# Patient Record
Sex: FEMALE | Race: WHITE | NOT HISPANIC OR LATINO | Employment: OTHER | URBAN - METROPOLITAN AREA
[De-identification: names, ages, dates, MRNs, and addresses within clinical notes are randomized per-mention and may not be internally consistent; named-entity substitution may affect disease eponyms.]

---

## 2023-11-06 ENCOUNTER — APPOINTMENT (OUTPATIENT)
Dept: RADIOLOGY | Facility: HOSPITAL | Age: 73
End: 2023-11-06
Payer: COMMERCIAL

## 2024-02-21 ENCOUNTER — HOSPITAL ENCOUNTER (EMERGENCY)
Facility: HOSPITAL | Age: 74
Discharge: LEFT BEFORE DISCHARGE | End: 2024-02-21
Payer: COMMERCIAL

## 2024-02-21 ENCOUNTER — APPOINTMENT (OUTPATIENT)
Dept: RADIOLOGY | Facility: HOSPITAL | Age: 74
End: 2024-02-21
Payer: COMMERCIAL

## 2024-02-21 VITALS
BODY MASS INDEX: 31.89 KG/M2 | DIASTOLIC BLOOD PRESSURE: 113 MMHG | HEART RATE: 73 BPM | TEMPERATURE: 98.1 F | RESPIRATION RATE: 19 BRPM | OXYGEN SATURATION: 97 % | HEIGHT: 63 IN | WEIGHT: 180 LBS | SYSTOLIC BLOOD PRESSURE: 145 MMHG

## 2024-02-21 LAB
ALBUMIN SERPL-MCNC: 3.5 G/DL (ref 3.5–5)
ALP BLD-CCNC: 123 U/L (ref 35–125)
ALT SERPL-CCNC: 11 U/L (ref 5–40)
ANION GAP SERPL CALC-SCNC: 10 MMOL/L
APPEARANCE UR: CLEAR
AST SERPL-CCNC: 17 U/L (ref 5–40)
BASOPHILS # BLD AUTO: 0.02 X10*3/UL (ref 0–0.1)
BASOPHILS NFR BLD AUTO: 0.2 %
BILIRUB SERPL-MCNC: 0.3 MG/DL (ref 0.1–1.2)
BILIRUB UR STRIP.AUTO-MCNC: NEGATIVE MG/DL
BUN SERPL-MCNC: 11 MG/DL (ref 8–25)
CALCIUM SERPL-MCNC: 9.3 MG/DL (ref 8.5–10.4)
CHLORIDE SERPL-SCNC: 97 MMOL/L (ref 97–107)
CO2 SERPL-SCNC: 30 MMOL/L (ref 24–31)
COLOR UR: ABNORMAL
CREAT SERPL-MCNC: 0.9 MG/DL (ref 0.4–1.6)
EGFRCR SERPLBLD CKD-EPI 2021: 68 ML/MIN/1.73M*2
EOSINOPHIL # BLD AUTO: 0.06 X10*3/UL (ref 0–0.4)
EOSINOPHIL NFR BLD AUTO: 0.6 %
ERYTHROCYTE [DISTWIDTH] IN BLOOD BY AUTOMATED COUNT: 15.9 % (ref 11.5–14.5)
GLUCOSE SERPL-MCNC: 102 MG/DL (ref 65–99)
GLUCOSE UR STRIP.AUTO-MCNC: NORMAL MG/DL
HCT VFR BLD AUTO: 43.5 % (ref 36–46)
HGB BLD-MCNC: 14.1 G/DL (ref 12–16)
IMM GRANULOCYTES # BLD AUTO: 0.03 X10*3/UL (ref 0–0.5)
IMM GRANULOCYTES NFR BLD AUTO: 0.3 % (ref 0–0.9)
KETONES UR STRIP.AUTO-MCNC: NEGATIVE MG/DL
LEUKOCYTE ESTERASE UR QL STRIP.AUTO: ABNORMAL
LIPASE SERPL-CCNC: 16 U/L (ref 16–63)
LYMPHOCYTES # BLD AUTO: 2.05 X10*3/UL (ref 0.8–3)
LYMPHOCYTES NFR BLD AUTO: 21.5 %
MCH RBC QN AUTO: 30.5 PG (ref 26–34)
MCHC RBC AUTO-ENTMCNC: 32.4 G/DL (ref 32–36)
MCV RBC AUTO: 94 FL (ref 80–100)
MONOCYTES # BLD AUTO: 0.49 X10*3/UL (ref 0.05–0.8)
MONOCYTES NFR BLD AUTO: 5.1 %
NEUTROPHILS # BLD AUTO: 6.89 X10*3/UL (ref 1.6–5.5)
NEUTROPHILS NFR BLD AUTO: 72.3 %
NITRITE UR QL STRIP.AUTO: NEGATIVE
NRBC BLD-RTO: 0 /100 WBCS (ref 0–0)
PH UR STRIP.AUTO: 7 [PH]
PLATELET # BLD AUTO: 316 X10*3/UL (ref 150–450)
POTASSIUM SERPL-SCNC: 3.2 MMOL/L (ref 3.4–5.1)
PROT SERPL-MCNC: 7.6 G/DL (ref 5.9–7.9)
PROT UR STRIP.AUTO-MCNC: NEGATIVE MG/DL
RBC # BLD AUTO: 4.63 X10*6/UL (ref 4–5.2)
RBC # UR STRIP.AUTO: NEGATIVE /UL
RBC #/AREA URNS AUTO: NORMAL /HPF
SODIUM SERPL-SCNC: 137 MMOL/L (ref 133–145)
SP GR UR STRIP.AUTO: 1.01
SQUAMOUS #/AREA URNS AUTO: NORMAL /HPF
TROPONIN T SERPL-MCNC: 21 NG/L
UROBILINOGEN UR STRIP.AUTO-MCNC: NORMAL MG/DL
WBC # BLD AUTO: 9.5 X10*3/UL (ref 4.4–11.3)
WBC #/AREA URNS AUTO: NORMAL /HPF

## 2024-02-21 PROCEDURE — 80053 COMPREHEN METABOLIC PANEL: CPT | Performed by: STUDENT IN AN ORGANIZED HEALTH CARE EDUCATION/TRAINING PROGRAM

## 2024-02-21 PROCEDURE — 85025 COMPLETE CBC W/AUTO DIFF WBC: CPT | Performed by: STUDENT IN AN ORGANIZED HEALTH CARE EDUCATION/TRAINING PROGRAM

## 2024-02-21 PROCEDURE — 71046 X-RAY EXAM CHEST 2 VIEWS: CPT | Mod: FOREIGN READ | Performed by: RADIOLOGY

## 2024-02-21 PROCEDURE — 99284 EMERGENCY DEPT VISIT MOD MDM: CPT | Mod: 25

## 2024-02-21 PROCEDURE — 83690 ASSAY OF LIPASE: CPT | Performed by: CLINICAL NURSE SPECIALIST

## 2024-02-21 PROCEDURE — 87086 URINE CULTURE/COLONY COUNT: CPT | Mod: TRILAB,WESLAB | Performed by: CLINICAL NURSE SPECIALIST

## 2024-02-21 PROCEDURE — 84484 ASSAY OF TROPONIN QUANT: CPT | Performed by: STUDENT IN AN ORGANIZED HEALTH CARE EDUCATION/TRAINING PROGRAM

## 2024-02-21 PROCEDURE — 81001 URINALYSIS AUTO W/SCOPE: CPT | Performed by: CLINICAL NURSE SPECIALIST

## 2024-02-21 PROCEDURE — 74176 CT ABD & PELVIS W/O CONTRAST: CPT

## 2024-02-21 PROCEDURE — 36415 COLL VENOUS BLD VENIPUNCTURE: CPT | Performed by: STUDENT IN AN ORGANIZED HEALTH CARE EDUCATION/TRAINING PROGRAM

## 2024-02-21 PROCEDURE — 74176 CT ABD & PELVIS W/O CONTRAST: CPT | Mod: FOREIGN READ | Performed by: RADIOLOGY

## 2024-02-21 PROCEDURE — 71046 X-RAY EXAM CHEST 2 VIEWS: CPT

## 2024-02-21 RX ORDER — ACETAMINOPHEN 325 MG/1
650 TABLET ORAL ONCE
Status: DISCONTINUED | OUTPATIENT
Start: 2024-02-21 | End: 2024-02-22 | Stop reason: HOSPADM

## 2024-02-21 RX ORDER — IPRATROPIUM BROMIDE AND ALBUTEROL SULFATE 2.5; .5 MG/3ML; MG/3ML
3 SOLUTION RESPIRATORY (INHALATION)
Status: DISCONTINUED | OUTPATIENT
Start: 2024-02-21 | End: 2024-02-22 | Stop reason: HOSPADM

## 2024-02-21 RX ORDER — ONDANSETRON HYDROCHLORIDE 2 MG/ML
4 INJECTION, SOLUTION INTRAVENOUS ONCE
Status: DISCONTINUED | OUTPATIENT
Start: 2024-02-21 | End: 2024-02-22 | Stop reason: HOSPADM

## 2024-02-21 ASSESSMENT — PAIN DESCRIPTION - FREQUENCY: FREQUENCY: CONSTANT/CONTINUOUS

## 2024-02-21 ASSESSMENT — PAIN DESCRIPTION - DESCRIPTORS: DESCRIPTORS: TIGHTNESS

## 2024-02-21 ASSESSMENT — PAIN DESCRIPTION - PAIN TYPE: TYPE: ACUTE PAIN

## 2024-02-21 ASSESSMENT — PAIN DESCRIPTION - ORIENTATION: ORIENTATION: ANTERIOR

## 2024-02-21 ASSESSMENT — PAIN - FUNCTIONAL ASSESSMENT: PAIN_FUNCTIONAL_ASSESSMENT: 0-10

## 2024-02-21 ASSESSMENT — PAIN DESCRIPTION - LOCATION: LOCATION: HEAD

## 2024-02-21 ASSESSMENT — PAIN SCALES - GENERAL: PAINLEVEL_OUTOF10: 8

## 2024-02-22 ENCOUNTER — HOSPITAL ENCOUNTER (EMERGENCY)
Facility: HOSPITAL | Age: 74
Discharge: HOME | End: 2024-02-22
Attending: STUDENT IN AN ORGANIZED HEALTH CARE EDUCATION/TRAINING PROGRAM
Payer: COMMERCIAL

## 2024-02-22 ENCOUNTER — PHARMACY VISIT (OUTPATIENT)
Dept: PHARMACY | Facility: CLINIC | Age: 74
End: 2024-02-22
Payer: MEDICARE

## 2024-02-22 VITALS
BODY MASS INDEX: 31.89 KG/M2 | HEIGHT: 63 IN | TEMPERATURE: 96.8 F | SYSTOLIC BLOOD PRESSURE: 162 MMHG | RESPIRATION RATE: 16 BRPM | WEIGHT: 180 LBS | OXYGEN SATURATION: 99 % | DIASTOLIC BLOOD PRESSURE: 82 MMHG | HEART RATE: 73 BPM

## 2024-02-22 DIAGNOSIS — I10 PRIMARY HYPERTENSION: ICD-10-CM

## 2024-02-22 DIAGNOSIS — H66.003 ACUTE SUPPURATIVE OTITIS MEDIA OF BOTH EARS WITHOUT SPONTANEOUS RUPTURE OF TYMPANIC MEMBRANES, RECURRENCE NOT SPECIFIED: Primary | ICD-10-CM

## 2024-02-22 DIAGNOSIS — J01.90 ACUTE SINUSITIS, RECURRENCE NOT SPECIFIED, UNSPECIFIED LOCATION: ICD-10-CM

## 2024-02-22 PROCEDURE — 99283 EMERGENCY DEPT VISIT LOW MDM: CPT

## 2024-02-22 PROCEDURE — 2500000004 HC RX 250 GENERAL PHARMACY W/ HCPCS (ALT 636 FOR OP/ED): Performed by: PHYSICIAN ASSISTANT

## 2024-02-22 PROCEDURE — 2500000002 HC RX 250 W HCPCS SELF ADMINISTERED DRUGS (ALT 637 FOR MEDICARE OP, ALT 636 FOR OP/ED): Performed by: PHYSICIAN ASSISTANT

## 2024-02-22 PROCEDURE — RXMED WILLOW AMBULATORY MEDICATION CHARGE

## 2024-02-22 RX ORDER — PREDNISONE 20 MG/1
40 TABLET ORAL ONCE
Status: COMPLETED | OUTPATIENT
Start: 2024-02-22 | End: 2024-02-22

## 2024-02-22 RX ORDER — AMOXICILLIN AND CLAVULANATE POTASSIUM 875; 125 MG/1; MG/1
1 TABLET, FILM COATED ORAL EVERY 12 HOURS
Qty: 14 TABLET | Refills: 0 | Status: SHIPPED | OUTPATIENT
Start: 2024-02-22 | End: 2024-02-29

## 2024-02-22 RX ORDER — PREDNISONE 20 MG/1
TABLET ORAL
Qty: 13 TABLET | Refills: 0 | Status: SHIPPED | OUTPATIENT
Start: 2024-02-22 | End: 2024-03-02

## 2024-02-22 RX ORDER — IPRATROPIUM BROMIDE AND ALBUTEROL SULFATE 2.5; .5 MG/3ML; MG/3ML
3 SOLUTION RESPIRATORY (INHALATION) ONCE
Status: COMPLETED | OUTPATIENT
Start: 2024-02-22 | End: 2024-02-22

## 2024-02-22 RX ADMIN — IPRATROPIUM BROMIDE AND ALBUTEROL SULFATE 3 ML: 2.5; .5 SOLUTION RESPIRATORY (INHALATION) at 10:41

## 2024-02-22 RX ADMIN — PREDNISONE 40 MG: 20 TABLET ORAL at 10:41

## 2024-02-22 ASSESSMENT — COLUMBIA-SUICIDE SEVERITY RATING SCALE - C-SSRS
6. HAVE YOU EVER DONE ANYTHING, STARTED TO DO ANYTHING, OR PREPARED TO DO ANYTHING TO END YOUR LIFE?: NO
2. HAVE YOU ACTUALLY HAD ANY THOUGHTS OF KILLING YOURSELF?: NO
1. IN THE PAST MONTH, HAVE YOU WISHED YOU WERE DEAD OR WISHED YOU COULD GO TO SLEEP AND NOT WAKE UP?: NO

## 2024-02-22 NOTE — ED TRIAGE NOTES
Patient presents to the emergency department for multiple complaints.  Patient reports has been sick for 3 weeks with a sinus congestion.  Was given an inhaler which seemed to help her symptoms but today went to use it and had to be throw up.  She is also being treated by her primary care physician for blood pressure she was started on new blood pressure medication 2 weeks ago and noted that her blood pressure was elevated.  She complains of a headache described as a vice on her forehead related to her sinus pressure.  No fall or injury no blood thinners.  She has a cough with congestion that is productive.  She is a smoker.  Intermittent wheezing and shortness of breath.  Complains of intermittent chest pain that started on her way into the emergency department via EMS no pain at this time.  And also abdominal pain with history of diverticulitis.  She denies pressure burning or frequency of urination.  No increased weight gain  EMS report noted to have pressure of 203 systolic.  Patient states she is very sensitive to medications.  Has  5 times secondary to medication use.  Clinical presentation:  General: Calm cooperative well-nourished well-hydrated nontoxic in female no acute distress.  Blood pressure is elevated 145/113  HEENT: Head normocephalic atraumatic speech is clear facial movement symmetrical no focal deficits noted.  Pupils are 3 brisk reactive to light.  No pain palpation  Frontal sinuses.  Bilateral tympanic membranes pearly gray and intact.  No sign of otitis media otitis externa or mastoiditis.  Posterior pharynx not erythematous no exudate noted uvula midline palate is symmetrical  Neck: No pain palpation of the cervical spine no step-offs crepitus or bruising.  No cervical adenopathy trachea midline no stridor trismus noted  Heart: Regular rate and rhythm S1-S2 no murmurs rubs or gallops  Lungs: Diminished loose barky nonproductive cough scattered wheezing bilateral upper posterior lobes no  use of accessory muscles or nasal flaring noted.  Cough is nonproductive no pursed of breathing or tripod positioning noted  Abdomen: Soft diffuse tenderness no rebound or guarding negative for [place] sign abraised point tenderness.  No peritoneal signs noted.  Back: No pain palpation of the cervical thoracic or lumbar spine no step-offs crepitus or bruising no CVA tenderness no saddle paresthesias      Differentials include but not limited to viral illness COVID versus flu versus RSV versus pneumonia versus COPD exacerbation versus electrolyte abnormality versus dehydration versus acute coronary syndrome versus obstruction versus diverticulitis versus acute cholecystitis versus UTI    Plan:  Tylenol  DuoNeb  CT abdomen pelvis  Chest x-ray  EKG  CBC  CMP  Lipase  RSV COVID flu  Troponin  Urine    Patient seen via provider in triage.  There was a limited examination due to the triage capacity.  The appropriate studies have been ordered in triage.  Patient should be seen for full evaluation/examination as well as follow-up for any completed imaging or lab work.  Parts of this chart  have been completed using voice recognition software.  Please excuse any errors of transcription.  Despite the medical decision-making time stamp above medical decision making has taken place during the patient's entire visit.

## 2024-02-22 NOTE — ED PROVIDER NOTES
Patient was seen by both myself and advanced practitioner.  I personally saw the patient and made/approved the management plan and take responsibility for the patient management     Patient is a 73-year-old female with a history of hypertension that presents here to department for evaluation of anxiety, mild shortness of breath on exertion and a mild tension-like headache.  Patient states that she was seen in the emergency department yesterday however left without receiving her results prior to being placed in inpatient exam room.  She states that she has been having increasing anxiety recently due to her current living situation with her daughter however today is supposed to be moving in with another daughter and her son-in-law in [place].  She states this is making her feel much more relaxed and safe.  She states she still has some mild shortness of breath on exertion however the headache has significantly improved.  She denies neck pain or stiffness, fever, chills, abdominal pain, nausea or vomiting.  Patient came back in today because she was concerned and wanted to follow-up on her results from yesterday.    On exam patient resting comfortably in no obvious distress.  She is awake, alert and oriented.  She does have very mild expiratory wheeze present bilaterally however there is no respiratory distress and oxygen saturation is normal at 99% on room air.  She does have bilateral erythema and middle ear effusion but the tympanic membranes.  Regular rate and rhythm on auscultation of the heart.  Abdomen soft, nontender, nondistended.  Reviewed patient's blood work from yesterday which was unremarkable including only slightly decreased potassium at 3.2, otherwise normal electrolytes.  She has normal white count, slight leukocyte esterase present in the urine however no bacteria and patient does not have any urinary symptoms.  History and exam does appear to be consistent with an upper respiratory tract infection  and given her history of COPD with slight wheezing patient will be started on p.o. prednisone.  She does have inhaler at home.  She also has otitis media on clinical exam and given that it is bilateral and has been going on for 2 weeks she will be started on Augmentin.  Patient has remained hemodynamically stable and resting comfortably on reevaluation and is felt to be safe for discharge home at this time.  Return precautions were discussed with patient.     Kalpesh Sevilla, DO  02/22/24 6151

## 2024-02-22 NOTE — ED PROVIDER NOTES
HPI   Chief Complaint   Patient presents with    Hypertension     Pt to ER for eval of high blood pressure readings @ home, states she has been more stressed recently due to living situation. Pt states she was placed on medication for HTN 2 weeks ago. Pt has chronic sinusitis as well. Denies any CP, SOB, headache and blurry vision. Pt ambulated to room no distress noted.        A 73-year-old female who presents emergency department due to follow-up on imaging and laboratory results from yesterday evening.  Patient left the emergency department yesterday evening without receiving results of tests.  She was seen by the provider in triage but then did not want a wait to see a provider.  She presented to the emergency department due to concerns of high blood pressure with a tension-like headache.  She states that she also has 2 weeks of sinus congestion but she has a history of chronic sinusitis since she can remember.  She states she is living with her son and daughter in law.  She states that her daughter-in-law causes her stress and it is not a safe environment for her to live in.  She states that she has arranged plans to go live with her daughter and son-in-law in [place].  She is going there today.  She previously lived with her son in [place] and she feels safe there.  She states that she believes her high blood pressure was related to her current living situation.  She states that when her daughter in law causes her stress she will have a tension type headache and experience shortness of breath.  She will have elevated blood pressure at this time.  Currently she has a mild headache in the frontal region.  She denies any current shortness of breath, chest pain, abdominal pain, fevers or chills.  She has no numbness or tingling in the upper or lower extremities bilaterally.  She denies any visual disturbances.  She states she has nasal congestion 2 weeks.  She tried to get in with her primary care doctor today  to follow-up on her test results from the emergency department yesterday evening however she did not have a ride to her primary care doctor's office so she called EMS to bring her back to the emergency department.  She takes losartan 50 mg every morning for blood pressure.        Please see HPI for pertinent positive and negative ROS.                   [place] Coma Scale Score: 15                     Patient History   No past medical history on file.  No past surgical history on file.  No family history on file.  Social History     Tobacco Use    Smoking status: Not on file    Smokeless tobacco: Not on file   Substance Use Topics    Alcohol use: Not on file    Drug use: Not on file       Physical Exam   ED Triage Vitals [02/22/24 0853]   Temperature Heart Rate Respirations BP   36 °C (96.8 °F) 93 18 166/86      Pulse Ox Temp src Heart Rate Source Patient Position   99 % -- -- --      BP Location FiO2 (%)     -- --       Physical Exam  GENERAL APPEARANCE: Awake and alert. No acute distress.   VITAL SIGNS: As per the nurses' triage record.  HEENT: Normocephalic, atraumatic. Extraocular muscles are intact. Conjunctiva are pink. Negative scleral icterus. Mucous membranes are moist. Tongue in the midline. Oropharynx clear, uvula midline.   NECK: Soft, nontender and supple, full gross ROM, no meningeal signs.  TMs dull with erythema bilaterally.  No mastoid bone erythema or edema bilaterally.  Swollen erythematous inferior turbinates bilaterally.  CHEST: Nontender to palpation.  Diffuse return expiratory wheezing bilaterally.  Symmetric rise and fall of chest wall.   HEART: Clear S1 and S2. Regular rate and rhythm. No murmurs appreciated on auscultation.  Strong and equal pulses in the extremities.  ABDOMEN: Soft, nontender, nondistended, positive bowel sounds, no palpable masses.  MUSCULOSKELETAL: The calves are nontender to palpation.  No pedal edema bilaterally.  Full gross active range of motion. Ambulating on own  with no acute difficulties  NEUROLOGICAL: Awake, alert and oriented x 3. Motor power intact in the upper and lower extremities. Sensation is intact to light touch in the upper and lower extremities. Patient answering questions appropriately.  Cranial nerves II through XII grossly intact bilaterally.  IMMUNOLOGICAL: No lymphatic streaking noted  DERMATOLOGIC: Warm and dry without petechiae, rashes, or ecchymosis noted on visible skin.   PYSCH: Cooperative with appropriate mood and affect.  ED Course & MDM   Diagnoses as of 02/22/24 1937   Acute suppurative otitis media of both ears without spontaneous rupture of tympanic membranes, recurrence not specified   Acute sinusitis, recurrence not specified, unspecified location   Primary hypertension       Medical Decision Making  Parts of this chart have been completed using voice recognition software. Please excuse any errors of transcription.  My thought process and reason for plan has been formulated from the time that I saw the patient until the time of disposition and is not specific to one specific moment during their visit and furthermore my MDM encompasses this entire chart and not only this text box.      HPI: Detailed above.    Exam: A medically appropriate exam performed, outlined above, given the known history and presentation.    History obtained from: Patient    Social Determinants of Health considered during this visit: The patient was currently living with her son and daughter-in-law.  She states that her daughter-in-law made her feel stressed.  She believes that is contributing to her high blood pressure.  The patient states that she has plans to move in with her son in [place] today.  She is very happy with this plan and feels safe with this plan.  She states that all of the plans are in place to move in with her son in [place] today.  She states she does not need to talk to a  in regards to her current living situation as she already has  plans in place to move into a safe environment today with her son in [place].    Medications given during visit:  Medications   ipratropium-albuteroL (Duo-Neb) 0.5-2.5 mg/3 mL nebulizer solution 3 mL (3 mL nebulization Given 2/22/24 1041)   predniSONE (Deltasone) tablet 40 mg (40 mg oral Given 2/22/24 1041)        Diagnostic/tests  Labs Reviewed - No data to display   No orders to display        Considerations/further MDM:  Patient was seen in conjucntion with my supervising physician,  Dr. Sevilla. Please refer to his note.    This is a 73-year-old female who presents to the emergency department with concerns of mild shortness of breath and hypertension with tension-like headache.  The patient was seen in the emergency department yesterday night, she was pitted but did not see provider as patient left before receiving results.  She is back to receive results.     Laboratory evaluation was fairly unremarkable other than a potassium of 3.2.  The patient's electrolytes were within normal limits.  CBC showed no leukocytosis or evidence of acute anemia.  Troponin T HS 21.  Urinalysis showed 25 leukocytes but no bacteria and patient has no urinary complaints.    Patient's blood pressure was 162/82 in the emergency department.  She clinically appears to have acute sinusitis with bilateral acute otitis media.  She also had some inspiratory expiratory wheezing bilaterally which patient was treated for with a DuoNeb.  Her symptoms improved.  Of note chest x-ray yesterday night showed no acute cardiopulmonary process.  No leukocytosis so no concern for pneumonia.     CT of abdomen pelvis with out IV contrast was done yesterday as well which showed evidence of vaginal versus rectal prolapse compared to previous study this has increased.  Patient knows of this.  There was mild to moderate left hydronephrosis and mild ureterectasis without ureteral calculus demonstrated.  No evidence of urinary tract infection on urinalysis  yesterday.  Only 25 leukocytes.     The patient was discharged with a prescription for prednisone, Augmentin.     The patient was evaluated in the emergency department and an etiology requiring emergent treatment or admission to hospital was not identified.  All labs, imaging, and diagnostic studies were reviewed by me and the patient was counseled on clinical impression, expectations, and plan.  Patient was educated to follow-up with PCP in the following 1 to 2 days.  The patient felt comfortable with discharge plan home.  She is moving in with her son today and is happy and safe with this plan.  All questions from patient were answered.  They elicited understanding and were agreeable to course of treatment.  Patient was discharged in stable condition and given strict return precautions including new or worsening symptoms.    Procedure  Procedures     Audrey Whelan PA-C  02/22/24 1937

## 2024-02-22 NOTE — DISCHARGE INSTRUCTIONS
Follow-up with your primary care doctor in the next 1 to 2 days.  Listed on your discharge paperwork is a ENT doctor for you to follow-up with.  Please call his office to schedule an appointment.  Please to continue losartan as prescribed.    Please start Augmentin for sinusitis and ear infection and prednisone.  Continue albuterol inhaler    Return to the emergency department asap with new or worsening symptoms or the onset of new symptoms.

## 2024-02-22 NOTE — ED TRIAGE NOTES
Pt to ER for eval of high blood pressure readings @ home, states she has been more stressed recently due to living situation. Pt states she was placed on medication for HTN 2 weeks ago. Pt has chronic sinusitis as well. Denies any CP, SOB, headache and blurry vision. Pt ambulated to room no distress noted

## 2024-02-23 LAB — BACTERIA UR CULT: NORMAL

## 2024-03-27 ENCOUNTER — HOSPITAL ENCOUNTER (INPATIENT)
Facility: HOSPITAL | Age: 74
LOS: 5 days | Discharge: HOME | DRG: 193 | End: 2024-04-01
Attending: STUDENT IN AN ORGANIZED HEALTH CARE EDUCATION/TRAINING PROGRAM | Admitting: INTERNAL MEDICINE
Payer: COMMERCIAL

## 2024-03-27 ENCOUNTER — APPOINTMENT (OUTPATIENT)
Dept: CARDIOLOGY | Facility: HOSPITAL | Age: 74
DRG: 193 | End: 2024-03-27
Payer: COMMERCIAL

## 2024-03-27 ENCOUNTER — APPOINTMENT (OUTPATIENT)
Dept: RADIOLOGY | Facility: HOSPITAL | Age: 74
DRG: 193 | End: 2024-03-27
Payer: COMMERCIAL

## 2024-03-27 DIAGNOSIS — I48.91 ATRIAL FIBRILLATION WITH RAPID VENTRICULAR RESPONSE (MULTI): ICD-10-CM

## 2024-03-27 DIAGNOSIS — J40 BRONCHITIS: ICD-10-CM

## 2024-03-27 DIAGNOSIS — I50.43 CHF (CONGESTIVE HEART FAILURE), NYHA CLASS I, ACUTE ON CHRONIC, COMBINED (MULTI): ICD-10-CM

## 2024-03-27 PROBLEM — R06.00 DYSPNEA: Status: RESOLVED | Noted: 2024-03-27 | Resolved: 2024-03-27

## 2024-03-27 PROBLEM — R53.83 FATIGUE: Status: RESOLVED | Noted: 2023-10-19 | Resolved: 2024-03-27

## 2024-03-27 PROBLEM — E87.6 HYPOKALEMIA: Status: RESOLVED | Noted: 2023-10-19 | Resolved: 2024-03-27

## 2024-03-27 LAB
ALBUMIN SERPL-MCNC: 3.5 G/DL (ref 3.5–5)
ALP BLD-CCNC: 120 U/L (ref 35–125)
ALT SERPL-CCNC: 73 U/L (ref 5–40)
ANION GAP SERPL CALC-SCNC: 14 MMOL/L
APPEARANCE UR: ABNORMAL
AST SERPL-CCNC: 42 U/L (ref 5–40)
ATRIAL RATE: 159 BPM
BACTERIA #/AREA URNS AUTO: ABNORMAL /HPF
BASOPHILS # BLD AUTO: 0.03 X10*3/UL (ref 0–0.1)
BASOPHILS NFR BLD AUTO: 0.3 %
BILIRUB SERPL-MCNC: 0.8 MG/DL (ref 0.1–1.2)
BILIRUB UR STRIP.AUTO-MCNC: NEGATIVE MG/DL
BUN SERPL-MCNC: 19 MG/DL (ref 8–25)
CALCIUM SERPL-MCNC: 9.2 MG/DL (ref 8.5–10.4)
CHLORIDE SERPL-SCNC: 101 MMOL/L (ref 97–107)
CO2 SERPL-SCNC: 27 MMOL/L (ref 24–31)
COLOR UR: YELLOW
CREAT SERPL-MCNC: 1.1 MG/DL (ref 0.4–1.6)
EGFRCR SERPLBLD CKD-EPI 2021: 53 ML/MIN/1.73M*2
EOSINOPHIL # BLD AUTO: 0.06 X10*3/UL (ref 0–0.4)
EOSINOPHIL NFR BLD AUTO: 0.6 %
ERYTHROCYTE [DISTWIDTH] IN BLOOD BY AUTOMATED COUNT: 16.4 % (ref 11.5–14.5)
FLUAV RNA RESP QL NAA+PROBE: NOT DETECTED
FLUBV RNA RESP QL NAA+PROBE: NOT DETECTED
GLUCOSE SERPL-MCNC: 97 MG/DL (ref 65–99)
GLUCOSE UR STRIP.AUTO-MCNC: NORMAL MG/DL
HCT VFR BLD AUTO: 44.4 % (ref 36–46)
HGB BLD-MCNC: 14.1 G/DL (ref 12–16)
IMM GRANULOCYTES # BLD AUTO: 0.03 X10*3/UL (ref 0–0.5)
IMM GRANULOCYTES NFR BLD AUTO: 0.3 % (ref 0–0.9)
KETONES UR STRIP.AUTO-MCNC: NEGATIVE MG/DL
LACTATE BLDV-SCNC: 1.4 MMOL/L (ref 0.4–2)
LEUKOCYTE ESTERASE UR QL STRIP.AUTO: ABNORMAL
LYMPHOCYTES # BLD AUTO: 2.33 X10*3/UL (ref 0.8–3)
LYMPHOCYTES NFR BLD AUTO: 22.3 %
MAGNESIUM SERPL-MCNC: 1.5 MG/DL (ref 1.6–3.1)
MCH RBC QN AUTO: 30.3 PG (ref 26–34)
MCHC RBC AUTO-ENTMCNC: 31.8 G/DL (ref 32–36)
MCV RBC AUTO: 96 FL (ref 80–100)
MONOCYTES # BLD AUTO: 0.72 X10*3/UL (ref 0.05–0.8)
MONOCYTES NFR BLD AUTO: 6.9 %
MUCOUS THREADS #/AREA URNS AUTO: ABNORMAL /LPF
NEUTROPHILS # BLD AUTO: 7.29 X10*3/UL (ref 1.6–5.5)
NEUTROPHILS NFR BLD AUTO: 69.6 %
NITRITE UR QL STRIP.AUTO: NEGATIVE
NRBC BLD-RTO: 0 /100 WBCS (ref 0–0)
NT-PROBNP SERPL-MCNC: 2664 PG/ML (ref 0–353)
PH UR STRIP.AUTO: 6.5 [PH]
PLATELET # BLD AUTO: 291 X10*3/UL (ref 150–450)
POTASSIUM SERPL-SCNC: 3.2 MMOL/L (ref 3.4–5.1)
PROT SERPL-MCNC: 6.5 G/DL (ref 5.9–7.9)
PROT UR STRIP.AUTO-MCNC: ABNORMAL MG/DL
Q ONSET: 221 MS
QRS COUNT: 25 BEATS
QRS DURATION: 84 MS
QT INTERVAL: 290 MS
QTC CALCULATION(BAZETT): 461 MS
QTC FREDERICIA: 395 MS
R AXIS: 69 DEGREES
RBC # BLD AUTO: 4.65 X10*6/UL (ref 4–5.2)
RBC # UR STRIP.AUTO: NEGATIVE /UL
RBC #/AREA URNS AUTO: ABNORMAL /HPF
RSV RNA RESP QL NAA+PROBE: NOT DETECTED
SARS-COV-2 RNA RESP QL NAA+PROBE: NOT DETECTED
SODIUM SERPL-SCNC: 142 MMOL/L (ref 133–145)
SP GR UR STRIP.AUTO: 1.01
SQUAMOUS #/AREA URNS AUTO: ABNORMAL /HPF
T AXIS: 256 DEGREES
T OFFSET: 366 MS
TROPONIN T SERPL-MCNC: 19 NG/L
TROPONIN T SERPL-MCNC: 20 NG/L
TROPONIN T SERPL-MCNC: 20 NG/L
UROBILINOGEN UR STRIP.AUTO-MCNC: NORMAL MG/DL
VENTRICULAR RATE: 152 BPM
WBC # BLD AUTO: 10.5 X10*3/UL (ref 4.4–11.3)
WBC #/AREA URNS AUTO: ABNORMAL /HPF

## 2024-03-27 PROCEDURE — 87040 BLOOD CULTURE FOR BACTERIA: CPT | Mod: TRILAB,91 | Performed by: PHYSICIAN ASSISTANT

## 2024-03-27 PROCEDURE — 71046 X-RAY EXAM CHEST 2 VIEWS: CPT | Performed by: RADIOLOGY

## 2024-03-27 PROCEDURE — 71046 X-RAY EXAM CHEST 2 VIEWS: CPT

## 2024-03-27 PROCEDURE — 85025 COMPLETE CBC W/AUTO DIFF WBC: CPT | Performed by: PHYSICIAN ASSISTANT

## 2024-03-27 PROCEDURE — 2500000002 HC RX 250 W HCPCS SELF ADMINISTERED DRUGS (ALT 637 FOR MEDICARE OP, ALT 636 FOR OP/ED): Performed by: PHYSICIAN ASSISTANT

## 2024-03-27 PROCEDURE — 2060000001 HC INTERMEDIATE ICU ROOM DAILY

## 2024-03-27 PROCEDURE — 36415 COLL VENOUS BLD VENIPUNCTURE: CPT | Performed by: PHYSICIAN ASSISTANT

## 2024-03-27 PROCEDURE — 99222 1ST HOSP IP/OBS MODERATE 55: CPT | Performed by: NURSE PRACTITIONER

## 2024-03-27 PROCEDURE — 81001 URINALYSIS AUTO W/SCOPE: CPT | Performed by: PHYSICIAN ASSISTANT

## 2024-03-27 PROCEDURE — 2500000005 HC RX 250 GENERAL PHARMACY W/O HCPCS: Performed by: PHYSICIAN ASSISTANT

## 2024-03-27 PROCEDURE — 99291 CRITICAL CARE FIRST HOUR: CPT

## 2024-03-27 PROCEDURE — 93010 ELECTROCARDIOGRAM REPORT: CPT | Performed by: INTERNAL MEDICINE

## 2024-03-27 PROCEDURE — 84484 ASSAY OF TROPONIN QUANT: CPT | Performed by: PHYSICIAN ASSISTANT

## 2024-03-27 PROCEDURE — 94640 AIRWAY INHALATION TREATMENT: CPT

## 2024-03-27 PROCEDURE — 96375 TX/PRO/DX INJ NEW DRUG ADDON: CPT

## 2024-03-27 PROCEDURE — 80053 COMPREHEN METABOLIC PANEL: CPT | Performed by: PHYSICIAN ASSISTANT

## 2024-03-27 PROCEDURE — 87086 URINE CULTURE/COLONY COUNT: CPT | Mod: TRILAB,WESLAB | Performed by: PHYSICIAN ASSISTANT

## 2024-03-27 PROCEDURE — 2500000001 HC RX 250 WO HCPCS SELF ADMINISTERED DRUGS (ALT 637 FOR MEDICARE OP): Performed by: NURSE PRACTITIONER

## 2024-03-27 PROCEDURE — 83735 ASSAY OF MAGNESIUM: CPT | Performed by: NURSE PRACTITIONER

## 2024-03-27 PROCEDURE — 83605 ASSAY OF LACTIC ACID: CPT | Performed by: PHYSICIAN ASSISTANT

## 2024-03-27 PROCEDURE — 93005 ELECTROCARDIOGRAM TRACING: CPT

## 2024-03-27 PROCEDURE — 2500000004 HC RX 250 GENERAL PHARMACY W/ HCPCS (ALT 636 FOR OP/ED): Performed by: PHYSICIAN ASSISTANT

## 2024-03-27 PROCEDURE — 96365 THER/PROPH/DIAG IV INF INIT: CPT

## 2024-03-27 PROCEDURE — 83880 ASSAY OF NATRIURETIC PEPTIDE: CPT | Performed by: PHYSICIAN ASSISTANT

## 2024-03-27 PROCEDURE — 96376 TX/PRO/DX INJ SAME DRUG ADON: CPT

## 2024-03-27 PROCEDURE — 87637 SARSCOV2&INF A&B&RSV AMP PRB: CPT | Performed by: PHYSICIAN ASSISTANT

## 2024-03-27 RX ORDER — DILTIAZEM HYDROCHLORIDE 5 MG/ML
10 INJECTION INTRAVENOUS ONCE
Status: COMPLETED | OUTPATIENT
Start: 2024-03-27 | End: 2024-03-27

## 2024-03-27 RX ORDER — PHENOL/SODIUM PHENOLATE
20 AEROSOL, SPRAY (ML) MUCOUS MEMBRANE
COMMUNITY
Start: 2023-10-19 | End: 2024-03-27 | Stop reason: ENTERED-IN-ERROR

## 2024-03-27 RX ORDER — ALBUTEROL SULFATE 90 UG/1
1 AEROSOL, METERED RESPIRATORY (INHALATION) EVERY 6 HOURS PRN
Status: ON HOLD | COMMUNITY
Start: 2024-02-15 | End: 2024-04-01 | Stop reason: SDUPTHER

## 2024-03-27 RX ORDER — IPRATROPIUM BROMIDE AND ALBUTEROL SULFATE 2.5; .5 MG/3ML; MG/3ML
3 SOLUTION RESPIRATORY (INHALATION) ONCE
Status: COMPLETED | OUTPATIENT
Start: 2024-03-27 | End: 2024-03-27

## 2024-03-27 RX ORDER — DILTIAZEM HCL/D5W 125 MG/125
5-15 PLASTIC BAG, INJECTION (ML) INTRAVENOUS CONTINUOUS
Status: DISCONTINUED | OUTPATIENT
Start: 2024-03-27 | End: 2024-03-27

## 2024-03-27 RX ORDER — DILTIAZEM HCL/D5W 125 MG/125
5-15 PLASTIC BAG, INJECTION (ML) INTRAVENOUS CONTINUOUS
Status: DISCONTINUED | OUTPATIENT
Start: 2024-03-27 | End: 2024-03-28

## 2024-03-27 RX ORDER — FUROSEMIDE 40 MG/1
40 TABLET ORAL DAILY
Status: DISCONTINUED | OUTPATIENT
Start: 2024-03-27 | End: 2024-03-28

## 2024-03-27 RX ORDER — ASPIRIN 81 MG/1
TABLET ORAL
COMMUNITY
End: 2024-03-27 | Stop reason: ENTERED-IN-ERROR

## 2024-03-27 RX ORDER — CEFTRIAXONE 1 G/50ML
1 INJECTION, SOLUTION INTRAVENOUS ONCE
Status: COMPLETED | OUTPATIENT
Start: 2024-03-27 | End: 2024-03-27

## 2024-03-27 RX ORDER — ACETAMINOPHEN 650 MG/1
650 SUPPOSITORY RECTAL EVERY 4 HOURS PRN
Status: DISCONTINUED | OUTPATIENT
Start: 2024-03-27 | End: 2024-04-01 | Stop reason: HOSPADM

## 2024-03-27 RX ORDER — ACETAMINOPHEN 160 MG/5ML
650 SOLUTION ORAL EVERY 4 HOURS PRN
Status: DISCONTINUED | OUTPATIENT
Start: 2024-03-27 | End: 2024-04-01 | Stop reason: HOSPADM

## 2024-03-27 RX ORDER — POTASSIUM CHLORIDE 20 MEQ/1
20 TABLET, EXTENDED RELEASE ORAL ONCE
Status: COMPLETED | OUTPATIENT
Start: 2024-03-27 | End: 2024-03-27

## 2024-03-27 RX ORDER — FUROSEMIDE 10 MG/ML
40 INJECTION INTRAMUSCULAR; INTRAVENOUS ONCE
Status: COMPLETED | OUTPATIENT
Start: 2024-03-27 | End: 2024-03-27

## 2024-03-27 RX ORDER — AMLODIPINE BESYLATE 5 MG/1
TABLET ORAL EVERY 24 HOURS
COMMUNITY
Start: 2018-04-09 | End: 2024-03-27 | Stop reason: ENTERED-IN-ERROR

## 2024-03-27 RX ORDER — AZITHROMYCIN 500 MG/1
500 TABLET, FILM COATED ORAL
Status: DISCONTINUED | OUTPATIENT
Start: 2024-03-27 | End: 2024-03-29

## 2024-03-27 RX ORDER — ACETAMINOPHEN 325 MG/1
650 TABLET ORAL EVERY 4 HOURS PRN
Status: DISCONTINUED | OUTPATIENT
Start: 2024-03-27 | End: 2024-04-01 | Stop reason: HOSPADM

## 2024-03-27 RX ORDER — IPRATROPIUM BROMIDE AND ALBUTEROL SULFATE 2.5; .5 MG/3ML; MG/3ML
3 SOLUTION RESPIRATORY (INHALATION) EVERY 6 HOURS PRN
Status: DISCONTINUED | OUTPATIENT
Start: 2024-03-27 | End: 2024-03-28

## 2024-03-27 RX ORDER — GUAIFENESIN 100 MG/5ML
200 SOLUTION ORAL EVERY 4 HOURS PRN
Status: DISCONTINUED | OUTPATIENT
Start: 2024-03-27 | End: 2024-04-01 | Stop reason: HOSPADM

## 2024-03-27 RX ORDER — METOPROLOL TARTRATE 25 MG/1
25 TABLET, FILM COATED ORAL 2 TIMES DAILY
Status: DISCONTINUED | OUTPATIENT
Start: 2024-03-27 | End: 2024-03-28

## 2024-03-27 RX ADMIN — Medication 5 MG/HR: at 16:05

## 2024-03-27 RX ADMIN — AZITHROMYCIN 500 MG: 500 TABLET, FILM COATED ORAL at 18:00

## 2024-03-27 RX ADMIN — CEFTRIAXONE SODIUM 1 G: 1 INJECTION, SOLUTION INTRAVENOUS at 16:03

## 2024-03-27 RX ADMIN — POTASSIUM CHLORIDE 20 MEQ: 1500 TABLET, EXTENDED RELEASE ORAL at 15:32

## 2024-03-27 RX ADMIN — SODIUM CHLORIDE 500 ML: 9 INJECTION, SOLUTION INTRAVENOUS at 13:40

## 2024-03-27 RX ADMIN — APIXABAN 5 MG: 5 TABLET, FILM COATED ORAL at 20:52

## 2024-03-27 RX ADMIN — IPRATROPIUM BROMIDE AND ALBUTEROL SULFATE 3 ML: 2.5; .5 SOLUTION RESPIRATORY (INHALATION) at 16:01

## 2024-03-27 RX ADMIN — IPRATROPIUM BROMIDE AND ALBUTEROL SULFATE 3 ML: .5; 3 SOLUTION RESPIRATORY (INHALATION) at 15:50

## 2024-03-27 RX ADMIN — DILTIAZEM HYDROCHLORIDE 10 MG: 5 INJECTION INTRAVENOUS at 15:32

## 2024-03-27 RX ADMIN — METOPROLOL TARTRATE 25 MG: 25 TABLET, FILM COATED ORAL at 20:49

## 2024-03-27 RX ADMIN — FUROSEMIDE 40 MG: 10 INJECTION, SOLUTION INTRAMUSCULAR; INTRAVENOUS at 15:57

## 2024-03-27 SDOH — SOCIAL STABILITY: SOCIAL INSECURITY: HAS ANYONE EVER THREATENED TO HURT YOUR FAMILY OR YOUR PETS?: NO

## 2024-03-27 SDOH — SOCIAL STABILITY: SOCIAL INSECURITY: ARE YOU OR HAVE YOU BEEN THREATENED OR ABUSED PHYSICALLY, EMOTIONALLY, OR SEXUALLY BY ANYONE?: NO

## 2024-03-27 SDOH — SOCIAL STABILITY: SOCIAL INSECURITY: DO YOU FEEL ANYONE HAS EXPLOITED OR TAKEN ADVANTAGE OF YOU FINANCIALLY OR OF YOUR PERSONAL PROPERTY?: NO

## 2024-03-27 SDOH — SOCIAL STABILITY: SOCIAL INSECURITY: DOES ANYONE TRY TO KEEP YOU FROM HAVING/CONTACTING OTHER FRIENDS OR DOING THINGS OUTSIDE YOUR HOME?: NO

## 2024-03-27 SDOH — SOCIAL STABILITY: SOCIAL INSECURITY: WERE YOU ABLE TO COMPLETE ALL THE BEHAVIORAL HEALTH SCREENINGS?: YES

## 2024-03-27 SDOH — SOCIAL STABILITY: SOCIAL INSECURITY: ARE THERE ANY APPARENT SIGNS OF INJURIES/BEHAVIORS THAT COULD BE RELATED TO ABUSE/NEGLECT?: NO

## 2024-03-27 SDOH — SOCIAL STABILITY: SOCIAL INSECURITY: ABUSE: ADULT

## 2024-03-27 SDOH — SOCIAL STABILITY: SOCIAL INSECURITY: HAVE YOU HAD THOUGHTS OF HARMING ANYONE ELSE?: NO

## 2024-03-27 SDOH — SOCIAL STABILITY: SOCIAL INSECURITY: DO YOU FEEL UNSAFE GOING BACK TO THE PLACE WHERE YOU ARE LIVING?: NO

## 2024-03-27 ASSESSMENT — COGNITIVE AND FUNCTIONAL STATUS - GENERAL
DAILY ACTIVITIY SCORE: 24
PATIENT BASELINE BEDBOUND: NO
MOBILITY SCORE: 24

## 2024-03-27 ASSESSMENT — ACTIVITIES OF DAILY LIVING (ADL)
BATHING: NEEDS ASSISTANCE
JUDGMENT_ADEQUATE_SAFELY_COMPLETE_DAILY_ACTIVITIES: YES
ADEQUATE_TO_COMPLETE_ADL: YES
DRESSING YOURSELF: INDEPENDENT
FEEDING YOURSELF: INDEPENDENT
GROOMING: INDEPENDENT
WALKS IN HOME: INDEPENDENT
HEARING - RIGHT EAR: FUNCTIONAL
HEARING - LEFT EAR: FUNCTIONAL
LACK_OF_TRANSPORTATION: YES
TOILETING: NEEDS ASSISTANCE
PATIENT'S MEMORY ADEQUATE TO SAFELY COMPLETE DAILY ACTIVITIES?: YES

## 2024-03-27 ASSESSMENT — PAIN - FUNCTIONAL ASSESSMENT: PAIN_FUNCTIONAL_ASSESSMENT: 0-10

## 2024-03-27 ASSESSMENT — LIFESTYLE VARIABLES
AUDIT-C TOTAL SCORE: 2
SUBSTANCE_ABUSE_PAST_12_MONTHS: NO
AUDIT-C TOTAL SCORE: 2
SKIP TO QUESTIONS 9-10: 0
PRESCIPTION_ABUSE_PAST_12_MONTHS: NO
HOW OFTEN DO YOU HAVE A DRINK CONTAINING ALCOHOL: MONTHLY OR LESS
HOW OFTEN DO YOU HAVE 6 OR MORE DRINKS ON ONE OCCASION: LESS THAN MONTHLY
HOW MANY STANDARD DRINKS CONTAINING ALCOHOL DO YOU HAVE ON A TYPICAL DAY: 1 OR 2

## 2024-03-27 ASSESSMENT — PAIN SCALES - GENERAL
PAINLEVEL_OUTOF10: 0 - NO PAIN
PAINLEVEL_OUTOF10: 6
PAINLEVEL_OUTOF10: 7

## 2024-03-27 ASSESSMENT — ENCOUNTER SYMPTOMS
SHORTNESS OF BREATH: 1
COUGH: 1

## 2024-03-27 ASSESSMENT — PATIENT HEALTH QUESTIONNAIRE - PHQ9
2. FEELING DOWN, DEPRESSED OR HOPELESS: NOT AT ALL
SUM OF ALL RESPONSES TO PHQ9 QUESTIONS 1 & 2: 0
1. LITTLE INTEREST OR PLEASURE IN DOING THINGS: NOT AT ALL

## 2024-03-27 ASSESSMENT — COLUMBIA-SUICIDE SEVERITY RATING SCALE - C-SSRS
2. HAVE YOU ACTUALLY HAD ANY THOUGHTS OF KILLING YOURSELF?: NO
1. IN THE PAST MONTH, HAVE YOU WISHED YOU WERE DEAD OR WISHED YOU COULD GO TO SLEEP AND NOT WAKE UP?: NO
6. HAVE YOU EVER DONE ANYTHING, STARTED TO DO ANYTHING, OR PREPARED TO DO ANYTHING TO END YOUR LIFE?: NO

## 2024-03-27 NOTE — PROGRESS NOTES
Pharmacy Medication History Review    Constance M Kocher is a 73 y.o. female admitted for  SOB. Pharmacy reviewed the patient's [id number] medications and allergies for accuracy.    The list below reflectives the updated PTA list. Please review each medication in order reconciliation for additional clarification and justification.  Prior to Admission Medications   Prescriptions Last Dose Informant   albuterol 90 mcg/actuation inhaler 3/26/2024 at am    Sig: Inhale 1 puff every 6 hours if needed.      Facility-Administered Medications: None          The list below reflectives the updated allergy list. Please review each documented allergy for additional clarification and justification.  Allergies  Reviewed by Marita Farnk CPhT on 3/27/2024        Severity Reactions Comments    Iodinated Contrast Media High Anaphylaxis             Below are additional concerns with the patient's PTA list.  - pt says she is against pharmaceuticals and believes she is allergic to pill form medications.     MARITA FRANK CPhT

## 2024-03-27 NOTE — H&P
"History Of Present Illness  Constance M Kocher is a 73 y.o. female, known to have HTN with med non-compliance, who presents to the ED for evaluation of 6-8 week cough and dyspnea. The patient is a poor historian. She does not have a PCP or see a physician on a regular basis. The patient states prefers to \"just go to the ED or Urgent Care when needed\". The patient reports was told she has high blood pressure at some point however admits to stopping med due to side effects. She is unable to tell me the name of the drug or specific side effects. States \"it made me deathly ill\". Upon ED evaluation the patient was found to be in Afib with RVR, 's. She was started on Cardizem gtt and HR is now improved to 70-80's. Pt reports exposure to Grandson with RSV. Viral studies done in ED are negative. ProBNP is elevated at 2,664. CXR shows interstitial infiltrates, small bilateral pleural effusions. Pt received IV Lasix and has urinated twice thus far. She is currently on room air. She denies leg edema, chest pain or palpitations. She recalls being told to see a Cardiologist many years ago and the menton of a blood thinner however never followed up on this.           Past Medical History  Past Medical History:   Diagnosis Date    Dyspnea 03/27/2024    Essential hypertension 10/20/2009    Last Assessment & Plan: Formatting of this note might be different from the original. -Uncontrolled -states she has not done well with meds in the past, but willing to try restarting something given how high her BP is.   -will start her on losartan 50 mg daily and see if she is able to tolerate this. -encouraged to watch salt in her diet and work on weight loss -risk of heart attack and stroke rev    Fatigue 10/19/2023    Last Assessment & Plan: Formatting of this note might be different from the original. -checking blood work including CBC and TSH    Hyperlipidemia 02/12/2014    Last Assessment & Plan: Formatting of this note might be " "different from the original. -updating lipid panel    Hypokalemia 10/19/2023    Insomnia 03/09/2012    Murmur 10/20/2009    Polymyalgia rheumatica (CMS/HCC)        Surgical History  History reviewed. No pertinent surgical history.     Social History  She reports that she quit smoking about 4 weeks ago. Her smoking use included cigarettes. She has a 55.00 pack-year smoking history. She has quit using smokeless tobacco. She reports that she does not currently use alcohol. She reports that she does not use drugs.    Family History  Family History   Problem Relation Name Age of Onset    No Known Problems Mother      No Known Problems Father          Allergies  Anesthesia s/i-40 (propofol) [propofol] and Iodinated contrast media    Review of Systems   Respiratory:  Positive for cough and shortness of breath.         Physical Exam  HENT:      Head: Normocephalic and atraumatic.      Nose: Nose normal.      Mouth/Throat:      Mouth: Mucous membranes are moist.      Pharynx: Oropharynx is clear.   Eyes:      Extraocular Movements: Extraocular movements intact.      Pupils: Pupils are equal, round, and reactive to light.   Cardiovascular:      Rate and Rhythm: Normal rate. Rhythm irregular.      Pulses: Normal pulses.   Pulmonary:      Effort: Pulmonary effort is normal.      Comments: Faint bibasilar rales, no wheezes or rhonci  Abdominal:      General: Abdomen is flat. Bowel sounds are normal.      Palpations: Abdomen is soft.   Musculoskeletal:         General: Normal range of motion.   Skin:     General: Skin is warm and dry.      Capillary Refill: Capillary refill takes less than 2 seconds.   Neurological:      General: No focal deficit present.      Mental Status: She is oriented to person, place, and time.   Psychiatric:         Mood and Affect: Mood normal.          Last Recorded Vitals  Blood pressure (!) 159/94, pulse (!) 104, temperature 36.9 °C (98.4 °F), resp. rate (!) 24, height 1.6 m (5' 3\"), weight 79.1 kg " (174 lb 6.1 oz), SpO2 95 %.    Relevant Results  ECG 12 lead    Result Date: 3/27/2024  Atrial fibrillation with rapid ventricular response Nonspecific ST-T changes Abnormal ECG No previous ECGs available Confirmed by Ze Jauregui ([id number]) on 3/27/2024 5:11:11 PM    XR chest 2 views    Result Date: 3/27/2024  Interpreted By:  Moi Walsh, STUDY: XR CHEST 2 VIEWS  3/27/2024 3:03 pm   INDICATION: Signs/Symptoms:cough x 6 weeks   COMPARISON: 02/21/2024   ACCESSION NUMBER(S): [id number]   ORDERING CLINICIAN: VIRAJ CHCAON   TECHNIQUE: PA and lateral views of the chest were obtained.   FINDINGS: Cardiac monitoring leads are seen over the chest. Mild-to-moderate diffuse interstitial infiltrates are seen bilaterally, and may represent edema and/or pneumonia. Small bilateral pleural effusions are seen, larger on the left than on the right. Bibasilar airspace consolidations are present, and may represent atelectasis and/or pneumonia. No pneumothorax is identified. The cardiac silhouette is within normal limits for size.  Mild discogenic degenerative changes are seen throughout the thoracic spine.       Diffuse interstitial infiltrates, small bilateral pleural effusions and bibasilar airspace consolidations, as above. Clinical correlation and continued follow-up until clearing is recommended.   MACRO: None.   Signed by: Moi Walsh 3/27/2024 3:09 PM Dictation workstation:   [id number]     Results for orders placed or performed during the hospital encounter of 03/27/24 (from the past 24 hour(s))   CBC and Auto Differential   Result Value Ref Range    WBC 10.5 4.4 - 11.3 x10*3/uL    nRBC 0.0 0.0 - 0.0 /100 WBCs    RBC 4.65 4.00 - 5.20 x10*6/uL    Hemoglobin 14.1 12.0 - 16.0 g/dL    Hematocrit 44.4 36.0 - 46.0 %    MCV 96 80 - 100 fL    MCH 30.3 26.0 - 34.0 pg    MCHC 31.8 (L) 32.0 - 36.0 g/dL    RDW 16.4 (H) 11.5 - 14.5 %    Platelets 291 150 - 450 x10*3/uL    Neutrophils % 69.6 40.0 - 80.0 %    Immature Granulocytes  %, Automated 0.3 0.0 - 0.9 %    Lymphocytes % 22.3 13.0 - 44.0 %    Monocytes % 6.9 2.0 - 10.0 %    Eosinophils % 0.6 0.0 - 6.0 %    Basophils % 0.3 0.0 - 2.0 %    Neutrophils Absolute 7.29 (H) 1.60 - 5.50 x10*3/uL    Immature Granulocytes Absolute, Automated 0.03 0.00 - 0.50 x10*3/uL    Lymphocytes Absolute 2.33 0.80 - 3.00 x10*3/uL    Monocytes Absolute 0.72 0.05 - 0.80 x10*3/uL    Eosinophils Absolute 0.06 0.00 - 0.40 x10*3/uL    Basophils Absolute 0.03 0.00 - 0.10 x10*3/uL   Comprehensive Metabolic Panel   Result Value Ref Range    Glucose 97 65 - 99 mg/dL    Sodium 142 133 - 145 mmol/L    Potassium 3.2 (L) 3.4 - 5.1 mmol/L    Chloride 101 97 - 107 mmol/L    Bicarbonate 27 24 - 31 mmol/L    Urea Nitrogen 19 8 - 25 mg/dL    Creatinine 1.10 0.40 - 1.60 mg/dL    eGFR 53 (L) >60 mL/min/1.73m*2    Calcium 9.2 8.5 - 10.4 mg/dL    Albumin 3.5 3.5 - 5.0 g/dL    Alkaline Phosphatase 120 35 - 125 U/L    Total Protein 6.5 5.9 - 7.9 g/dL    AST 42 (H) 5 - 40 U/L    Bilirubin, Total 0.8 0.1 - 1.2 mg/dL    ALT 73 (H) 5 - 40 U/L    Anion Gap 14 <=19 mmol/L   Blood Gas Lactic Acid, Venous   Result Value Ref Range    POCT Lactate, Venous 1.4 0.4 - 2.0 mmol/L   NT Pro-BNP   Result Value Ref Range    PROBNP 2,664 (H) 0 - 353 pg/mL   Urinalysis with Reflex Culture and Microscopic   Result Value Ref Range    Color, Urine Yellow Light-Yellow, Yellow, Dark-Yellow    Appearance, Urine Turbid (N) Clear    Specific Gravity, Urine 1.014 1.005 - 1.035    pH, Urine 6.5 5.0, 5.5, 6.0, 6.5, 7.0, 7.5, 8.0    Protein, Urine 10 (TRACE) NEGATIVE, 10 (TRACE), 20 (TRACE) mg/dL    Glucose, Urine Normal Normal mg/dL    Blood, Urine NEGATIVE NEGATIVE    Ketones, Urine NEGATIVE NEGATIVE mg/dL    Bilirubin, Urine NEGATIVE NEGATIVE    Urobilinogen, Urine Normal Normal mg/dL    Nitrite, Urine NEGATIVE NEGATIVE    Leukocyte Esterase, Urine 25 Artie/µL (A) NEGATIVE   Serial Troponin, Initial ([facility])   Result Value Ref Range    Troponin T, High Sensitivity 20  (HH) <=14 ng/L   Sars-CoV-2 and Influenza A/B PCR   Result Value Ref Range    Flu A Result Not Detected Not Detected    Flu B Result Not Detected Not Detected    Coronavirus 2019, PCR Not Detected Not Detected   RSV PCR   Result Value Ref Range    RSV PCR Not Detected Not Detected   Microscopic Only, Urine   Result Value Ref Range    WBC, Urine 1-5 1-5, NONE /HPF    RBC, Urine 1-2 NONE, 1-2, 3-5 /HPF    Squamous Epithelial Cells, Urine 10-25 (FEW) Reference range not established. /HPF    Bacteria, Urine 1+ (A) NONE SEEN /HPF    Mucus, Urine FEW Reference range not established. /LPF   Serial Troponin, 2 Hour ([facility])   Result Value Ref Range    Troponin T, High Sensitivity 19 (HH) <=14 ng/L     Assessment/Plan   Atrial Fibrillation with RVR  -'s upon ED arrival  -Continue Cardizem gtt for now  -Start Metoprolol  -Consult Cardiology  -ChadsVasc is 4. Will start Eliquis  -Monitor on tele  -Check Magnesium    Acute CHF   -Possibly secondary to AFib  -Pt euvolemic on exam  -ProBNP 2,664  -CXR shows small bilateral pleural effusions  -Check ECHO  -Received IV Lasix in ED. Will start oral Lasix in am  -Strict I&O's   -Cardiology to see    Dyspnea  -Likely secondary to Acute CHF/Afib. Pt also with recent URI. Could also be component of COPD  -On room air  -Will empirically cover with oral Azithromycin for possible URI/bronchitis  -Duonebs prn  -As above    Hypertension  -Start Metoprolol  -BP elevated, monitor     Tobacco Dependence  -Smoker x 57 yrs. States quit 4 weeks ago    DVT Prophylaxis  -Eliquis    Plan  Plan d/w patient and she is agreeable.   To home on discharge with no needs. Pt would benefit from [facility]       Hoa Christiansen, APRN-CNP

## 2024-03-27 NOTE — ED PROVIDER NOTES
Supervisory note:  Patient seen in conjunction with EZEKIEL Sadler.  Patient presents with shortness of breath and cough.  She reports that she has had cough for weeks.  Over the last week, she has noted more shortness of breath, particularly with exertion.  She reports that she has been taking care of her grandchildren and she feels very worn out.  She is not for primary care physician, stating that she has difficulty with transportation but does follow-up with urgent care.  She reports after breathing treatment, her breathing did feel somewhat better.  On examination, heart is rapid and irregular.  There are wheezes heard in all lung fields with expiration.  There is trace pretibial edema of bilateral lower extremities.    Patient found to be in atrial fibrillation with rapid ventricular response.  I also feel that patient likely has undiagnosed COPD in setting of smoking.  Patient was treated for both COPD as well as atrial fibrillation, having received breathing treatments as well as diltiazem bolus and drip.  Patient accepted to hospitalist service for further management.    I personally saw the patient and made/approved the management plan and take responsiblity for the patient management.  Parts of this chart were completed with dictation software, please excuse any errors in transcription.     Timothy Gan MD  03/28/24 0000

## 2024-03-27 NOTE — ED PROVIDER NOTES
HPI   Chief Complaint   Patient presents with    Shortness of Breath     Pt complains of a sinus infection, and difficulty breathing for about a month.  States after treatment it has not gotten better.  Has some chest tightness. States she went to urgent care today and she was told she has fluid on her lungs and she should come to the ED.  States she got a breathing treatment at urgent care with some relief.        This is a 73-year-old female who presents emergency department for concerns of shortness of breath, coughing x 6 to 8 weeks.  Patient states that she was seen at urgent care today and they advised her to come to the emergency department because they were concerned for fluid in her lungs.  Patient states that she has shortness of breath with show and over the last several weeks with cough. She has swelling in her lower extremities when her legs are not upright.  She denies subjective fever or chills.  She states that her cough is dry and sometimes productive.  She states that she was treated for sinus infection at the end of February but feels like she has not gotten any better.  She does not have a primary care doctor.  She does not have any chest pain or abdominal pain.  No nausea or vomiting.  No dysuria or hematuria.  No known history of A-fib      Please see HPI for pertinent positive and negative ROS.                   Melissa Coma Scale Score: 15                     Patient History   Past Medical History:   Diagnosis Date    Dyspnea 03/27/2024    Essential hypertension 10/20/2009    Last Assessment & Plan: Formatting of this note might be different from the original. -Uncontrolled -states she has not done well with meds in the past, but willing to try restarting something given how high her BP is.   -will start her on losartan 50 mg daily and see if she is able to tolerate this. -encouraged to watch salt in her diet and work on weight loss -risk of heart attack and stroke rev    Fatigue 10/19/2023     Last Assessment & Plan: Formatting of this note might be different from the original. -checking blood work including CBC and TSH    Hyperlipidemia 02/12/2014    Last Assessment & Plan: Formatting of this note might be different from the original. -updating lipid panel    Hypokalemia 10/19/2023    Insomnia 03/09/2012    Murmur 10/20/2009     History reviewed. No pertinent surgical history.  No family history on file.  Social History     Tobacco Use    Smoking status: Former     Types: Cigarettes    Smokeless tobacco: Former   Substance Use Topics    Alcohol use: Not on file    Drug use: Not on file       Physical Exam   ED Triage Vitals [03/27/24 1253]   Temperature Heart Rate Respirations BP   36.8 °C (98.2 °F) (!) 104 (!) 26 (!) 153/114      Pulse Ox Temp src Heart Rate Source Patient Position   94 % -- -- --      BP Location FiO2 (%)     -- --       Physical Exam  GENERAL APPEARANCE: Awake and alert. No acute distress.   VITAL SIGNS: As per the nurses' triage record.  HEENT: Normocephalic, atraumatic. Extraocular muscles are intact. Conjunctiva are pink. Negative scleral icterus. Mucous membranes are moist. Tongue in the midline. Oropharynx clear, uvula midline.   NECK: Soft, nontender and supple, full gross ROM, no meningeal signs.  CHEST: Nontender to palpation.  Diffuse inspiratory and expiratory wheeze bilaterally.  Bibasilar crackles noted bilaterally.  Symmetric rise and fall of chest wall.   HEART: Clear S1 and S2. Tachycardic rate rate and irregularly irregular rhythm. No murmurs appreciated on auscultation.  Strong and equal pulses in the extremities.  ABDOMEN: Soft, nontender, nondistended, positive bowel sounds, no palpable masses.  MUSCULOSKELETAL: The calves are nontender to palpation.  Pitting pedal edema bilaterally.  Full gross active range of motion. Ambulating on own with no acute difficulties  NEUROLOGICAL: Awake, alert and oriented x 3. Motor power intact in the upper and lower extremities.  Sensation is intact to light touch in the upper and lower extremities. Patient answering questions appropriately.   IMMUNOLOGICAL: No lymphatic streaking noted  DERMATOLOGIC: Warm and dry without petechiae, rashes, or ecchymosis noted on visible skin.   PYSCH: Cooperative with appropriate mood and affect.  ED Course & MDM   ED Course as of 03/27/24 1740   Wed Mar 27, 2024   1305 EKG Time:1303  EKG Interpretation time:1305  EKG Interpretation: EKG shows atrial fibrillation with RVR with a rate of 152 bpm, normal axis, QTc 461, nonspecific ST changes but no evidence of STEMI.    EKG was interpreted by myself independently [JL]      ED Course User Index  [JL] Kalpesh Sevilla, DO         Diagnoses as of 03/27/24 1740   Atrial fibrillation with rapid ventricular response (CMS/HCC)   Bronchitis       Medical Decision Making  Parts of this chart have been completed using voice recognition software. Please excuse any errors of transcription.  My thought process and reason for plan has been formulated from the time that I saw the patient until the time of disposition and is not specific to one specific moment during their visit and furthermore my MDM encompasses this entire chart and not only this text box.      HPI: Detailed above.    Exam: A medically appropriate exam performed, outlined above, given the known history and presentation.    History obtained from: Patient    EKG: See my supervising physician's EKG interpretation    Medications given during visit:  Medications   sodium chloride 0.9 % bolus 500 mL (500 mL intravenous Not Given 3/27/24 1405)   dilTIAZem (Cardizem) 125 mg in dextrose 5% 125 mL (1 mg/mL) infusion (premix) (5 mg/hr intravenous New Bag 3/27/24 1605)   dilTIAZem (Cardizem) injection 10 mg (10 mg intravenous Given 3/27/24 1532)   potassium chloride CR (Klor-Con M20) ER tablet 20 mEq (20 mEq oral Given 3/27/24 1532)   cefTRIAXone (Rocephin) IVPB 1 g (1 g intravenous New Bag 3/27/24 1603)    ipratropium-albuteroL (Duo-Neb) 0.5-2.5 mg/3 mL nebulizer solution 3 mL (3 mL nebulization Given 3/27/24 1550)   furosemide (Lasix) injection 40 mg (40 mg intravenous Given 3/27/24 1557)   ipratropium-albuteroL (Duo-Neb) 0.5-2.5 mg/3 mL nebulizer solution 3 mL (3 mL nebulization Given 3/27/24 1601)        Diagnostic/tests  Labs Reviewed   CBC WITH AUTO DIFFERENTIAL - Abnormal       Result Value    WBC 10.5      nRBC 0.0      RBC 4.65      Hemoglobin 14.1      Hematocrit 44.4      MCV 96      MCH 30.3      MCHC 31.8 (*)     RDW 16.4 (*)     Platelets 291      Neutrophils % 69.6      Immature Granulocytes %, Automated 0.3      Lymphocytes % 22.3      Monocytes % 6.9      Eosinophils % 0.6      Basophils % 0.3      Neutrophils Absolute 7.29 (*)     Immature Granulocytes Absolute, Automated 0.03      Lymphocytes Absolute 2.33      Monocytes Absolute 0.72      Eosinophils Absolute 0.06      Basophils Absolute 0.03     COMPREHENSIVE METABOLIC PANEL - Abnormal    Glucose 97      Sodium 142      Potassium 3.2 (*)     Chloride 101      Bicarbonate 27      Urea Nitrogen 19      Creatinine 1.10      eGFR 53 (*)     Calcium 9.2      Albumin 3.5      Alkaline Phosphatase 120      Total Protein 6.5      AST 42 (*)     Bilirubin, Total 0.8      ALT 73 (*)     Anion Gap 14     N-TERMINAL PROBNP - Abnormal    PROBNP 2,664 (*)     Narrative:     Reference ranges are based on clinical submission data. These ranges represent the 95th percentile of normal cut-off points. As NT Pro- BNP values approach 1000 pg/ml, clinical symptoms are more likely associated with CHF.   URINALYSIS WITH REFLEX CULTURE AND MICROSCOPIC - Abnormal    Color, Urine Yellow      Appearance, Urine Turbid (*)     Specific Gravity, Urine 1.014      pH, Urine 6.5      Protein, Urine 10 (TRACE)      Glucose, Urine Normal      Blood, Urine NEGATIVE      Ketones, Urine NEGATIVE      Bilirubin, Urine NEGATIVE      Urobilinogen, Urine Normal      Nitrite, Urine  NEGATIVE      Leukocyte Esterase, Urine 25 Artie/µL (*)    SERIAL TROPONIN, INITIAL ([facility]) - Abnormal    Troponin T, High Sensitivity 20 (*)    SERIAL TROPONIN,  2 HOUR ([facility]) - Abnormal    Troponin T, High Sensitivity 19 (*)    MICROSCOPIC ONLY, URINE - Abnormal    WBC, Urine 1-5      RBC, Urine 1-2      Squamous Epithelial Cells, Urine 10-25 (FEW)      Bacteria, Urine 1+ (*)     Mucus, Urine FEW     BLOOD GAS LACTIC ACID, VENOUS - Normal    POCT Lactate, Venous 1.4     SARS-COV-2 AND INFLUENZA A/B PCR - Normal    Flu A Result Not Detected      Flu B Result Not Detected      Coronavirus 2019, PCR Not Detected      Narrative:     This assay has received FDA Emergency Use Authorization (EUA) and  is only authorized for the duration of time that circumstances exist to justify the authorization of the emergency use of in vitro diagnostic tests for the detection of SARS-CoV-2 virus and/or diagnosis of COVID-19 infection under section 564(b)(1) of the Act, 21 U.S.C. 360bbb-3(b)(1). Testing for SARS-CoV-2 is only recommended for patients who meet current clinical and/or epidemiological criteria as defined by federal, state, or local public health directives. This assay is an in vitro diagnostic nucleic acid amplification test for the qualitative detection of SARS-CoV-2, Influenza A, and Influenza B from nasopharyngeal specimens and has been validated for use at [facility]. Negative results do not preclude COVID-19 infections or Influenza A/B infections, and should not be used as the sole basis for diagnosis, treatment, or other management decisions. If Influenza A/B and RSV PCR results are negative, testing for Parainfluenza virus, Adenovirus and Metapneumovirus is routinely performed for [facility] pediatric oncology and intensive care inpatients, and is available on other patients by placing an add-on request.    RSV PCR - Normal    RSV PCR Not Detected      Narrative:     This assay is an FDA-cleared, in  vitro diagnostic nucleic acid amplification test for the detection of RSV from nasopharyngeal specimens, and has been validated for use at [facility]. Negative results do not preclude RSV infections, and should not be used as the sole basis for diagnosis, treatment, or other management decisions. If Influenza A/B and RSV PCR results are negative, testing for Parainfluenza virus, Adenovirus and Metapneumovirus is routinely performed for pediatric oncology and intensive care inpatients at [facility], and is available on other patients by placing an add-on request.       BLOOD CULTURE   BLOOD CULTURE   URINE CULTURE   TROPONIN T SERIES, HIGH SENSITIVITY (0, 2 HR, 6 HR)    Narrative:     The following orders were created for panel order Troponin T Series, High Sensitivity (0, 2HR, 6HR).  Procedure                               Abnormality         Status                     ---------                               -----------         ------                     Serial Troponin, Initial...[[id number]]  Abnormal            Final result               Serial Troponin, 2 Hour ...[[id number]]  Abnormal            Final result               Serial Troponin, 6 Hour ...[[id number]]                                                   Please view results for these tests on the individual orders.   URINALYSIS WITH REFLEX CULTURE AND MICROSCOPIC    Narrative:     The following orders were created for panel order Urinalysis with Reflex Culture and Microscopic.  Procedure                               Abnormality         Status                     ---------                               -----------         ------                     Urinalysis with Reflex C...[[id number]]  Abnormal            Final result               Extra Urine Gray Tube[[id number]]                                                         Please view results for these tests on the individual orders.   EXTRA URINE GRAY TUBE   SERIAL TROPONIN, 6 HOUR ([facility])       XR chest 2 views   Final Result   Diffuse interstitial infiltrates, small bilateral pleural effusions   and bibasilar airspace consolidations, as above. Clinical correlation   and continued follow-up until clearing is recommended.        MACRO:   None.        Signed by: Moi Walsh 3/27/2024 3:09 PM   Dictation workstation:   [id number]           Considerations/further MDM:  Patient was seen in conjucntion with my supervising physician,  Dr. Gan. Please refer to his note.    Presented tachycardia, per tensive, afebrile, mild tachypnea, 94% on room air    Differential diagnosis includes but not limited to A-fib versus infection such as pneumonia versus viral syndrome versus electrolyte disturbance versus sepsis    Due to presentation with tachycardia and elevated respirations, sepsis protocol was initiated including blood cultures and lactic acid.  Of note, concern for fluid overload due to patient having bibasilar crackles and pedal edema with A-fib, therefore full 30 mL/kg fluid bolus was not given.    Workup revealed no leukocytosis.  Initial lactic acid was 1.4.  Therefore concern for abscess was low.  Chest x-ray did show interstitial infiltrate with bibasilar airspace consolidations.  Therefore IV ceftriaxone was ordered.  DuoNebs were also ordered.    Patient's initial troponin T HS 20, repeat Troponin T HS 19. Pro-BNP elevated at 2,664.  No previous echoes in the system.   Due to concerns of A-fib leading to acute CHF, IV Lasix 40 mg IV injection was ordered.    Regards to A-fib, diltiazem 10 mg IV injection time once initially without significant lowering of heart rate.  She remained in 140s to 150 bpm.  Therefore diltiazem infusion was ordered.  Heart rate did respond with a heart rate of 104 at time of admission.    The patient was evaluated in the emergency department and an etiology requiring emergent treatment or admission to hospital was identified.  The patient/family was counseled on clinical  expression, expectations, and plan along with recommendations for admission.  All questions were answered and involved parties were understanding and agreeable to course of treatment.  Case was discussed with admitting physician, Ester.  Bed type ED treatment and further ED work-up decided by joint decision making with admitting team and any consultants.  Patient stable for admission per my assessment and further management of patient will be deferred to the inpatient setting.    Critical care time was billed at 35 minutes by me and is nonconcurrent with other providers involved.  Time excludes other separately billable procedures.  Critical care billed due to A-fib RVR with rate control needed in the emergency department.    Procedure  Procedures     Audrey Whelan PA-C  03/27/24 1206

## 2024-03-28 ENCOUNTER — APPOINTMENT (OUTPATIENT)
Dept: CARDIOLOGY | Facility: HOSPITAL | Age: 74
DRG: 193 | End: 2024-03-28
Payer: COMMERCIAL

## 2024-03-28 LAB
ANION GAP SERPL CALC-SCNC: 15 MMOL/L
AORTIC VALVE MEAN GRADIENT: 2 MMHG
AORTIC VALVE PEAK VELOCITY: 1.07 M/S
AV PEAK GRADIENT: 4.6 MMHG
AVA (PEAK VEL): 2.65 CM2
AVA (VTI): 3.19 CM2
BACTERIA UR CULT: NORMAL
BUN SERPL-MCNC: 20 MG/DL (ref 8–25)
CALCIUM SERPL-MCNC: 8.9 MG/DL (ref 8.5–10.4)
CHLORIDE SERPL-SCNC: 98 MMOL/L (ref 97–107)
CO2 SERPL-SCNC: 26 MMOL/L (ref 24–31)
CREAT SERPL-MCNC: 1.2 MG/DL (ref 0.4–1.6)
EGFRCR SERPLBLD CKD-EPI 2021: 48 ML/MIN/1.73M*2
EJECTION FRACTION APICAL 4 CHAMBER: 56.6
EJECTION FRACTION: 54 %
ERYTHROCYTE [DISTWIDTH] IN BLOOD BY AUTOMATED COUNT: 16.1 % (ref 11.5–14.5)
GLUCOSE SERPL-MCNC: 105 MG/DL (ref 65–99)
HCT VFR BLD AUTO: 42.3 % (ref 36–46)
HGB BLD-MCNC: 13.4 G/DL (ref 12–16)
LEFT ATRIUM VOLUME AREA LENGTH INDEX BSA: 45.4 ML/M2
LEFT VENTRICLE INTERNAL DIMENSION DIASTOLE: 3.51 CM (ref 3.5–6)
LEFT VENTRICULAR OUTFLOW TRACT DIAMETER: 2 CM
MCH RBC QN AUTO: 30.3 PG (ref 26–34)
MCHC RBC AUTO-ENTMCNC: 31.7 G/DL (ref 32–36)
MCV RBC AUTO: 96 FL (ref 80–100)
MITRAL VALVE E/E' RATIO: 14
NRBC BLD-RTO: 0 /100 WBCS (ref 0–0)
PLATELET # BLD AUTO: 267 X10*3/UL (ref 150–450)
POTASSIUM SERPL-SCNC: 3.3 MMOL/L (ref 3.4–5.1)
RBC # BLD AUTO: 4.42 X10*6/UL (ref 4–5.2)
RIGHT VENTRICLE FREE WALL PEAK S': 7.51 CM/S
RIGHT VENTRICLE PEAK SYSTOLIC PRESSURE: 28.4 MMHG
SODIUM SERPL-SCNC: 139 MMOL/L (ref 133–145)
TROPONIN T SERPL-MCNC: 20 NG/L
TROPONIN T SERPL-MCNC: 21 NG/L
TROPONIN T SERPL-MCNC: 22 NG/L
WBC # BLD AUTO: 9.6 X10*3/UL (ref 4.4–11.3)

## 2024-03-28 PROCEDURE — 94640 AIRWAY INHALATION TREATMENT: CPT | Mod: MUE

## 2024-03-28 PROCEDURE — 2060000001 HC INTERMEDIATE ICU ROOM DAILY

## 2024-03-28 PROCEDURE — 2500000002 HC RX 250 W HCPCS SELF ADMINISTERED DRUGS (ALT 637 FOR MEDICARE OP, ALT 636 FOR OP/ED): Performed by: NURSE PRACTITIONER

## 2024-03-28 PROCEDURE — 93306 TTE W/DOPPLER COMPLETE: CPT

## 2024-03-28 PROCEDURE — 2500000001 HC RX 250 WO HCPCS SELF ADMINISTERED DRUGS (ALT 637 FOR MEDICARE OP): Performed by: NURSE PRACTITIONER

## 2024-03-28 PROCEDURE — 94664 DEMO&/EVAL PT USE INHALER: CPT

## 2024-03-28 PROCEDURE — 2500000001 HC RX 250 WO HCPCS SELF ADMINISTERED DRUGS (ALT 637 FOR MEDICARE OP): Performed by: INTERNAL MEDICINE

## 2024-03-28 PROCEDURE — 2500000004 HC RX 250 GENERAL PHARMACY W/ HCPCS (ALT 636 FOR OP/ED): Performed by: NURSE PRACTITIONER

## 2024-03-28 PROCEDURE — 99232 SBSQ HOSP IP/OBS MODERATE 35: CPT | Performed by: NURSE PRACTITIONER

## 2024-03-28 PROCEDURE — 94668 MNPJ CHEST WALL SBSQ: CPT

## 2024-03-28 PROCEDURE — 9420000001 HC RT PATIENT EDUCATION 5 MIN

## 2024-03-28 PROCEDURE — 36415 COLL VENOUS BLD VENIPUNCTURE: CPT | Performed by: INTERNAL MEDICINE

## 2024-03-28 PROCEDURE — 36415 COLL VENOUS BLD VENIPUNCTURE: CPT | Performed by: NURSE PRACTITIONER

## 2024-03-28 PROCEDURE — 94667 MNPJ CHEST WALL 1ST: CPT

## 2024-03-28 PROCEDURE — 93306 TTE W/DOPPLER COMPLETE: CPT | Performed by: INTERNAL MEDICINE

## 2024-03-28 PROCEDURE — 94762 N-INVAS EAR/PLS OXIMTRY CONT: CPT

## 2024-03-28 PROCEDURE — 84484 ASSAY OF TROPONIN QUANT: CPT | Performed by: INTERNAL MEDICINE

## 2024-03-28 PROCEDURE — 85027 COMPLETE CBC AUTOMATED: CPT | Performed by: NURSE PRACTITIONER

## 2024-03-28 PROCEDURE — 99222 1ST HOSP IP/OBS MODERATE 55: CPT | Performed by: INTERNAL MEDICINE

## 2024-03-28 PROCEDURE — 94760 N-INVAS EAR/PLS OXIMETRY 1: CPT

## 2024-03-28 PROCEDURE — 80048 BASIC METABOLIC PNL TOTAL CA: CPT | Performed by: NURSE PRACTITIONER

## 2024-03-28 PROCEDURE — 2500000002 HC RX 250 W HCPCS SELF ADMINISTERED DRUGS (ALT 637 FOR MEDICARE OP, ALT 636 FOR OP/ED): Performed by: INTERNAL MEDICINE

## 2024-03-28 RX ORDER — POTASSIUM CHLORIDE 20 MEQ/1
20 TABLET, EXTENDED RELEASE ORAL ONCE
Status: COMPLETED | OUTPATIENT
Start: 2024-03-28 | End: 2024-03-28

## 2024-03-28 RX ORDER — BISOPROLOL FUMARATE 5 MG/1
5 TABLET, FILM COATED ORAL DAILY
Status: DISCONTINUED | OUTPATIENT
Start: 2024-03-28 | End: 2024-03-29

## 2024-03-28 RX ORDER — MAGNESIUM SULFATE HEPTAHYDRATE 40 MG/ML
2 INJECTION, SOLUTION INTRAVENOUS ONCE
Status: COMPLETED | OUTPATIENT
Start: 2024-03-28 | End: 2024-03-28

## 2024-03-28 RX ORDER — IPRATROPIUM BROMIDE AND ALBUTEROL SULFATE 2.5; .5 MG/3ML; MG/3ML
3 SOLUTION RESPIRATORY (INHALATION)
Status: DISCONTINUED | OUTPATIENT
Start: 2024-03-28 | End: 2024-04-01 | Stop reason: HOSPADM

## 2024-03-28 RX ORDER — DILTIAZEM HYDROCHLORIDE 120 MG/1
120 CAPSULE, COATED, EXTENDED RELEASE ORAL 2 TIMES DAILY
Status: DISCONTINUED | OUTPATIENT
Start: 2024-03-28 | End: 2024-03-29

## 2024-03-28 RX ORDER — IPRATROPIUM BROMIDE AND ALBUTEROL SULFATE 2.5; .5 MG/3ML; MG/3ML
3 SOLUTION RESPIRATORY (INHALATION) EVERY 2 HOUR PRN
Status: DISCONTINUED | OUTPATIENT
Start: 2024-03-28 | End: 2024-04-01 | Stop reason: HOSPADM

## 2024-03-28 RX ADMIN — GUAIFENESIN 200 MG: 200 SOLUTION ORAL at 01:59

## 2024-03-28 RX ADMIN — METOPROLOL TARTRATE 25 MG: 25 TABLET, FILM COATED ORAL at 09:06

## 2024-03-28 RX ADMIN — IPRATROPIUM BROMIDE AND ALBUTEROL SULFATE 3 ML: 2.5; .5 SOLUTION RESPIRATORY (INHALATION) at 19:53

## 2024-03-28 RX ADMIN — DILTIAZEM HYDROCHLORIDE 120 MG: 120 CAPSULE, COATED, EXTENDED RELEASE ORAL at 13:56

## 2024-03-28 RX ADMIN — BISOPROLOL FUMARATE 5 MG: 5 TABLET, FILM COATED ORAL at 13:56

## 2024-03-28 RX ADMIN — POTASSIUM CHLORIDE 20 MEQ: 1500 TABLET, EXTENDED RELEASE ORAL at 13:56

## 2024-03-28 RX ADMIN — AZITHROMYCIN 500 MG: 500 TABLET, FILM COATED ORAL at 09:06

## 2024-03-28 RX ADMIN — APIXABAN 5 MG: 5 TABLET, FILM COATED ORAL at 05:49

## 2024-03-28 RX ADMIN — IPRATROPIUM BROMIDE AND ALBUTEROL SULFATE 3 ML: 2.5; .5 SOLUTION RESPIRATORY (INHALATION) at 13:00

## 2024-03-28 RX ADMIN — ACETAMINOPHEN 650 MG: 325 TABLET ORAL at 01:59

## 2024-03-28 RX ADMIN — FUROSEMIDE 40 MG: 40 TABLET ORAL at 09:06

## 2024-03-28 RX ADMIN — MAGNESIUM SULFATE HEPTAHYDRATE 2 G: 40 INJECTION, SOLUTION INTRAVENOUS at 13:57

## 2024-03-28 RX ADMIN — DILTIAZEM HYDROCHLORIDE 120 MG: 120 CAPSULE, COATED, EXTENDED RELEASE ORAL at 20:43

## 2024-03-28 RX ADMIN — APIXABAN 5 MG: 5 TABLET, FILM COATED ORAL at 18:37

## 2024-03-28 RX ADMIN — IPRATROPIUM BROMIDE AND ALBUTEROL SULFATE 3 ML: 2.5; .5 SOLUTION RESPIRATORY (INHALATION) at 08:44

## 2024-03-28 ASSESSMENT — COGNITIVE AND FUNCTIONAL STATUS - GENERAL
TOILETING: A LITTLE
DAILY ACTIVITIY SCORE: 23
MOBILITY SCORE: 23
CLIMB 3 TO 5 STEPS WITH RAILING: A LITTLE

## 2024-03-28 ASSESSMENT — PAIN SCALES - GENERAL
PAINLEVEL_OUTOF10: 0 - NO PAIN

## 2024-03-28 ASSESSMENT — PAIN - FUNCTIONAL ASSESSMENT
PAIN_FUNCTIONAL_ASSESSMENT: 0-10

## 2024-03-28 NOTE — PROGRESS NOTES
03/28/24 1553   Discharge Planning   Living Arrangements Family members   Support Systems Family members   Assistance Needed Patient lives with her son and his family.   Type of Residence Private residence   Number of Stairs to Enter Residence 5   Number of Stairs Within Residence 15   Home or Post Acute Services None   Patient expects to be discharged to: Home with no needs.  Patient needs PCP and [facility] info.     Met with patient at bedside to discuss discharge needs.  Patient is from home with her son and his family.  The home has 5+ entry steps with 15-20 between floors.  Patient does not wear O2.  Patient does drive, but has difficulty with transportation for physician appointments.  Discussed [facility] as an alternative to which patient was very receptive.  Patient inquiring of PCP in the [facility] so she can see Dr. Rubin and her PCP on the same day.  Patient denies needs at discharge.  RN TCC following.

## 2024-03-28 NOTE — CARE PLAN
The patient's goals for the shift include To breath better    The clinical goals for the shift include MAINTAIN STABLE HEMODYNAMICS    Over the shift, the patient did  make progress toward the following goals.  CONTROLLED AF

## 2024-03-28 NOTE — SIGNIFICANT EVENT
I came to evaluate the pt ater RN reported CP and HR>150/min   when I entered the room the patient denied any chest pain stated it was gas pain which is now completely resolved reviewed EKG which shows A-fib heart rate about 90 the patient is fully alert oriented x 3 no distress she states that she is much improved since admission she has bilateral breath sounds with occasional crackles no wheezes.  I feel this patient is stable no further the need to change any of the plan

## 2024-03-28 NOTE — PROGRESS NOTES
"Constance M Kocher is a 73 y.o. female on day 1 of admission presenting with Atrial fibrillation with rapid ventricular response (CMS/HCC).      Subjective   No overnight issues. Up in chair. States urinated \"a lot\" from Lasix however UOP not recorded. Breathing is improved. Remains in Afib however HR better controlled.        Objective     Last Recorded Vitals  /90 (BP Location: Left arm, Patient Position: Lying)   Pulse 72   Temp 36.3 °C (97.3 °F) (Temporal)   Resp 18   Wt 78.1 kg (172 lb 2.9 oz)   SpO2 90%   Intake/Output last 3 Shifts:  No intake or output data in the 24 hours ending 03/28/24 1314    Admission Weight  Weight: 79.1 kg (174 lb 6.1 oz) (03/27/24 1253)    Daily Weight  03/28/24 : 78.1 kg (172 lb 2.9 oz)    Image Results  Transthoracic Echo (TTE) [place] [address]              Phone [phone]    TRANSTHORACIC ECHOCARDIOGRAM REPORT       Patient Name:     CONSTANCE M KOCHER   Reading Physician:   [id number] Los Kuo MD  Study Date:       3/28/2024            Ordering Provider:   [id number] ALVARO LEE  MRN/PID:          [id number]             Fellow:  Accession#:       [id number]         Nurse:  Date of           1950 / 73 years Sonographer:         Brittni Real RDCS  Birth/Age:  Gender:           F                    Additional Staff:  Height:           165.10 cm            Admit Date:  Weight:           78.02 kg             Admission Status:    Inpatient - Routine  BSA / BMI:        1.86 m2 / 28.62      Department Location: [facility]                    kg/m2  Blood Pressure: 129 /74 mmHg    Study Type:    TRANSTHORACIC ECHO (TTE) COMPLETE  Diagnosis/ICD: Unspecified atrial fibrillation-I48.91  Indication:    Afib with RVR  CPT Codes:     Echo Complete w Full [id number]    Patient " History:  Pertinent History: A-Fib and HTN.    Study Detail: The following Echo studies were performed: 2D, M-Mode, Doppler and                color flow.       PHYSICIAN INTERPRETATION:  Left Ventricle: Left ventricular systolic function is low normal, with an estimated ejection fraction of 50-55%. There are no regional wall motion abnormalities. The left ventricular cavity size is normal. Spectral Doppler shows a normal pattern of left ventricular diastolic filling.  Left Atrium: The left atrium is mildly dilated.  Right Ventricle: The right ventricle is normal in size. There is normal right ventricular global systolic function.  Right Atrium: The right atrium is normal in size.  Aortic Valve: The aortic valve is trileaflet. There is no evidence of aortic valve regurgitation. The peak instantaneous gradient of the aortic valve is 4.6 mmHg. The mean gradient of the aortic valve is 2.0 mmHg.  Mitral Valve: The mitral valve is normal in structure. There is mild mitral valve regurgitation.  Tricuspid Valve: The tricuspid valve is structurally normal. There is trace tricuspid regurgitation. The Doppler estimated RVSP is within normal limits at 28.4 mmHg.  Pulmonic Valve: The pulmonic valve is structurally normal. There is physiologic pulmonic valve regurgitation.  Pericardium: There is no pericardial effusion noted.  Aorta: The aortic root is normal.  Systemic Veins: The inferior vena cava appears mildly dilated. There is IVC inspiratory collapse greater than 50%.       CONCLUSIONS:   1. Left ventricular systolic function is low normal with a 50-55% estimated ejection fraction.   2. Mild mitral valve regurgitation.   3. RVSP within normal limits.   4. Trace tricuspid regurgitation is visualized.   5. Patient is in atrial fibrillation.    QUANTITATIVE DATA SUMMARY:  2D MEASUREMENTS:                           Normal Ranges:  LAs:           4.10 cm   (2.7-4.0cm)  IVSd:          0.99 cm   (0.6-1.1cm)  LVPWd:         1.05  cm   (0.6-1.1cm)  LVIDd:         3.51 cm   (3.9-5.9cm)  LVIDs:         2.44 cm  LV Mass Index: 57.6 g/m2  LV % FS        30.5 %    LA VOLUME:                                Normal Ranges:  LA Vol A4C:        83.7 ml    (22+/-6mL/m2)  LA Vol A2C:        84.0 ml  LA Vol BP:         84.3 ml  LA Vol Index A4C:  45.1ml/m2  LA Vol Index A2C:  45.3 ml/m2  LA Vol Index BP:   45.4 ml/m2  LA Area A4C:       26.1 cm2  LA Area A2C:       26.3 cm2  LA Major Axis A4C: 6.9 cm  LA Major Axis A2C: 7.0 cm  LA Volume Index:   43.5 ml/m2  LA Vol A4C:        78.7 ml  LA Vol A2C:        82.6 ml    RA VOLUME BY A/L METHOD:                                Normal Ranges:  RA Vol A4C:        24.6 ml    (8.3-19.5ml)  RA Vol Index A4C:  13.3 ml/m2  RA Area A4C:       12.1 cm2  RA Major Axis A4C: 5.1 cm    M-MODE MEASUREMENTS:                   Normal Ranges:  Ao Root: 2.50 cm (2.0-3.7cm)  LAs:     3.80 cm (2.7-4.0cm)    LV SYSTOLIC FUNCTION BY 2D PLANIMETRY (MOD):                      Normal Ranges:  EF-A4C View: 56.6 % (>=55%)  EF-A2C View: 53.8 %  EF-Biplane:  53.6 %    LV DIASTOLIC FUNCTION:                         Normal Ranges:  MV Peak E:    1.19 m/s (0.7-1.2 m/s)  MV e'         0.08 m/s (>8.0)  MV lateral e' 0.09 m/s  MV medial e'  0.08 m/s  E/e' Ratio:   14.00    (<8.0)    MITRAL VALVE:                  Normal Ranges:  MV DT: 161 msec (150-240msec)    MITRAL INSUFFICIENCY:                            Normal Ranges:  PISA Radius:  0.3 cm  MR VTI:       138.00 cm  MR Vmax:      474.00 cm/s  MR Alias Dillon: 30.8 cm/s  MR Volume:    5.07 ml  MR Flow Rt:   17.42 ml/s  MR EROA:      0.04 cm2    AORTIC VALVE:                                    Normal Ranges:  AoV Vmax:                1.07 m/s (<=1.7m/s)  AoV Peak P.6 mmHg (<20mmHg)  AoV Mean P.0 mmHg (1.7-11.5mmHg)  LVOT Max Dillon:            0.90 m/s (<=1.1m/s)  AoV VTI:                 14.20 cm (18-25cm)  LVOT VTI:                14.40 cm  LVOT Diameter:            2.00 cm  (1.8-2.4cm)  AoV Area, VTI:           3.19 cm2 (2.5-5.5cm2)  AoV Area,Vmax:           2.65 cm2 (2.5-4.5cm2)  AoV Dimensionless Index: 1.01       RIGHT VENTRICLE:  RV Basal 2.69 cm  RV Mid   1.72 cm  RV Major 6.7 cm  RV s'    0.08 m/s    TRICUSPID VALVE/RVSP:                              Normal Ranges:  Peak TR Velocity: 2.26 m/s  RV Syst Pressure: 28.4 mmHg (< 30mmHg)  IVC Diam:         2.25 cm       [id number] Los Kuo MD  Electronically signed on 3/28/2024 at 8:53:21 AM       ** Final **  ECG 12 Lead  Atrial fibrillation with premature ventricular or aberrantly conducted complexes  ST & T wave abnormality, consider inferior ischemia  Abnormal ECG  When compared with ECG of 27-MAR-2024 19:11, (unconfirmed)  T wave inversion less evident in Lateral leads  ECG 12 lead  Atrial fibrillation with rapid ventricular response with premature ventricular or aberrantly conducted complexes  ST & T wave abnormality, consider lateral ischemia  Abnormal ECG  When compared with ECG of 27-MAR-2024 13:03,  T wave inversion more evident in Anterolateral leads      Physical Exam  HENT:      Head: Normocephalic and atraumatic.      Nose: Nose normal.      Mouth/Throat:      Mouth: Mucous membranes are moist.      Pharynx: Oropharynx is clear.   Eyes:      Extraocular Movements: Extraocular movements intact.      Pupils: Pupils are equal, round, and reactive to light.   Cardiovascular:      Rate and Rhythm: Normal rate. Rhythm irregular.      Pulses: Normal pulses.   Pulmonary:      Effort: Pulmonary effort is normal.      Breath sounds: Normal breath sounds.   Abdominal:      General: Abdomen is flat. Bowel sounds are normal.      Palpations: Abdomen is soft.   Musculoskeletal:         General: Normal range of motion.   Skin:     General: Skin is warm and dry.      Capillary Refill: Capillary refill takes less than 2 seconds.   Neurological:      General: No focal deficit present.      Mental Status: She is oriented to person,  place, and time.   Psychiatric:         Mood and Affect: Mood normal.         Relevant Results  Results for orders placed or performed during the hospital encounter of 03/27/24 (from the past 24 hour(s))   ECG 12 lead   Result Value Ref Range    Ventricular Rate 140 BPM    Atrial Rate 326 BPM    QRS Duration 86 ms    QT Interval 254 ms    QTC Calculation(Bazett) 387 ms    R Axis 56 degrees    T Axis 236 degrees    QRS Count 23 beats    Q Onset 219 ms    T Offset 346 ms    QTC Fredericia 336 ms   CBC and Auto Differential   Result Value Ref Range    WBC 10.5 4.4 - 11.3 x10*3/uL    nRBC 0.0 0.0 - 0.0 /100 WBCs    RBC 4.65 4.00 - 5.20 x10*6/uL    Hemoglobin 14.1 12.0 - 16.0 g/dL    Hematocrit 44.4 36.0 - 46.0 %    MCV 96 80 - 100 fL    MCH 30.3 26.0 - 34.0 pg    MCHC 31.8 (L) 32.0 - 36.0 g/dL    RDW 16.4 (H) 11.5 - 14.5 %    Platelets 291 150 - 450 x10*3/uL    Neutrophils % 69.6 40.0 - 80.0 %    Immature Granulocytes %, Automated 0.3 0.0 - 0.9 %    Lymphocytes % 22.3 13.0 - 44.0 %    Monocytes % 6.9 2.0 - 10.0 %    Eosinophils % 0.6 0.0 - 6.0 %    Basophils % 0.3 0.0 - 2.0 %    Neutrophils Absolute 7.29 (H) 1.60 - 5.50 x10*3/uL    Immature Granulocytes Absolute, Automated 0.03 0.00 - 0.50 x10*3/uL    Lymphocytes Absolute 2.33 0.80 - 3.00 x10*3/uL    Monocytes Absolute 0.72 0.05 - 0.80 x10*3/uL    Eosinophils Absolute 0.06 0.00 - 0.40 x10*3/uL    Basophils Absolute 0.03 0.00 - 0.10 x10*3/uL   Comprehensive Metabolic Panel   Result Value Ref Range    Glucose 97 65 - 99 mg/dL    Sodium 142 133 - 145 mmol/L    Potassium 3.2 (L) 3.4 - 5.1 mmol/L    Chloride 101 97 - 107 mmol/L    Bicarbonate 27 24 - 31 mmol/L    Urea Nitrogen 19 8 - 25 mg/dL    Creatinine 1.10 0.40 - 1.60 mg/dL    eGFR 53 (L) >60 mL/min/1.73m*2    Calcium 9.2 8.5 - 10.4 mg/dL    Albumin 3.5 3.5 - 5.0 g/dL    Alkaline Phosphatase 120 35 - 125 U/L    Total Protein 6.5 5.9 - 7.9 g/dL    AST 42 (H) 5 - 40 U/L    Bilirubin, Total 0.8 0.1 - 1.2 mg/dL    ALT 73  (H) 5 - 40 U/L    Anion Gap 14 <=19 mmol/L   Blood Gas Lactic Acid, Venous   Result Value Ref Range    POCT Lactate, Venous 1.4 0.4 - 2.0 mmol/L   Blood Culture    Specimen: Peripheral Venipuncture; Blood culture   Result Value Ref Range    Blood Culture Loaded on Instrument - Culture in progress    Blood Culture    Specimen: Peripheral Venipuncture; Blood culture   Result Value Ref Range    Blood Culture Loaded on Instrument - Culture in progress    NT Pro-BNP   Result Value Ref Range    PROBNP 2,664 (H) 0 - 353 pg/mL   Urinalysis with Reflex Culture and Microscopic   Result Value Ref Range    Color, Urine Yellow Light-Yellow, Yellow, Dark-Yellow    Appearance, Urine Turbid (N) Clear    Specific Gravity, Urine 1.014 1.005 - 1.035    pH, Urine 6.5 5.0, 5.5, 6.0, 6.5, 7.0, 7.5, 8.0    Protein, Urine 10 (TRACE) NEGATIVE, 10 (TRACE), 20 (TRACE) mg/dL    Glucose, Urine Normal Normal mg/dL    Blood, Urine NEGATIVE NEGATIVE    Ketones, Urine NEGATIVE NEGATIVE mg/dL    Bilirubin, Urine NEGATIVE NEGATIVE    Urobilinogen, Urine Normal Normal mg/dL    Nitrite, Urine NEGATIVE NEGATIVE    Leukocyte Esterase, Urine 25 Artie/µL (A) NEGATIVE   Serial Troponin, Initial ([facility])   Result Value Ref Range    Troponin T, High Sensitivity 20 (HH) <=14 ng/L   Sars-CoV-2 and Influenza A/B PCR   Result Value Ref Range    Flu A Result Not Detected Not Detected    Flu B Result Not Detected Not Detected    Coronavirus 2019, PCR Not Detected Not Detected   RSV PCR   Result Value Ref Range    RSV PCR Not Detected Not Detected   Microscopic Only, Urine   Result Value Ref Range    WBC, Urine 1-5 1-5, NONE /HPF    RBC, Urine 1-2 NONE, 1-2, 3-5 /HPF    Squamous Epithelial Cells, Urine 10-25 (FEW) Reference range not established. /HPF    Bacteria, Urine 1+ (A) NONE SEEN /HPF    Mucus, Urine FEW Reference range not established. /LPF   Serial Troponin, 2 Hour ([facility])   Result Value Ref Range    Troponin T, High Sensitivity 19 (HH) <=14 ng/L   Magnesium    Result Value Ref Range    Magnesium 1.50 (L) 1.60 - 3.10 mg/dL   Serial Troponin, 6 Hour ([facility])   Result Value Ref Range    Troponin T, High Sensitivity 20 (HH) <=14 ng/L   ECG 12 Lead   Result Value Ref Range    Ventricular Rate 98 BPM    Atrial Rate 74 BPM    QRS Duration 90 ms    QT Interval 392 ms    QTC Calculation(Bazett) 500 ms    R Axis 76 degrees    T Axis -8 degrees    QRS Count 17 beats    Q Onset 218 ms    T Offset 414 ms    QTC Fredericia 461 ms   Basic metabolic panel   Result Value Ref Range    Glucose 105 (H) 65 - 99 mg/dL    Sodium 139 133 - 145 mmol/L    Potassium 3.3 (L) 3.4 - 5.1 mmol/L    Chloride 98 97 - 107 mmol/L    Bicarbonate 26 24 - 31 mmol/L    Urea Nitrogen 20 8 - 25 mg/dL    Creatinine 1.20 0.40 - 1.60 mg/dL    eGFR 48 (L) >60 mL/min/1.73m*2    Calcium 8.9 8.5 - 10.4 mg/dL    Anion Gap 15 <=19 mmol/L   CBC   Result Value Ref Range    WBC 9.6 4.4 - 11.3 x10*3/uL    nRBC 0.0 0.0 - 0.0 /100 WBCs    RBC 4.42 4.00 - 5.20 x10*6/uL    Hemoglobin 13.4 12.0 - 16.0 g/dL    Hematocrit 42.3 36.0 - 46.0 %    MCV 96 80 - 100 fL    MCH 30.3 26.0 - 34.0 pg    MCHC 31.7 (L) 32.0 - 36.0 g/dL    RDW 16.1 (H) 11.5 - 14.5 %    Platelets 267 150 - 450 x10*3/uL   Serial Troponin, Initial ([facility])   Result Value Ref Range    Troponin T, High Sensitivity 22 (HH) <=14 ng/L   Serial Troponin, 2 Hour ([facility])   Result Value Ref Range    Troponin T, High Sensitivity 21 () <=14 ng/L   Transthoracic Echo (TTE) Complete   Result Value Ref Range    AV pk nasim 1.07 m/s    LVOT diam 2.00 cm    AV mn grad 2.0 mmHg    LV biplane EF 54 %    LA vol index A/L 45.4 ml/m2    MV avg E/e' ratio 14.00     RV free wall pk S' 7.51 cm/s    RVSP 28.4 mmHg    LVIDd 3.51 cm    AV pk grad 4.6 mmHg    Aortic Valve Area by Continuity of VTI 3.19 cm2    Aortic Valve Area by Continuity of Peak Velocity 2.65 cm2    LV A4C EF 56.6    Serial Troponin, 6 Hour ([facility])   Result Value Ref Range    Troponin T, High Sensitivity 20 (HH) <=14 ng/L      Assessment/Plan   Atrial Fibrillation with RVR  -'s upon ED arrival, currently controlled  -Continue Cardizem gtt for now  -Continue Metoprolol  -Cardiology to evaluate  -ChadsVasc is 4. Continue Eliquis  -Monitor on tele     Acute CHF   -Likely secondary to AFib  -Pt euvolemic   -ProBNP 2,664  -CXR showed small bilateral pleural effusions  -ECHO shows mildly reduced LV systolic function, EF 50-55%  -Continue oral Lasix  -Strict I&O's   -Cardiology to see     Dyspnea  -Likely secondary to Acute CHF/Afib. Pt also with recent URI. Could also be component of COPD. Improved after Lasix  -On room air  -Continue empiric oral Azithromycin for possible URI/bronchitis  -Duonebs prn  -As above    Hypomagnesemia  -Replace    Hypokalemia  -Replace     Hypertension  -Continue Metoprolol  -BP elevated, monitor      Tobacco Dependence  -Smoker x 57 yrs. States quit 4 weeks ago     DVT Prophylaxis  -Eliquis     Plan  To home on discharge with no needs    Hoa Christiansen, APRN-CNP

## 2024-03-28 NOTE — CONSULTS
Consults  History Of Present Illness:    Constance M Kocher is a 73 y.o. female patient with multiple comorbid condition.  Noncompliance to diet and medication are hospital our office follow-up.  Patient found having a worsening short of breath dyspnea exertion found during palpitation tachycardia.  Patient has a transportation issue unable to travel to doctors office finally came to [facility] emergency room found having A-fib RVR.  New onset atrial fibrillation with a mild elevated proBNP now more likely acute on chronic diastolic CHF.  Currently on IV Cardizem.  No active angina  signs symptoms except short of breath with activity.  Currently feeling better.  Patient quit smoking about 4 weeks ago.  Last Recorded Vitals:  Vitals:    03/28/24 0446 03/28/24 0705 03/28/24 0844 03/28/24 1052   BP: 110/86 129/74  122/90   BP Location:  Right arm  Left arm   Patient Position:  Lying  Lying   Pulse: 76 96  72   Resp: 18 18  18   Temp: 36.1 °C (97 °F) 36.3 °C (97.3 °F)  36.3 °C (97.3 °F)   TempSrc: Temporal Temporal  Temporal   SpO2: 91% 92% 96% 90%   Weight: 78.1 kg (172 lb 2.9 oz)      Height:           Past Medical History:  She has a past medical history of Dyspnea (03/27/2024), Essential hypertension (10/20/2009), Fatigue (10/19/2023), Hyperlipidemia (02/12/2014), Hypokalemia (10/19/2023), Insomnia (03/09/2012), Murmur (10/20/2009), and Polymyalgia rheumatica (CMS/[facility]).    Past Surgical History:  She has no past surgical history on file.      Social History:  She reports that she quit smoking about 4 weeks ago. Her smoking use included cigarettes. She has a 55.00 pack-year smoking history. She has quit using smokeless tobacco. She reports that she does not currently use alcohol. She reports that she does not use drugs.    Family History:  Family History   Problem Relation Name Age of Onset    No Known Problems Mother      No Known Problems Father          Allergies:  Anesthesia s/i-40 (propofol) [propofol] and  "Iodinated contrast media    Inpatient Medications:  Scheduled medications   Medication Dose Route Frequency    apixaban  5 mg oral q12h    azithromycin  500 mg oral q24h LUNA    bisoprolol  5 mg oral Daily    dilTIAZem CD  120 mg oral BID    ipratropium-albuteroL  3 mL nebulization TID    magnesium sulfate  2 g intravenous Once    potassium chloride CR  20 mEq oral Once     Outpatient Medications:  Current Outpatient Medications   Medication Instructions    albuterol 90 mcg/actuation inhaler 1 puff, inhalation, Every 6 hours PRN       Physical Exam:  HEENT: Normocephalic/atraumatic pupils equal react light  Neck exam mild JVD, no bruit  Lung exam diffuse expiratory wheezing few crackles at the bases  Cardiac exam is irregular rhythm S1-S2, soft systolic murmur heard.   Abdomen soft nontender, nondistended  Extremities no clubbing, cyanosis but trace edema  Neuro exam grossly intact.  Last Labs:  CBC - 3/28/2024:  5:33 AM  9.6 13.4 267    42.3      CMP - 3/28/2024:  5:33 AM  8.9 6.5 42 --- 0.8   _ 3.5 73 120      PTT - No results in last year.  _   _ _     No results found for: \"TROPHS\", \"BNP\", \"LDLCALC\", \"VLDL\"       Transthoracic Echo (TTE) Complete              [address], [place] [id number]              Phone [phone]    TRANSTHORACIC ECHOCARDIOGRAM REPORT       Patient Name:     CONSTANCE M KOCHER Reading Physician:   [id number] Los Kuo MD  Study Date:       3/28/2024            Ordering Provider:   [id number] ALVARO LEE  MRN/PID:          [id number]             Fellow:  Accession#:       [id number]         Nurse:  Date of           1950 / 73 years Sonographer:         Brittni Real RDCS  Birth/Age:  Gender:           F                    Additional Staff:  Height:           165.10 cm            Admit Date:  Weight:           78.02 kg             " Admission Status:    Inpatient - Routine  BSA / BMI:        1.86 m2 / 28.62      [facility]                    kg/m2  Blood Pressure: 129 /74 mmHg    Study Type:    TRANSTHORACIC ECHO (TTE) COMPLETE  Diagnosis/ICD: Unspecified atrial fibrillation-I48.91  Indication:    Afib with RVR  CPT Codes:     Echo Complete w Full [id number]    Patient History:  Pertinent History: A-Fib and HTN.    Study Detail: The following Echo studies were performed: 2D, M-Mode, Doppler and                color flow.       PHYSICIAN INTERPRETATION:  Left Ventricle: Left ventricular systolic function is low normal, with an estimated ejection fraction of 50-55%. There are no regional wall motion abnormalities. The left ventricular cavity size is normal. Spectral Doppler shows a normal pattern of left ventricular diastolic filling.  Left Atrium: The left atrium is mildly dilated.  Right Ventricle: The right ventricle is normal in size. There is normal right ventricular global systolic function.  Right Atrium: The right atrium is normal in size.  Aortic Valve: The aortic valve is trileaflet. There is no evidence of aortic valve regurgitation. The peak instantaneous gradient of the aortic valve is 4.6 mmHg. The mean gradient of the aortic valve is 2.0 mmHg.  Mitral Valve: The mitral valve is normal in structure. There is mild mitral valve regurgitation.  Tricuspid Valve: The tricuspid valve is structurally normal. There is trace tricuspid regurgitation. The Doppler estimated RVSP is within normal limits at 28.4 mmHg.  Pulmonic Valve: The pulmonic valve is structurally normal. There is physiologic pulmonic valve regurgitation.  Pericardium: There is no pericardial effusion noted.  Aorta: The aortic root is normal.  Systemic Veins: The inferior vena cava appears mildly dilated. There is IVC inspiratory collapse greater than 50%.       CONCLUSIONS:   1. Left ventricular systolic function is low normal with a 50-55%  estimated ejection fraction.   2. Mild mitral valve regurgitation.   3. RVSP within normal limits.   4. Trace tricuspid regurgitation is visualized.   5. Patient is in atrial fibrillation.    QUANTITATIVE DATA SUMMARY:  2D MEASUREMENTS:                           Normal Ranges:  LAs:           4.10 cm   (2.7-4.0cm)  IVSd:          0.99 cm   (0.6-1.1cm)  LVPWd:         1.05 cm   (0.6-1.1cm)  LVIDd:         3.51 cm   (3.9-5.9cm)  LVIDs:         2.44 cm  LV Mass Index: 57.6 g/m2  LV % FS        30.5 %    LA VOLUME:                                Normal Ranges:  LA Vol A4C:        83.7 ml    (22+/-6mL/m2)  LA Vol A2C:        84.0 ml  LA Vol BP:         84.3 ml  LA Vol Index A4C:  45.1ml/m2  LA Vol Index A2C:  45.3 ml/m2  LA Vol Index BP:   45.4 ml/m2  LA Area A4C:       26.1 cm2  LA Area A2C:       26.3 cm2  LA Major Axis A4C: 6.9 cm  LA Major Axis A2C: 7.0 cm  LA Volume Index:   43.5 ml/m2  LA Vol A4C:        78.7 ml  LA Vol A2C:        82.6 ml    RA VOLUME BY A/L METHOD:                                Normal Ranges:  RA Vol A4C:        24.6 ml    (8.3-19.5ml)  RA Vol Index A4C:  13.3 ml/m2  RA Area A4C:       12.1 cm2  RA Major Axis A4C: 5.1 cm    M-MODE MEASUREMENTS:                   Normal Ranges:  Ao Root: 2.50 cm (2.0-3.7cm)  LAs:     3.80 cm (2.7-4.0cm)    LV SYSTOLIC FUNCTION BY 2D PLANIMETRY (MOD):                      Normal Ranges:  EF-A4C View: 56.6 % (>=55%)  EF-A2C View: 53.8 %  EF-Biplane:  53.6 %    LV DIASTOLIC FUNCTION:                         Normal Ranges:  MV Peak E:    1.19 m/s (0.7-1.2 m/s)  MV e'         0.08 m/s (>8.0)  MV lateral e' 0.09 m/s  MV medial e'  0.08 m/s  E/e' Ratio:   14.00    (<8.0)    MITRAL VALVE:                  Normal Ranges:  MV DT: 161 msec (150-240msec)    MITRAL INSUFFICIENCY:                            Normal Ranges:  PISA Radius:  0.3 cm  MR VTI:       138.00 cm  MR Vmax:      474.00 cm/s  MR Alias Dillon: 30.8 cm/s  MR Volume:    5.07 ml  MR Flow Rt:   17.42 ml/s  MR EROA:       0.04 cm2    AORTIC VALVE:                                    Normal Ranges:  AoV Vmax:                1.07 m/s (<=1.7m/s)  AoV Peak P.6 mmHg (<20mmHg)  AoV Mean P.0 mmHg (1.7-11.5mmHg)  LVOT Max Dillon:            0.90 m/s (<=1.1m/s)  AoV VTI:                 14.20 cm (18-25cm)  LVOT VTI:                14.40 cm  LVOT Diameter:           2.00 cm  (1.8-2.4cm)  AoV Area, VTI:           3.19 cm2 (2.5-5.5cm2)  AoV Area,Vmax:           2.65 cm2 (2.5-4.5cm2)  AoV Dimensionless Index: 1.01       RIGHT VENTRICLE:  RV Basal 2.69 cm  RV Mid   1.72 cm  RV Major 6.7 cm  RV s'    0.08 m/s    TRICUSPID VALVE/RVSP:                              Normal Ranges:  Peak TR Velocity: 2.26 m/s  RV Syst Pressure: 28.4 mmHg (< 30mmHg)  IVC Diam:         2.25 cm       [id number] Los Kuo MD  Electronically signed on 3/28/2024 at 8:53:21 AM       ** Final **  ECG 12 Lead  Atrial fibrillation with premature ventricular or aberrantly conducted complexes  ST & T wave abnormality, consider inferior ischemia  Abnormal ECG  When compared with ECG of 27-MAR-2024 19:11, (unconfirmed)  T wave inversion less evident in Lateral leads  ECG 12 lead  Atrial fibrillation with rapid ventricular response with premature ventricular or aberrantly conducted complexes  ST & T wave abnormality, consider lateral ischemia  Abnormal ECG  When compared with ECG of 27-MAR-2024 13:03,  T wave inversion more evident in Anterolateral leads      Principal Problem:    Atrial fibrillation with rapid ventricular response (CMS/HCC)  Active Problems:    Primary hypertension    Assessment/Plan   73-year-old female patient with noncompliance with diet, medication, office follow-up or physician follow-up.  Now with shortness of breath and dyspnea exertion with A-fib RVR.  1.  A-fib RVR: Continue current rate control vacation changed to p.o. rate and to medication today including bisoprolol and Cardizem with Eliquis.  2.  Acute on chronic diastolic  CHF: Continue CHF guideline directed medical therapy.  CHF education was done.  Advised patient for CHF diet to avoid SALTY 6/equals 1,500MG per DAY, Advised patient to watch out for diarrhea, dehydration and dizziness with CHF care plan.     Advised patient to avoid lunch meats, canned soups, pizzas, bread rolls, and sandwiches. Advised patient to limit salt intake 1,500 mg daily. Advised patient to exercise 30 mins/3 times a week including treadmill or aerobic type, Goal to achieve 65% target HR.    Smoking cessation counseling was performed.  The patient was counseled on the harms of smoking, including increased risk for lung disease, cardiovascular disease and cancer. In  the context of the disease, smoking is associated with a greater pain, worse joint damage, and worse response to biological therapy.  About 11 to 15 minute on smoking cessation and counseling to patient and family.    Critical care time is spent at bedside includes review of diagnostic tests, labs, and radiographs, serial assessments and management of hemodynamics, EKGs, old echoes, cardiac work-up and coordination of care.  Assessment, impression and plans are reflected in the note above as well as the orders.    Code Status:  Full Code  I spent 60 minutes in the professional and overall care of this patient.  Hugo Rubin MD

## 2024-03-29 ENCOUNTER — APPOINTMENT (OUTPATIENT)
Dept: RADIOLOGY | Facility: HOSPITAL | Age: 74
DRG: 193 | End: 2024-03-29
Payer: COMMERCIAL

## 2024-03-29 ENCOUNTER — PATIENT OUTREACH (OUTPATIENT)
Dept: CASE MANAGEMENT | Facility: HOSPITAL | Age: 74
End: 2024-03-29
Payer: COMMERCIAL

## 2024-03-29 LAB
ANION GAP SERPL CALC-SCNC: 12 MMOL/L
BUN SERPL-MCNC: 24 MG/DL (ref 8–25)
CALCIUM SERPL-MCNC: 8.7 MG/DL (ref 8.5–10.4)
CHLORIDE SERPL-SCNC: 98 MMOL/L (ref 97–107)
CO2 SERPL-SCNC: 27 MMOL/L (ref 24–31)
CREAT SERPL-MCNC: 1.2 MG/DL (ref 0.4–1.6)
EGFRCR SERPLBLD CKD-EPI 2021: 48 ML/MIN/1.73M*2
ERYTHROCYTE [DISTWIDTH] IN BLOOD BY AUTOMATED COUNT: 16.2 % (ref 11.5–14.5)
GLUCOSE SERPL-MCNC: 97 MG/DL (ref 65–99)
HCT VFR BLD AUTO: 43.4 % (ref 36–46)
HGB BLD-MCNC: 13.8 G/DL (ref 12–16)
MAGNESIUM SERPL-MCNC: 2 MG/DL (ref 1.6–3.1)
MCH RBC QN AUTO: 30.7 PG (ref 26–34)
MCHC RBC AUTO-ENTMCNC: 31.8 G/DL (ref 32–36)
MCV RBC AUTO: 97 FL (ref 80–100)
NRBC BLD-RTO: 0 /100 WBCS (ref 0–0)
PLATELET # BLD AUTO: 272 X10*3/UL (ref 150–450)
POTASSIUM SERPL-SCNC: 3.5 MMOL/L (ref 3.4–5.1)
RBC # BLD AUTO: 4.49 X10*6/UL (ref 4–5.2)
SODIUM SERPL-SCNC: 137 MMOL/L (ref 133–145)
WBC # BLD AUTO: 11.6 X10*3/UL (ref 4.4–11.3)

## 2024-03-29 PROCEDURE — 94668 MNPJ CHEST WALL SBSQ: CPT

## 2024-03-29 PROCEDURE — 2500000001 HC RX 250 WO HCPCS SELF ADMINISTERED DRUGS (ALT 637 FOR MEDICARE OP): Performed by: NURSE PRACTITIONER

## 2024-03-29 PROCEDURE — 2500000004 HC RX 250 GENERAL PHARMACY W/ HCPCS (ALT 636 FOR OP/ED): Performed by: NURSE PRACTITIONER

## 2024-03-29 PROCEDURE — 99232 SBSQ HOSP IP/OBS MODERATE 35: CPT | Performed by: NURSE PRACTITIONER

## 2024-03-29 PROCEDURE — 2500000001 HC RX 250 WO HCPCS SELF ADMINISTERED DRUGS (ALT 637 FOR MEDICARE OP): Performed by: INTERNAL MEDICINE

## 2024-03-29 PROCEDURE — 2500000002 HC RX 250 W HCPCS SELF ADMINISTERED DRUGS (ALT 637 FOR MEDICARE OP, ALT 636 FOR OP/ED): Performed by: INTERNAL MEDICINE

## 2024-03-29 PROCEDURE — 94640 AIRWAY INHALATION TREATMENT: CPT | Mod: MUE

## 2024-03-29 PROCEDURE — 80048 BASIC METABOLIC PNL TOTAL CA: CPT | Performed by: NURSE PRACTITIONER

## 2024-03-29 PROCEDURE — 83735 ASSAY OF MAGNESIUM: CPT | Performed by: NURSE PRACTITIONER

## 2024-03-29 PROCEDURE — 2500000002 HC RX 250 W HCPCS SELF ADMINISTERED DRUGS (ALT 637 FOR MEDICARE OP, ALT 636 FOR OP/ED): Mod: MUE | Performed by: INTERNAL MEDICINE

## 2024-03-29 PROCEDURE — 85027 COMPLETE CBC AUTOMATED: CPT | Performed by: NURSE PRACTITIONER

## 2024-03-29 PROCEDURE — 87081 CULTURE SCREEN ONLY: CPT | Mod: TRILAB,WESLAB | Performed by: NURSE PRACTITIONER

## 2024-03-29 PROCEDURE — 71045 X-RAY EXAM CHEST 1 VIEW: CPT | Performed by: RADIOLOGY

## 2024-03-29 PROCEDURE — 9420000001 HC RT PATIENT EDUCATION 5 MIN

## 2024-03-29 PROCEDURE — 36415 COLL VENOUS BLD VENIPUNCTURE: CPT | Performed by: NURSE PRACTITIONER

## 2024-03-29 PROCEDURE — 99232 SBSQ HOSP IP/OBS MODERATE 35: CPT | Performed by: INTERNAL MEDICINE

## 2024-03-29 PROCEDURE — 71045 X-RAY EXAM CHEST 1 VIEW: CPT

## 2024-03-29 PROCEDURE — 2060000001 HC INTERMEDIATE ICU ROOM DAILY

## 2024-03-29 RX ORDER — BISOPROLOL FUMARATE 5 MG/1
10 TABLET, FILM COATED ORAL DAILY
Status: DISCONTINUED | OUTPATIENT
Start: 2024-03-30 | End: 2024-04-01 | Stop reason: HOSPADM

## 2024-03-29 RX ORDER — FUROSEMIDE 40 MG/1
40 TABLET ORAL DAILY
Status: DISCONTINUED | OUTPATIENT
Start: 2024-03-30 | End: 2024-03-29

## 2024-03-29 RX ORDER — ONDANSETRON HYDROCHLORIDE 2 MG/ML
4 INJECTION, SOLUTION INTRAVENOUS EVERY 6 HOURS PRN
Status: DISCONTINUED | OUTPATIENT
Start: 2024-03-29 | End: 2024-04-01 | Stop reason: HOSPADM

## 2024-03-29 RX ORDER — BENZONATATE 100 MG/1
100 CAPSULE ORAL 3 TIMES DAILY PRN
Status: DISCONTINUED | OUTPATIENT
Start: 2024-03-29 | End: 2024-04-01 | Stop reason: HOSPADM

## 2024-03-29 RX ORDER — TORSEMIDE 20 MG/1
20 TABLET ORAL DAILY
Status: DISCONTINUED | OUTPATIENT
Start: 2024-03-29 | End: 2024-04-01 | Stop reason: HOSPADM

## 2024-03-29 RX ORDER — DILTIAZEM HYDROCHLORIDE 180 MG/1
180 CAPSULE, COATED, EXTENDED RELEASE ORAL 2 TIMES DAILY
Status: DISCONTINUED | OUTPATIENT
Start: 2024-03-29 | End: 2024-04-01 | Stop reason: HOSPADM

## 2024-03-29 RX ORDER — SPIRONOLACTONE 25 MG/1
12.5 TABLET ORAL 2 TIMES DAILY
Status: DISCONTINUED | OUTPATIENT
Start: 2024-03-29 | End: 2024-04-01 | Stop reason: HOSPADM

## 2024-03-29 RX ORDER — PREDNISONE 20 MG/1
40 TABLET ORAL DAILY
Status: DISCONTINUED | OUTPATIENT
Start: 2024-03-29 | End: 2024-04-01 | Stop reason: HOSPADM

## 2024-03-29 RX ORDER — CEFTRIAXONE 1 G/50ML
1 INJECTION, SOLUTION INTRAVENOUS EVERY 24 HOURS
Status: DISCONTINUED | OUTPATIENT
Start: 2024-03-29 | End: 2024-04-01 | Stop reason: HOSPADM

## 2024-03-29 RX ORDER — FUROSEMIDE 10 MG/ML
40 INJECTION INTRAMUSCULAR; INTRAVENOUS ONCE
Status: DISCONTINUED | OUTPATIENT
Start: 2024-03-29 | End: 2024-03-29

## 2024-03-29 RX ADMIN — APIXABAN 5 MG: 5 TABLET, FILM COATED ORAL at 05:25

## 2024-03-29 RX ADMIN — APIXABAN 5 MG: 5 TABLET, FILM COATED ORAL at 17:55

## 2024-03-29 RX ADMIN — IPRATROPIUM BROMIDE AND ALBUTEROL SULFATE 3 ML: 2.5; .5 SOLUTION RESPIRATORY (INHALATION) at 07:46

## 2024-03-29 RX ADMIN — IPRATROPIUM BROMIDE AND ALBUTEROL SULFATE 3 ML: 2.5; .5 SOLUTION RESPIRATORY (INHALATION) at 12:06

## 2024-03-29 RX ADMIN — CEFTRIAXONE SODIUM 1 G: 1 INJECTION, SOLUTION INTRAVENOUS at 15:18

## 2024-03-29 RX ADMIN — DILTIAZEM HYDROCHLORIDE 120 MG: 120 CAPSULE, COATED, EXTENDED RELEASE ORAL at 08:46

## 2024-03-29 RX ADMIN — ACETAMINOPHEN 650 MG: 325 TABLET ORAL at 23:43

## 2024-03-29 RX ADMIN — SPIRONOLACTONE 12.5 MG: 25 TABLET ORAL at 20:45

## 2024-03-29 RX ADMIN — AZITHROMYCIN 500 MG: 500 TABLET, FILM COATED ORAL at 09:00

## 2024-03-29 RX ADMIN — TORSEMIDE 20 MG: 20 TABLET ORAL at 12:37

## 2024-03-29 RX ADMIN — PREDNISONE 40 MG: 20 TABLET ORAL at 12:37

## 2024-03-29 RX ADMIN — BISOPROLOL FUMARATE 5 MG: 5 TABLET, FILM COATED ORAL at 08:42

## 2024-03-29 RX ADMIN — SPIRONOLACTONE 12.5 MG: 25 TABLET ORAL at 12:37

## 2024-03-29 RX ADMIN — AZITHROMYCIN MONOHYDRATE 500 MG: 500 INJECTION, POWDER, LYOPHILIZED, FOR SOLUTION INTRAVENOUS at 13:37

## 2024-03-29 RX ADMIN — BENZONATATE 100 MG: 100 CAPSULE ORAL at 05:25

## 2024-03-29 RX ADMIN — IPRATROPIUM BROMIDE AND ALBUTEROL SULFATE 3 ML: 2.5; .5 SOLUTION RESPIRATORY (INHALATION) at 19:58

## 2024-03-29 RX ADMIN — DILTIAZEM HYDROCHLORIDE 180 MG: 180 CAPSULE, COATED, EXTENDED RELEASE ORAL at 20:45

## 2024-03-29 RX ADMIN — ACETAMINOPHEN 650 MG: 325 TABLET ORAL at 05:33

## 2024-03-29 RX ADMIN — BENZONATATE 100 MG: 100 CAPSULE ORAL at 12:37

## 2024-03-29 ASSESSMENT — COGNITIVE AND FUNCTIONAL STATUS - GENERAL
MOBILITY SCORE: 23
DAILY ACTIVITIY SCORE: 24
CLIMB 3 TO 5 STEPS WITH RAILING: A LITTLE
MOBILITY SCORE: 23
CLIMB 3 TO 5 STEPS WITH RAILING: A LITTLE
DAILY ACTIVITIY SCORE: 24

## 2024-03-29 ASSESSMENT — PAIN SCALES - GENERAL
PAINLEVEL_OUTOF10: 0 - NO PAIN
PAINLEVEL_OUTOF10: 3

## 2024-03-29 ASSESSMENT — PAIN - FUNCTIONAL ASSESSMENT
PAIN_FUNCTIONAL_ASSESSMENT: 0-10

## 2024-03-29 ASSESSMENT — PAIN DESCRIPTION - ORIENTATION: ORIENTATION: LOWER

## 2024-03-29 ASSESSMENT — PAIN DESCRIPTION - LOCATION: LOCATION: BACK

## 2024-03-29 NOTE — CONSULTS
"Nutrition Assessement Note    Nutrition Assessment    Reason for Assessment: Dietitian discretion (CHF)    Reason for Hospital Admission:  Constance M Kocher is a 73 y.o. female who is admitted for CHF, a fib with RVR.     Nutrition History:  Food and Nutrient History: states her appetite is very good at this time d/t her breathing treatments. reports decreased appetite for 6-8 weeks prior to admission d/t not feeling well.  Energy Intake: Good > 75 %  Oral Problems:  edentulous but denies any issues chewing    Anthropometrics:  Ht: 165.1 cm (5' 5\"), Wt: 78.1 kg (172 lb 2.9 oz), BMI: 28.65    Weight Change:  Daily Weight  03/28/24 : 78.1 kg (172 lb 2.9 oz)  02/22/24 : 81.6 kg (180 lb)     Weight History / % Weight Change: pt reports a 15# wt loss over the past 6-8 weeks  Significant Weight Loss: Yes  Interpretation of Weight Loss:  (15# (8%) wt loss over 6-8 weeks per pt report)    Nutrition Focused Physical Exam Findings:   Subcutaneous Fat Loss  Orbital Fat Pads: Well nourished (slightly bulging fat pads)  Buccal Fat Pads: Well nourished (full, rounded cheeks)  Triceps: Well nourished (ample fat tissue)    Muscle Wasting  Temporalis: Well nourished (well-defined muscle)  Pectoralis (Clavicular Region): Well nourished (clavicle not visible)  Deltoid/Trapezius: Well nourished (rounded appearance at arm, shoulder, neck)    Nutrition Significant Labs:  Lab Results   Component Value Date    WBC 11.6 (H) 03/29/2024    HGB 13.8 03/29/2024    HCT 43.4 03/29/2024     03/29/2024    ALT 73 (H) 03/27/2024    AST 42 (H) 03/27/2024     03/29/2024    K 3.5 03/29/2024    CL 98 03/29/2024    CREATININE 1.20 03/29/2024    BUN 24 03/29/2024    CO2 27 03/29/2024    TSH 0.71 04/09/2018    INR 1.0 07/15/2019       Current Facility-Administered Medications:     acetaminophen (Tylenol) tablet 650 mg, 650 mg, oral, q4h PRN, 650 mg at 03/29/24 0533 **OR** acetaminophen (Tylenol) oral liquid 650 mg, 650 mg, nasogastric tube, " q4h PRN **OR** acetaminophen (Tylenol) suppository 650 mg, 650 mg, rectal, q4h PRN, TANI Lozano-CNP    apixaban (Eliquis) tablet 5 mg, 5 mg, oral, q12h, TANI Lozano-CNP, 5 mg at 03/29/24 0525    azithromycin (Zithromax) tablet 500 mg, 500 mg, oral, q24h LUNA, TANI Lozano-CNP, 500 mg at 03/29/24 0900    benzonatate (Tessalon) capsule 100 mg, 100 mg, oral, TID PRN, Juancarlos Cullen MD, 100 mg at 03/29/24 0525    bisoprolol (Zebeta) tablet 5 mg, 5 mg, oral, Daily, Hugo Rubin MD, 5 mg at 03/29/24 0842    dilTIAZem CD (Cardizem CD) 24 hr capsule 120 mg, 120 mg, oral, BID, Hugo Rubin MD, 120 mg at 03/29/24 0846    guaiFENesin (Robitussin) 100 mg/5 mL syrup 200 mg, 200 mg, oral, q4h PRN, TANI Lozano-CNP, 200 mg at 03/28/24 0159    ipratropium-albuteroL (Duo-Neb) 0.5-2.5 mg/3 mL nebulizer solution 3 mL, 3 mL, nebulization, TID, Alfa Walker DO, 3 mL at 03/29/24 0746    ipratropium-albuteroL (Duo-Neb) 0.5-2.5 mg/3 mL nebulizer solution 3 mL, 3 mL, nebulization, q2h PRN, Alfa Walker DO    Dietary Orders (From admission, onward)       Start     Ordered    03/27/24 1801  Adult diet Cardiac; 70 gm fat; 2 - 3 grams Sodium  Diet effective now        Question Answer Comment   Diet type Cardiac    Fat restriction: 70 gm fat    Sodium restriction: 2 - 3 grams Sodium        03/27/24 1801                  Estimated Needs:   Estimated Energy Needs  Total Energy Estimated Needs (kCal): 1955 kCal  Total Estimated Energy Need per Day (kCal/kg): 25 kCal/kg  Method for Estimating Needs: actual wt    Estimated Protein Needs  Total Protein Estimated Needs (g): 94 g  Total Protein Estimated Needs (g/kg): 1.2 g/kg  Method for Estimating Needs: actual wt    Estimated Fluid Needs  Method for Estimating Needs: <2000 ml/day        Nutrition Diagnosis   Nutrition Diagnosis:  Malnutrition Diagnosis  Patient has Malnutrition Diagnosis: No    Nutrition Diagnosis  Patient has  Nutrition Diagnosis: Yes  Diagnosis Status (1): New  Nutrition Diagnosis 1: Food and nutrition related knowledge deficit  Related to (1): lack of prior exposure to accurate nutrition related information  As Evidenced by (1): request for education       Nutrition Interventions/Recommendations   Nutrition Interventions and Recommendations:    Nutrition Prescription:  Individualized Nutrition Prescription Provided for : 1955 kcals and 94g protein to be provided via diet    Nutrition Interventions:   Food and/or Nutrient Delivery Interventions  Interventions: Meals and snacks  Meals and Snacks: Fat-modified diet, Mineral-modified diet  Goal: provide as ordered  Additional Interventions: briefly discussed the importance of following a low Na+ diet. declined written info. verbally reviewed foods high in Na+ and label reading to consume <2000 mg Na+ daily.    Education Documentation  Diet - Low Sodium, taught by Janae Jung, RD, LD at 3/29/2024 10:05 AM.  Learner: Patient  Readiness: Acceptance  Method: Explanation  Response: Verbalizes Understanding             Nutrition Monitoring and Evaluation   Monitoring/Evaluation:   Food/Nutrient Related History Monitoring  Monitoring and Evaluation Plan: Energy intake  Energy Intake: Estimated energy intake  Criteria: pt to consume >/= 75% estimated needs  Additional Plans: pt will plan meals within prescribed guidelines       Time Spent/Follow-up:   Follow Up  Time Spent (min): 30 minutes  Last Date of Nutrition Visit: 03/29/24  Nutrition Follow-Up Needed?: 7-10 days  Follow up Comment: 4/5/24

## 2024-03-29 NOTE — PROGRESS NOTES
Subjective Data:  Patient stable cardiac wise.  Underlying A-fib RVR with possible pneumonia.  Stable cardiac wise.  Continue current treatment plan.  Optimize pulmonary status.  Overnight Events:    None  Objective   Last Recorded Vitals  /80 (BP Location: Left arm, Patient Position: Sitting)   Pulse 65   Temp 36.2 °C (97.2 °F) (Temporal)   Resp 23   Wt 78.1 kg (172 lb 2.9 oz)   SpO2 95%     Intake/Output Summary (Last 24 hours) at 3/29/2024 1533  Last data filed at 3/29/2024 1337  Gross per 24 hour   Intake 720 ml   Output 150 ml   Net 570 ml     Physical Exam:  HEENT: Normocephalic/atraumatic pupils equal react light  Neck exam mild JVD, no bruit  Lung exam clear to auscultation, few crackles at the bases  Cardiac exam is irregular rhythm S1-S2, soft systolic murmur heard.   Abdomen soft nontender, nondistended  Extremities no clubbing, cyanosis, trace edema  Neuro exam grossly intact.  Image Results  XR chest 1 view  Narrative: Interpreted By:  De Meek,   STUDY:  XR CHEST 1 VIEW; 3/29/2024 11:07 am      INDICATION:  Signs/Symptoms:shortness of breath.      COMPARISON:  03/27/2024      ACCESSION NUMBER(S):  [id number]      ORDERING CLINICIAN:  ELMO ALONZO      TECHNIQUE:  1 view of the chest was performed.      FINDINGS:  The lungs are adequately inflated. No pneumothorax. Mild prominence  of the interstitium without significant consolidation possibly mild  pulmonary edema or atypical infectious or inflammatory etiology.  Probable minimal pleural effusion which is improved from the prior  study. The cardiomediastinal silhouette is within normal limits.      Impression: Patchy bilateral ground-glass airspace opacity and mild prominence of  the interstitium is again noted which can reflect mild pulmonary  edema as well as inflammatory or potentially atypical pneumonia.  Minimal bilateral pleural effusions. No significant consolidation.      Signed by: De Meek 3/29/2024 11:15  AM  Dictation workstation:   [id number]    Last Labs:  CBC - 3/29/2024:  5:33 AM  11.6 13.8 272    43.4      CMP - 3/29/2024:  5:33 AM  8.7 6.5 42 --- 0.8   _ 3.5 73 120      PTT - No results in last year.  _   _ _     Inpatient Medications:  Scheduled medications   Medication Dose Route Frequency    apixaban  5 mg oral q12h    azithromycin  500 mg intravenous q24h    [START ON 3/30/2024] bisoprolol  10 mg oral Daily    cefTRIAXone  1 g intravenous q24h    dilTIAZem CD  180 mg oral BID    ipratropium-albuteroL  3 mL nebulization TID    predniSONE  40 mg oral Daily    spironolactone  12.5 mg oral BID    torsemide  20 mg oral Daily     Principal Problem:    Atrial fibrillation with rapid ventricular response (CMS/HCC)  Active Problems:    Primary hypertension    Assessment/Plan   73-year-old female patient with a known history of COPD exacerbation, A-fib RVR has controlled with multiple rate control medication.  Continue antibiotic for possible pneumonia.  1.  Atrial fibrillation rapid ventricular rate: Continue current Eliquis 5 mg tablet p.o. twice a day as well as bisoprolol 10 mg once a day.  Also continue Cardizem on 80 mg CD tablet p.o. twice daily.  2.  Acute on chronic diastolic CHF: Continue current spironolactone 12.5 mg subcu twice a day as well as a low-dose of torsemide.  CHF education was done.    Advised patient for CHF diet to avoid SALTY 6/equals 1,000MG per DAY, Advised patient to watch out for diarrhea, dehydration and dizziness with CHF care plan. Advised patient for CHF diet to avoid SALTY 6/equals 1,000MG per DAY, Guideline directed medical therapy for CHF.    Code Status:  Full Code  I spent 35 minutes in the professional and overall care of this patient.  Hugo Rubin MD

## 2024-03-29 NOTE — PROGRESS NOTES
Met with patient @ bedside for information exchange & CHF education. Discussed HF Nurse Navigator role/HF program. Patient is agreeable to receiving CHF education & being followed post hospital discharge to help manage CHF. Patient reports that currently she does not have PCP. She plans on following up with Dr Rubin. CHF is new dx.  Reports the reason why she stopped taking medications was because they made her ill. She is very sensitive to many medications. She also reports that she has transportation issues. Case Management has informed her about [facility] & has given her list of PCPs.  Informed patient of  Meds to Beds Program. She was agreeable to Meds to Beds & would also [facility] Informed nurse & she did opt pt in for Meds to Beds & working on changing pharmacy. She follows low Na, natural diet currently, has scale @ home. She plans on having her insurance-[employer]-send her new scale. Provided CHF booklet/handouts & reviewed. Educated on CHF,EF,HF flare up symptoms & managing CHF.  HEART FAILURE EDUCATION:  1. Weigh yourself daily and record on your weight log.  2. If you gain more than 2 or 3 pounds overnight or > 5 Lbs in week, call your cardiologist.  3. Follow a low sodium diet. No more than 2000 mg in one day, or more than 650 mg per meal.  4. Limit total fluids to no more than 6- 8 cups (48-64 oz) per day - this includes all fluids (water, coffee, juice, milk, tea, etc.)  5. Call to schedule your follow-up appointments when you get home if they were not already scheduled for you.  6. Keep your follow-up appointments! Bring your weight log with you so the doctors can see your weight trend.  7. Be sure to  any new prescriptions and take them as directed. If unsure of the medications, be sure to call your cardiologist.  8. Stay as active as you can tolerate.   9. If you notice subtle change of symptoms (slight increase in swelling,  slight shortness of breath, a new intolerance to laying flat, a new cough), be sure to call your cardiologist.  Patient verbalized understanding of CHF education. She prefers to make her own follow up appt. I will continue to follow while IP & post hospital discharge to help manage CHF.

## 2024-03-29 NOTE — PROGRESS NOTES
03/29/24 1100   Discharge Planning   Patient expects to be discharged to: Home with no needs.  PCP and [facility] info provided.   Does the patient need discharge transport arranged? Yes     Patient is from home with her son and his family.  Patient will need to be utilizing [employer] for transportation to appointments.  Provided PCP and [facility] information to patient.  Bedside nurse advised patient will need transportation home.  RN [facility] following.

## 2024-03-29 NOTE — CONSULTS
"Inpatient consult to Pulmonology  Consult performed by: Darrell Calhoun MD  Consult ordered by: TANI Lozano-CNP          Pulmonary Consult    Reason For Consult  Shortness of breath and hypoxia.  History Of Present Illness  Constance M Kocher is a 73 y.o. female presenting with Atrial fibrillation with rapid ventricular response (CMS/HCC).  She reports tolerating states that she on regular basis.  Has a history of known high blood pressure but admits to stop in her meds due to side effects.  Given A-fib with RVR started on Cardizem drip proBNP is also elevated chest x-ray showing signs of edema and possible pneumonia.     Past Medical History  She has a past medical history of Dyspnea (03/27/2024), Essential hypertension (10/20/2009), Fatigue (10/19/2023), Hyperlipidemia (02/12/2014), Hypokalemia (10/19/2023), Insomnia (03/09/2012), Murmur (10/20/2009), and Polymyalgia rheumatica (CMS/[facility]).    Surgical History  She has no past surgical history on file.     Social History  She reports that she quit smoking about 4 weeks ago. Her smoking use included cigarettes. She has a 55.00 pack-year smoking history. She has quit using smokeless tobacco. She reports that she does not currently use alcohol. She reports that she does not use drugs.      Family History  Family History   Problem Relation Name Age of Onset    No Known Problems Mother      No Known Problems Father          Allergies  Anesthesia s/i-40 (propofol) [propofol] and Iodinated contrast media    Review of Systems   All other systems reviewed and are negative.      Last Recorded Vitals  Blood pressure 102/80, pulse 65, temperature 36.2 °C (97.2 °F), temperature source Temporal, resp. rate 23, height 1.651 m (5' 5\"), weight 78.1 kg (172 lb 2.9 oz), SpO2 95 %.    Intake/Output    Intake/Output Summary (Last 24 hours) at 3/29/2024 1622  Last data filed at 3/29/2024 1337  Gross per 24 hour   Intake 720 ml   Output 150 ml   Net 570 ml       Physical " Exam  Vitals reviewed.   Constitutional:       Appearance: Normal appearance.   HENT:      Head: Normocephalic and atraumatic.      Mouth/Throat:      Mouth: Mucous membranes are moist.   Eyes:      Extraocular Movements: Extraocular movements intact.      Pupils: Pupils are equal, round, and reactive to light.   Cardiovascular:      Rate and Rhythm: Normal rate and regular rhythm.   Pulmonary:      Effort: Pulmonary effort is normal.      Breath sounds: Normal breath sounds and air entry.   Abdominal:      General: Abdomen is flat. Bowel sounds are normal.      Palpations: Abdomen is soft.   Musculoskeletal:         General: Normal range of motion.      Cervical back: Normal range of motion and neck supple.   Skin:     General: Skin is warm and dry.   Neurological:      General: No focal deficit present.      Mental Status: She is alert and oriented to person, place, and time. Mental status is at baseline.      ...    Oxygen Therapy  SpO2: 95 %  Medical Gas Therapy: None (Room air)  O2 Delivery Method: Nasal cannula  FiO2 (%):  [28 %] 28 %    Lines and Tubes:  Peripheral IV 03/27/24 18 G Right Antecubital (Active)   Placement Date/Time: 03/27/24 1320   Size (Gauge): 18 G  Orientation: Right  Location: Antecubital   Number of days: 2       Peripheral IV 20 G Left Antecubital (Active)   Earliest Known Present: 03/29/24   Size (Gauge): 20 G  Orientation: Left  Location: Antecubital   Number of days: 0         Scheduled medications  apixaban, 5 mg, oral, q12h  azithromycin, 500 mg, intravenous, q24h  [START ON 3/30/2024] bisoprolol, 10 mg, oral, Daily  cefTRIAXone, 1 g, intravenous, q24h  dilTIAZem CD, 180 mg, oral, BID  ipratropium-albuteroL, 3 mL, nebulization, TID  predniSONE, 40 mg, oral, Daily  spironolactone, 12.5 mg, oral, BID  torsemide, 20 mg, oral, Daily      Continuous medications     PRN medications  PRN medications: acetaminophen **OR** acetaminophen **OR** acetaminophen, benzonatate, guaiFENesin,  ipratropium-albuteroL, ondansetron    Relevant Results  Results from last 7 days   Lab Units 03/29/24  0533 03/28/24  0533 03/27/24  1347   WBC AUTO x10*3/uL 11.6* 9.6 10.5   HEMOGLOBIN g/dL 13.8 13.4 14.1   HEMATOCRIT % 43.4 42.3 44.4   PLATELETS AUTO x10*3/uL 272 267 291      Results from last 7 days   Lab Units 03/29/24  0533 03/28/24  0533 03/27/24  1347   SODIUM mmol/L 137 139 142   POTASSIUM mmol/L 3.5 3.3* 3.2*   CHLORIDE mmol/L 98 98 101   CO2 mmol/L 27 26 27   BUN mg/dL 24 20 19   CREATININE mg/dL 1.20 1.20 1.10   GLUCOSE mg/dL 97 105* 97   CALCIUM mg/dL 8.7 8.9 9.2          XR chest 1 view 03/29/2024    Narrative  Interpreted By:  De Meek,  STUDY:  XR CHEST 1 VIEW; 3/29/2024 11:07 am    INDICATION:  Signs/Symptoms:shortness of breath.    COMPARISON:  03/27/2024    ACCESSION NUMBER(S):  [id number]    ORDERING CLINICIAN:  ELMO ALONZO    TECHNIQUE:  1 view of the chest was performed.    FINDINGS:  The lungs are adequately inflated. No pneumothorax. Mild prominence  of the interstitium without significant consolidation possibly mild  pulmonary edema or atypical infectious or inflammatory etiology.  Probable minimal pleural effusion which is improved from the prior  study. The cardiomediastinal silhouette is within normal limits.    Impression  Patchy bilateral ground-glass airspace opacity and mild prominence of  the interstitium is again noted which can reflect mild pulmonary  edema as well as inflammatory or potentially atypical pneumonia.  Minimal bilateral pleural effusions. No significant consolidation.    Signed by: De Meek 3/29/2024 11:15 AM  Dictation workstation:   [id number]       Assessment/Plan   Principal Problem:    Atrial fibrillation with rapid ventricular response (CMS/HCC)  Active Problems:    Primary hypertension    Atrial Fibrillation with RVR  Acute CHF   Dyspnea  Hypertension  Tobacco Dependence  DVT Prophylaxis    Await 2d echo  Diuresis  Wean  oxygen  Eliquis    Darrell Calhoun MD  [facility]

## 2024-03-29 NOTE — PROGRESS NOTES
Constance M Kocher is a 73 y.o. female on day 2 of admission presenting with Atrial fibrillation with rapid ventricular response (CMS/HCC).      Subjective   No overnight issues. Remains in Afib, HR controlled. Endorses cough. Requesting O2 as this makes her feel better however good O2 sats on room air. Evaluated by Pulmonology and CXR ordered.        Objective     Last Recorded Vitals  /76 (BP Location: Right arm, Patient Position: Lying)   Pulse 71   Temp 36.2 °C (97.2 °F) (Temporal)   Resp 18   Wt 78.1 kg (172 lb 2.9 oz)   SpO2 95%   Intake/Output last 3 Shifts:  No intake or output data in the 24 hours ending 03/29/24 0926    Admission Weight  Weight: 79.1 kg (174 lb 6.1 oz) (03/27/24 1253)    Daily Weight  03/28/24 : 78.1 kg (172 lb 2.9 oz)    Image Results    XR chest 2 views 03/27/2024    Narrative  Interpreted By:  Moi Walsh,  STUDY:  XR CHEST 2 VIEWS  3/27/2024 3:03 pm    INDICATION:  Signs/Symptoms:cough x 6 weeks    COMPARISON:  02/21/2024    ACCESSION NUMBER(S):  [id number]    ORDERING CLINICIAN:  VIRAJ CHACON    TECHNIQUE:  PA and lateral views of the chest were obtained.    FINDINGS:  Cardiac monitoring leads are seen over the chest. Mild-to-moderate  diffuse interstitial infiltrates are seen bilaterally, and may  represent edema and/or pneumonia. Small bilateral pleural effusions  are seen, larger on the left than on the right. Bibasilar airspace  consolidations are present, and may represent atelectasis and/or  pneumonia. No pneumothorax is identified. The cardiac silhouette is  within normal limits for size.  Mild discogenic degenerative changes  are seen throughout the thoracic spine.    Impression  Diffuse interstitial infiltrates, small bilateral pleural effusions  and bibasilar airspace consolidations, as above. Clinical correlation  and continued follow-up until clearing is recommended.    MACRO:  None.    Signed by: Moi Walsh 3/27/2024 3:09 PM  Dictation workstation:    [id number]     Results for orders placed or performed during the hospital encounter of 03/27/24 (from the past 24 hour(s))   Basic metabolic panel   Result Value Ref Range    Glucose 97 65 - 99 mg/dL    Sodium 137 133 - 145 mmol/L    Potassium 3.5 3.4 - 5.1 mmol/L    Chloride 98 97 - 107 mmol/L    Bicarbonate 27 24 - 31 mmol/L    Urea Nitrogen 24 8 - 25 mg/dL    Creatinine 1.20 0.40 - 1.60 mg/dL    eGFR 48 (L) >60 mL/min/1.73m*2    Calcium 8.7 8.5 - 10.4 mg/dL    Anion Gap 12 <=19 mmol/L   CBC   Result Value Ref Range    WBC 11.6 (H) 4.4 - 11.3 x10*3/uL    nRBC 0.0 0.0 - 0.0 /100 WBCs    RBC 4.49 4.00 - 5.20 x10*6/uL    Hemoglobin 13.8 12.0 - 16.0 g/dL    Hematocrit 43.4 36.0 - 46.0 %    MCV 97 80 - 100 fL    MCH 30.7 26.0 - 34.0 pg    MCHC 31.8 (L) 32.0 - 36.0 g/dL    RDW 16.2 (H) 11.5 - 14.5 %    Platelets 272 150 - 450 x10*3/uL   Magnesium   Result Value Ref Range    Magnesium 2.00 1.60 - 3.10 mg/dL        Physical Exam  Vitals and nursing note reviewed.   Constitutional:       Appearance: Normal appearance.   HENT:      Head: Normocephalic and atraumatic.      Nose: Nose normal.      Mouth/Throat:      Mouth: Mucous membranes are moist.      Pharynx: Oropharynx is clear.   Eyes:      Extraocular Movements: Extraocular movements intact.      Pupils: Pupils are equal, round, and reactive to light.   Cardiovascular:      Rate and Rhythm: Normal rate. Rhythm irregular.      Pulses: Normal pulses.   Pulmonary:      Effort: Pulmonary effort is normal.      Breath sounds: Normal breath sounds.   Abdominal:      General: Abdomen is flat. Bowel sounds are normal.      Palpations: Abdomen is soft.   Musculoskeletal:         General: Normal range of motion.      Cervical back: Normal range of motion and neck supple.   Skin:     General: Skin is warm and dry.      Capillary Refill: Capillary refill takes less than 2 seconds.   Neurological:      General: No focal deficit present.      Mental Status: She is alert and  oriented to person, place, and time.   Psychiatric:         Mood and Affect: Mood normal.         Behavior: Behavior normal.         Thought Content: Thought content normal.         Judgment: Judgment normal.           Assessment/Plan    Atrial Fibrillation with RVR  -'s upon ED arrival, currently controlled  -Initially on Cardizem gtt, now on oral  -Continue BB  -Cardiology following  -ChadsVasc is 4. Continue Eliquis  -Monitor on tele     Acute CHF   -Likely secondary to AFib  -Pt euvolemic on exam  -ProBNP 2,664  -CXR today shows pulmonary edema  -ECHO shows mildly reduced LV systolic function, EF 50-55%  -Will give IV Lasix today. Start oral in am  -Strict I&O's   -Cardiology follows     Pneumonia  -CXR today shows patchy bilateral ground-glass opacities, possible atypical pneumonia  -Start broad spectrum IV Abx  -Will add oral steroids as pt reports chronic bronchitis  -Pulmonology follows  -Follow blood cultures  -MRSA PCR  -Duonebs    Hypomagnesemia  -Resolved     Hypokalemia  -Resolved     Hypertension  -Continue Metoprolol  -BP elevated, monitor      Tobacco Dependence  -Smoker x 57 yrs. States quit 4 weeks ago     DVT Prophylaxis  -Eliquis     Plan  To home on discharge with no needs. Will place referral for [facility] and PCP    Hoa Christiansen, CNP

## 2024-03-30 LAB
ANION GAP SERPL CALC-SCNC: 14 MMOL/L
BUN SERPL-MCNC: 24 MG/DL (ref 8–25)
CALCIUM SERPL-MCNC: 8.9 MG/DL (ref 8.5–10.4)
CHLORIDE SERPL-SCNC: 97 MMOL/L (ref 97–107)
CO2 SERPL-SCNC: 22 MMOL/L (ref 24–31)
CREAT SERPL-MCNC: 1.1 MG/DL (ref 0.4–1.6)
EGFRCR SERPLBLD CKD-EPI 2021: 53 ML/MIN/1.73M*2
ERYTHROCYTE [DISTWIDTH] IN BLOOD BY AUTOMATED COUNT: 15.9 % (ref 11.5–14.5)
GLUCOSE SERPL-MCNC: 169 MG/DL (ref 65–99)
HCT VFR BLD AUTO: 39.6 % (ref 36–46)
HGB BLD-MCNC: 12.5 G/DL (ref 12–16)
MCH RBC QN AUTO: 30.4 PG (ref 26–34)
MCHC RBC AUTO-ENTMCNC: 31.6 G/DL (ref 32–36)
MCV RBC AUTO: 96 FL (ref 80–100)
NRBC BLD-RTO: 0 /100 WBCS (ref 0–0)
PLATELET # BLD AUTO: 227 X10*3/UL (ref 150–450)
POTASSIUM SERPL-SCNC: 3.6 MMOL/L (ref 3.4–5.1)
RBC # BLD AUTO: 4.11 X10*6/UL (ref 4–5.2)
SODIUM SERPL-SCNC: 133 MMOL/L (ref 133–145)
WBC # BLD AUTO: 12.1 X10*3/UL (ref 4.4–11.3)

## 2024-03-30 PROCEDURE — 2060000001 HC INTERMEDIATE ICU ROOM DAILY

## 2024-03-30 PROCEDURE — 2500000002 HC RX 250 W HCPCS SELF ADMINISTERED DRUGS (ALT 637 FOR MEDICARE OP, ALT 636 FOR OP/ED): Performed by: INTERNAL MEDICINE

## 2024-03-30 PROCEDURE — 9420000001 HC RT PATIENT EDUCATION 5 MIN

## 2024-03-30 PROCEDURE — 36415 COLL VENOUS BLD VENIPUNCTURE: CPT | Performed by: NURSE PRACTITIONER

## 2024-03-30 PROCEDURE — 94760 N-INVAS EAR/PLS OXIMETRY 1: CPT

## 2024-03-30 PROCEDURE — 94640 AIRWAY INHALATION TREATMENT: CPT | Mod: MUE

## 2024-03-30 PROCEDURE — 2500000001 HC RX 250 WO HCPCS SELF ADMINISTERED DRUGS (ALT 637 FOR MEDICARE OP): Performed by: NURSE PRACTITIONER

## 2024-03-30 PROCEDURE — 2500000004 HC RX 250 GENERAL PHARMACY W/ HCPCS (ALT 636 FOR OP/ED): Performed by: NURSE PRACTITIONER

## 2024-03-30 PROCEDURE — 99232 SBSQ HOSP IP/OBS MODERATE 35: CPT | Performed by: INTERNAL MEDICINE

## 2024-03-30 PROCEDURE — 99232 SBSQ HOSP IP/OBS MODERATE 35: CPT | Performed by: NURSE PRACTITIONER

## 2024-03-30 PROCEDURE — 2500000001 HC RX 250 WO HCPCS SELF ADMINISTERED DRUGS (ALT 637 FOR MEDICARE OP): Performed by: INTERNAL MEDICINE

## 2024-03-30 PROCEDURE — 80048 BASIC METABOLIC PNL TOTAL CA: CPT | Performed by: NURSE PRACTITIONER

## 2024-03-30 PROCEDURE — 94668 MNPJ CHEST WALL SBSQ: CPT

## 2024-03-30 PROCEDURE — 85027 COMPLETE CBC AUTOMATED: CPT | Performed by: NURSE PRACTITIONER

## 2024-03-30 RX ADMIN — IPRATROPIUM BROMIDE AND ALBUTEROL SULFATE 3 ML: 2.5; .5 SOLUTION RESPIRATORY (INHALATION) at 19:17

## 2024-03-30 RX ADMIN — DILTIAZEM HYDROCHLORIDE 180 MG: 180 CAPSULE, COATED, EXTENDED RELEASE ORAL at 09:24

## 2024-03-30 RX ADMIN — IPRATROPIUM BROMIDE AND ALBUTEROL SULFATE 3 ML: 2.5; .5 SOLUTION RESPIRATORY (INHALATION) at 08:59

## 2024-03-30 RX ADMIN — TORSEMIDE 20 MG: 20 TABLET ORAL at 09:25

## 2024-03-30 RX ADMIN — APIXABAN 5 MG: 5 TABLET, FILM COATED ORAL at 06:31

## 2024-03-30 RX ADMIN — CEFTRIAXONE SODIUM 1 G: 1 INJECTION, SOLUTION INTRAVENOUS at 14:19

## 2024-03-30 RX ADMIN — AZITHROMYCIN MONOHYDRATE 500 MG: 500 INJECTION, POWDER, LYOPHILIZED, FOR SOLUTION INTRAVENOUS at 14:19

## 2024-03-30 RX ADMIN — SPIRONOLACTONE 12.5 MG: 25 TABLET ORAL at 20:27

## 2024-03-30 RX ADMIN — PREDNISONE 40 MG: 20 TABLET ORAL at 09:24

## 2024-03-30 RX ADMIN — BISOPROLOL FUMARATE 10 MG: 5 TABLET, FILM COATED ORAL at 09:25

## 2024-03-30 RX ADMIN — APIXABAN 5 MG: 5 TABLET, FILM COATED ORAL at 17:41

## 2024-03-30 RX ADMIN — IPRATROPIUM BROMIDE AND ALBUTEROL SULFATE 3 ML: 2.5; .5 SOLUTION RESPIRATORY (INHALATION) at 14:00

## 2024-03-30 RX ADMIN — DILTIAZEM HYDROCHLORIDE 180 MG: 180 CAPSULE, COATED, EXTENDED RELEASE ORAL at 20:27

## 2024-03-30 RX ADMIN — SPIRONOLACTONE 12.5 MG: 25 TABLET ORAL at 09:24

## 2024-03-30 ASSESSMENT — PAIN SCALES - GENERAL: PAINLEVEL_OUTOF10: 0 - NO PAIN

## 2024-03-30 NOTE — CARE PLAN
Attempted to contact mom regarding refill request. No answer. Left message on voicemail with callback number.        The patient's goals for the shift include To breath better    The clinical goals for the shift include Improved breathing

## 2024-03-30 NOTE — PROGRESS NOTES
Pulmonary Progress Note    Constance M Kocher is a 73 y.o. female on day 3 of admission presenting with Atrial fibrillation with rapid ventricular response (CMS/HCC).    Subjective   Improved at rest  Short of breath with exertion persists  On 3lpm  Objective   Vital Signs      3/29/2024     4:34 PM 3/29/2024     8:21 PM 3/30/2024    12:27 AM 3/30/2024     4:03 AM 3/30/2024     8:34 AM 3/30/2024    12:43 PM 3/30/2024     3:58 PM   Vitals   Systolic 94 123 102 115 104 98 90   Diastolic 75 67 55 81 54 55 49   Heart Rate 80 83 99 57 57 57 57   Temp 35.8 °C (96.4 °F) 36.4 °C (97.5 °F) 36.2 °C (97.2 °F) 35.4 °C (95.7 °F) 36.7 °C (98.1 °F) 39.7 °C (103.5 °F) 36.7 °C (98.1 °F)   Resp 20 23 19 20 20 20 20       Oxygen Therapy  SpO2: 91 %  Medical Gas Therapy: Supplemental oxygen  O2 Delivery Method: Nasal cannula    FiO2 (%):  [21 %-28 %] 28 %    Intake/Output previous 24 hours:    Intake/Output Summary (Last 24 hours) at 3/30/2024 1710  Last data filed at 3/29/2024 1800  Gross per 24 hour   Intake 400 ml   Output --   Net 400 ml       Physical Exam  Vitals reviewed.   Constitutional:       General: She is awake.      Appearance: Normal appearance.   HENT:      Head: Normocephalic and atraumatic.   Eyes:      Pupils: Pupils are equal, round, and reactive to light.   Cardiovascular:      Rate and Rhythm: Normal rate and regular rhythm.   Pulmonary:      Effort: Pulmonary effort is normal.      Breath sounds: Normal breath sounds.   Abdominal:      General: Bowel sounds are normal.   Musculoskeletal:         General: Normal range of motion.   Neurological:      Mental Status: She is alert and oriented to person, place, and time. Mental status is at baseline.   Psychiatric:         Attention and Perception: Attention and perception normal.         Mood and Affect: Mood normal.         Behavior: Behavior normal.       ...  Lines and Tubes:  Peripheral IV 03/27/24 18 G Right Antecubital (Active)   Placement Date/Time:  03/27/24 1320   Size (Gauge): 18 G  Orientation: Right  Location: Antecubital   Number of days: 3         Scheduled medications  apixaban, 5 mg, oral, q12h  azithromycin, 500 mg, intravenous, q24h  bisoprolol, 10 mg, oral, Daily  cefTRIAXone, 1 g, intravenous, q24h  dilTIAZem CD, 180 mg, oral, BID  ipratropium-albuteroL, 3 mL, nebulization, TID  predniSONE, 40 mg, oral, Daily  spironolactone, 12.5 mg, oral, BID  torsemide, 20 mg, oral, Daily      Continuous medications     PRN medications  PRN medications: acetaminophen **OR** acetaminophen **OR** acetaminophen, benzonatate, guaiFENesin, ipratropium-albuteroL, ondansetron    Relevant Results  Results from last 7 days   Lab Units 03/30/24  0525 03/29/24  0533 03/28/24  0533   WBC AUTO x10*3/uL 12.1* 11.6* 9.6   HEMOGLOBIN g/dL 12.5 13.8 13.4   HEMATOCRIT % 39.6 43.4 42.3   PLATELETS AUTO x10*3/uL 227 272 267      Results from last 7 days   Lab Units 03/30/24  0525 03/29/24  0533 03/28/24  0533   SODIUM mmol/L 133 137 139   POTASSIUM mmol/L 3.6 3.5 3.3*   CHLORIDE mmol/L 97 98 98   CO2 mmol/L 22* 27 26   BUN mg/dL 24 24 20   CREATININE mg/dL 1.10 1.20 1.20   GLUCOSE mg/dL 169* 97 105*   CALCIUM mg/dL 8.9 8.7 8.9          XR chest 1 view 03/29/2024    Narrative  Interpreted By:  De Meek,  STUDY:  XR CHEST 1 VIEW; 3/29/2024 11:07 am    INDICATION:  Signs/Symptoms:shortness of breath.    COMPARISON:  03/27/2024    ACCESSION NUMBER(S):  [id number]    ORDERING CLINICIAN:  ELMO ALONZO    TECHNIQUE:  1 view of the chest was performed.    FINDINGS:  The lungs are adequately inflated. No pneumothorax. Mild prominence  of the interstitium without significant consolidation possibly mild  pulmonary edema or atypical infectious or inflammatory etiology.  Probable minimal pleural effusion which is improved from the prior  study. The cardiomediastinal silhouette is within normal limits.    Impression  Patchy bilateral ground-glass airspace opacity and mild prominence  of  the interstitium is again noted which can reflect mild pulmonary  edema as well as inflammatory or potentially atypical pneumonia.  Minimal bilateral pleural effusions. No significant consolidation.    Signed by: De Meek 3/29/2024 11:15 AM  Dictation workstation:   [id number]      Patient Active Problem List   Diagnosis    Primary hypertension    Atrial fibrillation with rapid ventricular response (CMS/HCC)     Assessment/Plan     Principal Problem:    Atrial fibrillation with rapid ventricular response (CMS/HCC)  Active Problems:    Primary hypertension     Atrial Fibrillation with RVR  Acute CHF   Dyspnea  Hypertension  Tobacco Dependence    Rate control  Initially on Cardizem drip now on oral meds  Monitor volume status  Continue diuresis  On torsemide objective  Continue DuoNebs  Continue antibiotics for now.  Repeat chest x-ray  Wean oxygen as tolerated  DVT prophylaxis            Darrell Calhoun MD  [facility]

## 2024-03-30 NOTE — CARE PLAN
Problem: Respiratory  Goal: Wean oxygen to maintain O2 saturation per order/standard this shift  Outcome: Progressing  Goal: Minimize anxiety/maximize coping throughout shift  Outcome: Progressing  Goal: Minimal/no exertional discomfort or dyspnea this shift  Outcome: Progressing  Goal: No signs of respiratory distress (eg. Use of accessory muscles. Peds grunting)  Outcome: Progressing  Goal: Verbalize decreased shortness of breath this shift  Outcome: Progressing

## 2024-03-30 NOTE — PROGRESS NOTES
Subjective Data:  Patient no atrial fibrillation.  Also COPD exacerbation.  Patient heart rate is controlled on the multiple medication.  No active angina CHF signs symptoms.  None  Overnight Events:    None  Objective   Last Recorded Vitals  BP (!) 90/49 (BP Location: Left arm, Patient Position: Lying)   Pulse 57   Temp 36.7 °C (98.1 °F) (Temporal)   Resp 20   Wt 78.1 kg (172 lb 2.9 oz)   SpO2 91%     Intake/Output Summary (Last 24 hours) at 3/30/2024 1613  Last data filed at 3/29/2024 1800  Gross per 24 hour   Intake 400 ml   Output --   Net 400 ml     Physical Exam:  HEENT: Normocephalic/atraumatic pupils equal react light  Neck exam mild JVD, no bruit  Lung exam clear to auscultation, few crackles at the bases  Cardiac exam is irregular rhythm S1-S2, soft systolic murmur heard.   Abdomen soft nontender, nondistended  Extremities no clubbing, cyanosis, trace edema  Neuro exam grossly intact.  Image Results  XR chest 1 view  Narrative: Interpreted By:  De Meek,   STUDY:  XR CHEST 1 VIEW; 3/29/2024 11:07 am      INDICATION:  Signs/Symptoms:shortness of breath.      COMPARISON:  03/27/2024      ACCESSION NUMBER(S):  [id number]      ORDERING CLINICIAN:  ELMO ALONZO      TECHNIQUE:  1 view of the chest was performed.      FINDINGS:  The lungs are adequately inflated. No pneumothorax. Mild prominence  of the interstitium without significant consolidation possibly mild  pulmonary edema or atypical infectious or inflammatory etiology.  Probable minimal pleural effusion which is improved from the prior  study. The cardiomediastinal silhouette is within normal limits.      Impression: Patchy bilateral ground-glass airspace opacity and mild prominence of  the interstitium is again noted which can reflect mild pulmonary  edema as well as inflammatory or potentially atypical pneumonia.  Minimal bilateral pleural effusions. No significant consolidation.      Signed by: De Meek 3/29/2024 11:15  AM  Dictation workstation:   [id number]    Last Labs:  CBC - 3/30/2024:  5:25 AM  12.1 12.5 227    39.6      CMP - 3/30/2024:  5:25 AM  8.9 6.5 42 --- 0.8   _ 3.5 73 120      PTT - No results in last year.  _   _ _     Inpatient Medications:  Scheduled medications   Medication Dose Route Frequency    apixaban  5 mg oral q12h    azithromycin  500 mg intravenous q24h    bisoprolol  10 mg oral Daily    cefTRIAXone  1 g intravenous q24h    dilTIAZem CD  180 mg oral BID    ipratropium-albuteroL  3 mL nebulization TID    predniSONE  40 mg oral Daily    spironolactone  12.5 mg oral BID    torsemide  20 mg oral Daily     Principal Problem:    Atrial fibrillation with rapid ventricular response (CMS/HCC)  Active Problems:    Primary hypertension    Assessment/Plan   73-year-old female patient.  No chest pain or tightness.  Underlying atrial fibrillation with rapid ventricular rate.  Continue current rate control medication  1.  A-fib: Continue rate control medication including bisoprolol and diltiazem.  2.  Diastolic CHF: Continue current spironolactone and torsemide.  3.  COPD exacerbation: Continue current bronchodilators with antibiotic and steroids.  Pt. care time is spent includes review of diagnostic tests, labs, radiographs, EKGs, old echoes, cardiac work-up and coordination of care. Assessment, impression and plans are reflected in the note above as well as the orders.    Code Status:  Full Code  I spent 35 minutes in the professional and overall care of this patient.  Hugo Rubin MD

## 2024-03-30 NOTE — PROGRESS NOTES
Constance M Kocher is a 73 y.o. female on day 3 of admission presenting with Atrial fibrillation with rapid ventricular response (CMS/HCC).      Subjective   Remains in Afib, HR elevated at times. Breathing is improved.    Placed on oxygen early this am for low room air pulse ox. Currently on 2 liters. Awaiting PFT's.     Objective     Last Recorded Vitals  BP 98/55 (BP Location: Left arm, Patient Position: Lying)   Pulse 57   Temp 39.7 °C (103.5 °F) (Temporal)   Resp 20   Wt 78.1 kg (172 lb 2.9 oz)   SpO2 92%   Intake/Output last 3 Shifts:    Intake/Output Summary (Last 24 hours) at 3/30/2024 1340  Last data filed at 3/29/2024 1800  Gross per 24 hour   Intake 700 ml   Output --   Net 700 ml       Admission Weight  Weight: 79.1 kg (174 lb 6.1 oz) (03/27/24 1253)    Daily Weight  03/28/24 : 78.1 kg (172 lb 2.9 oz)    Results for orders placed or performed during the hospital encounter of 03/27/24 (from the past 24 hour(s))   Basic metabolic panel   Result Value Ref Range    Glucose 169 (H) 65 - 99 mg/dL    Sodium 133 133 - 145 mmol/L    Potassium 3.6 3.4 - 5.1 mmol/L    Chloride 97 97 - 107 mmol/L    Bicarbonate 22 (L) 24 - 31 mmol/L    Urea Nitrogen 24 8 - 25 mg/dL    Creatinine 1.10 0.40 - 1.60 mg/dL    eGFR 53 (L) >60 mL/min/1.73m*2    Calcium 8.9 8.5 - 10.4 mg/dL    Anion Gap 14 <=19 mmol/L   CBC   Result Value Ref Range    WBC 12.1 (H) 4.4 - 11.3 x10*3/uL    nRBC 0.0 0.0 - 0.0 /100 WBCs    RBC 4.11 4.00 - 5.20 x10*6/uL    Hemoglobin 12.5 12.0 - 16.0 g/dL    Hematocrit 39.6 36.0 - 46.0 %    MCV 96 80 - 100 fL    MCH 30.4 26.0 - 34.0 pg    MCHC 31.6 (L) 32.0 - 36.0 g/dL    RDW 15.9 (H) 11.5 - 14.5 %    Platelets 227 150 - 450 x10*3/uL        Physical Exam  Vitals and nursing note reviewed.   Constitutional:       Appearance: Normal appearance.   HENT:      Head: Normocephalic and atraumatic.      Nose: Nose normal.      Mouth/Throat:      Mouth: Mucous membranes are moist.      Pharynx: Oropharynx is clear.    Eyes:      Extraocular Movements: Extraocular movements intact.      Pupils: Pupils are equal, round, and reactive to light.   Cardiovascular:      Rate and Rhythm: Normal rate. Rhythm irregular.      Pulses: Normal pulses.   Pulmonary:      Effort: Pulmonary effort is normal.      Breath sounds: Normal breath sounds.   Abdominal:      General: Abdomen is flat. Bowel sounds are normal.      Palpations: Abdomen is soft.   Musculoskeletal:         General: Normal range of motion.      Cervical back: Normal range of motion and neck supple.   Skin:     General: Skin is warm and dry.      Capillary Refill: Capillary refill takes less than 2 seconds.   Neurological:      General: No focal deficit present.      Mental Status: She is alert and oriented to person, place, and time.   Psychiatric:         Mood and Affect: Mood normal.         Behavior: Behavior normal.         Thought Content: Thought content normal.         Judgment: Judgment normal.           Assessment/Plan    Atrial Fibrillation with RVR  -'s upon ED arrival, currently improved  -Initially on Cardizem gtt, now on oral  -Continue BB  -Cardiology following  -ChadsVasc is 4. Continue Eliquis  -Monitor on tele     Acute CHF   -Likely secondary to AFib  -Pt euvolemic on exam  -ProBNP 2,664  -CXR 3/29 shows pulmonary edema  -ECHO shows mildly reduced LV systolic function, EF 50-55%  -Continue Torsemide and Aldactone  -Strict I&O's   -Cardiology follows     Pneumonia  -CXR 3/29 shows patchy bilateral ground-glass opacities, possible atypical pneumonia  -Continue broad spectrum IV Abx  -Continue oral steroids  -Pulmonology follows  -Follow blood cultures  -MRSA PCR pending  -Pretty    Hypomagnesemia  -Resolved     Hypokalemia  -Resolved     Hypertension  -Continue Metoprolol  -BP soft, monitor      Tobacco Dependence  -Smoker x 57 yrs. States quit 4 weeks ago     DVT Prophylaxis  -Eliquis     Plan  To home on discharge with no needs. Referral placed for  [facility]    Hoa Christiansen, CNP

## 2024-03-31 LAB
ANION GAP SERPL CALC-SCNC: 9 MMOL/L
ATRIAL RATE: 326 BPM
ATRIAL RATE: 74 BPM
BACTERIA BLD CULT: NORMAL
BACTERIA BLD CULT: NORMAL
BUN SERPL-MCNC: 24 MG/DL (ref 8–25)
CALCIUM SERPL-MCNC: 9.1 MG/DL (ref 8.5–10.4)
CHLORIDE SERPL-SCNC: 100 MMOL/L (ref 97–107)
CO2 SERPL-SCNC: 27 MMOL/L (ref 24–31)
CREAT SERPL-MCNC: 1.3 MG/DL (ref 0.4–1.6)
EGFRCR SERPLBLD CKD-EPI 2021: 44 ML/MIN/1.73M*2
ERYTHROCYTE [DISTWIDTH] IN BLOOD BY AUTOMATED COUNT: 16.4 % (ref 11.5–14.5)
GLUCOSE SERPL-MCNC: 131 MG/DL (ref 65–99)
HCT VFR BLD AUTO: 41.3 % (ref 36–46)
HGB BLD-MCNC: 13.2 G/DL (ref 12–16)
MCH RBC QN AUTO: 30.5 PG (ref 26–34)
MCHC RBC AUTO-ENTMCNC: 32 G/DL (ref 32–36)
MCV RBC AUTO: 95 FL (ref 80–100)
NRBC BLD-RTO: 0 /100 WBCS (ref 0–0)
PLATELET # BLD AUTO: 260 X10*3/UL (ref 150–450)
POTASSIUM SERPL-SCNC: 4.1 MMOL/L (ref 3.4–5.1)
Q ONSET: 218 MS
Q ONSET: 219 MS
QRS COUNT: 17 BEATS
QRS COUNT: 23 BEATS
QRS DURATION: 86 MS
QRS DURATION: 90 MS
QT INTERVAL: 254 MS
QT INTERVAL: 392 MS
QTC CALCULATION(BAZETT): 387 MS
QTC CALCULATION(BAZETT): 500 MS
QTC FREDERICIA: 336 MS
QTC FREDERICIA: 461 MS
R AXIS: 56 DEGREES
R AXIS: 76 DEGREES
RBC # BLD AUTO: 4.33 X10*6/UL (ref 4–5.2)
SODIUM SERPL-SCNC: 136 MMOL/L (ref 133–145)
STAPHYLOCOCCUS SPEC CULT: NORMAL
T AXIS: -8 DEGREES
T AXIS: 236 DEGREES
T OFFSET: 346 MS
T OFFSET: 414 MS
VENTRICULAR RATE: 140 BPM
VENTRICULAR RATE: 98 BPM
WBC # BLD AUTO: 18.3 X10*3/UL (ref 4.4–11.3)

## 2024-03-31 PROCEDURE — 2060000001 HC INTERMEDIATE ICU ROOM DAILY

## 2024-03-31 PROCEDURE — 2500000002 HC RX 250 W HCPCS SELF ADMINISTERED DRUGS (ALT 637 FOR MEDICARE OP, ALT 636 FOR OP/ED): Performed by: INTERNAL MEDICINE

## 2024-03-31 PROCEDURE — 2500000001 HC RX 250 WO HCPCS SELF ADMINISTERED DRUGS (ALT 637 FOR MEDICARE OP): Performed by: INTERNAL MEDICINE

## 2024-03-31 PROCEDURE — 85027 COMPLETE CBC AUTOMATED: CPT | Performed by: NURSE PRACTITIONER

## 2024-03-31 PROCEDURE — 2500000004 HC RX 250 GENERAL PHARMACY W/ HCPCS (ALT 636 FOR OP/ED): Performed by: NURSE PRACTITIONER

## 2024-03-31 PROCEDURE — 9420000001 HC RT PATIENT EDUCATION 5 MIN

## 2024-03-31 PROCEDURE — 2500000001 HC RX 250 WO HCPCS SELF ADMINISTERED DRUGS (ALT 637 FOR MEDICARE OP): Performed by: NURSE PRACTITIONER

## 2024-03-31 PROCEDURE — 94640 AIRWAY INHALATION TREATMENT: CPT | Mod: MUE

## 2024-03-31 PROCEDURE — 36415 COLL VENOUS BLD VENIPUNCTURE: CPT | Performed by: NURSE PRACTITIONER

## 2024-03-31 PROCEDURE — 80048 BASIC METABOLIC PNL TOTAL CA: CPT | Performed by: NURSE PRACTITIONER

## 2024-03-31 PROCEDURE — 99232 SBSQ HOSP IP/OBS MODERATE 35: CPT | Performed by: INTERNAL MEDICINE

## 2024-03-31 PROCEDURE — 94668 MNPJ CHEST WALL SBSQ: CPT

## 2024-03-31 RX ADMIN — SPIRONOLACTONE 12.5 MG: 25 TABLET ORAL at 09:16

## 2024-03-31 RX ADMIN — PREDNISONE 40 MG: 20 TABLET ORAL at 09:16

## 2024-03-31 RX ADMIN — IPRATROPIUM BROMIDE AND ALBUTEROL SULFATE 3 ML: 2.5; .5 SOLUTION RESPIRATORY (INHALATION) at 08:00

## 2024-03-31 RX ADMIN — AZITHROMYCIN MONOHYDRATE 500 MG: 500 INJECTION, POWDER, LYOPHILIZED, FOR SOLUTION INTRAVENOUS at 13:54

## 2024-03-31 RX ADMIN — BENZONATATE 100 MG: 100 CAPSULE ORAL at 09:16

## 2024-03-31 RX ADMIN — BISOPROLOL FUMARATE 10 MG: 5 TABLET, FILM COATED ORAL at 09:16

## 2024-03-31 RX ADMIN — SPIRONOLACTONE 12.5 MG: 25 TABLET ORAL at 20:02

## 2024-03-31 RX ADMIN — IPRATROPIUM BROMIDE AND ALBUTEROL SULFATE 3 ML: 2.5; .5 SOLUTION RESPIRATORY (INHALATION) at 19:06

## 2024-03-31 RX ADMIN — DILTIAZEM HYDROCHLORIDE 180 MG: 180 CAPSULE, COATED, EXTENDED RELEASE ORAL at 20:02

## 2024-03-31 RX ADMIN — CEFTRIAXONE SODIUM 1 G: 1 INJECTION, SOLUTION INTRAVENOUS at 13:54

## 2024-03-31 RX ADMIN — IPRATROPIUM BROMIDE AND ALBUTEROL SULFATE 3 ML: 2.5; .5 SOLUTION RESPIRATORY (INHALATION) at 12:57

## 2024-03-31 RX ADMIN — DILTIAZEM HYDROCHLORIDE 180 MG: 180 CAPSULE, COATED, EXTENDED RELEASE ORAL at 09:16

## 2024-03-31 RX ADMIN — APIXABAN 5 MG: 5 TABLET, FILM COATED ORAL at 06:17

## 2024-03-31 RX ADMIN — ACETAMINOPHEN 650 MG: 325 TABLET ORAL at 20:02

## 2024-03-31 RX ADMIN — APIXABAN 5 MG: 5 TABLET, FILM COATED ORAL at 17:50

## 2024-03-31 RX ADMIN — TORSEMIDE 20 MG: 20 TABLET ORAL at 09:16

## 2024-03-31 ASSESSMENT — COGNITIVE AND FUNCTIONAL STATUS - GENERAL
MOBILITY SCORE: 23
MOBILITY SCORE: 23
DAILY ACTIVITIY SCORE: 24
CLIMB 3 TO 5 STEPS WITH RAILING: A LITTLE
DAILY ACTIVITIY SCORE: 24
CLIMB 3 TO 5 STEPS WITH RAILING: A LITTLE

## 2024-03-31 ASSESSMENT — PAIN SCALES - GENERAL
PAINLEVEL_OUTOF10: 4
PAINLEVEL_OUTOF10: 0 - NO PAIN

## 2024-03-31 ASSESSMENT — PAIN - FUNCTIONAL ASSESSMENT
PAIN_FUNCTIONAL_ASSESSMENT: 0-10
PAIN_FUNCTIONAL_ASSESSMENT: 0-10

## 2024-03-31 ASSESSMENT — PAIN SCALES - WONG BAKER: WONGBAKER_NUMERICALRESPONSE: NO HURT

## 2024-03-31 NOTE — PROGRESS NOTES
Subjective Data:  Patient with a paroxysmal fibrillation COPD exacerbation.  Stable cardiac wise.  Overnight Events:    None  Objective   Last Recorded Vitals  /80 (BP Location: Left arm, Patient Position: Lying)   Pulse 63   Temp 36.1 °C (97 °F) (Temporal)   Resp 18   Wt 78.1 kg (172 lb 2.9 oz)   SpO2 93%   No intake or output data in the 24 hours ending 03/31/24 1438  Physical Exam:  HEENT: Normocephalic/atraumatic pupils equal react light  Neck exam mild JVD, no bruit  Lung exam clear to auscultation, few crackles at the bases  Cardiac exam is irregular rhythm S1-S2, soft systolic murmur heard.   Abdomen soft nontender, nondistended  Extremities no clubbing, cyanosis, trace edema  Neuro exam grossly intact.  Image Results  XR chest 1 view  Narrative: Interpreted By:  De Meek,   STUDY:  XR CHEST 1 VIEW; 3/29/2024 11:07 am      INDICATION:  Signs/Symptoms:shortness of breath.      COMPARISON:  03/27/2024      ACCESSION NUMBER(S):  [id number]      ORDERING CLINICIAN:  ELMO ALONZO      TECHNIQUE:  1 view of the chest was performed.      FINDINGS:  The lungs are adequately inflated. No pneumothorax. Mild prominence  of the interstitium without significant consolidation possibly mild  pulmonary edema or atypical infectious or inflammatory etiology.  Probable minimal pleural effusion which is improved from the prior  study. The cardiomediastinal silhouette is within normal limits.      Impression: Patchy bilateral ground-glass airspace opacity and mild prominence of  the interstitium is again noted which can reflect mild pulmonary  edema as well as inflammatory or potentially atypical pneumonia.  Minimal bilateral pleural effusions. No significant consolidation.      Signed by: De Meek 3/29/2024 11:15 AM  Dictation workstation:   [id number]    Last Labs:  CBC - 3/31/2024:  6:22 AM  18.3 13.2 260    41.3      CMP - 3/31/2024:  6:22 AM  9.1 6.5 42 --- 0.8   _ 3.5 73 120      PTT - No  results in last year.  _   _ _     Inpatient Medications:  Scheduled medications   Medication Dose Route Frequency    apixaban  5 mg oral q12h    azithromycin  500 mg intravenous q24h    bisoprolol  10 mg oral Daily    cefTRIAXone  1 g intravenous q24h    dilTIAZem CD  180 mg oral BID    ipratropium-albuteroL  3 mL nebulization TID    predniSONE  40 mg oral Daily    spironolactone  12.5 mg oral BID    torsemide  20 mg oral Daily     Principal Problem:    Atrial fibrillation with rapid ventricular response (CMS/HCC)  Active Problems:    Primary hypertension    Assessment/Plan   73 male patient with history of atrial fibrillation for ventricular rate, hypertension, hyperlipidemia.  Patient also COPD exacerbation continue current rate control rotation.  More likely acute on chronic diastolic CHF.  Continue beta-blocker rate control.    Pt. care time is spent includes review of diagnostic tests, labs, radiographs, EKGs, old echoes, cardiac work-up and coordination of care. Assessment, impression and plans are reflected in the note above as well as the orders.    Code Status:  Full Code  I spent 35 minutes in the professional and overall care of this patient.  Hugo Rubin MD

## 2024-03-31 NOTE — CARE PLAN
The patient's goals for the shift include To breath better    The clinical goals for the shift include lessen work of breathing

## 2024-03-31 NOTE — PROGRESS NOTES
"Constance M Kocher is a 73 y.o. female on day 4 of admission presenting with Atrial fibrillation with rapid ventricular response (CMS/HCC).    Subjective   HPI   Patient seen and examined, feels slightly better today still coughing all night.  Patient denies any chest pain, shortness of breath in room air.  Patient tolerates well her diet with no nausea vomiting or abdominal pain.  No fever or chills.  No headache or dizziness.      Objective     Last Recorded Vitals  Blood pressure 108/71, pulse 79, temperature 35.8 °C (96.4 °F), temperature source Temporal, resp. rate 18, height 1.651 m (5' 5\"), weight 78.1 kg (172 lb 2.9 oz), SpO2 90 %.    No intake or output data in the 24 hours ending 03/31/24 0804      Physical Exam   General: alert, no diaphoresis   HENT: mucous membranes moist, external ears normal, no rhinorrhea   Eyes: no icterus or injection, no discharge   Lungs: CTA BL   Heart: RRR, no murmurs, no LE edema BL   GI: abdomen soft, nontender, nondistended, BS present   MSK: no joint effusion or deformity   Skin: no rashes, erythema, or ecchymosis   Neuro: grossly normal cognition, motor strength, sensation   Relevant Results    Lab Results   Component Value Date    WBC 18.3 (H) 03/31/2024    HGB 13.2 03/31/2024    HCT 41.3 03/31/2024    MCV 95 03/31/2024     03/31/2024       Lab Results   Component Value Date    GLUCOSE 131 (H) 03/31/2024    CALCIUM 9.1 03/31/2024     03/31/2024    K 4.1 03/31/2024    CO2 27 03/31/2024     03/31/2024    BUN 24 03/31/2024    CREATININE 1.30 03/31/2024       No results found for: \"HGBA1C\"    XR chest 1 view    Result Date: 3/29/2024  Interpreted By:  De Meek, STUDY: XR CHEST 1 VIEW; 3/29/2024 11:07 am   INDICATION: Signs/Symptoms:shortness of breath.   COMPARISON: 03/27/2024   ACCESSION NUMBER(S): [id number]   ORDERING CLINICIAN: ELMO ALONZO   TECHNIQUE: 1 view of the chest was performed.   FINDINGS: The lungs are adequately inflated. No " pneumothorax. Mild prominence of the interstitium without significant consolidation possibly mild pulmonary edema or atypical infectious or inflammatory etiology. Probable minimal pleural effusion which is improved from the prior study. The cardiomediastinal silhouette is within normal limits.       Patchy bilateral ground-glass airspace opacity and mild prominence of the interstitium is again noted which can reflect mild pulmonary edema as well as inflammatory or potentially atypical pneumonia. Minimal bilateral pleural effusions. No significant consolidation.   Signed by: De Meek 3/29/2024 11:15 AM Dictation workstation:   [id number]    Transthoracic Echo (TTE) Complete    Result Date: 3/28/2024            [facility] [address], [place] [id number]             Phone [phone] TRANSTHORACIC ECHOCARDIOGRAM REPORT  Patient Name:     CONSTANCE M KOCHER   Reading Physician:   [id number] Los Kuo MD Study Date:       3/28/2024            Ordering Provider:   [id number] ALVARO LEE MRN/PID:          [id number]             Fellow: Accession#:       [id number]         Nurse: Date of           1950 / 73 years Sonographer:         Brittni Real RDCS Birth/Age: Gender:           F                    Additional Staff: Height:           165.10 cm            Admit Date: Weight:           78.02 kg             Admission Status:    Inpatient - Routine BSA / BMI:        1.86 m2 / 28.62      Department Location: [facility]                   kg/m2 Blood Pressure: 129 /74 mmHg Study Type:    TRANSTHORACIC ECHO (TTE) COMPLETE Diagnosis/ICD: Unspecified atrial fibrillation-I48.91 Indication:    Afib with RVR CPT Codes:     Echo Complete w Full Doppler-[id number] Patient History: Pertinent History: A-Fib and HTN. Study Detail: The following Echo studies were performed: 2D, M-Mode,  Doppler and               color flow.  PHYSICIAN INTERPRETATION: Left Ventricle: Left ventricular systolic function is low normal, with an estimated ejection fraction of 50-55%. There are no regional wall motion abnormalities. The left ventricular cavity size is normal. Spectral Doppler shows a normal pattern of left ventricular diastolic filling. Left Atrium: The left atrium is mildly dilated. Right Ventricle: The right ventricle is normal in size. There is normal right ventricular global systolic function. Right Atrium: The right atrium is normal in size. Aortic Valve: The aortic valve is trileaflet. There is no evidence of aortic valve regurgitation. The peak instantaneous gradient of the aortic valve is 4.6 mmHg. The mean gradient of the aortic valve is 2.0 mmHg. Mitral Valve: The mitral valve is normal in structure. There is mild mitral valve regurgitation. Tricuspid Valve: The tricuspid valve is structurally normal. There is trace tricuspid regurgitation. The Doppler estimated RVSP is within normal limits at 28.4 mmHg. Pulmonic Valve: The pulmonic valve is structurally normal. There is physiologic pulmonic valve regurgitation. Pericardium: There is no pericardial effusion noted. Aorta: The aortic root is normal. Systemic Veins: The inferior vena cava appears mildly dilated. There is IVC inspiratory collapse greater than 50%.  CONCLUSIONS:  1. Left ventricular systolic function is low normal with a 50-55% estimated ejection fraction.  2. Mild mitral valve regurgitation.  3. RVSP within normal limits.  4. Trace tricuspid regurgitation is visualized.  5. Patient is in atrial fibrillation. QUANTITATIVE DATA SUMMARY: 2D MEASUREMENTS:                          Normal Ranges: LAs:           4.10 cm   (2.7-4.0cm) IVSd:          0.99 cm   (0.6-1.1cm) LVPWd:         1.05 cm   (0.6-1.1cm) LVIDd:         3.51 cm   (3.9-5.9cm) LVIDs:         2.44 cm LV Mass Index: 57.6 g/m2 LV % FS        30.5 % LA VOLUME:                                Normal Ranges: LA Vol A4C:        83.7 ml    (22+/-6mL/m2) LA Vol A2C:        84.0 ml LA Vol BP:         84.3 ml LA Vol Index A4C:  45.1ml/m2 LA Vol Index A2C:  45.3 ml/m2 LA Vol Index BP:   45.4 ml/m2 LA Area A4C:       26.1 cm2 LA Area A2C:       26.3 cm2 LA Major Axis A4C: 6.9 cm LA Major Axis A2C: 7.0 cm LA Volume Index:   43.5 ml/m2 LA Vol A4C:        78.7 ml LA Vol A2C:        82.6 ml RA VOLUME BY A/L METHOD:                               Normal Ranges: RA Vol A4C:        24.6 ml    (8.3-19.5ml) RA Vol Index A4C:  13.3 ml/m2 RA Area A4C:       12.1 cm2 RA Major Axis A4C: 5.1 cm M-MODE MEASUREMENTS:                  Normal Ranges: Ao Root: 2.50 cm (2.0-3.7cm) LAs:     3.80 cm (2.7-4.0cm) LV SYSTOLIC FUNCTION BY 2D PLANIMETRY (MOD):                     Normal Ranges: EF-A4C View: 56.6 % (>=55%) EF-A2C View: 53.8 % EF-Biplane:  53.6 % LV DIASTOLIC FUNCTION:                        Normal Ranges: MV Peak E:    1.19 m/s (0.7-1.2 m/s) MV e'         0.08 m/s (>8.0) MV lateral e' 0.09 m/s MV medial e'  0.08 m/s E/e' Ratio:   14.00    (<8.0) MITRAL VALVE:                 Normal Ranges: MV DT: 161 msec (150-240msec) MITRAL INSUFFICIENCY:                           Normal Ranges: PISA Radius:  0.3 cm MR VTI:       138.00 cm MR Vmax:      474.00 cm/s MR Alias Dillon: 30.8 cm/s MR Volume:    5.07 ml MR Flow Rt:   17.42 ml/s MR EROA:      0.04 cm2 AORTIC VALVE:                                   Normal Ranges: AoV Vmax:                1.07 m/s (<=1.7m/s) AoV Peak P.6 mmHg (<20mmHg) AoV Mean P.0 mmHg (1.7-11.5mmHg) LVOT Max Dillon:            0.90 m/s (<=1.1m/s) AoV VTI:                 14.20 cm (18-25cm) LVOT VTI:                14.40 cm LVOT Diameter:           2.00 cm  (1.8-2.4cm) AoV Area, VTI:           3.19 cm2 (2.5-5.5cm2) AoV Area,Vmax:           2.65 cm2 (2.5-4.5cm2) AoV Dimensionless Index: 1.01  RIGHT VENTRICLE: RV Basal 2.69 cm RV Mid   1.72 cm RV Major 6.7 cm RV s'    0.08 m/s  TRICUSPID VALVE/RVSP:                             Normal Ranges: Peak TR Velocity: 2.26 m/s RV Syst Pressure: 28.4 mmHg (< 30mmHg) IVC Diam:         2.25 cm  [id number] Los Kuo MD Electronically signed on 3/28/2024 at 8:53:21 AM  ** Final **     ECG 12 Lead    Result Date: 3/28/2024  Atrial fibrillation with premature ventricular or aberrantly conducted complexes ST & T wave abnormality, consider inferior ischemia Abnormal ECG When compared with ECG of 27-MAR-2024 19:11, (unconfirmed) T wave inversion less evident in Lateral leads    ECG 12 lead    Result Date: 3/28/2024  Atrial fibrillation with rapid ventricular response with premature ventricular or aberrantly conducted complexes ST & T wave abnormality, consider lateral ischemia Abnormal ECG When compared with ECG of 27-MAR-2024 13:03, T wave inversion more evident in Anterolateral leads    ECG 12 lead    Result Date: 3/27/2024  Atrial fibrillation with rapid ventricular response Nonspecific ST-T changes Abnormal ECG No previous ECGs available Confirmed by Ze Jauregui ([id number]) on 3/27/2024 5:11:11 PM    XR chest 2 views    Result Date: 3/27/2024  Interpreted By:  Moi Walsh, STUDY: XR CHEST 2 VIEWS  3/27/2024 3:03 pm   INDICATION: Signs/Symptoms:cough x 6 weeks   COMPARISON: 02/21/2024   ACCESSION NUMBER(S): [id number]   ORDERING CLINICIAN: VIRAJ CHACON   TECHNIQUE: PA and lateral views of the chest were obtained.   FINDINGS: Cardiac monitoring leads are seen over the chest. Mild-to-moderate diffuse interstitial infiltrates are seen bilaterally, and may represent edema and/or pneumonia. Small bilateral pleural effusions are seen, larger on the left than on the right. Bibasilar airspace consolidations are present, and may represent atelectasis and/or pneumonia. No pneumothorax is identified. The cardiac silhouette is within normal limits for size.  Mild discogenic degenerative changes are seen throughout the thoracic spine.       Diffuse interstitial  infiltrates, small bilateral pleural effusions and bibasilar airspace consolidations, as above. Clinical correlation and continued follow-up until clearing is recommended.   MACRO: None.   Signed by: Moi Walsh 3/27/2024 3:09 PM Dictation workstation:   [id number]    Scheduled medications  apixaban, 5 mg, oral, q12h  azithromycin, 500 mg, intravenous, q24h  bisoprolol, 10 mg, oral, Daily  cefTRIAXone, 1 g, intravenous, q24h  dilTIAZem CD, 180 mg, oral, BID  ipratropium-albuteroL, 3 mL, nebulization, TID  predniSONE, 40 mg, oral, Daily  spironolactone, 12.5 mg, oral, BID  torsemide, 20 mg, oral, Daily      Continuous medications     PRN medications  PRN medications: acetaminophen **OR** acetaminophen **OR** acetaminophen, benzonatate, guaiFENesin, ipratropium-albuteroL, ondansetron    Assessment/Plan   Principal Problem:    Atrial fibrillation with rapid ventricular response (CMS/HCC)  Active Problems:    Primary hypertension     Atrial Fibrillation with RVR  -HR 57-92   -Initially on Cardizem gtt, now on oral  -Continue BB  -Cardiology following  -ChadsVasc is 4. Continue Eliquis  -Monitor on tele     Acute CHF   -Likely secondary to AFib  -Pt euvolemic on exam  -ProBNP 2,664  -CXR 3/29 shows pulmonary edema  -ECHO shows mildly reduced LV systolic function, EF 50-55%  -Continue Torsemide and Aldactone  -Strict I&O's   -Cardiology follows     Pneumonia  -CXR 3/29 shows patchy bilateral ground-glass opacities, possible atypical pneumonia  -Continue broad spectrum IV Abx  -Continue oral steroids  -Pulmonology follows  -Follow blood cultures  -MRSA PCR pending  -Pretty     Hypertension  - BP is in low side  -Continue Metoprolol    Tobacco Dependence  -Smoker x 57 yrs. States quit 4 weeks ago     DVT Prophylaxis  -Eliquis     Discharge plan:  Waiting for PFT  Anticipate discharging patient home when medically clear    I spent 35 minutes in the professional and overall care of this patient.    Alberta Gil,  APRN-CNP

## 2024-04-01 ENCOUNTER — PHARMACY VISIT (OUTPATIENT)
Dept: PHARMACY | Facility: CLINIC | Age: 74
End: 2024-04-01
Payer: COMMERCIAL

## 2024-04-01 VITALS
RESPIRATION RATE: 16 BRPM | BODY MASS INDEX: 31.11 KG/M2 | TEMPERATURE: 97.9 F | HEIGHT: 65 IN | HEART RATE: 73 BPM | OXYGEN SATURATION: 94 % | SYSTOLIC BLOOD PRESSURE: 124 MMHG | WEIGHT: 186.73 LBS | DIASTOLIC BLOOD PRESSURE: 77 MMHG

## 2024-04-01 PROCEDURE — 94640 AIRWAY INHALATION TREATMENT: CPT | Mod: MUE

## 2024-04-01 PROCEDURE — 2500000004 HC RX 250 GENERAL PHARMACY W/ HCPCS (ALT 636 FOR OP/ED): Performed by: NURSE PRACTITIONER

## 2024-04-01 PROCEDURE — RXMED WILLOW AMBULATORY MEDICATION CHARGE

## 2024-04-01 PROCEDURE — 94668 MNPJ CHEST WALL SBSQ: CPT

## 2024-04-01 PROCEDURE — 2500000002 HC RX 250 W HCPCS SELF ADMINISTERED DRUGS (ALT 637 FOR MEDICARE OP, ALT 636 FOR OP/ED): Performed by: INTERNAL MEDICINE

## 2024-04-01 PROCEDURE — 2500000001 HC RX 250 WO HCPCS SELF ADMINISTERED DRUGS (ALT 637 FOR MEDICARE OP): Performed by: NURSE PRACTITIONER

## 2024-04-01 PROCEDURE — 99232 SBSQ HOSP IP/OBS MODERATE 35: CPT | Performed by: NURSE PRACTITIONER

## 2024-04-01 PROCEDURE — 94760 N-INVAS EAR/PLS OXIMETRY 1: CPT

## 2024-04-01 PROCEDURE — 2500000001 HC RX 250 WO HCPCS SELF ADMINISTERED DRUGS (ALT 637 FOR MEDICARE OP): Performed by: INTERNAL MEDICINE

## 2024-04-01 PROCEDURE — 99232 SBSQ HOSP IP/OBS MODERATE 35: CPT | Performed by: INTERNAL MEDICINE

## 2024-04-01 PROCEDURE — 2500000002 HC RX 250 W HCPCS SELF ADMINISTERED DRUGS (ALT 637 FOR MEDICARE OP, ALT 636 FOR OP/ED): Mod: MUE | Performed by: INTERNAL MEDICINE

## 2024-04-01 RX ORDER — GUAIFENESIN 100 MG/5ML
200 SOLUTION ORAL EVERY 4 HOURS PRN
Qty: 473 ML | Refills: 0 | Status: SHIPPED | OUTPATIENT
Start: 2024-04-01 | End: 2024-06-04 | Stop reason: WASHOUT

## 2024-04-01 RX ORDER — BISOPROLOL FUMARATE 10 MG/1
10 TABLET, FILM COATED ORAL DAILY
Qty: 30 TABLET | Refills: 0 | Status: SHIPPED | OUTPATIENT
Start: 2024-04-02 | End: 2024-04-16 | Stop reason: SDUPTHER

## 2024-04-01 RX ORDER — PREDNISONE 10 MG/1
TABLET ORAL
Qty: 28 TABLET | Refills: 0 | Status: SHIPPED | OUTPATIENT
Start: 2024-04-02 | End: 2024-04-16 | Stop reason: ALTCHOICE

## 2024-04-01 RX ORDER — ACETAMINOPHEN 325 MG/1
650 TABLET ORAL EVERY 4 HOURS PRN
Qty: 30 TABLET | Refills: 0
Start: 2024-04-01

## 2024-04-01 RX ORDER — CEFUROXIME AXETIL 500 MG/1
500 TABLET ORAL 2 TIMES DAILY
Qty: 10 TABLET | Refills: 0 | Status: SHIPPED | OUTPATIENT
Start: 2024-04-01 | End: 2024-04-06

## 2024-04-01 RX ORDER — DILTIAZEM HYDROCHLORIDE 180 MG/1
180 CAPSULE, COATED, EXTENDED RELEASE ORAL 2 TIMES DAILY
Qty: 60 CAPSULE | Refills: 0 | Status: SHIPPED | OUTPATIENT
Start: 2024-04-01 | End: 2024-04-09 | Stop reason: WASHOUT

## 2024-04-01 RX ORDER — SPIRONOLACTONE 25 MG/1
12.5 TABLET ORAL 2 TIMES DAILY
Qty: 30 TABLET | Refills: 0 | Status: SHIPPED | OUTPATIENT
Start: 2024-04-01 | End: 2024-04-16 | Stop reason: SDUPTHER

## 2024-04-01 RX ORDER — AZITHROMYCIN 500 MG/1
500 TABLET, FILM COATED ORAL DAILY
Qty: 5 TABLET | Refills: 0 | Status: SHIPPED | OUTPATIENT
Start: 2024-04-01 | End: 2024-04-06

## 2024-04-01 RX ORDER — TORSEMIDE 20 MG/1
20 TABLET ORAL DAILY
Qty: 30 TABLET | Refills: 0 | Status: SHIPPED | OUTPATIENT
Start: 2024-04-02 | End: 2024-04-16 | Stop reason: SDUPTHER

## 2024-04-01 RX ORDER — ALBUTEROL SULFATE 90 UG/1
1 AEROSOL, METERED RESPIRATORY (INHALATION) EVERY 6 HOURS PRN
Qty: 18 G | Refills: 0 | Status: SHIPPED | OUTPATIENT
Start: 2024-04-01 | End: 2024-04-16 | Stop reason: SDUPTHER

## 2024-04-01 RX ADMIN — AZITHROMYCIN MONOHYDRATE 500 MG: 500 INJECTION, POWDER, LYOPHILIZED, FOR SOLUTION INTRAVENOUS at 12:24

## 2024-04-01 RX ADMIN — ACETAMINOPHEN 650 MG: 325 TABLET ORAL at 07:21

## 2024-04-01 RX ADMIN — SPIRONOLACTONE 12.5 MG: 25 TABLET ORAL at 08:10

## 2024-04-01 RX ADMIN — IPRATROPIUM BROMIDE AND ALBUTEROL SULFATE 3 ML: 2.5; .5 SOLUTION RESPIRATORY (INHALATION) at 12:28

## 2024-04-01 RX ADMIN — TORSEMIDE 20 MG: 20 TABLET ORAL at 08:10

## 2024-04-01 RX ADMIN — PREDNISONE 40 MG: 20 TABLET ORAL at 08:10

## 2024-04-01 RX ADMIN — DILTIAZEM HYDROCHLORIDE 180 MG: 180 CAPSULE, COATED, EXTENDED RELEASE ORAL at 08:10

## 2024-04-01 RX ADMIN — APIXABAN 5 MG: 5 TABLET, FILM COATED ORAL at 05:42

## 2024-04-01 RX ADMIN — BISOPROLOL FUMARATE 10 MG: 5 TABLET, FILM COATED ORAL at 08:10

## 2024-04-01 RX ADMIN — IPRATROPIUM BROMIDE AND ALBUTEROL SULFATE 3 ML: 2.5; .5 SOLUTION RESPIRATORY (INHALATION) at 07:51

## 2024-04-01 ASSESSMENT — PAIN DESCRIPTION - LOCATION: LOCATION: BACK

## 2024-04-01 ASSESSMENT — COGNITIVE AND FUNCTIONAL STATUS - GENERAL
DAILY ACTIVITIY SCORE: 24
MOBILITY SCORE: 24

## 2024-04-01 ASSESSMENT — PAIN SCALES - GENERAL
PAINLEVEL_OUTOF10: 8
PAINLEVEL_OUTOF10: 0 - NO PAIN

## 2024-04-01 ASSESSMENT — PAIN - FUNCTIONAL ASSESSMENT
PAIN_FUNCTIONAL_ASSESSMENT: 0-10
PAIN_FUNCTIONAL_ASSESSMENT: 0-10

## 2024-04-01 NOTE — CARE PLAN
The patient's goals for the shift include To breath better    The clinical goals for the shift include COPD testing and discharge      Problem: Pain  Goal: My pain/discomfort is manageable  Outcome: Progressing     Problem: Safety  Goal: Patient will be injury free during hospitalization  Outcome: Progressing  Goal: I will remain free of falls  Outcome: Progressing     Problem: Daily Care  Goal: Daily care needs are met  Outcome: Progressing     Problem: Psychosocial Needs  Goal: Demonstrates ability to cope with hospitalization/illness  Outcome: Progressing  Goal: Collaborate with me, my family, and caregiver to identify my specific goals  Outcome: Progressing     Problem: Discharge Barriers  Goal: My discharge needs are met  Outcome: Progressing     Problem: Fall/Injury  Goal: Not fall by end of shift  Outcome: Progressing  Goal: Be free from injury by end of the shift  Outcome: Progressing  Goal: Verbalize understanding of personal risk factors for fall in the hospital  Outcome: Progressing  Goal: Verbalize understanding of risk factor reduction measures to prevent injury from fall in the home  Outcome: Progressing  Goal: Use assistive devices by end of the shift  Outcome: Progressing  Goal: Pace activities to prevent fatigue by end of the shift  Outcome: Progressing     Problem: Pain - Adult  Goal: Verbalizes/displays adequate comfort level or baseline comfort level  Outcome: Progressing     Problem: Respiratory  Goal: Clear secretions with interventions this shift  Outcome: Progressing  Goal: Wean oxygen to maintain O2 saturation per order/standard this shift  Outcome: Progressing  Goal: Minimize anxiety/maximize coping throughout shift  Outcome: Progressing  Goal: Minimal/no exertional discomfort or dyspnea this shift  Outcome: Progressing  Goal: No signs of respiratory distress (eg. Use of accessory muscles. Peds grunting)  Outcome: Progressing  Goal: Verbalize decreased shortness of breath this  shift  Outcome: Progressing

## 2024-04-01 NOTE — DISCHARGE SUMMARY
Discharge Diagnosis  Atrial fibrillation with rapid ventricular response (CMS/HCC)    Issues Requiring Follow-Up  Bronchitis  Afib RVR  CHF    Discharge Meds     Your medication list        START taking these medications        Instructions Last Dose Given Next Dose Due   acetaminophen 325 mg tablet  Commonly known as: Tylenol      Take 2 tablets (650 mg) by mouth every 4 hours if needed for mild pain (1 - 3) or fever (temp greater than 38.0 C).       apixaban 5 mg tablet  Commonly known as: Eliquis      Take 1 tablet (5 mg) by mouth every 12 hours.       azithromycin 500 mg tablet  Commonly known as: Zithromax      Take 1 tablet (500 mg) by mouth once daily for 5 days.       bisoprolol 10 mg tablet  Commonly known as: Zebeta  Start taking on: April 2, 2024      Take 1 tablet (10 mg) by mouth once daily. Do not start before April 2, 2024.       cefuroxime 500 mg tablet  Commonly known as: Ceftin      Take 1 tablet (500 mg) by mouth 2 times a day for 5 days.       dilTIAZem  mg 24 hr capsule  Commonly known as: Cardizem CD      Take 1 capsule (180 mg) by mouth 2 times a day.       guaiFENesin 100 mg/5 mL syrup  Commonly known as: Robitussin      Take 10 mL (200 mg) by mouth every 4 hours if needed for congestion.       predniSONE 10 mg tablet  Commonly known as: Deltasone  Start taking on: April 2, 2024      Take 4 tablets (40 mg) by mouth once daily for 2 days, THEN 3 tablets (30 mg) once daily for 3 days, THEN 2 tablets (20 mg) once daily for 3 days, THEN 1 tablet (10 mg) once daily for 3 days, THEN 1 tablet (10 mg) every other day for 3 days. Do not start before April 2, 2024.       spironolactone 25 mg tablet  Commonly known as: Aldactone      Take 0.5 tablets (12.5 mg) by mouth 2 times a day.       torsemide 20 mg tablet  Commonly known as: Demadex  Start taking on: April 2, 2024      Take 1 tablet (20 mg) by mouth once daily. Do not start before April 2, 2024.              CONTINUE taking these  "medications        Instructions Last Dose Given Next Dose Due   albuterol 90 mcg/actuation inhaler                     Where to Get Your Medications        These medications were sent to [facility]  [address], [place], [place] [facility] [address]      Hours: 9 AM to 6 PM Mon-Fri, 9 AM to 1 PM Sat Phone: [phone]   apixaban 5 mg tablet  azithromycin 500 mg tablet  bisoprolol 10 mg tablet  cefuroxime 500 mg tablet  dilTIAZem  mg 24 hr capsule  guaiFENesin 100 mg/5 mL syrup  predniSONE 10 mg tablet  spironolactone 25 mg tablet  torsemide 20 mg tablet       Information about where to get these medications is not yet available    Ask your nurse or doctor about these medications  acetaminophen 325 mg tablet         Test Results Pending At Discharge  Pending Labs       Order Current Status    Extra Urine Gray Tube Collected (03/27/24 1347)    Urinalysis with Reflex Culture and Microscopic In process            Hospital Course  HPI from admission  Constance M Kocher is a 73 y.o. female, known to have HTN with med non-compliance, who presents to the ED for evaluation of 6-8 week cough and dyspnea. The patient is a poor historian. She does not have a PCP or see a physician on a regular basis. The patient states prefers to \"just go to the ED or Urgent Care when needed\". The patient reports was told she has high blood pressure at some point however admits to stopping med due to side effects. She is unable to tell me the name of the drug or specific side effects. States \"it made me deathly ill\". Upon ED evaluation the patient was found to be in Afib with RVR, 's. She was started on Cardizem gtt and HR is now improved to 70-80's. Pt reports exposure to Grandson with RSV. Viral studies done in ED are negative. ProBNP is elevated at 2,664. CXR shows interstitial infiltrates, small bilateral pleural effusions. Pt received IV Lasix and has urinated twice thus far. She is currently on room air. She " denies leg edema, chest pain or palpitations. She recalls being told to see a Cardiologist many years ago and the menton of a blood thinner however never followed up on this.           During hospital course patient was seen by pulmonology and cardiology.  Pulmonology saw patient for dyspnea.  Patient was originally requiring oxygen.  She has been on room air for over 24 hours.  She has been placed on a steroid taper and antibiotics which she will continue upon discharge.  She will follow-up with pulmonology on an outpatient basis for PFTs.  She was also seen by cardiology for atrial fibrillation with rapid ventricular rate and acute CHF.  She was originally on Cardizem drip and now is on oral medications.  Her rate is now controlled she was placed on beta-blocker, torsemide, Eliquis per cardiology recommendations.  She will be discharged and follow-up with radiology on an outpatient basis.    Of Case and plan discussed with patient and she is in agreement    Pertinent Physical Exam At Time of Discharge  Physical Exam  Constitutional:       General: She is not in acute distress.     Appearance: She is obese. She is not ill-appearing or toxic-appearing.   Cardiovascular:      Rate and Rhythm: Normal rate. Rhythm irregular.      Pulses: Normal pulses.      Heart sounds: Normal heart sounds.   Pulmonary:      Effort: No respiratory distress.      Breath sounds: Wheezing present.   Abdominal:      General: Bowel sounds are normal.      Palpations: Abdomen is soft.   Musculoskeletal:         General: Normal range of motion.   Skin:     General: Skin is warm and dry.   Neurological:      General: No focal deficit present.      Mental Status: She is alert and oriented to person, place, and time.   Psychiatric:         Mood and Affect: Mood normal.         Behavior: Behavior normal.     Outpatient Follow-Up  Future Appointments   Date Time Provider Department Center   4/9/2024  9:20 AM Delon Mcneill MD [id number]         Cecilia Rodriguez, APRN-CNP

## 2024-04-01 NOTE — PROGRESS NOTES
"Constance M Kocher is a 73 y.o. female on day 5 of admission presenting with Atrial fibrillation with rapid ventricular response (CMS/HCC).    Subjective   Patient seen and examined.  No documented concerns/events overnight from nursing.  Afebrile overnight.  Denies chills.  Patient continues to complain of dyspnea.        Objective     Physical Exam  Constitutional:       General: She is not in acute distress.     Appearance: She is obese. She is not ill-appearing or toxic-appearing.   Cardiovascular:      Rate and Rhythm: Normal rate. Rhythm irregular.      Pulses: Normal pulses.      Heart sounds: Normal heart sounds.   Pulmonary:      Effort: No respiratory distress.      Breath sounds: Wheezing present.   Abdominal:      General: Bowel sounds are normal.      Palpations: Abdomen is soft.   Musculoskeletal:         General: Normal range of motion.   Skin:     General: Skin is warm and dry.   Neurological:      General: No focal deficit present.      Mental Status: She is alert and oriented to person, place, and time.   Psychiatric:         Mood and Affect: Mood normal.         Behavior: Behavior normal.         Last Recorded Vitals  Blood pressure 137/85, pulse 53, temperature 36.3 °C (97.3 °F), temperature source Temporal, resp. rate 17, height 1.651 m (5' 5\"), weight 84.7 kg (186 lb 11.7 oz), SpO2 95 %.  Intake/Output last 3 Shifts:  I/O last 3 completed shifts:  In: 480 (5.7 mL/kg) [P.O.:480]  Out: - (0 mL/kg)   Weight: 84.7 kg     Relevant Results  Scheduled medications  apixaban, 5 mg, oral, q12h  azithromycin, 500 mg, intravenous, q24h  bisoprolol, 10 mg, oral, Daily  cefTRIAXone, 1 g, intravenous, q24h  dilTIAZem CD, 180 mg, oral, BID  ipratropium-albuteroL, 3 mL, nebulization, TID  predniSONE, 40 mg, oral, Daily  spironolactone, 12.5 mg, oral, BID  torsemide, 20 mg, oral, Daily      Continuous medications     PRN medications  PRN medications: acetaminophen **OR** acetaminophen **OR** acetaminophen, " benzonatate, guaiFENesin, ipratropium-albuteroL, ondansetron     following data reviewed    WBC-18.3  Hgb-13.2  Hct-41.3  Platelets-260      Na-136  K+-4.1  Cl-100  Bicarb-27  BUN-24  creatinine-1.3                             Assessment/Plan   Principal Problem:    Atrial fibrillation with rapid ventricular response (CMS/HCC)  Active Problems:    Primary hypertension    Atrial Fibrillation with RVR  -rate control  -continue BB  -cardiology following  -telemetry    Acute CHF   -Pt euvolemic on exam  -ProBNP 2,664  -CXR 3/29 shows pulmonary edema  -ECHO shows mildly reduced LV systolic function, EF 50-55%  -Continue Torsemide and Aldactone  -Strict I&O's   -Cardiology following     Pneumonia  -CXR 3/29 shows patchy bilateral ground-glass opacities, possible atypical pneumonia  -Continue broad spectrum IV Abx  -Continue oral steroids  -Pulmonology follows  -Follow blood cultures  -MRSA PCR pending  -Pretty     Hypertension  - BP is in low side  -Continue Metoprolol     Tobacco Dependence  -Smoker x 57 yrs. States quit 4 weeks ago     Discharge plan:  Above plan discussed with pt and she is in agreement    Cecilia Rodriguez, APRN-CNP

## 2024-04-01 NOTE — PROGRESS NOTES
Pulmonary Progress Note    Constance M Kocher is a 73 y.o. female on day 5 of admission presenting with Atrial fibrillation with rapid ventricular response (CMS/HCC).    Subjective   Feeling much better.  Case signed out to me by Dr. Calhoun.  No overnight events.  Off oxygen.  Objective   Vital Signs      3/31/2024     9:35 AM 3/31/2024     1:22 PM 3/31/2024     7:32 PM 4/1/2024    12:19 AM 4/1/2024     3:50 AM 4/1/2024     8:11 AM 4/1/2024    11:54 AM   Vitals   Systolic  115 109 99 104 137 124   Diastolic  80 73 83 67 85 77   Heart Rate  63 94 61 63 53 73   Temp 36.4 °C (97.5 °F) 36.1 °C (97 °F) 35.9 °C (96.6 °F) 35.7 °C (96.3 °F) 35.9 °C (96.6 °F) 36.3 °C (97.3 °F) 36.6 °C (97.9 °F)   Resp   17 16 18 17 16   Weight (lb)     186.73     BMI     31.07 kg/m2     BSA (m2)     1.97 m2         Oxygen Therapy  SpO2: 92 %  Medical Gas Therapy: None (Room air)  O2 Delivery Method: Nasal cannula    FiO2 (%):  [21 %] 21 %    Intake/Output previous 24 hours:    Intake/Output Summary (Last 24 hours) at 4/1/2024 1206  Last data filed at 4/1/2024 1100  Gross per 24 hour   Intake 480 ml   Output --   Net 480 ml         Physical Exam  Vitals reviewed.   Constitutional:       General: She is awake.      Appearance: Normal appearance.   HENT:      Head: Normocephalic and atraumatic.   Eyes:      Pupils: Pupils are equal, round, and reactive to light.   Cardiovascular:      Rate and Rhythm: Normal rate and regular rhythm.   Pulmonary:      Effort: Pulmonary effort is normal.      Breath sounds: Normal breath sounds.   Abdominal:      General: Bowel sounds are normal.   Musculoskeletal:         General: Normal range of motion.   Neurological:      Mental Status: She is alert and oriented to person, place, and time. Mental status is at baseline.   Psychiatric:         Attention and Perception: Attention and perception normal.         Mood and Affect: Mood normal.         Behavior: Behavior normal.       ...  Lines and  Tubes:  Peripheral IV 03/27/24 18 G Right Antecubital (Active)   Placement Date/Time: 03/27/24 1320   Size (Gauge): 18 G  Orientation: Right  Location: Antecubital   Number of days: 3         Scheduled medications  apixaban, 5 mg, oral, q12h  azithromycin, 500 mg, intravenous, q24h  bisoprolol, 10 mg, oral, Daily  cefTRIAXone, 1 g, intravenous, q24h  dilTIAZem CD, 180 mg, oral, BID  ipratropium-albuteroL, 3 mL, nebulization, TID  predniSONE, 40 mg, oral, Daily  spironolactone, 12.5 mg, oral, BID  torsemide, 20 mg, oral, Daily      Continuous medications     PRN medications  PRN medications: acetaminophen **OR** acetaminophen **OR** acetaminophen, benzonatate, guaiFENesin, ipratropium-albuteroL, ondansetron    Relevant Results  Results from last 7 days   Lab Units 03/31/24  0622 03/30/24  0525 03/29/24  0533   WBC AUTO x10*3/uL 18.3* 12.1* 11.6*   HEMOGLOBIN g/dL 13.2 12.5 13.8   HEMATOCRIT % 41.3 39.6 43.4   PLATELETS AUTO x10*3/uL 260 227 272        Results from last 7 days   Lab Units 03/31/24  0622 03/30/24  0525 03/29/24  0533   SODIUM mmol/L 136 133 137   POTASSIUM mmol/L 4.1 3.6 3.5   CHLORIDE mmol/L 100 97 98   CO2 mmol/L 27 22* 27   BUN mg/dL 24 24 24   CREATININE mg/dL 1.30 1.10 1.20   GLUCOSE mg/dL 131* 169* 97   CALCIUM mg/dL 9.1 8.9 8.7            XR chest 1 view 03/29/2024    Narrative  Interpreted By:  De Meek,  STUDY:  XR CHEST 1 VIEW; 3/29/2024 11:07 am    INDICATION:  Signs/Symptoms:shortness of breath.    COMPARISON:  03/27/2024    ACCESSION NUMBER(S):  [id number]    ORDERING CLINICIAN:  ELMO ALONZO    TECHNIQUE:  1 view of the chest was performed.    FINDINGS:  The lungs are adequately inflated. No pneumothorax. Mild prominence  of the interstitium without significant consolidation possibly mild  pulmonary edema or atypical infectious or inflammatory etiology.  Probable minimal pleural effusion which is improved from the prior  study. The cardiomediastinal silhouette is within normal  limits.    Impression  Patchy bilateral ground-glass airspace opacity and mild prominence of  the interstitium is again noted which can reflect mild pulmonary  edema as well as inflammatory or potentially atypical pneumonia.  Minimal bilateral pleural effusions. No significant consolidation.    Signed by: De Meek 3/29/2024 11:15 AM  Dictation workstation:   [id number]      Patient Active Problem List   Diagnosis    Primary hypertension    Atrial fibrillation with rapid ventricular response (CMS/HCC)     Assessment/Plan     Principal Problem:    Atrial fibrillation with rapid ventricular response (CMS/HCC)  Active Problems:    Primary hypertension     Atrial Fibrillation with RVR  Acute CHF   Dyspnea  Hypertension  Tobacco Dependence    Stable for discharge from our perspective.  PFTs can be pursued as an outpatient.  Can follow-up with Dr. Calhoun in the office in 1 to 2 weeks  We will sign off.  Please call with any questions      Tj Guadalupe MD  [facility]

## 2024-04-01 NOTE — PROGRESS NOTES
"Subjective Data:  Patient is doing better.  Still complains shortness of breath.  Denies having chest pain.    Overnight Events:    Events overnight reviewed no events.     Objective Data:  Last Recorded Vitals:  Vitals:    04/01/24 0350 04/01/24 0751 04/01/24 0811 04/01/24 1154   BP: 104/67  137/85 124/77   BP Location: Right arm      Patient Position: Lying      Pulse: 63  53 73   Resp: 18  17 16   Temp: 35.9 °C (96.6 °F)  36.3 °C (97.3 °F) 36.6 °C (97.9 °F)   TempSrc: Temporal  Temporal Temporal   SpO2: 91% 95% 95% 92%   Weight: 84.7 kg (186 lb 11.7 oz)      Height:           Last Labs:  CBC - 3/31/2024:  6:22 AM  18.3 13.2 260    41.3      CMP - 3/31/2024:  6:22 AM  9.1 6.5 42 --- 0.8   _ 3.5 73 120      PTT - No results in last year.  _   _ _     No results found for: \"TROPHS\", \"BNP\", \"HGBA1C\", \"LDLCALC\", \"VLDL\"   Last I/O:  I/O last 3 completed shifts:  In: 480 (5.7 mL/kg) [P.O.:480]  Out: - (0 mL/kg)   Weight: 84.7 kg     Past Cardiology Tests (Last 3 Years):  EKG:  ECG 12 Lead 03/27/2024      ECG 12 lead 03/27/2024      ECG 12 lead 03/27/2024    Echo:  Transthoracic Echo (TTE) Complete 03/28/2024    Ejection Fractions:  EF   Date/Time Value Ref Range Status   03/28/2024 08:26 AM 54 %      Cath:  No results found for this or any previous visit from the past 1095 days.    Stress Test:  No results found for this or any previous visit from the past 1095 days.    Cardiac Imaging:  No results found for this or any previous visit from the past 1095 days.      Inpatient Medications:  Scheduled medications   Medication Dose Route Frequency    apixaban  5 mg oral q12h    azithromycin  500 mg intravenous q24h    bisoprolol  10 mg oral Daily    cefTRIAXone  1 g intravenous q24h    dilTIAZem CD  180 mg oral BID    ipratropium-albuteroL  3 mL nebulization TID    predniSONE  40 mg oral Daily    spironolactone  12.5 mg oral BID    torsemide  20 mg oral Daily     PRN medications   Medication    acetaminophen    Or    " acetaminophen    Or    acetaminophen    benzonatate    guaiFENesin    ipratropium-albuteroL    ondansetron     Continuous Medications   Medication Dose Last Rate       Physical Exam:  General: Patient is in no acute distress.  HEENT: atraumatic normocephalic.  Neck: is supple jugular venous pressure within normal limits no thyromegaly.  Cardiovascular regular rate and rhythm normal heart sounds no murmurs rubs or gallops.  Lungs: Decreased breathing sounds throughout.  Abdomen: is soft nontender.  Extremities warm to touch no edema.       Assessment/Plan   #1 atrial fibrillation.  Patient with history of atrial fibrillation.  Recommend to continue rate control strategy and oral anticoagulation.  Follow-up as an outpatient.    2.  Shortness of breath.  Patient admitted to the hospital with acute on chronic respiratory failure secondary to COPD exacerbation.  I do not see any evidence of heart failure.  Recommend outpatient follow-up.    3.  Hypertension.  Recommend monitoring.  Continue antihypertensive medications.  Peripheral IV 03/27/24 18 G Right Antecubital (Active)   Site Assessment Clean;Dry;Intact 04/01/24 0731   Dressing Type Transparent 04/01/24 0731   Line Status Flushed 04/01/24 0731   Dressing Status Clean;Dry 04/01/24 0731   Number of days: 5       Code Status:  Full Code        Los Kuo MD

## 2024-04-01 NOTE — CARE PLAN
Pt has a discharge for today; She will return home. Numbers for [facility] (visiting physicians) given to pt; number for [employer] to assist with an apartment (pt is currently working with [employer]); number for [facility]--pt is able to transfer her meds to [facility] and have them delivered to her home.  Pt said that she has been on the phone with [employer] and they are going to send someone to her home to evaluate for at home services such as house keeping. Pt declines the Healthy at Home program. Pt said that she is interested in having someone come into her home to clean.  Pt will need the shuttle for transportation home    DISCHARGE PLAN: HOME TODAY

## 2024-04-01 NOTE — CARE PLAN
The patient's goals for the shift include To breath better    The clinical goals for the shift include COPD testing and discharge      Problem: Pain  Goal: My pain/discomfort is manageable  4/1/2024 1154 by Jama Browne RN  Outcome: Met  4/1/2024 0733 by Jama Browne RN  Outcome: Progressing     Problem: Safety  Goal: Patient will be injury free during hospitalization  4/1/2024 1154 by Jama Browne RN  Outcome: Met  4/1/2024 0733 by Jama Browne RN  Outcome: Progressing  Goal: I will remain free of falls  4/1/2024 1154 by Jama Browne RN  Outcome: Met  4/1/2024 0733 by Jama Browne RN  Outcome: Progressing     Problem: Daily Care  Goal: Daily care needs are met  4/1/2024 1154 by Jama Browne RN  Outcome: Met  4/1/2024 0733 by Jama Browne RN  Outcome: Progressing     Problem: Psychosocial Needs  Goal: Demonstrates ability to cope with hospitalization/illness  4/1/2024 1154 by Jama Brwone RN  Outcome: Met  4/1/2024 0733 by Jama Browne RN  Outcome: Progressing  Goal: Collaborate with me, my family, and caregiver to identify my specific goals  4/1/2024 1154 by Jama Browne RN  Outcome: Met  4/1/2024 0733 by Jama Browne RN  Outcome: Progressing     Problem: Discharge Barriers  Goal: My discharge needs are met  4/1/2024 1154 by Jama Browne RN  Outcome: Met  4/1/2024 0733 by Jama Browne RN  Outcome: Progressing     Problem: Fall/Injury  Goal: Not fall by end of shift  4/1/2024 1154 by Jama Browne RN  Outcome: Met  4/1/2024 0733 by Jama Browne RN  Outcome: Progressing  Goal: Be free from injury by end of the shift  4/1/2024 1154 by Jama Browne RN  Outcome: Met  4/1/2024 0733 by Jama Browne RN  Outcome: Progressing  Goal: Verbalize understanding of personal risk factors for fall in the hospital  4/1/2024 1154 by Jama Browne RN  Outcome: Met  4/1/2024 0733 by Jama Browne, RN  Outcome: Progressing  Goal: Verbalize  understanding of risk factor reduction measures to prevent injury from fall in the home  4/1/2024 1154 by Jama Browne RN  Outcome: Met  4/1/2024 0733 by Jama Browne RN  Outcome: Progressing  Goal: Use assistive devices by end of the shift  4/1/2024 1154 by Jama Browne RN  Outcome: Met  4/1/2024 0733 by Jama Browne RN  Outcome: Progressing  Goal: Pace activities to prevent fatigue by end of the shift  4/1/2024 1154 by Jama Browne RN  Outcome: Met  4/1/2024 0733 by Jama Browne RN  Outcome: Progressing     Problem: Pain - Adult  Goal: Verbalizes/displays adequate comfort level or baseline comfort level  4/1/2024 1154 by Jama Browne RN  Outcome: Met  4/1/2024 0733 by Jama Browne RN  Outcome: Progressing     Problem: Respiratory  Goal: Clear secretions with interventions this shift  4/1/2024 1154 by Jama Browne RN  Outcome: Met  4/1/2024 0733 by Jama Browne RN  Outcome: Progressing  Goal: Wean oxygen to maintain O2 saturation per order/standard this shift  4/1/2024 1154 by Jama Browne RN  Outcome: Met  4/1/2024 0733 by Jama Browne RN  Outcome: Progressing  Goal: Minimize anxiety/maximize coping throughout shift  4/1/2024 1154 by Jama Browne RN  Outcome: Met  4/1/2024 0733 by Jama Browne RN  Outcome: Progressing  Goal: Minimal/no exertional discomfort or dyspnea this shift  4/1/2024 1154 by Jama Browne RN  Outcome: Met  4/1/2024 0733 by Jama Browne RN  Outcome: Progressing  Goal: No signs of respiratory distress (eg. Use of accessory muscles. Peds grunting)  4/1/2024 1154 by Jama Browne RN  Outcome: Met  4/1/2024 0733 by Jama Browne RN  Outcome: Progressing  Goal: Verbalize decreased shortness of breath this shift  4/1/2024 1154 by Jama Browne RN  Outcome: Met  4/1/2024 0733 by Haille Browne, RN  Outcome: Progressing

## 2024-04-02 ENCOUNTER — DOCUMENTATION (OUTPATIENT)
Dept: CARE COORDINATION | Age: 74
End: 2024-04-02
Payer: COMMERCIAL

## 2024-04-02 ENCOUNTER — PATIENT OUTREACH (OUTPATIENT)
Dept: HOME HEALTH SERVICES | Age: 74
End: 2024-04-02
Payer: COMMERCIAL

## 2024-04-02 ENCOUNTER — TELEPHONE (OUTPATIENT)
Dept: PULMONOLOGY | Facility: CLINIC | Age: 74
End: 2024-04-02
Payer: COMMERCIAL

## 2024-04-02 VITALS — SYSTOLIC BLOOD PRESSURE: 135 MMHG | DIASTOLIC BLOOD PRESSURE: 75 MMHG

## 2024-04-02 SDOH — HEALTH STABILITY: MENTAL HEALTH
STRESS IS WHEN SOMEONE FEELS TENSE, NERVOUS, ANXIOUS, OR CAN'T SLEEP AT NIGHT BECAUSE THEIR MIND IS TROUBLED. HOW STRESSED ARE YOU?: RATHER MUCH

## 2024-04-02 SDOH — ECONOMIC STABILITY: FOOD INSECURITY: WITHIN THE PAST 12 MONTHS, YOU WORRIED THAT YOUR FOOD WOULD RUN OUT BEFORE YOU GOT MONEY TO BUY MORE.: SOMETIMES TRUE

## 2024-04-02 SDOH — SOCIAL STABILITY: SOCIAL INSECURITY
WITHIN THE LAST YEAR, HAVE TO BEEN RAPED OR FORCED TO HAVE ANY KIND OF SEXUAL ACTIVITY BY YOUR PARTNER OR EX-PARTNER?: NO

## 2024-04-02 SDOH — SOCIAL STABILITY: SOCIAL NETWORK: IN A TYPICAL WEEK, HOW MANY TIMES DO YOU TALK ON THE PHONE WITH FAMILY, FRIENDS, OR NEIGHBORS?: ONCE A WEEK

## 2024-04-02 SDOH — ECONOMIC STABILITY: FOOD INSECURITY: WITHIN THE PAST 12 MONTHS, THE FOOD YOU BOUGHT JUST DIDN'T LAST AND YOU DIDN'T HAVE MONEY TO GET MORE.: SOMETIMES TRUE

## 2024-04-02 SDOH — SOCIAL STABILITY: SOCIAL INSECURITY
WITHIN THE LAST YEAR, HAVE YOU BEEN KICKED, HIT, SLAPPED, OR OTHERWISE PHYSICALLY HURT BY YOUR PARTNER OR EX-PARTNER?: NO

## 2024-04-02 SDOH — HEALTH STABILITY: PHYSICAL HEALTH: ON AVERAGE, HOW MANY DAYS PER WEEK DO YOU ENGAGE IN MODERATE TO STRENUOUS EXERCISE (LIKE A BRISK WALK)?: 0 DAYS

## 2024-04-02 SDOH — SOCIAL STABILITY: SOCIAL INSECURITY: WITHIN THE LAST YEAR, HAVE YOU BEEN HUMILIATED OR EMOTIONALLY ABUSED IN OTHER WAYS BY YOUR PARTNER OR EX-PARTNER?: NO

## 2024-04-02 SDOH — SOCIAL STABILITY: SOCIAL NETWORK: ARE YOU MARRIED, WIDOWED, DIVORCED, SEPARATED, NEVER MARRIED, OR LIVING WITH A PARTNER?: PATIENT DECLINED

## 2024-04-02 SDOH — SOCIAL STABILITY: SOCIAL NETWORK
DO YOU BELONG TO ANY CLUBS OR ORGANIZATIONS SUCH AS CHURCH GROUPS UNIONS, FRATERNAL OR ATHLETIC GROUPS, OR SCHOOL GROUPS?: PATIENT DECLINED

## 2024-04-02 SDOH — ECONOMIC STABILITY: INCOME INSECURITY: IN THE PAST 12 MONTHS, HAS THE ELECTRIC, GAS, OIL, OR WATER COMPANY THREATENED TO SHUT OFF SERVICE IN YOUR HOME?: NO

## 2024-04-02 SDOH — SOCIAL STABILITY: SOCIAL INSECURITY: WITHIN THE LAST YEAR, HAVE YOU BEEN AFRAID OF YOUR PARTNER OR EX-PARTNER?: NO

## 2024-04-02 SDOH — SOCIAL STABILITY: SOCIAL NETWORK: HOW OFTEN DO YOU ATTENT MEETINGS OF THE CLUB OR ORGANIZATION YOU BELONG TO?: PATIENT DECLINED

## 2024-04-02 SDOH — SOCIAL STABILITY: SOCIAL NETWORK: HOW OFTEN DO YOU GET TOGETHER WITH FRIENDS OR RELATIVES?: ONCE A WEEK

## 2024-04-02 SDOH — HEALTH STABILITY: PHYSICAL HEALTH: ON AVERAGE, HOW MANY MINUTES DO YOU ENGAGE IN EXERCISE AT THIS LEVEL?: 0 MIN

## 2024-04-02 SDOH — SOCIAL STABILITY: SOCIAL NETWORK: HOW OFTEN DO YOU ATTEND CHURCH OR RELIGIOUS SERVICES?: PATIENT DECLINED

## 2024-04-02 NOTE — PROGRESS NOTES
Community Resource Name: [facility]    Phone Number: [phone]  Staff Member:  ROBI Mcdonald    Discussed the following topics on behalf of the patient:  []  Behavioral Health Assistance     []  Case Management  []   Assistance  []  Digital Equity Assistance  []  Dental Health Assistance  []  Education Assistance  []  Employment Assistance  [x]  Financial Strain Relief Assistance  []  Food Insecurity Assistance  []  Healthcare Coverage Assistance  []  Housing Stability Assistance  []  IP Violence Relief Assistance  []  Legal Assistance  []  Physical Activity Assistance  []  Social Connection Assistance  []  Stress Relief Assistance   []  Substance Abuse Assistance  [x]  Transportation Assistance  []  Utility Assistance  []  Other:     PT returned my call. I shared with her the info in regards to [employer] transportation. She stated she did not have a financial strain, but did request transportation assist.         Tai Mcdonald, CAMILLE

## 2024-04-02 NOTE — TELEPHONE ENCOUNTER
Per dr. Mcneill request, to verify pt does want to see him due to saw  in hospital, pt lives in [place], still welcome to see dr. Mcneill but would  need to be in office not [facility],   Tried to call pt , pt did answer but pt said was not able to talk at that moment and asked to be called tomorrow   [phone]

## 2024-04-02 NOTE — PROGRESS NOTES
Community Resource Name: [facility]  Phone Number: [phone]  Staff Member:  Tai Mcdonald    Discussed the following topics on behalf of the patient:  []  Behavioral Health Assistance     []  Case Management  []   Assistance  []  Digital Equity Assistance  []  Dental Health Assistance  []  Education Assistance  []  Employment Assistance  [x]  Financial Strain Relief Assistance  []  Food Insecurity Assistance  []  Healthcare Coverage Assistance  [x]  Housing Stability Assistance  []  IP Violence Relief Assistance  []  Legal Assistance  []  Physical Activity Assistance  []  Social Connection Assistance  []  Stress Relief Assistance   []  Substance Abuse Assistance  [x]  Transportation Assistance  []  Utility Assistance  []  Other: [insert comment here]    Attempted to contact PT ingards to [facility] referral for transportation/financial assistance. PT was not available, left a voice message w/my name and number for a return call back.  I also contacted the [facility] in [facility], for them to mail the PT out an application to fill out and mail back in for transporting services.        Tai Mcdonald, [facility] [place]

## 2024-04-03 ENCOUNTER — PATIENT OUTREACH (OUTPATIENT)
Dept: HOME HEALTH SERVICES | Age: 74
End: 2024-04-03
Payer: COMMERCIAL

## 2024-04-04 ENCOUNTER — TELEPHONE (OUTPATIENT)
Dept: INTENSIVE CARE | Facility: HOSPITAL | Age: 74
End: 2024-04-04

## 2024-04-04 ENCOUNTER — PATIENT OUTREACH (OUTPATIENT)
Dept: CASE MANAGEMENT | Facility: HOSPITAL | Age: 74
End: 2024-04-04

## 2024-04-04 ENCOUNTER — PATIENT OUTREACH (OUTPATIENT)
Dept: HOME HEALTH SERVICES | Age: 74
End: 2024-04-04

## 2024-04-04 VITALS — WEIGHT: 171.5 LBS | BODY MASS INDEX: 28.54 KG/M2

## 2024-04-04 DIAGNOSIS — I50.9 CHRONIC CONGESTIVE HEART FAILURE, UNSPECIFIED HEART FAILURE TYPE (MULTI): Primary | ICD-10-CM

## 2024-04-04 RX ORDER — ALBUTEROL SULFATE 0.83 MG/ML
2.5 SOLUTION RESPIRATORY (INHALATION) EVERY 6 HOURS PRN
COMMUNITY
Start: 2024-03-27 | End: 2024-05-22 | Stop reason: ENTERED-IN-ERROR

## 2024-04-04 NOTE — PROGRESS NOTES
Initial [facility] call competed with Dr Ahuja and Broderick from pharmacy [facility] states she is doing ok she still has a lot of edema she states she is watching her sodium intake she has been sticking with the 64 oz Fluid restriction she is not able to rest or keep her legs up for long but she does feel like the edema is getting a little better she did get all her meds patients family set her medication up in pill boxes and put the rest up so she is unable to do a med review at this time patient does not have a scale we will follow up with getting her a scale she does have a BP cuff and has been checking her /90 HR 79 patient is doing a good job tracking her BP patient states she does get SOB with exertion patient advised to keep sodium to less than 2 grams per day patient needs a New PCP Cardiology Dr Scottie Calhoun set up she was sent the application for [employer] by social work to help wit transport patient ok to take a multivitamin and vitamin D we will get labs done to check vitamin D level no other medication needs questions and concerns addressed next [facility] 4/11 @ 1230     Patient's Address:   [address] [place], [place] [id number]  **  If this is not the address patient will receive services - alert team and address in EMR**       Patient Contacts:  Extended Emergency Contact Information  Primary Emergency Contact: Kocher,Lori  Home Phone: [phone]  Relation: Child  Secondary Emergency Contact: Kocher,Chris  Home Phone: [phone]  Relation: None                                Patient's Preferred Phone: [phone]  Patient's E-mail: [email or web address]

## 2024-04-04 NOTE — PROGRESS NOTES
Follow up phone call made by CHF Clinical Nurse Navigator. Spoke with Jazzy reports she is doing well. Unable to start performing daily weights. New scale is being delivered tomorrow. She is doing her best to follow low Na diet,watching fluid intake, takes medications as directed. Reviewed HF flare up symptoms to report to MD. She is active with [facility]. She is working on making follow up appt with PCP & Cardiology. I will continue to follow to help manage CHF.

## 2024-04-05 ENCOUNTER — PATIENT OUTREACH (OUTPATIENT)
Dept: HOME HEALTH SERVICES | Age: 74
End: 2024-04-05
Payer: COMMERCIAL

## 2024-04-05 VITALS — SYSTOLIC BLOOD PRESSURE: 133 MMHG | HEART RATE: 90 BPM | DIASTOLIC BLOOD PRESSURE: 79 MMHG

## 2024-04-05 NOTE — PROGRESS NOTES
Daily Call Note:   Daily call spoke to patient, she states she is feeling good. Reports vitals, denies any new symptoms. Deneis any questions. Reviewed, still working on getting scale.   Working to schedule Card, Pulm, And PCP for patient today. Will update her on process.   Next [facility] reviewed.    Pt Education: POC  Barriers: na  Topics for Daily Review: POC  Pt demonstrates clear understanding: Yes    Daily Weight:  There were no vitals filed for this visit.   Last 3 Weights:  Wt Readings from Last 7 Encounters:   04/04/24 77.8 kg (171 lb 8 oz)   04/01/24 84.7 kg (186 lb 11.7 oz)   02/22/24 81.6 kg (180 lb)       Masimo Device: No   Masimo Clinical Impression: na    Virtual Visits--Scheduled (Most Recent Date at Top)  Follow up Appointments  Recent Visits  No visits were found meeting these conditions.  Showing recent visits within past 30 days and meeting all other requirements  Future Appointments  No visits were found meeting these conditions.  Showing future appointments within next 90 days and meeting all other requirements       Frequency of RN Calls & Virtual Visits per Team Agreement: [facility] Frequency: Daily    Medication issues Addressed (what was done): na    Follow up appointments scheduled by [facility] Staff: na  Referrals made by [facility] staff: na      [facility] Care Transitions Assessment    Patient's Address:   [address] [place] [address]  **  If this is not the address patient will receive services - alert team and address in EMR**       Patient Contacts:  Extended Emergency Contact Information  Primary Emergency Contact: Kocher,Lori  Home Phone: [phone]  Relation: Child  Secondary Emergency Contact: Kocher,Kristopher  Home Phone: [phone]  Relation: None                                Patient's Preferred Phone: [phone]  Patient's E-mail: [email or web address]

## 2024-04-06 ENCOUNTER — DOCUMENTATION (OUTPATIENT)
Dept: HOME HEALTH SERVICES | Age: 74
End: 2024-04-06
Payer: COMMERCIAL

## 2024-04-06 NOTE — PROGRESS NOTES
[facility] Care Transitions Assessment  4/6/24 Daily call completed. Patient returned call to SOC. She reported that she is feeling well with the exception of a minor sore throat. Continues ABT and Steroids. Reported some edema bilateral lower extremities since hospital discharge. Patient does not have a scale to monitor weight but will go out an purchase one today. Advised to weigh self upon getting scale and call SOC to report it. Continues to monitor vitals. Educated on keeping a daily log for weight and vitals. Verbalized understanding.  /97  HR 93  Patient's Address:   [address] [place], [place] [address]  **  If this is not the address patient will receive services - alert team and address in EMR**       Patient Contacts:  Extended Emergency Contact Information  Primary Emergency Contact: Kocher,Lori  Home Phone: [phone]  Relation: Child  Secondary Emergency Contact: Kocher,Chris  Home Phone: [phone]  Relation: None                                Patient's Preferred Phone: [phone]  Patient's E-mail: [email or web address]

## 2024-04-08 ENCOUNTER — PATIENT OUTREACH (OUTPATIENT)
Dept: HOME HEALTH SERVICES | Age: 74
End: 2024-04-08
Payer: COMMERCIAL

## 2024-04-09 ENCOUNTER — PHARMACY VISIT (OUTPATIENT)
Dept: PHARMACY | Facility: CLINIC | Age: 74
End: 2024-04-09
Payer: MEDICARE

## 2024-04-09 ENCOUNTER — APPOINTMENT (OUTPATIENT)
Dept: PULMONOLOGY | Facility: CLINIC | Age: 74
End: 2024-04-09
Payer: COMMERCIAL

## 2024-04-09 ENCOUNTER — OFFICE VISIT (OUTPATIENT)
Dept: PRIMARY CARE | Facility: CLINIC | Age: 74
End: 2024-04-09
Payer: COMMERCIAL

## 2024-04-09 VITALS
RESPIRATION RATE: 18 BRPM | SYSTOLIC BLOOD PRESSURE: 114 MMHG | WEIGHT: 175 LBS | TEMPERATURE: 98.4 F | HEIGHT: 64 IN | BODY MASS INDEX: 29.88 KG/M2 | DIASTOLIC BLOOD PRESSURE: 68 MMHG | HEART RATE: 57 BPM | OXYGEN SATURATION: 95 %

## 2024-04-09 DIAGNOSIS — B37.0 THRUSH: ICD-10-CM

## 2024-04-09 DIAGNOSIS — J42 CHRONIC BRONCHITIS, UNSPECIFIED CHRONIC BRONCHITIS TYPE (MULTI): ICD-10-CM

## 2024-04-09 DIAGNOSIS — I48.91 ATRIAL FIBRILLATION WITH RAPID VENTRICULAR RESPONSE (MULTI): Primary | ICD-10-CM

## 2024-04-09 DIAGNOSIS — I10 PRIMARY HYPERTENSION: ICD-10-CM

## 2024-04-09 DIAGNOSIS — N18.31 STAGE 3A CHRONIC KIDNEY DISEASE (MULTI): ICD-10-CM

## 2024-04-09 PROCEDURE — 3078F DIAST BP <80 MM HG: CPT | Performed by: FAMILY MEDICINE

## 2024-04-09 PROCEDURE — RXMED WILLOW AMBULATORY MEDICATION CHARGE

## 2024-04-09 PROCEDURE — 1159F MED LIST DOCD IN RCRD: CPT | Performed by: FAMILY MEDICINE

## 2024-04-09 PROCEDURE — 1111F DSCHRG MED/CURRENT MED MERGE: CPT | Performed by: FAMILY MEDICINE

## 2024-04-09 PROCEDURE — 99214 OFFICE O/P EST MOD 30 MIN: CPT | Performed by: FAMILY MEDICINE

## 2024-04-09 PROCEDURE — 99204 OFFICE O/P NEW MOD 45 MIN: CPT | Performed by: FAMILY MEDICINE

## 2024-04-09 PROCEDURE — 3074F SYST BP LT 130 MM HG: CPT | Performed by: FAMILY MEDICINE

## 2024-04-09 RX ORDER — FLUCONAZOLE 150 MG/1
150 TABLET ORAL DAILY
Qty: 3 TABLET | Refills: 0 | Status: SHIPPED | OUTPATIENT
Start: 2024-04-09 | End: 2024-04-16 | Stop reason: ALTCHOICE

## 2024-04-09 ASSESSMENT — ENCOUNTER SYMPTOMS
DEPRESSION: 0
LOSS OF SENSATION IN FEET: 0
OCCASIONAL FEELINGS OF UNSTEADINESS: 0

## 2024-04-09 NOTE — PROGRESS NOTES
"Subjective   Patient ID: Constance M Kocher is a 73 y.o. female who presents for New Patient Visit (NEW PATIENT HERE FOR HOSPITAL FOLLOW UP.SHE WAS ADMITTED TO [facility] FOR A FIB AND PNEUMONIA.  SHE DOES SEE DR. FIGUEROA AND DR. ALONZO. HERE TO GET ESTABLISHED).    HPI she was admitted on 3/27 after having palpitations and coughing.  Dx with a fib with rvr/bronchitis.  She is just finishing pred taper and is on Eliquis.   She has had some edema since being out of the hospital and she spoke with her care team and cardiology had told her to increase and extra dose of torsemide. Dr figueroa follow up on 23rd and Dr Alonzo on the 25th.      Review of Systems see HPI    Objective   /68 (BP Location: Left arm, Patient Position: Sitting, BP Cuff Size: Adult)   Pulse 57   Temp 36.9 °C (98.4 °F) (Skin)   Resp 18   Ht 1.626 m (5' 4\")   Wt 79.4 kg (175 lb)   SpO2 95%   BMI 30.04 kg/m²     Physical Exam  Constitutional:       General: She is not in acute distress.     Appearance: Normal appearance.   Cardiovascular:      Rate and Rhythm: Normal rate and regular rhythm.      Heart sounds: No murmur heard.  Pulmonary:      Breath sounds: Normal breath sounds. No wheezing.   Musculoskeletal:      Comments: 2/4 edema bilaterally.  No erythema or calf tenderness.    Skin:     Comments: Mild thrush of posterior pharynx.    Neurological:      Mental Status: She is alert.         Assessment/Plan   Problem List Items Addressed This Visit             ICD-10-CM    Primary hypertension I10    Atrial fibrillation with rapid ventricular response (CMS/HCC) - Primary I48.91    Chronic bronchitis, unspecified chronic bronchitis type (CMS/[facility]) J42    Stage 3a chronic kidney disease (CMS/[facility]) N18.31     Other Visit Diagnoses         Codes    Thrush     B37.0    Relevant Medications    fluconazole (Diflucan) 150 mg tablet          She will take 2 torsemide today and tomorrow also and continue wt checks.  She appear to still in a fib but with " good rate control currently.  Reviewed hospital records/imaging/labs.   Recheck 3-4 wks sooner if worse.

## 2024-04-10 ENCOUNTER — PATIENT OUTREACH (OUTPATIENT)
Dept: HOME HEALTH SERVICES | Age: 74
End: 2024-04-10
Payer: COMMERCIAL

## 2024-04-10 VITALS
HEART RATE: 104 BPM | SYSTOLIC BLOOD PRESSURE: 123 MMHG | BODY MASS INDEX: 30.04 KG/M2 | WEIGHT: 175 LBS | DIASTOLIC BLOOD PRESSURE: 80 MMHG

## 2024-04-10 PROBLEM — N18.31 STAGE 3A CHRONIC KIDNEY DISEASE (MULTI): Status: ACTIVE | Noted: 2024-04-10

## 2024-04-10 PROBLEM — J42 CHRONIC BRONCHITIS, UNSPECIFIED CHRONIC BRONCHITIS TYPE (MULTI): Status: ACTIVE | Noted: 2024-04-10

## 2024-04-10 NOTE — PROGRESS NOTES
"Daily Call Note:   Spoke to patient, who reports not feeling great. She saw PCP yesterday and she is still in afib. Discussed importance of meds, she states she is taking all meds as ordered. Denies any chest pain, sob or palpitations. States some leg swelling but it is improving since taking water pill. She said PCP ordered another water pill for \"a couple more days\".  Also diagnosis of thrush yesterday, diflucan oral started.  Weight reported. BP and HR reported. Discussed if any sob, palpitations or new symptoms please call or or if needed 911.  Will continue to monitor daily weights and symptoms.   Encouraged LE elevation and limiting exertion today.  Next [facility] tomorrow reviewed.  Pt Education: POC  Barriers: na  Topics for Daily Review: POC  Pt demonstrates clear understanding: Yes    Daily Weight:  Vitals:    04/10/24 1032   Weight: 79.4 kg (175 lb)      Last 3 Weights:  Wt Readings from Last 7 Encounters:   04/10/24 79.4 kg (175 lb)   04/09/24 79.4 kg (175 lb)   04/04/24 77.8 kg (171 lb 8 oz)   04/01/24 84.7 kg (186 lb 11.7 oz)   02/22/24 81.6 kg (180 lb)       Masimo Device: No   Masimo Clinical Impression: na    Virtual Visits--Scheduled (Most Recent Date at Top)  Follow up Appointments  Recent Visits  Date Type Provider Dept   04/09/24 Office Visit Nahun Douglas DO [facility]   Showing recent visits within past 30 days and meeting all other requirements  Future Appointments  No visits were found meeting these conditions.  Showing future appointments within next 90 days and meeting all other requirements       Frequency of RN Calls & Virtual Visits per Team Agreement: [facility] Frequency: Daily    Medication issues Addressed (what was done): reviewed    Follow up appointments scheduled by [facility] Staff: na  Referrals made by [facility] staff: na      [facility] Care Transitions Assessment    Patient's Address:   [address] [place] [address]  **  If this is not the address " patient will receive services - alert team and address in EMR**       Patient Contacts:  Extended Emergency Contact Information  Primary Emergency Contact: Kocher,Lori  Home Phone: [phone]  Relation: Child  Secondary Emergency Contact: Kocher,Chris  Home Phone: [phone]  Relation: None                                Patient's Preferred Phone: [phone]  Patient's E-mail: [email or web address]

## 2024-04-11 ENCOUNTER — PATIENT OUTREACH (OUTPATIENT)
Dept: HOME HEALTH SERVICES | Age: 74
End: 2024-04-11

## 2024-04-11 ENCOUNTER — TELEPHONE (OUTPATIENT)
Dept: PRIMARY CARE | Facility: CLINIC | Age: 74
End: 2024-04-11

## 2024-04-11 ENCOUNTER — TELEPHONE (OUTPATIENT)
Dept: CARDIOLOGY | Facility: CLINIC | Age: 74
End: 2024-04-11

## 2024-04-11 DIAGNOSIS — I48.91 ATRIAL FIBRILLATION WITH RAPID VENTRICULAR RESPONSE (MULTI): ICD-10-CM

## 2024-04-11 PROBLEM — J01.90 ACUTE SINUSITIS: Status: ACTIVE | Noted: 2024-04-11

## 2024-04-11 PROBLEM — H66.009 ACUTE SUPPURATIVE OTITIS MEDIA WITHOUT SPONTANEOUS RUPTURE OF EAR DRUM: Status: ACTIVE | Noted: 2024-04-11

## 2024-04-11 NOTE — PROGRESS NOTES
Daily Call Note:  [facility] daily all complete.  Pt missed weekly [facility] call, rescheduled for 4/13/24 at 1300.  Pt reports fatigue, states she has been fatigued for a while, this was an issue during recent hospital stay.  Pt saw PCP on 4/9.  Pt states PCP ordered potassium, I called PCP to clarify, awaiting return call.  Pt request home health aide, waiting for PCP to order.  B/P 120/79, HR 81.  No other concerns voiced.      Pt Education: POC   Barriers:   Topics for Daily Review:  VS/ weight   Pt demonstrates clear understanding: Yes    Daily Weight:  172.2 lbs   There were no vitals filed for this visit.   Last 3 Weights:  Wt Readings from Last 7 Encounters:   04/10/24 79.4 kg (175 lb)   04/09/24 79.4 kg (175 lb)   04/04/24 77.8 kg (171 lb 8 oz)   04/01/24 84.7 kg (186 lb 11.7 oz)   02/22/24 81.6 kg (180 lb)       Masimo Device: No   Masimo Clinical Impression:     Virtual Visits--Scheduled (Most Recent Date at Top)  Follow up Appointments  Recent Visits  Date Type Provider Dept   04/09/24 Office Visit Nahun Douglas,  [facility]   Showing recent visits within past 30 days and meeting all other requirements  Future Appointments  No visits were found meeting these conditions.  Showing future appointments within next 90 days and meeting all other requirements       Frequency of RN Calls & Virtual Visits per Team Agreement: [facility] Frequency: Daily    Medication issues Addressed (what was done):     Follow up appointments scheduled by [facility] Staff:   Referrals made by [facility] staff:     [facility] Care Transitions Assessment    Patient's Address:   [address] [place] [address]  **  If this is not the address patient will receive services - alert team and address in EMR**       Patient Contacts:  Extended Emergency Contact Information  Primary Emergency Contact: Kocher,Lori  Home Phone: [phone]  Relation: Child  Secondary Emergency Contact: Kocher,Chris  Home Phone:  [phone]  Relation: None                                Patient's Preferred Phone: [phone]  Patient's E-mail: [email or web address]

## 2024-04-11 NOTE — TELEPHONE ENCOUNTER
Luh from [facility] called and advise new medication for patient prescribed by Yoli Augustine CNP for low potassium pre hospital discharge on 04/01/2024        Potassiumchloroide 20 mEq  1 times daily

## 2024-04-12 ENCOUNTER — PATIENT OUTREACH (OUTPATIENT)
Dept: CASE MANAGEMENT | Facility: HOSPITAL | Age: 74
End: 2024-04-12
Payer: COMMERCIAL

## 2024-04-12 ENCOUNTER — PHARMACY VISIT (OUTPATIENT)
Dept: PHARMACY | Facility: CLINIC | Age: 74
End: 2024-04-12
Payer: MEDICARE

## 2024-04-12 ENCOUNTER — HOME HEALTH ADMISSION (OUTPATIENT)
Dept: HOME HEALTH SERVICES | Facility: HOME HEALTH | Age: 74
End: 2024-04-12
Payer: COMMERCIAL

## 2024-04-12 ENCOUNTER — PATIENT OUTREACH (OUTPATIENT)
Dept: HOME HEALTH SERVICES | Age: 74
End: 2024-04-12
Payer: COMMERCIAL

## 2024-04-12 PROCEDURE — RXMED WILLOW AMBULATORY MEDICATION CHARGE

## 2024-04-12 RX ORDER — POTASSIUM CHLORIDE 1500 MG/1
20 TABLET, EXTENDED RELEASE ORAL DAILY
Qty: 30 TABLET | Refills: 0 | OUTPATIENT
Start: 2024-04-11 | End: 2024-04-16 | Stop reason: SDUPTHER

## 2024-04-12 NOTE — PROGRESS NOTES
Follow up phone call made by CHF Clinical Nurse Navigator. Spoke with Jazzy yesterday afternoon. Reports she is doing her best to manage her CHF. However she does not feel any better c/o weakness & difficulty taking care of self. She did receive scale & has been performing daily weights. She completed follow up appt with her PCP this week. She has call into her PCP's office to have HHC-PT & aide ordered. Per Jazzy this has been approved via her insurance, [employer]. She also reports that she is unable to get downstairs ( she lives with her son & daughter in law). Suggested that she have her son move her downstairs. She has been doing her best to follow low Na diet & has been taking her medications as directed. Reported that she needs Potassium. She is active with [facility]. Informed her that she is scheduled for call from both programs to address her issue with Potassium. She reports that she changed her Pharmacy to [facility], so she is hoping to get her medications delivered.  She has scheduled appts with Cardiology & Pulmonary. Reviewed HF flare up symptoms to call MD with. I will continue to follow to help manage CHF.

## 2024-04-12 NOTE — PROGRESS NOTES
Daily Call Note:  [facility] daily call complete.  Pt reports she is feeling well.  B/P 124/55, HR 67.  PCP ordered Potassium, [facility] will mail.  No  concerns voiced.  Verified upcoming [facility].      Pt Education:  POC   Barriers:   Topics for Daily Review:  VS/ weight   Pt demonstrates clear understanding: Yes    Daily Weight:  There were no vitals filed for this visit.   Last 3 Weights:  Wt Readings from Last 7 Encounters:   04/10/24 79.4 kg (175 lb)   04/09/24 79.4 kg (175 lb)   04/04/24 77.8 kg (171 lb 8 oz)   04/01/24 84.7 kg (186 lb 11.7 oz)   02/22/24 81.6 kg (180 lb)       Masimo Device: No   Masimo Clinical Impression:     Virtual Visits--Scheduled (Most Recent Date at Top)  Follow up Appointments  Recent Visits  Date Type Provider Dept   04/09/24 Office Visit Nahun Douglas DO [facility]   Showing recent visits within past 30 days and meeting all other requirements  Future Appointments  No visits were found meeting these conditions.  Showing future appointments within next 90 days and meeting all other requirements       Frequency of RN Calls & Virtual Visits per Team Agreement: [facility] Frequency: Daily    Medication issues Addressed (what was done):     Follow up appointments scheduled by [facility] Staff:   Referrals made by [facility] staff:       [facility] Care Transitions Assessment    Patient's Address:   [address] [place] [address]  **  If this is not the address patient will receive services - alert team and address in EMR**       Patient Contacts:  Extended Emergency Contact Information  Primary Emergency Contact: Kocher,Lori  Home Phone: [phone]  Relation: Child  Secondary Emergency Contact: Kocher,Kristopher  Home Phone: [phone]  Relation: None                                Patient's Preferred Phone: [phone]  Patient's E-mail: [email or web address]

## 2024-04-14 ENCOUNTER — TELEPHONE (OUTPATIENT)
Dept: HOME HEALTH SERVICES | Facility: HOME HEALTH | Age: 74
End: 2024-04-14
Payer: COMMERCIAL

## 2024-04-14 DIAGNOSIS — I48.91 ATRIAL FIBRILLATION WITH RAPID VENTRICULAR RESPONSE (MULTI): ICD-10-CM

## 2024-04-14 NOTE — TELEPHONE ENCOUNTER
Hello!    Due to referral having inaccurate information it has been determined that this referral has been made a non-admit. If you wish for patient to have [facility] please resubmit [facility] referral with correct homebound status and if labs are needed. If labs are needed please state which labs and for the specific date..     Thank you,  Intake department

## 2024-04-15 ENCOUNTER — PATIENT OUTREACH (OUTPATIENT)
Dept: HOME HEALTH SERVICES | Age: 74
End: 2024-04-15

## 2024-04-15 VITALS
BODY MASS INDEX: 29.39 KG/M2 | DIASTOLIC BLOOD PRESSURE: 91 MMHG | HEART RATE: 74 BPM | SYSTOLIC BLOOD PRESSURE: 136 MMHG | WEIGHT: 171.2 LBS

## 2024-04-15 NOTE — PROGRESS NOTES
Daily Call Note:  Spoke to patient, she states she slept for 2 days and feels much better. Reports weight and BP/HR.   Denies sob or palpitations unless she does activities.  Was taking water pill for few days and that stopped a few days ago and she reports feeling much better. No more leg swelling also. States she got potassium ans has started taking it.  HC reviewed SOC pending, gave her number to call as well.  She is working with [employer] for cardio apt next week.  Next [facility] scheduled.  Pt Education: POC  Barriers: na  Topics for Daily Review: POC  Pt demonstrates clear understanding: Yes    Daily Weight:  Vitals:    04/15/24 0925   Weight: 77.7 kg (171 lb 3.2 oz)      Last 3 Weights:  Wt Readings from Last 7 Encounters:   04/15/24 77.7 kg (171 lb 3.2 oz)   04/10/24 79.4 kg (175 lb)   04/09/24 79.4 kg (175 lb)   04/04/24 77.8 kg (171 lb 8 oz)   04/01/24 84.7 kg (186 lb 11.7 oz)   02/22/24 81.6 kg (180 lb)       Masimo Device: No   Masimo Clinical Impression: na    Virtual Visits--Scheduled (Most Recent Date at Top)  Follow up Appointments  Recent Visits  Date Type Provider Dept   04/09/24 Office Visit Nahun Douglas DO [facility]   Showing recent visits within past 30 days and meeting all other requirements  Future Appointments  No visits were found meeting these conditions.  Showing future appointments within next 90 days and meeting all other requirements       Frequency of RN Calls & Virtual Visits per Team Agreement: [facility] Frequency: Daily    Medication issues Addressed (what was done): na    Follow up appointments scheduled by [facility] Staff: na  Referrals made by [facility] staff: na      [facility] Care Transitions Assessment    Patient's Address:   [address] [place] [address]  **  If this is not the address patient will receive services - alert team and address in EMR**       Patient Contacts:  Extended Emergency Contact Information  Primary Emergency Contact:  Kocher,Lori  Home Phone: [phone]  Relation: Child  Secondary Emergency Contact: Kocher,Chris  Home Phone: [phone]  Relation: None                                Patient's Preferred Phone: [phone]  Patient's E-mail: [email or web address]

## 2024-04-16 ENCOUNTER — PATIENT OUTREACH (OUTPATIENT)
Dept: HOME HEALTH SERVICES | Age: 74
End: 2024-04-16

## 2024-04-16 ENCOUNTER — TELEMEDICINE (OUTPATIENT)
Dept: PHARMACY | Facility: HOSPITAL | Age: 74
End: 2024-04-16
Payer: COMMERCIAL

## 2024-04-16 DIAGNOSIS — J40 BRONCHITIS: ICD-10-CM

## 2024-04-16 DIAGNOSIS — I48.91 ATRIAL FIBRILLATION WITH RAPID VENTRICULAR RESPONSE (MULTI): ICD-10-CM

## 2024-04-16 DIAGNOSIS — I50.9 CONGESTIVE HEART FAILURE, UNSPECIFIED HF CHRONICITY, UNSPECIFIED HEART FAILURE TYPE (MULTI): ICD-10-CM

## 2024-04-16 PROCEDURE — RXMED WILLOW AMBULATORY MEDICATION CHARGE

## 2024-04-16 RX ORDER — TORSEMIDE 20 MG/1
20 TABLET ORAL DAILY
Qty: 90 TABLET | Refills: 1 | Status: SHIPPED | OUTPATIENT
Start: 2024-04-16 | End: 2024-04-24 | Stop reason: SDUPTHER

## 2024-04-16 RX ORDER — ALBUTEROL SULFATE 90 UG/1
1 AEROSOL, METERED RESPIRATORY (INHALATION) EVERY 6 HOURS PRN
Qty: 18 G | Refills: 2 | Status: SHIPPED | OUTPATIENT
Start: 2024-04-16 | End: 2024-07-11

## 2024-04-16 RX ORDER — BISOPROLOL FUMARATE 10 MG/1
10 TABLET, FILM COATED ORAL DAILY
Qty: 90 TABLET | Refills: 1 | Status: SHIPPED | OUTPATIENT
Start: 2024-04-16 | End: 2024-04-24 | Stop reason: SDUPTHER

## 2024-04-16 RX ORDER — SPIRONOLACTONE 25 MG/1
12.5 TABLET ORAL 2 TIMES DAILY
Qty: 90 TABLET | Refills: 1 | Status: SHIPPED | OUTPATIENT
Start: 2024-04-16 | End: 2024-04-24 | Stop reason: SDUPTHER

## 2024-04-16 RX ORDER — POTASSIUM CHLORIDE 20 MEQ/1
20 TABLET, EXTENDED RELEASE ORAL DAILY
Qty: 90 TABLET | Refills: 1 | Status: SHIPPED | OUTPATIENT
Start: 2024-04-16 | End: 2024-05-27 | Stop reason: HOSPADM

## 2024-04-16 NOTE — PROGRESS NOTES
Daily Call Note: [facility] weekly call complete w this RN, Broderick Pharm D, and Dr Munguia.  Pt reports intermittent SOB, mostly w exertion.  B/P 128/81, HR 77.  Pt had Cardiology appt on 4/23, and has Pulmonary appt soon.  Pt requesting Passport services will send  message to Social Work. Medications reviewed w Broderick, refills ordered.   All questions/ concerns addressed.  Next [facility] 4/3/24 at 1330.      Pt Education: POC   Barriers:   Topics for Daily Review:  VS/ weight   Pt demonstrates clear understanding: Yes    Daily Weight:  171.2 lbs   There were no vitals filed for this visit.   Last 3 Weights:  Wt Readings from Last 7 Encounters:   04/15/24 77.7 kg (171 lb 3.2 oz)   04/10/24 79.4 kg (175 lb)   04/09/24 79.4 kg (175 lb)   04/04/24 77.8 kg (171 lb 8 oz)   04/01/24 84.7 kg (186 lb 11.7 oz)   02/22/24 81.6 kg (180 lb)       Masimo Device: No   Masimo Clinical Impression:     Virtual Visits--Scheduled (Most Recent Date at Top)  Follow up Appointments  Recent Visits  Date Type Provider Dept   04/09/24 Office Visit Nahun Douglas DO [facility]   Showing recent visits within past 30 days and meeting all other requirements  Future Appointments  No visits were found meeting these conditions.  Showing future appointments within next 90 days and meeting all other requirements       Frequency of RN Calls & Virtual Visits per Team Agreement: [facility] Frequency: Daily    Medication issues Addressed (what was done):     Follow up appointments scheduled by [facility] Staff:   Referrals made by [facility] staff:       [facility]ute [facility] Care Transitions Assessment    Patient's Address:   [address] [place] [address]  **  If this is not the address patient will receive services - alert team and address in EMR**       Patient Contacts:  Extended Emergency Contact Information  Primary Emergency Contact: Kocher,Lori  Home Phone: [phone]  Relation: Child  Secondary Emergency Contact: Kocher,Chris  Home  Phone: [phone]  Relation: None                                Patient's Preferred Phone: [phone]  Patient's E-mail: [email or web address]

## 2024-04-17 ENCOUNTER — PHARMACY VISIT (OUTPATIENT)
Dept: PHARMACY | Facility: CLINIC | Age: 74
End: 2024-04-17
Payer: MEDICARE

## 2024-04-17 ENCOUNTER — DOCUMENTATION (OUTPATIENT)
Dept: CARE COORDINATION | Age: 74
End: 2024-04-17

## 2024-04-17 ENCOUNTER — HOME HEALTH ADMISSION (OUTPATIENT)
Dept: HOME HEALTH SERVICES | Facility: HOME HEALTH | Age: 74
End: 2024-04-17
Payer: COMMERCIAL

## 2024-04-17 ENCOUNTER — PATIENT OUTREACH (OUTPATIENT)
Dept: HOME HEALTH SERVICES | Age: 74
End: 2024-04-17

## 2024-04-17 ENCOUNTER — DOCUMENTATION (OUTPATIENT)
Dept: HOME HEALTH SERVICES | Facility: HOME HEALTH | Age: 74
End: 2024-04-17

## 2024-04-17 VITALS
DIASTOLIC BLOOD PRESSURE: 74 MMHG | BODY MASS INDEX: 29.39 KG/M2 | SYSTOLIC BLOOD PRESSURE: 118 MMHG | HEART RATE: 73 BPM | WEIGHT: 171.2 LBS

## 2024-04-17 NOTE — PROGRESS NOTES
"Daily Call Note:   Ms.. Kocher states she feels okay as long as she stays in bed.  Patient states she develops palpitations and get sob due to Afib.    /74  HR 73     122/61 HR 74     125/81  , with activity  Weight 171.2  Home care contacted Dr. Douglas concerning inaccurate information on referral, waiting on corrected informatation.     Update from [facility] concerning passport services.   \"Spoke with [facility], who will be contacting the PT to see if she eligible for 4 hours of Home Care though their agency.\"   I updated the patient.       Pt Education:   Barriers:   Topics for Daily Review:   Pt demonstrates clear understanding:     Daily Weight:  There were no vitals filed for this visit.   Last 3 Weights:  Wt Readings from Last 7 Encounters:   04/15/24 77.7 kg (171 lb 3.2 oz)   04/10/24 79.4 kg (175 lb)   04/09/24 79.4 kg (175 lb)   04/04/24 77.8 kg (171 lb 8 oz)   04/01/24 84.7 kg (186 lb 11.7 oz)   02/22/24 81.6 kg (180 lb)       Masimo Device:    Masimo Clinical Impression:     Virtual Visits--Scheduled (Most Recent Date at Top)  Follow up Appointments  Recent Visits  Date Type Provider Dept   04/09/24 Office Visit Nahun Douglas DO [facility]   Showing recent visits within past 30 days and meeting all other requirements  Future Appointments  No visits were found meeting these conditions.  Showing future appointments within next 90 days and meeting all other requirements       Frequency of RN Calls & Virtual Visits per Team Agreement:     Medication issues Addressed (what was done):     Follow up appointments scheduled by [facility] Staff:   Referrals made by [facility] staff:       [facility] Care Transitions Assessment    Patient's Address:   [address] [place] [id number]  **  If this is not the address patient will receive services - alert team and address in EMR**       Patient Contacts:  Extended Emergency Contact Information  Primary Emergency " Contact: Kocher,Lori  Home Phone: [phone]  Relation: Child  Secondary Emergency Contact: Kocher,Chris  Home Phone: [phone]  Relation: None                                Patient's Preferred Phone: [phone]  Patient's E-mail: [email or web address]

## 2024-04-17 NOTE — PROGRESS NOTES
Contacted PT in regards to [facility] referral for Passport resource assist: PT stated that she was interested in the PP program, but not for complete & total services so that it wld not consume her income.   Spoke with [facility], who will be contacting the PT to see if she eligible for 4 hours of Home Care though their agency.  She also required further assist w/dial a ride for transportation. She stated she did her application for dial a ride, but while completing it she experienced issues. Which I assisted her with.    Discussed the following topics on behalf of the patient:  []  Behavioral Health Assistance     []  Case Management  []   Assistance  []  Digital Equity Assistance  []  Dental Health Assistance  []  Education Assistance  []  Employment Assistance  []  Financial Strain Relief Assistance  []  Food Insecurity Assistance  [x]  Healthcare Coverage Assistance  []  Housing Stability Assistance  []  IP Violence Relief Assistance  []  Legal Assistance  []  Physical Activity Assistance  []  Social Connection Assistance  []  Stress Relief Assistance   []  Substance Abuse Assistance  [x]  Transportation Assistance  []  Utility Assistance  []  Other: [insert comment here]            Tai Mcdonald LCSW

## 2024-04-17 NOTE — HH CARE COORDINATION
Home Care received a Referral for Nursing and Home Health Aide. We have processed the referral for a Start of Care on 24-48 HOURS .     If you have any questions or concerns, please feel free to contact us at [phone]. Follow the prompts, enter your five digit zip code, and you will be directed to your care team on EAST 1.

## 2024-04-18 ENCOUNTER — HOME CARE VISIT (OUTPATIENT)
Dept: HOME HEALTH SERVICES | Facility: HOME HEALTH | Age: 74
End: 2024-04-18

## 2024-04-18 ENCOUNTER — PATIENT OUTREACH (OUTPATIENT)
Dept: HOME HEALTH SERVICES | Age: 74
End: 2024-04-18

## 2024-04-18 NOTE — PROGRESS NOTES
Daily Call Note: [facility] daily call complete.  Pt reports she is fatigued did not sleep well.  [facility] begins today.  B/P 124/79, HR 70.  No other concerns voiced.  Verified upcoming [facility] appt.      Pt Education: POC   Barriers:   Topics for Daily Review:  VS/ weight   Pt demonstrates clear understanding: Yes    Daily Weight:  167.2 lbs   There were no vitals filed for this visit.   Last 3 Weights:  Wt Readings from Last 7 Encounters:   04/17/24 77.7 kg (171 lb 3.2 oz)   04/15/24 77.7 kg (171 lb 3.2 oz)   04/10/24 79.4 kg (175 lb)   04/09/24 79.4 kg (175 lb)   04/04/24 77.8 kg (171 lb 8 oz)   04/01/24 84.7 kg (186 lb 11.7 oz)   02/22/24 81.6 kg (180 lb)       Masimo Device: No   Masimo Clinical Impression:     Virtual Visits--Scheduled (Most Recent Date at Top)  Follow up Appointments  Recent Visits  Date Type Provider Dept   04/09/24 Office Visit Nahun Douglas DO [facility]   Showing recent visits within past 30 days and meeting all other requirements  Future Appointments  No visits were found meeting these conditions.  Showing future appointments within next 90 days and meeting all other requirements       Frequency of RN Calls & Virtual Visits per Team Agreement: Healthy at Home Frequency: Daily    Medication issues Addressed (what was done):     Follow up appointments scheduled by [facility] Staff:   Referrals made by [facility] staff:       [facility] Care Transitions Assessment    Patient's Address:   [address] [place] [id number]  **  If this is not the address patient will receive services - alert team and address in EMR**       Patient Contacts:  Extended Emergency Contact Information  Primary Emergency Contact: Kocher,Lori  Home Phone: [phone]  Relation: Child  Secondary Emergency Contact: Kocher,Chris  Home Phone: [phone]  Relation: None                                Patient's Preferred Phone: [phone]  Patient's E-mail: [email or web address]

## 2024-04-20 ENCOUNTER — PATIENT OUTREACH (OUTPATIENT)
Dept: HOME HEALTH SERVICES | Age: 74
End: 2024-04-20

## 2024-04-20 NOTE — PROGRESS NOTES
Daily Call Note: Daily [facility] call complete.  Pt reports she is feeling well.  Has not checked B/P or weight his am.  Pt requesting Passport services.   aware , pt states she did speak w [facility], but has not heard from them again.  No concerns voiced .  Verified upcoming weekly [facility] call      Pt Education: POC   Barriers:  compliance   Topics for Daily Review: VS/ weight   Pt demonstrates clear understanding: Yes    Daily Weight:  There were no vitals filed for this visit.   Last 3 Weights:  Wt Readings from Last 7 Encounters:   04/17/24 77.7 kg (171 lb 3.2 oz)   04/15/24 77.7 kg (171 lb 3.2 oz)   04/10/24 79.4 kg (175 lb)   04/09/24 79.4 kg (175 lb)   04/04/24 77.8 kg (171 lb 8 oz)   04/01/24 84.7 kg (186 lb 11.7 oz)   02/22/24 81.6 kg (180 lb)       Masimo Device: No   Masimo Clinical Impression:     Virtual Visits--Scheduled (Most Recent Date at Top)  Follow up Appointments  Recent Visits  Date Type Provider Dept   04/09/24 Office Visit Nahun Douglas DO [facility]   Showing recent visits within past 30 days and meeting all other requirements  Future Appointments  Date Type Provider Dept   04/23/24 Appointment Nahun Douglas DO [facility]   Showing future appointments within next 90 days and meeting all other requirements       Frequency of RN Calls & Virtual Visits per Team Agreement: Healthy at Home Frequency: Daily    Medication issues Addressed (what was done):     Follow up appointments scheduled by [facility] Staff:   Referrals made by [facility] staff:  [facility] Care Transitions Assessment    Patient's Address:   [address] [place] [address]  **  If this is not the address patient will receive services - alert team and address in EMR**       Patient Contacts:  Extended Emergency Contact Information  Primary Emergency Contact: Kocher,Lori  Home Phone: [phone]  Relation: Child  Secondary Emergency Contact: Kocher,Chris  Home Phone:  [phone]  Relation: None                                Patient's Preferred Phone: [phone]  Patient's E-mail: [email or web address]

## 2024-04-22 ENCOUNTER — PATIENT OUTREACH (OUTPATIENT)
Dept: CARE COORDINATION | Age: 74
End: 2024-04-22

## 2024-04-22 NOTE — PROGRESS NOTES
Daily Call Note:   Pt on the phone this morning changing all of her appointments. PCP and cardiology changed to 4/24. Pt has a car these days to drive to her appointments. Pt stated she had a long morning and wanted to take a nap, and did not want to answer any more questions right now.        Topics for Daily Review:   Pt demonstrates clear understanding: Yes       Last 3 Weights:  Wt Readings from Last 7 Encounters:   04/17/24 77.7 kg (171 lb 3.2 oz)   04/15/24 77.7 kg (171 lb 3.2 oz)   04/10/24 79.4 kg (175 lb)   04/09/24 79.4 kg (175 lb)   04/04/24 77.8 kg (171 lb 8 oz)   04/01/24 84.7 kg (186 lb 11.7 oz)   02/22/24 81.6 kg (180 lb)       Masimo Device: No   Masimo Clinical Impression:     Virtual Visits--Scheduled (Most Recent Date at Top)  Follow up Appointments  Recent Visits  Date Type Provider Dept   04/09/24 Office Visit Nahun Douglas DO [facility]   Showing recent visits within past 30 days and meeting all other requirements  Future Appointments  Date Type Provider Dept   04/24/24 Appointment [facility]   04/25/24 Appointment Nahun Douglas DO [facility]   Showing future appointments within next 90 days and meeting all other requirements       Frequency of RN Calls & Virtual Visits per Team Agreement: Healthy at Home Frequency: Daily    Medication issues Addressed (what was done):     Follow up appointments scheduled by [facility] Staff: 4/24 @0930  Referrals made by [facility] staff:

## 2024-04-23 ENCOUNTER — DOCUMENTATION (OUTPATIENT)
Dept: CARE COORDINATION | Age: 74
End: 2024-04-23

## 2024-04-23 ENCOUNTER — APPOINTMENT (OUTPATIENT)
Dept: PHARMACY | Facility: HOSPITAL | Age: 74
End: 2024-04-23
Payer: COMMERCIAL

## 2024-04-23 ENCOUNTER — APPOINTMENT (OUTPATIENT)
Dept: PRIMARY CARE | Facility: CLINIC | Age: 74
End: 2024-04-23
Payer: COMMERCIAL

## 2024-04-23 ENCOUNTER — APPOINTMENT (OUTPATIENT)
Dept: CARDIOLOGY | Facility: CLINIC | Age: 74
End: 2024-04-23
Payer: COMMERCIAL

## 2024-04-23 ENCOUNTER — PATIENT OUTREACH (OUTPATIENT)
Dept: HOME HEALTH SERVICES | Age: 74
End: 2024-04-23

## 2024-04-23 ENCOUNTER — APPOINTMENT (OUTPATIENT)
Dept: CARE COORDINATION | Age: 74
End: 2024-04-23
Payer: COMMERCIAL

## 2024-04-23 VITALS
DIASTOLIC BLOOD PRESSURE: 86 MMHG | WEIGHT: 170 LBS | SYSTOLIC BLOOD PRESSURE: 127 MMHG | HEART RATE: 80 BPM | BODY MASS INDEX: 29.18 KG/M2

## 2024-04-23 NOTE — PROGRESS NOTES
Contacted PT to follow up in regards to her transportation. She stated that [employer] was too costly. Contacted her ins and since she is a heart patient, they will provide transportation for her medical appointments.     Discussed the following topics on behalf of the patient:  []  Behavioral Health Assistance     []  Case Management  []   Assistance  []  Digital Equity Assistance  []  Dental Health Assistance  []  Education Assistance  []  Employment Assistance  []  Financial Strain Relief Assistance  []  Food Insecurity Assistance  []  Healthcare Coverage Assistance  []  Housing Stability Assistance  []  IP Violence Relief Assistance  []  Legal Assistance  []  Physical Activity Assistance  []  Social Connection Assistance  []  Stress Relief Assistance   []  Substance Abuse Assistance  [x]  Transportation Assistance  []  Utility Assistance  []  Other: [insert comment here]        Tai Mcdonald LCSW

## 2024-04-23 NOTE — PROGRESS NOTES
"Daily call completed. Pt states she is \"hanging in there\". Denies CP/SOB. Denies any new swelling. States she is just tired. Pt states she is trying to get back into her \"normal life\" which involves transporting family and watching her grandchildren which is why she is so tired. Pt advised to rest when possible. Verbalizes understanding. States she took all of her daily meds today. Aware of upcoming appts (cards and PCP tomorrow). Denies any further questions/concerns/needs at this time. [facility] tomorrow 4/24 at 0930. States this time will work despite her two other appts.     Weight 170lb  /86  HR 80    Pt Education: resting, obtaining vitals  Barriers: na  Topics for Daily Review: dw, vs, daily needs, upcoming appts  Pt demonstrates clear understanding: Yes    Daily Weight:  There were no vitals filed for this visit.   Last 3 Weights:  Wt Readings from Last 7 Encounters:   04/17/24 77.7 kg (171 lb 3.2 oz)   04/15/24 77.7 kg (171 lb 3.2 oz)   04/10/24 79.4 kg (175 lb)   04/09/24 79.4 kg (175 lb)   04/04/24 77.8 kg (171 lb 8 oz)   04/01/24 84.7 kg (186 lb 11.7 oz)   02/22/24 81.6 kg (180 lb)       Masimo Device: No   Masimo Clinical Impression: na    Virtual Visits--Scheduled (Most Recent Date at Top)  Follow up Appointments  Recent Visits  Date Type Provider Dept   04/09/24 Office Visit Nahun Douglas DO [facility]   Showing recent visits within past 30 days and meeting all other requirements  Future Appointments  Date Type Provider Dept   04/24/24 Appointment Nahun Douglas DO [facility]   Showing future appointments within next 90 days and meeting all other requirements       Frequency of RN Calls & Virtual Visits per Team Agreement: Healthy at Home Frequency: Daily    Medication issues Addressed (what was done): na    Follow up appointments scheduled by [facility] Staff: na  Referrals made by [facility] staff: na      [facility] Care Transitions Assessment    Patient's Address:   [address] [facility] [address]  **  If this is not the address patient will receive services - alert team and address in EMR**       Patient Contacts:  Extended Emergency Contact Information  Primary Emergency Contact: Kocher,Lori  Home Phone: [phone]  Relation: Child  Secondary Emergency Contact: Kocher,Chris  Home Phone: [phone]  Relation: None                                Patient's Preferred Phone: [phone]  Patient's E-mail: [email or web address]

## 2024-04-24 ENCOUNTER — PATIENT OUTREACH (OUTPATIENT)
Dept: HOME HEALTH SERVICES | Age: 74
End: 2024-04-24

## 2024-04-24 ENCOUNTER — TELEMEDICINE (OUTPATIENT)
Dept: PHARMACY | Facility: HOSPITAL | Age: 74
End: 2024-04-24
Payer: COMMERCIAL

## 2024-04-24 ENCOUNTER — OFFICE VISIT (OUTPATIENT)
Dept: CARDIOLOGY | Facility: CLINIC | Age: 74
End: 2024-04-24
Payer: COMMERCIAL

## 2024-04-24 ENCOUNTER — OFFICE VISIT (OUTPATIENT)
Dept: PRIMARY CARE | Facility: CLINIC | Age: 74
End: 2024-04-24
Payer: COMMERCIAL

## 2024-04-24 VITALS
WEIGHT: 172 LBS | BODY MASS INDEX: 29.37 KG/M2 | HEART RATE: 48 BPM | SYSTOLIC BLOOD PRESSURE: 130 MMHG | TEMPERATURE: 98.9 F | RESPIRATION RATE: 18 BRPM | DIASTOLIC BLOOD PRESSURE: 63 MMHG | OXYGEN SATURATION: 97 % | HEIGHT: 64 IN

## 2024-04-24 VITALS
HEIGHT: 64 IN | WEIGHT: 170 LBS | OXYGEN SATURATION: 97 % | BODY MASS INDEX: 29.02 KG/M2 | RESPIRATION RATE: 16 BRPM | HEART RATE: 78 BPM | SYSTOLIC BLOOD PRESSURE: 118 MMHG | DIASTOLIC BLOOD PRESSURE: 76 MMHG | TEMPERATURE: 96.5 F

## 2024-04-24 VITALS — DIASTOLIC BLOOD PRESSURE: 80 MMHG | SYSTOLIC BLOOD PRESSURE: 135 MMHG | HEART RATE: 80 BPM

## 2024-04-24 DIAGNOSIS — E55.9 VITAMIN D DEFICIENCY: Primary | ICD-10-CM

## 2024-04-24 DIAGNOSIS — I48.0 PAF (PAROXYSMAL ATRIAL FIBRILLATION) (MULTI): ICD-10-CM

## 2024-04-24 DIAGNOSIS — I48.91 ATRIAL FIBRILLATION WITH RAPID VENTRICULAR RESPONSE (MULTI): Primary | ICD-10-CM

## 2024-04-24 DIAGNOSIS — I48.91 ATRIAL FIBRILLATION WITH RAPID VENTRICULAR RESPONSE (MULTI): ICD-10-CM

## 2024-04-24 DIAGNOSIS — I50.32 CHRONIC DIASTOLIC CHF (CONGESTIVE HEART FAILURE) (MULTI): ICD-10-CM

## 2024-04-24 DIAGNOSIS — N18.31 STAGE 3A CHRONIC KIDNEY DISEASE (MULTI): ICD-10-CM

## 2024-04-24 DIAGNOSIS — I50.9 CONGESTIVE HEART FAILURE, UNSPECIFIED HF CHRONICITY, UNSPECIFIED HEART FAILURE TYPE (MULTI): ICD-10-CM

## 2024-04-24 DIAGNOSIS — I10 PRIMARY HYPERTENSION: Primary | ICD-10-CM

## 2024-04-24 DIAGNOSIS — I10 PRIMARY HYPERTENSION: ICD-10-CM

## 2024-04-24 LAB — 25(OH)D3 SERPL-MCNC: 22 NG/ML (ref 31–100)

## 2024-04-24 PROCEDURE — 3078F DIAST BP <80 MM HG: CPT | Performed by: INTERNAL MEDICINE

## 2024-04-24 PROCEDURE — 3075F SYST BP GE 130 - 139MM HG: CPT | Performed by: INTERNAL MEDICINE

## 2024-04-24 PROCEDURE — 1111F DSCHRG MED/CURRENT MED MERGE: CPT | Performed by: FAMILY MEDICINE

## 2024-04-24 PROCEDURE — 1111F DSCHRG MED/CURRENT MED MERGE: CPT | Performed by: INTERNAL MEDICINE

## 2024-04-24 PROCEDURE — 3074F SYST BP LT 130 MM HG: CPT | Performed by: FAMILY MEDICINE

## 2024-04-24 PROCEDURE — 99214 OFFICE O/P EST MOD 30 MIN: CPT | Performed by: INTERNAL MEDICINE

## 2024-04-24 PROCEDURE — 1159F MED LIST DOCD IN RCRD: CPT | Performed by: INTERNAL MEDICINE

## 2024-04-24 PROCEDURE — 1126F AMNT PAIN NOTED NONE PRSNT: CPT | Performed by: INTERNAL MEDICINE

## 2024-04-24 PROCEDURE — 99214 OFFICE O/P EST MOD 30 MIN: CPT | Performed by: FAMILY MEDICINE

## 2024-04-24 PROCEDURE — 1126F AMNT PAIN NOTED NONE PRSNT: CPT | Performed by: FAMILY MEDICINE

## 2024-04-24 PROCEDURE — 3078F DIAST BP <80 MM HG: CPT | Performed by: FAMILY MEDICINE

## 2024-04-24 PROCEDURE — 1159F MED LIST DOCD IN RCRD: CPT | Performed by: FAMILY MEDICINE

## 2024-04-24 PROCEDURE — 36415 COLL VENOUS BLD VENIPUNCTURE: CPT | Performed by: FAMILY MEDICINE

## 2024-04-24 PROCEDURE — 82306 VITAMIN D 25 HYDROXY: CPT | Performed by: FAMILY MEDICINE

## 2024-04-24 RX ORDER — BISOPROLOL FUMARATE 10 MG/1
10 TABLET, FILM COATED ORAL DAILY
Qty: 90 TABLET | Refills: 3 | Status: SHIPPED | OUTPATIENT
Start: 2024-04-24 | End: 2024-05-27 | Stop reason: HOSPADM

## 2024-04-24 RX ORDER — ACETAMINOPHEN 500 MG
4000 TABLET ORAL DAILY
Qty: 60 CAPSULE | Refills: 11 | Status: SHIPPED | OUTPATIENT
Start: 2024-04-24 | End: 2024-06-10 | Stop reason: SDUPTHER

## 2024-04-24 RX ORDER — TORSEMIDE 20 MG/1
20 TABLET ORAL DAILY
Qty: 90 TABLET | Refills: 3 | Status: SHIPPED | OUTPATIENT
Start: 2024-04-24 | End: 2024-05-27 | Stop reason: HOSPADM

## 2024-04-24 RX ORDER — SPIRONOLACTONE 25 MG/1
12.5 TABLET ORAL 2 TIMES DAILY
Qty: 90 TABLET | Refills: 3 | Status: SHIPPED | OUTPATIENT
Start: 2024-04-24 | End: 2024-05-27 | Stop reason: HOSPADM

## 2024-04-24 ASSESSMENT — ENCOUNTER SYMPTOMS
OCCASIONAL FEELINGS OF UNSTEADINESS: 0
GASTROINTESTINAL NEGATIVE: 1
DEPRESSION: 0
RESPIRATORY NEGATIVE: 1
OCCASIONAL FEELINGS OF UNSTEADINESS: 0
CARDIOVASCULAR NEGATIVE: 1
MUSCULOSKELETAL NEGATIVE: 1
CONSTITUTIONAL NEGATIVE: 1
LOSS OF SENSATION IN FEET: 0
NEUROLOGICAL NEGATIVE: 1
DEPRESSION: 0
LOSS OF SENSATION IN FEET: 0

## 2024-04-24 ASSESSMENT — PAIN SCALES - GENERAL
PAINLEVEL: 0-NO PAIN
PAINLEVEL: 0-NO PAIN

## 2024-04-24 ASSESSMENT — LIFESTYLE VARIABLES
AUDIT-C TOTAL SCORE: 0
SKIP TO QUESTIONS 9-10: 1
HOW OFTEN DO YOU HAVE A DRINK CONTAINING ALCOHOL: NEVER
HAVE YOU OR SOMEONE ELSE BEEN INJURED AS A RESULT OF YOUR DRINKING: NO
HOW MANY STANDARD DRINKS CONTAINING ALCOHOL DO YOU HAVE ON A TYPICAL DAY: PATIENT DOES NOT DRINK
HOW OFTEN DO YOU HAVE SIX OR MORE DRINKS ON ONE OCCASION: NEVER
AUDIT TOTAL SCORE: 0
HAS A RELATIVE, FRIEND, DOCTOR, OR ANOTHER HEALTH PROFESSIONAL EXPRESSED CONCERN ABOUT YOUR DRINKING OR SUGGESTED YOU CUT DOWN: NO

## 2024-04-24 ASSESSMENT — PATIENT HEALTH QUESTIONNAIRE - PHQ9
2. FEELING DOWN, DEPRESSED OR HOPELESS: NOT AT ALL
1. LITTLE INTEREST OR PLEASURE IN DOING THINGS: NOT AT ALL
SUM OF ALL RESPONSES TO PHQ9 QUESTIONS 1 AND 2: 0

## 2024-04-24 ASSESSMENT — COLUMBIA-SUICIDE SEVERITY RATING SCALE - C-SSRS
6. HAVE YOU EVER DONE ANYTHING, STARTED TO DO ANYTHING, OR PREPARED TO DO ANYTHING TO END YOUR LIFE?: NO
1. IN THE PAST MONTH, HAVE YOU WISHED YOU WERE DEAD OR WISHED YOU COULD GO TO SLEEP AND NOT WAKE UP?: NO
2. HAVE YOU ACTUALLY HAD ANY THOUGHTS OF KILLING YOURSELF?: NO

## 2024-04-24 NOTE — PROGRESS NOTES
Subjective   Chief Complaint   Patient presents with    Hospital Follow-up     Mrs\Ms. Kocher is present for Hosp Follow up with Dr. Rubin, pt will like to discuss her condition      72-year-old female patient with a multiple comorbid condition.  Patient found worsening shortness of breath exertion palpitation fluttering symptoms patient found having A-fib RVR.  Also found to be acute on chronic diastolic CHF.  Patient had echocardiogram done in the hospital that time essentially had ejection fraction of 50% with mild mitral regurgitation with a mild tricuspid regurgitation and diastolic dysfunction.  Patient has also seen pulmonologist for short of breath.  She can improve with the diuretics.  Currently on rate control medication as well as Eliquis and diuretics.  Not here for further evaluation.  Feeling somewhat better with multiple medication.  Patient had a recently week ago blood test done hemoglobin is 15.1 hematocrit 49.8 platelet count was 355 with a mild elevated white count. Patient had a BMP done week ago essentially showed creatinine 1.4 potassium 4.7    Past Medical History:   Diagnosis Date    Dyspnea 03/27/2024    Essential hypertension 10/20/2009    Last Assessment & Plan: Formatting of this note might be different from the original. -Uncontrolled -states she has not done well with meds in the past, but willing to try restarting something given how high her BP is.   -will start her on losartan 50 mg daily and see if she is able to tolerate this. -encouraged to watch salt in her diet and work on weight loss -risk of heart attack and stroke rev    Fatigue 10/19/2023    Last Assessment & Plan: Formatting of this note might be different from the original. -checking blood work including CBC and TSH    Hyperlipidemia 02/12/2014    Last Assessment & Plan: Formatting of this note might be different from the original. -updating lipid panel    Hypokalemia 10/19/2023    Insomnia 03/09/2012    Murmur 10/20/2009  "   Polymyalgia rheumatica (Multi)      History reviewed. No pertinent surgical history.  No relevant family history has been documented for this patient.  Current Outpatient Medications   Medication Sig Dispense Refill    acetaminophen (Tylenol) 325 mg tablet Take 2 tablets (650 mg) by mouth every 4 hours if needed for mild pain (1 - 3) or fever (temp greater than 38.0 C). 30 tablet 0    albuterol 2.5 mg /3 mL (0.083 %) nebulizer solution Take 3 mL (2.5 mg) by nebulization every 6 hours if needed for shortness of breath.      albuterol 90 mcg/actuation inhaler Inhale 1 puff every 6 hours if needed for shortness of breath. 18 g 2    apixaban (Eliquis) 5 mg tablet Take 1 tablet (5 mg) by mouth every 12 hours. 180 tablet 1    bisoprolol (Zebeta) 10 mg tablet Take 1 tablet (10 mg) by mouth once daily. 90 tablet 1    guaiFENesin (Robitussin) 100 mg/5 mL syrup Take 10 mL (200 mg) by mouth every 4 hours if needed for congestion. 473 mL 0    potassium chloride CR 20 mEq ER tablet Take 1 tablet (20 mEq) by mouth once daily. 90 tablet 1    spironolactone (Aldactone) 25 mg tablet Take 0.5 tablets (12.5 mg) by mouth 2 times a day. 90 tablet 1    torsemide (Demadex) 20 mg tablet Take 1 tablet (20 mg) by mouth once daily. 90 tablet 1     No current facility-administered medications for this visit.      reports that she quit smoking about 8 weeks ago. Her smoking use included cigarettes. She started smoking about 55 years ago. She has a 55 pack-year smoking history. She has never been exposed to tobacco smoke. She has never used smokeless tobacco. She reports that she does not drink alcohol and does not use drugs.  Iodinated contrast media and Propofol  Primary hypertension    Vitals:    04/24/24 1131   BP: 130/63   Pulse: (!) 48   Resp: 18   Temp: 37.2 °C (98.9 °F)   TempSrc: Core   SpO2: 97%   Weight: 78 kg (172 lb)   Height: 1.626 m (5' 4\")   PainSc: 0-No pain      BMI:Body mass index is 29.52 kg/m².   General " Cardiology:  General Appearance: Alert, oriented and in no acute distress.  HEENT: extra ocular movements intact (EOMI), pupils equal,  round, reactive to light and accommodation (PERRLA).  Carotid Upstroke: no bruit, normal.  Jugular Venous Distention (JVD): flat.  Chest: normal.  Lungs: Clear to auscultation,   Heart Sounds: no S3 or S4, normal S1, S2, irregular rate.  Murmur, Click, Gallop: no systolic murmur.  Abdomen: no hepatomegaly, no masses felt, soft.  Extremities: no leg edema.  Peripheral pulses: 2 plus bilateral.  NEUROLOGY Cranial nerves II-XII grossly intact.     Patient Active Problem List   Diagnosis    Primary hypertension    Atrial fibrillation with rapid ventricular response (Multi)    Chronic bronchitis, unspecified chronic bronchitis type (Multi)    Stage 3a chronic kidney disease (Multi)    Acute suppurative otitis media without spontaneous rupture of ear drum    Acute sinusitis    Chronic diastolic CHF (congestive heart failure) (Multi)       Problem List Items Addressed This Visit       Primary hypertension - Primary    Atrial fibrillation with rapid ventricular response (Multi)    Stage 3a chronic kidney disease (Multi)    Chronic diastolic CHF (congestive heart failure) (Multi)      73-year-old female patient with a history of chronic diastolic CHF, A-fib RVR, COPD, bronchodilators.  Patient quit smoking couple months ago.  Somewhat improved with diuretics and rate control medication still in atrial fibrillation  1.  Atrial fibrillation: Continue current bisoprolol and Eliquis.  Probably needs ANYA cardioversion.  2.  Chronic diastolic CHF: Continue current bisoprolol, diuretics.  Creatinine is 1.4.  May add ACE inhibitors blood pressure is running fair.  Diet and exercise reviewed with patient..advice to walk about 10,000 steps or about 2 hours during day time. Cut back on salt, sugar and flour.  Pt. care time is spent includes review of diagnostic tests, labs, radiographs, EKGs, old  echoes, cardiac work-up and coordination of care. Assessment, impression and plans are reflected in the note above as well as the orders.    Hugo Rubin MD

## 2024-04-24 NOTE — PROGRESS NOTES
Pharmacy Post-Discharge Visit - Follow Up    Constance M Kocher is a 73 y.o. female was referred to Clinical Pharmacy Team to complete a post-discharge medication optimization and monitoring visit.  The patient was referred for their Afib and CHF management while also being enrolled in [facility].       Referring Provider: Miguel Sneed*  PCP: Nahun Douglas, DO - last visit: 4/9/24, next visit: 4/24 (later today)      Subjective   Allergies   Allergen Reactions    Anesthesia S/I-40 (Propofol) [Propofol] Anaphylaxis    Iodinated Contrast Media Anaphylaxis     No Pharmacies Listed    Medication System Management:  Affordability/Accessibility: $0 co-pays for prescriptions  Adherence/Organization: no concerns       Social History     Social History Narrative    Not on file          HPI  CHF ASSESSMENT  Staging:  Most recent ejection fraction: 50-55%  NYHA Stage: III  ACC/AHA Stage:  n/a     Symptom Assessment:  Weight changes/edema?: Yes, down 10 lbs in 2 weeks   Dyspnea?: With exertion  Dizziness/syncope/palpitations?: No     Current Regimen:  ARNI/ACEi/ARB: No  Beta Blocker: Yes - bisoprolol  MRA: Yes - spironolactone   SGLT2i: No     Other therapy:  Eliquis   K+  Torsemide      Secondary Prevention:  The ASCVD Risk score (Leah DK, et al., 2019) failed to calculate for the following reasons:    Cannot find a previous HDL lab    Cannot find a previous total cholesterol lab     Aspirin 81mg? no  Statin?: No  HTN?: No     Review of Systems        Objective     There were no vitals taken for this visit.   BP Readings from Last 4 Encounters:   04/23/24 127/86   04/17/24 118/74   04/15/24 (!) 136/91   04/10/24 123/80      There were no vitals filed for this visit.     LAB  Lab Results   Component Value Date    BILITOT 0.8 03/27/2024    CALCIUM 9.1 03/31/2024    CO2 27 03/31/2024     03/31/2024    CREATININE 1.30 03/31/2024    GLUCOSE 131 (H) 03/31/2024    ALKPHOS 120 03/27/2024    K 4.1 03/31/2024    PROT 6.5  "03/27/2024     03/31/2024    AST 42 (H) 03/27/2024    ALT 73 (H) 03/27/2024    BUN 24 03/31/2024    ANIONGAP 9 03/31/2024    MG 2.00 03/29/2024    ALBUMIN 3.5 03/27/2024    LIPASE 16 02/21/2024     No results found for: \"TRIG\", \"CHOL\", \"LDLCALC\", \"HDL\"  No results found for: \"HGBA1C\"      Current Outpatient Medications   Medication Instructions    acetaminophen (TYLENOL) 650 mg, oral, Every 4 hours PRN    albuterol 90 mcg/actuation inhaler 1 puff, inhalation, Every 6 hours PRN    albuterol 2.5 mg, nebulization, Every 6 hours PRN    bisoprolol (ZEBETA) 10 mg, oral, Daily    Eliquis 5 mg, oral, Every 12 hours    guaiFENesin (ROBITUSSIN) 200 mg, oral, Every 4 hours PRN    potassium chloride CR 20 mEq ER tablet 20 mEq, oral, Daily    spironolactone (ALDACTONE) 12.5 mg, oral, 2 times daily    torsemide (DEMADEX) 20 mg, oral, Daily            Assessment/Plan   Problem List Items Addressed This Visit    None  Afib  No chest pain or feelings of palpitations  Continue eliquis and bisoprolol   HR's have been stable - today was 80  CHF  Continue GDMT --> bisoprolol and spironolactone   Would benefit from SGLT2i but seeing cardio later today so will wait for their recs first  Continue torsemide daily   Continue daily weights and monitoring BP and continue to keep log for next visit   BP today 135/85  Also taking K+ while taking torsemide       Follow Up: 1 week     Continue all meds under the continuation of care with the referring provider and clinical pharmacy team.    Broderick Zamora, Venkat     Verbal consent to manage patient's drug therapy was obtained from the patient. They were informed they may decline to participate or withdraw from participation in pharmacy services at any time.   "

## 2024-04-24 NOTE — PROGRESS NOTES
"Daily Call Note:   Ms. Kocher states she is doing well today.  She was cleaning the house.  Patient denies heart racing. Patient denies lower extremity swelling.   Weight 165 lb  Blood pressure \"around 135/80 and HR 80\"  Broderick sent medications to [facility] a few days ago.      Call frequency changed to Tuesday and Thursday     [facility] 4/30/24 @ 13:30      Pt Education:   Barriers:   Topics for Daily Review:   Pt demonstrates clear understanding:     Daily Weight:  There were no vitals filed for this visit.   Last 3 Weights:  Wt Readings from Last 7 Encounters:   04/23/24 77.1 kg (170 lb)   04/17/24 77.7 kg (171 lb 3.2 oz)   04/15/24 77.7 kg (171 lb 3.2 oz)   04/10/24 79.4 kg (175 lb)   04/09/24 79.4 kg (175 lb)   04/04/24 77.8 kg (171 lb 8 oz)   04/01/24 84.7 kg (186 lb 11.7 oz)       Masimo Device:    Masimo Clinical Impression:     Virtual Visits--Scheduled (Most Recent Date at Top)  Follow up Appointments  Recent Visits  Date Type Provider Dept   04/09/24 Office Visit Nahun Douglas DO [facility]   Showing recent visits within past 30 days and meeting all other requirements  Today's Visits  Date Type Provider Dept   04/24/24 Appointment Nahun Douglas DO [facility]   Showing today's visits and meeting all other requirements  Future Appointments  No visits were found meeting these conditions.  Showing future appointments within next 90 days and meeting all other requirements       Frequency of RN Calls & Virtual Visits per Team Agreement:     Medication issues Addressed (what was done):     Follow up appointments scheduled by [facility] Staff:   Referrals made by [facility] staff:              "

## 2024-04-24 NOTE — PROGRESS NOTES
"Subjective   Patient ID: Constance M Kocher is a 73 y.o. female who presents for Follow-up (Pt is here for a er follow up of a fib and pneumonia.).    HPI pt admitted 4/1 after presenting with cough and shortness of breath.  She had not been taking most of her medication regularly or had stopped them.    She was found to be in a fib with RVR in 150s.  She was started on a Cardizem drip and her HR improved into the 70-80s.   Pro BNP was 2664 and cxr showed interstitial infiltrated and small pleural effusions.  She was stared on IV lasix.  She was seen by pulm and cardiology.  She was on oxygen and titrated back to RA and given abx and pred.  She had seen cardiology and placed on b blocker, torsemide and eliquis and was released to follow up with us and specialists.   She saw Dr Rubin today and they will be scheduling cardioversion in the near future    Review of Systems   Constitutional: Negative.    HENT: Negative.     Respiratory: Negative.     Cardiovascular: Negative.    Gastrointestinal: Negative.    Genitourinary: Negative.    Musculoskeletal: Negative.    Neurological: Negative.        Objective   /76 (BP Location: Left arm, Patient Position: Sitting, BP Cuff Size: Adult)   Pulse 78   Temp 35.8 °C (96.5 °F) (Temporal)   Resp 16   Ht 1.626 m (5' 4\")   Wt 77.1 kg (170 lb)   LMP  (LMP Unknown)   SpO2 97%   BMI 29.18 kg/m²     Physical Exam  Constitutional:       General: She is not in acute distress.     Appearance: Normal appearance.   Cardiovascular:      Rate and Rhythm: Normal rate and regular rhythm.      Heart sounds: No murmur heard.  Pulmonary:      Breath sounds: Normal breath sounds. No wheezing.   Neurological:      Mental Status: She is alert.         Assessment/Plan   Problem List Items Addressed This Visit             ICD-10-CM    Primary hypertension I10    Atrial fibrillation with rapid ventricular response (Multi) I48.91    Chronic diastolic CHF (congestive heart failure) (Multi) " I50.32     Other Visit Diagnoses         Codes    Vitamin D deficiency    -  Primary E55.9    Relevant Orders    Vitamin D 25-Hydroxy,Total (for eval of Vitamin D levels) (Completed)          Will recheck vit d and send rx once reviewed.  He will continue current meds and follow up with cardiology as scheduled.  Recheck 3 months. Reviewed hospital records and discussed issues with pt total time 30 min

## 2024-04-25 ENCOUNTER — PATIENT OUTREACH (OUTPATIENT)
Dept: CASE MANAGEMENT | Facility: HOSPITAL | Age: 74
End: 2024-04-25

## 2024-04-25 ENCOUNTER — PHARMACY VISIT (OUTPATIENT)
Dept: PHARMACY | Facility: CLINIC | Age: 74
End: 2024-04-25
Payer: MEDICARE

## 2024-04-25 ENCOUNTER — APPOINTMENT (OUTPATIENT)
Dept: PRIMARY CARE | Facility: CLINIC | Age: 74
End: 2024-04-25
Payer: COMMERCIAL

## 2024-04-25 ENCOUNTER — PATIENT OUTREACH (OUTPATIENT)
Dept: HOME HEALTH SERVICES | Age: 74
End: 2024-04-25

## 2024-04-25 PROCEDURE — RXMED WILLOW AMBULATORY MEDICATION CHARGE

## 2024-04-25 NOTE — PROGRESS NOTES
Follow up phone call made by CHF Clinical Nurse Navigator. Spoke with Jazzy reports she is doing well managing her CHF. Performs daily weights,doing her best to follow low Na diet, takes medications as directed. Reviewed HF flare up symptoms to report to MD. Completed follow up appointments with her PCP,Cardiologist & Pulmonary. I will continue to follow to help manage CHF.

## 2024-04-25 NOTE — PROGRESS NOTES
Daily call completed patient had pulmonary  appointment patient needs to have follow up chest x rays and other work up she is frustrated she has multiple appointments this week and she tried to reschedule her homecare visits and got pushback we will address on the next [facility] appointment no medication needs questions and concerns addressed

## 2024-04-26 ENCOUNTER — PHARMACY VISIT (OUTPATIENT)
Dept: PHARMACY | Facility: CLINIC | Age: 74
End: 2024-04-26

## 2024-04-26 DIAGNOSIS — E55.9 VITAMIN D DEFICIENCY: ICD-10-CM

## 2024-04-26 PROCEDURE — RXMED WILLOW AMBULATORY MEDICATION CHARGE

## 2024-04-29 ENCOUNTER — TELEPHONE (OUTPATIENT)
Dept: CARDIOLOGY | Facility: CLINIC | Age: 74
End: 2024-04-29
Payer: COMMERCIAL

## 2024-04-29 DIAGNOSIS — I48.91 ATRIAL FIBRILLATION WITH RAPID VENTRICULAR RESPONSE (MULTI): ICD-10-CM

## 2024-04-29 DIAGNOSIS — I50.32 CHRONIC DIASTOLIC CHF (CONGESTIVE HEART FAILURE) (MULTI): ICD-10-CM

## 2024-05-03 ENCOUNTER — PATIENT OUTREACH (OUTPATIENT)
Dept: CASE MANAGEMENT | Facility: HOSPITAL | Age: 74
End: 2024-05-03

## 2024-05-03 NOTE — PROGRESS NOTES
Follow up phone call made by CHF Clinical Nurse Navigator. No answer, voice box full. Jazzy has not answered last 2 HF follow up calls. Last spoke with her 4/25, she was managing her CHF& has completed follow up appointments with her PCP & Cardiologist.  No readmits for CHF in past 32 days. Closing HF case today.

## 2024-05-06 ENCOUNTER — TELEPHONE (OUTPATIENT)
Dept: PRIMARY CARE | Facility: CLINIC | Age: 74
End: 2024-05-06
Payer: COMMERCIAL

## 2024-05-06 ENCOUNTER — TELEPHONE (OUTPATIENT)
Dept: CARDIOLOGY | Facility: CLINIC | Age: 74
End: 2024-05-06
Payer: COMMERCIAL

## 2024-05-06 NOTE — TELEPHONE ENCOUNTER
Patient called to advise that she has been taking a full tab of spironolactone for a few weeks, instead of half pill twice daily. Patient concerned, called for advice.    Patient confirm dosages and instructions on remaining medications.

## 2024-05-06 NOTE — TELEPHONE ENCOUNTER
"Called patient to relay message from Dr. Rubin.    \"Cont meds as she fits.\"     No answer. Voicemail not set up. Unable to leave message.  "

## 2024-05-07 ENCOUNTER — TELEMEDICINE (OUTPATIENT)
Dept: PRIMARY CARE | Facility: CLINIC | Age: 74
End: 2024-05-07
Payer: COMMERCIAL

## 2024-05-07 DIAGNOSIS — I10 PRIMARY HYPERTENSION: Primary | ICD-10-CM

## 2024-05-07 DIAGNOSIS — I65.23 BILATERAL CAROTID ARTERY STENOSIS: ICD-10-CM

## 2024-05-07 DIAGNOSIS — I50.32 CHRONIC DIASTOLIC CHF (CONGESTIVE HEART FAILURE) (MULTI): ICD-10-CM

## 2024-05-07 DIAGNOSIS — I48.20 CHRONIC ATRIAL FIBRILLATION (MULTI): ICD-10-CM

## 2024-05-07 PROCEDURE — 99443 PR PHYS/QHP TELEPHONE EVALUATION 21-30 MIN: CPT | Performed by: FAMILY MEDICINE

## 2024-05-07 PROCEDURE — 1159F MED LIST DOCD IN RCRD: CPT | Performed by: FAMILY MEDICINE

## 2024-05-07 ASSESSMENT — PATIENT HEALTH QUESTIONNAIRE - PHQ9
1. LITTLE INTEREST OR PLEASURE IN DOING THINGS: NOT AT ALL
2. FEELING DOWN, DEPRESSED OR HOPELESS: NOT AT ALL
SUM OF ALL RESPONSES TO PHQ9 QUESTIONS 1 AND 2: 0

## 2024-05-07 ASSESSMENT — ENCOUNTER SYMPTOMS
OCCASIONAL FEELINGS OF UNSTEADINESS: 0
DEPRESSION: 0
LOSS OF SENSATION IN FEET: 0

## 2024-05-07 NOTE — PROGRESS NOTES
Subjective   Patient ID: Constance M Kocher is a 73 y.o. female who presents for Allergic Reaction (Pt states that she is allergic to anesthesia and has 2 procedures scheduled and would like to discuss this as well as  her afib./).    HPI pt states that she has ongoing a fib and significant carotid stenosis.   They are considering ANYA cardioverion and carotid repair.  She is concerned with possible previous anesthesia reactions and would like recommendations.      Review of Systems  See HPI  Objective   LMP  (LMP Unknown)     Physical Exam    Assessment/Plan   Problem List Items Addressed This Visit             ICD-10-CM    Primary hypertension - Primary I10    Chronic diastolic CHF (congestive heart failure) (Multi) I50.32     Other Visit Diagnoses         Codes    Bilateral carotid artery stenosis     I65.23    Chronic atrial fibrillation (Multi)     I48.20        Her piror reactions are unclear in source.  She will follow up with Dr Rubin and possibly get anesthesia consult to determine possible reactions and other course of  action.  Total time 22 min

## 2024-05-09 ENCOUNTER — HOSPITAL ENCOUNTER (OUTPATIENT)
Dept: RADIOLOGY | Facility: HOSPITAL | Age: 74
Discharge: HOME | End: 2024-05-09
Payer: COMMERCIAL

## 2024-05-09 ENCOUNTER — TELEPHONE (OUTPATIENT)
Dept: CARDIOLOGY | Facility: CLINIC | Age: 74
End: 2024-05-09

## 2024-05-09 DIAGNOSIS — I50.9 HEART FAILURE, UNSPECIFIED (MULTI): ICD-10-CM

## 2024-05-09 DIAGNOSIS — I48.91 ATRIAL FIBRILLATION WITH RAPID VENTRICULAR RESPONSE (MULTI): Primary | ICD-10-CM

## 2024-05-09 PROCEDURE — 71046 X-RAY EXAM CHEST 2 VIEWS: CPT

## 2024-05-09 PROCEDURE — 71046 X-RAY EXAM CHEST 2 VIEWS: CPT | Performed by: RADIOLOGY

## 2024-05-09 NOTE — TELEPHONE ENCOUNTER
Spoke to Mariana this morning, she asked if Dr. Rubin sent a referral for an  anesthesiologist to consult about her allergy to anesthesia. She says that Dr. Rubin told her to find one when she spoke to him over the phone yesterday. The patient is supposed to be scheduled for a cardioversion that is approved already through her insurance. Can you please verify if this and/or send a referral for the patient. Thank you.

## 2024-05-15 NOTE — TELEPHONE ENCOUNTER
Patient advised that she stopped taking diltiazem 180 mg. Patient offered no reason for stopping. Specialty services wished for us to follow up patient should continue medication.

## 2024-05-15 NOTE — TELEPHONE ENCOUNTER
Sofie has called to ask if provider can give pt a referral for the anesthesiologist for an upcoming procedure. Upon research, I have found that an previous encounter was made for the same thing on 05.09.24 and has not been answered.     Also Letty has called as well concerning the same pt concerning her medication, pt has stopped taking her diltiazem and has not given a reason for stopping medication on her own. Care team at [facility] will like to know if pt needs the medication and should be placed back on it

## 2024-05-17 NOTE — TELEPHONE ENCOUNTER
Spoke to patient if were going to schedule ANYA or not, read the above note from Dr. Rubin to patient, she still does not understand what she is supposed to be doing, she still concerned about anesthesia, and the she needs to see one before hand which she does not

## 2024-05-22 ENCOUNTER — APPOINTMENT (OUTPATIENT)
Dept: RADIOLOGY | Facility: HOSPITAL | Age: 74
DRG: 309 | End: 2024-05-22
Payer: COMMERCIAL

## 2024-05-22 ENCOUNTER — APPOINTMENT (OUTPATIENT)
Dept: CARDIOLOGY | Facility: HOSPITAL | Age: 74
DRG: 309 | End: 2024-05-22
Payer: COMMERCIAL

## 2024-05-22 ENCOUNTER — HOSPITAL ENCOUNTER (INPATIENT)
Facility: HOSPITAL | Age: 74
LOS: 5 days | Discharge: HOME | DRG: 309 | End: 2024-05-27
Attending: STUDENT IN AN ORGANIZED HEALTH CARE EDUCATION/TRAINING PROGRAM | Admitting: INTERNAL MEDICINE
Payer: COMMERCIAL

## 2024-05-22 DIAGNOSIS — I48.91 ATRIAL FIBRILLATION WITH RAPID VENTRICULAR RESPONSE (MULTI): Primary | ICD-10-CM

## 2024-05-22 DIAGNOSIS — L03.114 CELLULITIS OF LEFT ARM: ICD-10-CM

## 2024-05-22 DIAGNOSIS — G25.81 RESTLESS LEG SYNDROME: ICD-10-CM

## 2024-05-22 LAB
ALBUMIN SERPL-MCNC: 3.6 G/DL (ref 3.5–5)
ALP BLD-CCNC: 88 U/L (ref 35–125)
ALT SERPL-CCNC: 24 U/L (ref 5–40)
ANION GAP SERPL CALC-SCNC: 12 MMOL/L
AST SERPL-CCNC: 29 U/L (ref 5–40)
BASOPHILS # BLD AUTO: 0.04 X10*3/UL (ref 0–0.1)
BASOPHILS NFR BLD AUTO: 0.4 %
BILIRUB SERPL-MCNC: 0.2 MG/DL (ref 0.1–1.2)
BUN SERPL-MCNC: 22 MG/DL (ref 8–25)
CALCIUM SERPL-MCNC: 9.3 MG/DL (ref 8.5–10.4)
CHLORIDE SERPL-SCNC: 98 MMOL/L (ref 97–107)
CO2 SERPL-SCNC: 31 MMOL/L (ref 24–31)
CREAT SERPL-MCNC: 1.4 MG/DL (ref 0.4–1.6)
EGFRCR SERPLBLD CKD-EPI 2021: 40 ML/MIN/1.73M*2
EOSINOPHIL # BLD AUTO: 0.06 X10*3/UL (ref 0–0.4)
EOSINOPHIL NFR BLD AUTO: 0.7 %
ERYTHROCYTE [DISTWIDTH] IN BLOOD BY AUTOMATED COUNT: 16.5 % (ref 11.5–14.5)
GLUCOSE SERPL-MCNC: 99 MG/DL (ref 65–99)
HCT VFR BLD AUTO: 42.8 % (ref 36–46)
HGB BLD-MCNC: 13.7 G/DL (ref 12–16)
IMM GRANULOCYTES # BLD AUTO: 0.03 X10*3/UL (ref 0–0.5)
IMM GRANULOCYTES NFR BLD AUTO: 0.3 % (ref 0–0.9)
LYMPHOCYTES # BLD AUTO: 2.28 X10*3/UL (ref 0.8–3)
LYMPHOCYTES NFR BLD AUTO: 25.6 %
MAGNESIUM SERPL-MCNC: 1.6 MG/DL (ref 1.6–3.1)
MAGNESIUM SERPL-MCNC: 1.7 MG/DL (ref 1.6–3.1)
MCH RBC QN AUTO: 30 PG (ref 26–34)
MCHC RBC AUTO-ENTMCNC: 32 G/DL (ref 32–36)
MCV RBC AUTO: 94 FL (ref 80–100)
MONOCYTES # BLD AUTO: 0.67 X10*3/UL (ref 0.05–0.8)
MONOCYTES NFR BLD AUTO: 7.5 %
NEUTROPHILS # BLD AUTO: 5.81 X10*3/UL (ref 1.6–5.5)
NEUTROPHILS NFR BLD AUTO: 65.5 %
NRBC BLD-RTO: 0 /100 WBCS (ref 0–0)
NT-PROBNP SERPL-MCNC: 3968 PG/ML (ref 0–353)
PLATELET # BLD AUTO: 212 X10*3/UL (ref 150–450)
POTASSIUM SERPL-SCNC: 4 MMOL/L (ref 3.4–5.1)
PROT SERPL-MCNC: 6.8 G/DL (ref 5.9–7.9)
RBC # BLD AUTO: 4.56 X10*6/UL (ref 4–5.2)
SODIUM SERPL-SCNC: 141 MMOL/L (ref 133–145)
TROPONIN T SERPL-MCNC: 13 NG/L
TROPONIN T SERPL-MCNC: 13 NG/L
TROPONIN T SERPL-MCNC: 14 NG/L
TSH SERPL DL<=0.05 MIU/L-ACNC: 1.35 MIU/L (ref 0.27–4.2)
WBC # BLD AUTO: 8.9 X10*3/UL (ref 4.4–11.3)

## 2024-05-22 PROCEDURE — 9420000001 HC RT PATIENT EDUCATION 5 MIN

## 2024-05-22 PROCEDURE — 2500000006 HC RX 250 W HCPCS SELF ADMINISTERED DRUGS (ALT 637 FOR ALL PAYERS): Performed by: NURSE PRACTITIONER

## 2024-05-22 PROCEDURE — 2060000001 HC INTERMEDIATE ICU ROOM DAILY

## 2024-05-22 PROCEDURE — 2500000001 HC RX 250 WO HCPCS SELF ADMINISTERED DRUGS (ALT 637 FOR MEDICARE OP): Performed by: NURSE PRACTITIONER

## 2024-05-22 PROCEDURE — 94640 AIRWAY INHALATION TREATMENT: CPT

## 2024-05-22 PROCEDURE — 83880 ASSAY OF NATRIURETIC PEPTIDE: CPT | Performed by: STUDENT IN AN ORGANIZED HEALTH CARE EDUCATION/TRAINING PROGRAM

## 2024-05-22 PROCEDURE — 2500000004 HC RX 250 GENERAL PHARMACY W/ HCPCS (ALT 636 FOR OP/ED): Performed by: NURSE PRACTITIONER

## 2024-05-22 PROCEDURE — 84484 ASSAY OF TROPONIN QUANT: CPT | Performed by: STUDENT IN AN ORGANIZED HEALTH CARE EDUCATION/TRAINING PROGRAM

## 2024-05-22 PROCEDURE — 83735 ASSAY OF MAGNESIUM: CPT | Performed by: STUDENT IN AN ORGANIZED HEALTH CARE EDUCATION/TRAINING PROGRAM

## 2024-05-22 PROCEDURE — 36415 COLL VENOUS BLD VENIPUNCTURE: CPT | Performed by: STUDENT IN AN ORGANIZED HEALTH CARE EDUCATION/TRAINING PROGRAM

## 2024-05-22 PROCEDURE — 2500000004 HC RX 250 GENERAL PHARMACY W/ HCPCS (ALT 636 FOR OP/ED): Performed by: STUDENT IN AN ORGANIZED HEALTH CARE EDUCATION/TRAINING PROGRAM

## 2024-05-22 PROCEDURE — 84443 ASSAY THYROID STIM HORMONE: CPT | Performed by: STUDENT IN AN ORGANIZED HEALTH CARE EDUCATION/TRAINING PROGRAM

## 2024-05-22 PROCEDURE — 71045 X-RAY EXAM CHEST 1 VIEW: CPT

## 2024-05-22 PROCEDURE — 71045 X-RAY EXAM CHEST 1 VIEW: CPT | Performed by: RADIOLOGY

## 2024-05-22 PROCEDURE — 94664 DEMO&/EVAL PT USE INHALER: CPT

## 2024-05-22 PROCEDURE — 2500000002 HC RX 250 W HCPCS SELF ADMINISTERED DRUGS (ALT 637 FOR MEDICARE OP, ALT 636 FOR OP/ED): Mod: MUE | Performed by: NURSE PRACTITIONER

## 2024-05-22 PROCEDURE — 93005 ELECTROCARDIOGRAM TRACING: CPT

## 2024-05-22 PROCEDURE — 83735 ASSAY OF MAGNESIUM: CPT | Mod: 91 | Performed by: NURSE PRACTITIONER

## 2024-05-22 PROCEDURE — 2500000005 HC RX 250 GENERAL PHARMACY W/O HCPCS: Performed by: STUDENT IN AN ORGANIZED HEALTH CARE EDUCATION/TRAINING PROGRAM

## 2024-05-22 PROCEDURE — 96374 THER/PROPH/DIAG INJ IV PUSH: CPT

## 2024-05-22 PROCEDURE — 99291 CRITICAL CARE FIRST HOUR: CPT | Mod: 25 | Performed by: STUDENT IN AN ORGANIZED HEALTH CARE EDUCATION/TRAINING PROGRAM

## 2024-05-22 PROCEDURE — 85025 COMPLETE CBC W/AUTO DIFF WBC: CPT | Performed by: STUDENT IN AN ORGANIZED HEALTH CARE EDUCATION/TRAINING PROGRAM

## 2024-05-22 PROCEDURE — 80053 COMPREHEN METABOLIC PANEL: CPT | Performed by: STUDENT IN AN ORGANIZED HEALTH CARE EDUCATION/TRAINING PROGRAM

## 2024-05-22 RX ORDER — DILTIAZEM HYDROCHLORIDE 5 MG/ML
10 INJECTION INTRAVENOUS ONCE
Status: COMPLETED | OUTPATIENT
Start: 2024-05-22 | End: 2024-05-22

## 2024-05-22 RX ORDER — ACETAMINOPHEN 325 MG/1
650 TABLET ORAL EVERY 6 HOURS PRN
Status: DISCONTINUED | OUTPATIENT
Start: 2024-05-22 | End: 2024-05-22

## 2024-05-22 RX ORDER — ACETAMINOPHEN 325 MG/1
650 TABLET ORAL EVERY 4 HOURS PRN
Status: DISCONTINUED | OUTPATIENT
Start: 2024-05-22 | End: 2024-05-27 | Stop reason: HOSPADM

## 2024-05-22 RX ORDER — ONDANSETRON HYDROCHLORIDE 2 MG/ML
4 INJECTION, SOLUTION INTRAVENOUS ONCE
Status: DISCONTINUED | OUTPATIENT
Start: 2024-05-22 | End: 2024-05-27 | Stop reason: HOSPADM

## 2024-05-22 RX ORDER — POTASSIUM CHLORIDE 20 MEQ/1
20 TABLET, EXTENDED RELEASE ORAL DAILY
Status: DISCONTINUED | OUTPATIENT
Start: 2024-05-23 | End: 2024-05-27 | Stop reason: HOSPADM

## 2024-05-22 RX ORDER — IPRATROPIUM BROMIDE AND ALBUTEROL SULFATE 2.5; .5 MG/3ML; MG/3ML
3 SOLUTION RESPIRATORY (INHALATION) EVERY 2 HOUR PRN
Status: DISCONTINUED | OUTPATIENT
Start: 2024-05-22 | End: 2024-05-27 | Stop reason: HOSPADM

## 2024-05-22 RX ORDER — TORSEMIDE 20 MG/1
20 TABLET ORAL DAILY
Status: DISCONTINUED | OUTPATIENT
Start: 2024-05-23 | End: 2024-05-23

## 2024-05-22 RX ORDER — POLYETHYLENE GLYCOL 3350 17 G/17G
17 POWDER, FOR SOLUTION ORAL DAILY PRN
Status: DISCONTINUED | OUTPATIENT
Start: 2024-05-22 | End: 2024-05-27 | Stop reason: HOSPADM

## 2024-05-22 RX ORDER — IPRATROPIUM BROMIDE AND ALBUTEROL SULFATE 2.5; .5 MG/3ML; MG/3ML
3 SOLUTION RESPIRATORY (INHALATION) 3 TIMES DAILY
Status: DISCONTINUED | OUTPATIENT
Start: 2024-05-22 | End: 2024-05-23

## 2024-05-22 RX ORDER — MAGNESIUM SULFATE HEPTAHYDRATE 40 MG/ML
2 INJECTION, SOLUTION INTRAVENOUS ONCE
Status: COMPLETED | OUTPATIENT
Start: 2024-05-22 | End: 2024-05-23

## 2024-05-22 RX ORDER — DILTIAZEM HCL/D5W 125 MG/125
5-15 PLASTIC BAG, INJECTION (ML) INTRAVENOUS CONTINUOUS
Status: DISCONTINUED | OUTPATIENT
Start: 2024-05-22 | End: 2024-05-23

## 2024-05-22 RX ORDER — SPIRONOLACTONE 25 MG/1
12.5 TABLET ORAL 2 TIMES DAILY
Status: DISCONTINUED | OUTPATIENT
Start: 2024-05-22 | End: 2024-05-23

## 2024-05-22 RX ORDER — BISOPROLOL FUMARATE 5 MG/1
10 TABLET, FILM COATED ORAL DAILY
Status: DISCONTINUED | OUTPATIENT
Start: 2024-05-23 | End: 2024-05-23

## 2024-05-22 RX ADMIN — Medication 5 MG/HR: at 16:22

## 2024-05-22 RX ADMIN — IPRATROPIUM BROMIDE AND ALBUTEROL SULFATE 3 ML: .5; 3 SOLUTION RESPIRATORY (INHALATION) at 20:51

## 2024-05-22 RX ADMIN — DILTIAZEM HYDROCHLORIDE 10 MG: 5 INJECTION INTRAVENOUS at 16:22

## 2024-05-22 RX ADMIN — MAGNESIUM SULFATE HEPTAHYDRATE 2 G: 40 INJECTION, SOLUTION INTRAVENOUS at 22:00

## 2024-05-22 RX ADMIN — APIXABAN 5 MG: 5 TABLET, FILM COATED ORAL at 21:59

## 2024-05-22 RX ADMIN — SPIRONOLACTONE 12.5 MG: 25 TABLET ORAL at 22:00

## 2024-05-22 ASSESSMENT — ENCOUNTER SYMPTOMS
SHORTNESS OF BREATH: 1
ACTIVITY CHANGE: 1
CHEST TIGHTNESS: 1
FACIAL ASYMMETRY: 0
FATIGUE: 1

## 2024-05-22 ASSESSMENT — PAIN - FUNCTIONAL ASSESSMENT
PAIN_FUNCTIONAL_ASSESSMENT: 0-10
PAIN_FUNCTIONAL_ASSESSMENT: 0-10

## 2024-05-22 ASSESSMENT — PAIN SCALES - GENERAL
PAINLEVEL_OUTOF10: 0 - NO PAIN
PAINLEVEL_OUTOF10: 0 - NO PAIN

## 2024-05-22 NOTE — ED PROVIDER NOTES
HPI   Chief Complaint   Patient presents with    Atrial Fibrillation       This is a 73-year-old female with a past medical history of atrial fibrillation on Eliquis, CKD, chronic bronchitis, HFpEF presenting to ED for evaluation of palpitations and tachycardia.  She was evaluated by her home health nurse today and was found to have a heart rate in the 160s, she was sent to the ED for further assessment.  She does feel palpitations and short of breath but denies any chest pain.  She states that she been feeling short of breath for the past 4 weeks or so and did not notice any significant change in this today.  She did notice palpitations and fast heart rate today before she was found to be in A-fib with RVR.      History provided by:  Patient   used: No                        Melissa Coma Scale Score: 15                     Patient History   Past Medical History:   Diagnosis Date    Dyspnea 03/27/2024    Essential hypertension 10/20/2009    Last Assessment & Plan: Formatting of this note might be different from the original. -Uncontrolled -states she has not done well with meds in the past, but willing to try restarting something given how high her BP is.   -will start her on losartan 50 mg daily and see if she is able to tolerate this. -encouraged to watch salt in her diet and work on weight loss -risk of heart attack and stroke rev    Fatigue 10/19/2023    Last Assessment & Plan: Formatting of this note might be different from the original. -checking blood work including CBC and TSH    Hyperlipidemia 02/12/2014    Last Assessment & Plan: Formatting of this note might be different from the original. -updating lipid panel    Hypokalemia 10/19/2023    Insomnia 03/09/2012    Murmur 10/20/2009    Polymyalgia rheumatica (Multi)      History reviewed. No pertinent surgical history.  Family History   Problem Relation Name Age of Onset    No Known Problems Mother      No Known Problems Father        Social History     Tobacco Use    Smoking status: Former     Current packs/day: 0.00     Average packs/day: 1 pack/day for 55.0 years (55.0 ttl pk-yrs)     Types: Cigarettes     Start date: 1969     Quit date: 2024     Years since quittin.2     Passive exposure: Never    Smokeless tobacco: Never   Vaping Use    Vaping status: Former    Substances: Nicotine   Substance Use Topics    Alcohol use: Never    Drug use: Never       Physical Exam   ED Triage Vitals [24 1605]   Temperature Heart Rate Respirations BP   36.3 °C (97.4 °F) (!) 153 (!) 22 (!) 133/104      Pulse Ox Temp src Heart Rate Source Patient Position   96 % -- -- --      BP Location FiO2 (%)     -- --       Physical Exam  General: well developed, well nourished 73-year-old female who is awake and alert, in no apparent distress  Eyes: sclera clear bilaterally  HENT: normocephalic, atraumatic.   CV: Tachycardic, irregularly irregular rhythm, no murmur, no gallops, or rubs. radial and dorsalis pedis pulses +2/4 bilaterally  Resp: clear to ascultation bilaterally, no wheezes, rales, or rhonchi  GI: abdomen soft, nontender without rigidity or guarding, no peritoneal signs, abdomen is nondistended, no masses palpated  MSK: strength +5/5 to upper and lower extremities bilaterally, no swelling of the extremities.  Psych: appropriate mood and affect, cooperative with exam  Skin: warm, dry, without evidence of rash or abrasions    ED Course & MDM   ED Course as of 05/22/24 1823   Wed May 22, 2024   161 EKG on my interpretation shows A-fib with RVR, rate is 152 bpm.  Normal axis.  QTc is 496 ms.  There is no ST elevation or depression, no acute ischemic pattern.  No STEMI. [NT]   171 I reviewed the patient's chest x-ray.  She has cardiomegaly and no significant pulmonary edema. [NT]      ED Course User Index  [NT] Fracisco Simmons DO         Diagnoses as of 24   Atrial fibrillation with rapid ventricular response (Multi)        Medical Decision Making  She is in no acute distress here, heart rate is anywhere between the 140s to 160s on arrival.  EKG consistent with A-fib with RVR.  There is no ST changes concerning for acute ischemia.  No STEMI criteria.  She not having any chest pain but does feel like her heart is racing.  She took all of her medications today and denies any recent illness.  She was started on Cardizem bolus and infusion with adequate control of her rate down into the 90s to low 100s.  Her cardiologist is Dr. Rubin, he states that they have been talking about the possibility of maybe doing a ANYA and cardioversion and she is interested in potentially having this done now that she has had multiple episodes of A-fib with RVR.  The remainder of her    Workup in the ED today is unremarkable.  Kidney function is near baseline, she has no significant electrolyte abnormalities.  No evidence of infection or acute anemia, troponin is normal but BNP is elevated likely due to the rapid heart rate that she had earlier.  I personally reviewed her chest x-ray which shows cardiomegaly, no evidence of pneumonia, no obvious pulmonary edema.    XR chest 1 view   Final Result   No evidence of acute intrathoracic abnormality.        Signed by: Mckinley Capone 5/22/2024 5:37 PM   Dictation workstation:   [id number]        Labs Reviewed   CBC WITH AUTO DIFFERENTIAL - Abnormal       Result Value    WBC 8.9      nRBC 0.0      RBC 4.56      Hemoglobin 13.7      Hematocrit 42.8      MCV 94      MCH 30.0      MCHC 32.0      RDW 16.5 (*)     Platelets 212      Neutrophils % 65.5      Immature Granulocytes %, Automated 0.3      Lymphocytes % 25.6      Monocytes % 7.5      Eosinophils % 0.7      Basophils % 0.4      Neutrophils Absolute 5.81 (*)     Immature Granulocytes Absolute, Automated 0.03      Lymphocytes Absolute 2.28      Monocytes Absolute 0.67      Eosinophils Absolute 0.06      Basophils Absolute 0.04     COMPREHENSIVE METABOLIC PANEL  - Abnormal    Glucose 99      Sodium 141      Potassium 4.0      Chloride 98      Bicarbonate 31      Urea Nitrogen 22      Creatinine 1.40      eGFR 40 (*)     Calcium 9.3      Albumin 3.6      Alkaline Phosphatase 88      Total Protein 6.8      AST 29      Bilirubin, Total 0.2      ALT 24      Anion Gap 12     N-TERMINAL PROBNP - Abnormal    PROBNP 3,968 (*)     Narrative:     Reference ranges are based on clinical submission data. These ranges represent the 95th percentile of normal cut-off points. As NT Pro- BNP values approach 1000 pg/ml, clinical symptoms are more likely associated with CHF.   MAGNESIUM - Normal    Magnesium 1.70     TSH WITH REFLEX TO FREE T4 IF ABNORMAL - Normal    Thyroid Stimulating Hormone 1.35      Narrative:     TSH testing is performed using different testing methodology at [facility] than at other [facility]. Direct result comparisons should only be made within the same method.     SERIAL TROPONIN, INITIAL ([facility]) - Normal    Troponin T, High Sensitivity 13     TROPONIN T SERIES, HIGH SENSITIVITY (0, 2 HR, 6 HR)    Narrative:     The following orders were created for panel order Troponin T Series, High Sensitivity (0, 2HR, 6HR).  Procedure                               Abnormality         Status                     ---------                               -----------         ------                     Serial Troponin, Initial...[[id number]]  Normal              Final result               Serial Troponin, 2 Hour ...[[id number]]                      In process                   Please view results for these tests on the individual orders.   SERIAL TROPONIN,  2 HOUR ([facility])         Procedure  Critical Care    Performed by: Fracisco Simmons DO  Authorized by: Fracisco Simmons DO    Critical care provider statement:     Critical care time (minutes):  30    Critical care time was exclusive of:  Separately billable procedures and treating other patients    Critical  care was necessary to treat or prevent imminent or life-threatening deterioration of the following conditions: arrhythmia.    Critical care was time spent personally by me on the following activities:  Ordering and review of laboratory studies, ordering and review of radiographic studies, pulse oximetry, evaluation of patient's response to treatment, examination of patient, obtaining history from patient or surrogate and development of treatment plan with patient or surrogate    Care discussed with: admitting provider         Fracisco Simmons DO  05/22/24 8002

## 2024-05-22 NOTE — PROGRESS NOTES
Pharmacy Medication History Review    Constance M Kocher is a 73 y.o. female admitted for atrial fibrillation. Pharmacy reviewed the patient's [id number] medications and allergies for accuracy.    The list below reflectives the updated PTA list. Please review each medication in order reconciliation for additional clarification and justification.  Prior to Admission Medications   Prescriptions Last Dose Informant Patient Reported? Taking?   acetaminophen (Tylenol) 325 mg tablet Past Month  No Yes   Sig: Take 2 tablets (650 mg) by mouth every 4 hours if needed for mild pain (1 - 3) or fever (temp greater than 38.0 C).   albuterol 90 mcg/actuation inhaler 5/22/2024 at am  No Yes   Sig: Inhale 1 puff every 6 hours if needed for shortness of breath.   apixaban (Eliquis) 5 mg tablet 5/22/2024 at am  No Yes   Sig: Take 1 tablet (5 mg) by mouth every 12 hours.   bisoprolol (Zebeta) 10 mg tablet 5/22/2024 at am  No Yes   Sig: Take 1 tablet (10 mg) by mouth once daily.   cholecalciferol (Vitamin D3) 50 mcg (2,000 unit) capsule 5/22/2024 at am  No Yes   Sig: Take 2 capsules (100 mcg) by mouth once daily.   guaiFENesin (Robitussin) 100 mg/5 mL syrup   No No   Sig: Take 10 mL (200 mg) by mouth every 4 hours if needed for congestion.   potassium chloride CR 20 mEq ER tablet 5/22/2024 at am  No Yes   Sig: Take 1 tablet (20 mEq) by mouth once daily.   spironolactone (Aldactone) 25 mg tablet 5/22/2024 at am  No Yes   Sig: Take 0.5 tablets (12.5 mg) by mouth 2 times a day.   Patient taking differently: Take 1 tablet (25 mg) by mouth 2 times a day.   torsemide (Demadex) 20 mg tablet 5/22/2024 at am  No Yes   Sig: Take 1 tablet (20 mg) by mouth once daily.      Facility-Administered Medications: None          The list below reflectives the updated allergy list. Please review each documented allergy for additional clarification and justification.  Allergies  Reviewed by Kristi Alfonso RN on 5/22/2024        Severity Reactions  Comments    Iodinated Contrast Media High Anaphylaxis     Propofol High Anaphylaxis             Below are additional concerns with the patient's PTA list.  - flagged for review to be deleted:  Robitussin    - pt is supposed to take 0.5 tab of Spironolactone but states she does not have anything to cut the pills in half with so she just takes 1 tablet daily.    TUCKER FRANK, CPhT

## 2024-05-22 NOTE — H&P
History Of Present Illness  Constance M Kocher is a 73 y.o. female presenting with shortness of breath and chest pain.  Patient is a 73-year-old female patient with past medical history of hypertension, A-fib, CHF, carotid stenosis, GERD, and PMR; presented at [facility] with shortness of breath and chest pain.  Patient was noted with elevated heart rate at home with heart rate of 160s and patient called 911.  In the emergency room EKG showed A-fib with RVR, patient was started patient on Cardizem drip, heart rate improved.  Currently heart rate is in the 80s and 90s.  Patient also reports improved respiratory status, overall she is feeling better.  Denies any nausea/ vomiting or abdominal pain, denies any fevers or chills.  Recently hospitalized with similar episode, patient was started on Cardizem and bisoprolol but Cardizem was stopped as outpatient.  Patient is on Eliquis and compliant with her medications.  Patient sees Dr. Rubin as outpatient, the long-term plan was cardioversion.  Patient reports significant side effects from anesthesia such as anaphylaxis.  Chest x-ray was unremarkable, proBNP elevated-[id number], troponin 13 and 14  Patient will be admitted for cardiology evaluation and treatment for A-fib with RVR.  Past Medical History  Past Medical History:   Diagnosis Date    A-fib (Multi)     Carotid stenosis     Dyspnea 03/27/2024    Essential hypertension 10/20/2009    Last Assessment & Plan: Formatting of this note might be different from the original. -Uncontrolled -states she has not done well with meds in the past, but willing to try restarting something given how high her BP is.   -will start her on losartan 50 mg daily and see if she is able to tolerate this. -encouraged to watch salt in her diet and work on weight loss -risk of heart attack and stroke rev    Fatigue 10/19/2023    Last Assessment & Plan: Formatting of this note might be different from the original. -checking blood  work including CBC and TSH    GERD (gastroesophageal reflux disease)     Hyperlipidemia 02/12/2014    Last Assessment & Plan: Formatting of this note might be different from the original. -updating lipid panel    Hypokalemia 10/19/2023    Insomnia 03/09/2012    Murmur 10/20/2009    PMR (polymyalgia rheumatica) (Multi)     Polymyalgia rheumatica (Multi)        Surgical History  History reviewed. No pertinent surgical history.     Social History  She reports that she quit smoking about 2 months ago. Her smoking use included cigarettes. She started smoking about 55 years ago. She has a 55 pack-year smoking history. She has never been exposed to tobacco smoke. She has never used smokeless tobacco. She reports that she does not drink alcohol and does not use drugs.    Family History  Family History   Problem Relation Name Age of Onset    No Known Problems Mother      No Known Problems Father          Allergies  Iodinated contrast media and Propofol    Review of Systems   Constitutional:  Positive for activity change and fatigue.   Respiratory:  Positive for chest tightness and shortness of breath.    Neurological:  Negative for facial asymmetry.   All other systems reviewed and are negative.       Physical Exam  Vitals reviewed.   HENT:      Head: Normocephalic and atraumatic.      Nose: Nose normal.      Mouth/Throat:      Mouth: Mucous membranes are moist.   Eyes:      Extraocular Movements: Extraocular movements intact.   Cardiovascular:      Rate and Rhythm: Rhythm irregular.   Pulmonary:      Effort: Pulmonary effort is normal.   Abdominal:      General: Bowel sounds are normal.   Musculoskeletal:         General: Normal range of motion.      Cervical back: Neck supple.   Skin:     General: Skin is warm.   Neurological:      Mental Status: She is alert and oriented to person, place, and time.          Last Recorded Vitals  Blood pressure 111/86, pulse 70, temperature 36.3 °C (97.4 °F), resp. rate 20, height 1.626 m  "(5' 4\"), weight 80.3 kg (177 lb 1.6 oz), SpO2 95%.    Relevant Results      Results for orders placed or performed during the hospital encounter of 05/22/24 (from the past 24 hour(s))   CBC and Auto Differential   Result Value Ref Range    WBC 8.9 4.4 - 11.3 x10*3/uL    nRBC 0.0 0.0 - 0.0 /100 WBCs    RBC 4.56 4.00 - 5.20 x10*6/uL    Hemoglobin 13.7 12.0 - 16.0 g/dL    Hematocrit 42.8 36.0 - 46.0 %    MCV 94 80 - 100 fL    MCH 30.0 26.0 - 34.0 pg    MCHC 32.0 32.0 - 36.0 g/dL    RDW 16.5 (H) 11.5 - 14.5 %    Platelets 212 150 - 450 x10*3/uL    Neutrophils % 65.5 40.0 - 80.0 %    Immature Granulocytes %, Automated 0.3 0.0 - 0.9 %    Lymphocytes % 25.6 13.0 - 44.0 %    Monocytes % 7.5 2.0 - 10.0 %    Eosinophils % 0.7 0.0 - 6.0 %    Basophils % 0.4 0.0 - 2.0 %    Neutrophils Absolute 5.81 (H) 1.60 - 5.50 x10*3/uL    Immature Granulocytes Absolute, Automated 0.03 0.00 - 0.50 x10*3/uL    Lymphocytes Absolute 2.28 0.80 - 3.00 x10*3/uL    Monocytes Absolute 0.67 0.05 - 0.80 x10*3/uL    Eosinophils Absolute 0.06 0.00 - 0.40 x10*3/uL    Basophils Absolute 0.04 0.00 - 0.10 x10*3/uL   Comprehensive Metabolic Panel   Result Value Ref Range    Glucose 99 65 - 99 mg/dL    Sodium 141 133 - 145 mmol/L    Potassium 4.0 3.4 - 5.1 mmol/L    Chloride 98 97 - 107 mmol/L    Bicarbonate 31 24 - 31 mmol/L    Urea Nitrogen 22 8 - 25 mg/dL    Creatinine 1.40 0.40 - 1.60 mg/dL    eGFR 40 (L) >60 mL/min/1.73m*2    Calcium 9.3 8.5 - 10.4 mg/dL    Albumin 3.6 3.5 - 5.0 g/dL    Alkaline Phosphatase 88 35 - 125 U/L    Total Protein 6.8 5.9 - 7.9 g/dL    AST 29 5 - 40 U/L    Bilirubin, Total 0.2 0.1 - 1.2 mg/dL    ALT 24 5 - 40 U/L    Anion Gap 12 <=19 mmol/L   Magnesium   Result Value Ref Range    Magnesium 1.70 1.60 - 3.10 mg/dL   TSH with reflex to Free T4 if abnormal   Result Value Ref Range    Thyroid Stimulating Hormone 1.35 0.27 - 4.20 mIU/L   Serial Troponin, Initial ([facility])   Result Value Ref Range    Troponin T, High Sensitivity 13 <=14 " ng/L   NT Pro-BNP   Result Value Ref Range    PROBNP 3,968 (H) 0 - 353 pg/mL   Serial Troponin, 2 Hour ([facility])   Result Value Ref Range    Troponin T, High Sensitivity 14 <=14 ng/L      XR chest 1 view    Result Date: 5/22/2024  Interpreted By:  Mckinley Capone, STUDY: XR CHEST 1 VIEW   INDICATION: Signs/Symptoms:short of breath, afib rvr.   COMPARISON: May 9, 2024   ACCESSION NUMBER(S): [id number]   ORDERING CLINICIAN: MAURICE ORTIZ   FINDINGS: Cardiomegaly with atherosclerotic aorta. No consolidation, effusion, edema, or pneumothorax.       No evidence of acute intrathoracic abnormality.   Signed by: Mckinley Capone 5/22/2024 5:37 PM Dictation workstation:   [id number]    XR chest 2 views    Result Date: 5/11/2024  Interpreted By:  Broderick Lozano, STUDY: XR CHEST 2 VIEWS 5/9/2024 4:21 pm   INDICATION: Signs/Symptoms:CHF   COMPARISON: 03/29/2024   ACCESSION NUMBER(S): [id number]   ORDERING CLINICIAN: ELMO ALONZO   TECHNIQUE: PA and lateral views of the chest   FINDINGS: Cardiomediastinal silhouette is mildly enlarged. Aorta is tortuous. There is mild vascular calcification of the aortic knob. Mild generalized increased interstitial prominence in bilateral lung fields appearing less conspicuous comparison to the prior examination with improved interstitial pulmonary edema. However, there is mild persistent increased interstitial prominence. Correlate with patient's symptoms. No alveolar airspace infiltrate or consolidation demonstrated. There is mild exaggerated thoracic kyphosis. Multilevel anterior osteophyte formation demonstrated. No compression deformity noted. There is mild S shaped scoliosis.         1. Improved interstitial pulmonary edema with mild persistent edema noted.   Signed by: Broderick Lozano 5/11/2024 11:44 AM Dictation workstation:   [id number]         Assessment/Plan   Principal Problem:    Atrial fibrillation with rapid ventricular response (Multi)  -Continue with Cardizem drip for  now  -Resume bisoprolol and diuretics  -Continue with Eliquis  -Cardiac diet for now but n.p.o. after midnight  -Telemetry  -Cardiology consultation    CHF/hypertension/A-fib  -Patient takes Eliquis and bisoprolol for A-fib at home and compliant with medications  -3/2024 echocardiogram showed EF of 50-55 %  -Long-term plan was cardioversion but scheduling was difficult as patient has complicated history with anesthesia  -Consult cardiology  -Telemetry  -Monitor electrolytes    Hypoxia  -CXR unremarkable  -Likely secondary to A-fib with RVR  -Normally is on room air  -Does have significant history of smoking ( quit 5 months ago)  -Will start patient on DuoNeb  -Wean oxygen as able    GERD  -PPI    Bilateral carotid stenosis  -Has been evaluated, no plans for surgery per patient    DVT risk  -Continue with Eliquis    Discharge plan  Dissipated discharge home once cleared by cardiology       I spent 75 minutes in the professional and overall care of this patient.      Sarika Arceo, APRN-CNP

## 2024-05-22 NOTE — TELEPHONE ENCOUNTER
Dr. Rubin called her but he cannot leave message voice mail was full he wants her to call him between 6- 8 on his cell  I tried her as well voicemail is still full

## 2024-05-23 ENCOUNTER — PHARMACY VISIT (OUTPATIENT)
Dept: PHARMACY | Facility: CLINIC | Age: 74
End: 2024-05-23
Payer: MEDICARE

## 2024-05-23 LAB
ANION GAP SERPL CALC-SCNC: 11 MMOL/L
ATRIAL RATE: 159 BPM
BASOPHILS # BLD AUTO: 0.03 X10*3/UL (ref 0–0.1)
BASOPHILS NFR BLD AUTO: 0.4 %
BUN SERPL-MCNC: 20 MG/DL (ref 8–25)
CALCIUM SERPL-MCNC: 8.9 MG/DL (ref 8.5–10.4)
CHLORIDE SERPL-SCNC: 102 MMOL/L (ref 97–107)
CO2 SERPL-SCNC: 25 MMOL/L (ref 24–31)
CREAT SERPL-MCNC: 1.3 MG/DL (ref 0.4–1.6)
EGFRCR SERPLBLD CKD-EPI 2021: 44 ML/MIN/1.73M*2
EOSINOPHIL # BLD AUTO: 0.07 X10*3/UL (ref 0–0.4)
EOSINOPHIL NFR BLD AUTO: 1 %
ERYTHROCYTE [DISTWIDTH] IN BLOOD BY AUTOMATED COUNT: 16.4 % (ref 11.5–14.5)
GLUCOSE SERPL-MCNC: 113 MG/DL (ref 65–99)
HCT VFR BLD AUTO: 40.1 % (ref 36–46)
HGB BLD-MCNC: 12.8 G/DL (ref 12–16)
IMM GRANULOCYTES # BLD AUTO: 0.03 X10*3/UL (ref 0–0.5)
IMM GRANULOCYTES NFR BLD AUTO: 0.4 % (ref 0–0.9)
LYMPHOCYTES # BLD AUTO: 2.45 X10*3/UL (ref 0.8–3)
LYMPHOCYTES NFR BLD AUTO: 33.6 %
MCH RBC QN AUTO: 30.2 PG (ref 26–34)
MCHC RBC AUTO-ENTMCNC: 31.9 G/DL (ref 32–36)
MCV RBC AUTO: 95 FL (ref 80–100)
MONOCYTES # BLD AUTO: 0.49 X10*3/UL (ref 0.05–0.8)
MONOCYTES NFR BLD AUTO: 6.7 %
NEUTROPHILS # BLD AUTO: 4.23 X10*3/UL (ref 1.6–5.5)
NEUTROPHILS NFR BLD AUTO: 57.9 %
NRBC BLD-RTO: 0 /100 WBCS (ref 0–0)
PLATELET # BLD AUTO: 197 X10*3/UL (ref 150–450)
POTASSIUM SERPL-SCNC: 3.8 MMOL/L (ref 3.4–5.1)
Q ONSET: 221 MS
QRS COUNT: 25 BEATS
QRS DURATION: 72 MS
QT INTERVAL: 312 MS
QTC CALCULATION(BAZETT): 496 MS
QTC FREDERICIA: 425 MS
R AXIS: 62 DEGREES
RBC # BLD AUTO: 4.24 X10*6/UL (ref 4–5.2)
SODIUM SERPL-SCNC: 138 MMOL/L (ref 133–145)
T AXIS: 186 DEGREES
T OFFSET: 377 MS
VENTRICULAR RATE: 152 BPM
WBC # BLD AUTO: 7.3 X10*3/UL (ref 4.4–11.3)

## 2024-05-23 PROCEDURE — 2060000001 HC INTERMEDIATE ICU ROOM DAILY

## 2024-05-23 PROCEDURE — 2500000001 HC RX 250 WO HCPCS SELF ADMINISTERED DRUGS (ALT 637 FOR MEDICARE OP): Performed by: NURSE PRACTITIONER

## 2024-05-23 PROCEDURE — 82374 ASSAY BLOOD CARBON DIOXIDE: CPT | Performed by: NURSE PRACTITIONER

## 2024-05-23 PROCEDURE — 2500000002 HC RX 250 W HCPCS SELF ADMINISTERED DRUGS (ALT 637 FOR MEDICARE OP, ALT 636 FOR OP/ED): Mod: MUE | Performed by: INTERNAL MEDICINE

## 2024-05-23 PROCEDURE — 99222 1ST HOSP IP/OBS MODERATE 55: CPT | Performed by: INTERNAL MEDICINE

## 2024-05-23 PROCEDURE — 2500000002 HC RX 250 W HCPCS SELF ADMINISTERED DRUGS (ALT 637 FOR MEDICARE OP, ALT 636 FOR OP/ED): Mod: MUE | Performed by: NURSE PRACTITIONER

## 2024-05-23 PROCEDURE — 2500000004 HC RX 250 GENERAL PHARMACY W/ HCPCS (ALT 636 FOR OP/ED): Performed by: STUDENT IN AN ORGANIZED HEALTH CARE EDUCATION/TRAINING PROGRAM

## 2024-05-23 PROCEDURE — 9420000001 HC RT PATIENT EDUCATION 5 MIN

## 2024-05-23 PROCEDURE — 2500000006 HC RX 250 W HCPCS SELF ADMINISTERED DRUGS (ALT 637 FOR ALL PAYERS): Mod: MUE | Performed by: NURSE PRACTITIONER

## 2024-05-23 PROCEDURE — 36415 COLL VENOUS BLD VENIPUNCTURE: CPT | Performed by: NURSE PRACTITIONER

## 2024-05-23 PROCEDURE — 94640 AIRWAY INHALATION TREATMENT: CPT

## 2024-05-23 PROCEDURE — 85025 COMPLETE CBC W/AUTO DIFF WBC: CPT | Performed by: NURSE PRACTITIONER

## 2024-05-23 PROCEDURE — 2500000001 HC RX 250 WO HCPCS SELF ADMINISTERED DRUGS (ALT 637 FOR MEDICARE OP): Performed by: INTERNAL MEDICINE

## 2024-05-23 RX ORDER — BISOPROLOL FUMARATE 5 MG/1
5 TABLET, FILM COATED ORAL 2 TIMES DAILY
Status: DISCONTINUED | OUTPATIENT
Start: 2024-05-23 | End: 2024-05-23

## 2024-05-23 RX ORDER — IPRATROPIUM BROMIDE AND ALBUTEROL SULFATE 2.5; .5 MG/3ML; MG/3ML
3 SOLUTION RESPIRATORY (INHALATION)
Status: DISCONTINUED | OUTPATIENT
Start: 2024-05-23 | End: 2024-05-27 | Stop reason: HOSPADM

## 2024-05-23 RX ORDER — DILTIAZEM HYDROCHLORIDE 120 MG/1
120 CAPSULE, COATED, EXTENDED RELEASE ORAL 2 TIMES DAILY
Qty: 60 CAPSULE | Refills: 0 | Status: SHIPPED | OUTPATIENT
Start: 2024-05-23 | End: 2024-06-11 | Stop reason: SDUPTHER

## 2024-05-23 RX ORDER — DILTIAZEM HYDROCHLORIDE 120 MG/1
120 CAPSULE, COATED, EXTENDED RELEASE ORAL 2 TIMES DAILY
Status: DISCONTINUED | OUTPATIENT
Start: 2024-05-23 | End: 2024-05-26

## 2024-05-23 RX ORDER — ATENOLOL 50 MG/1
50 TABLET ORAL 2 TIMES DAILY
Qty: 60 TABLET | Refills: 0 | Status: SHIPPED | OUTPATIENT
Start: 2024-05-23 | End: 2024-06-27

## 2024-05-23 RX ORDER — ATENOLOL 50 MG/1
50 TABLET ORAL 2 TIMES DAILY
Status: DISCONTINUED | OUTPATIENT
Start: 2024-05-23 | End: 2024-05-27 | Stop reason: HOSPADM

## 2024-05-23 RX ORDER — NITROGLYCERIN 20 MG/1
1 PATCH TRANSDERMAL DAILY
Status: DISCONTINUED | OUTPATIENT
Start: 2024-05-23 | End: 2024-05-24

## 2024-05-23 RX ORDER — BISOPROLOL FUMARATE 5 MG/1
5 TABLET, FILM COATED ORAL DAILY
Status: DISCONTINUED | OUTPATIENT
Start: 2024-05-23 | End: 2024-05-23

## 2024-05-23 RX ORDER — METOPROLOL TARTRATE 50 MG/1
50 TABLET ORAL 2 TIMES DAILY
Status: DISCONTINUED | OUTPATIENT
Start: 2024-05-23 | End: 2024-05-23

## 2024-05-23 RX ADMIN — METOPROLOL TARTRATE 50 MG: 50 TABLET, FILM COATED ORAL at 08:56

## 2024-05-23 RX ADMIN — DILTIAZEM HYDROCHLORIDE 120 MG: 120 CAPSULE, EXTENDED RELEASE ORAL at 21:56

## 2024-05-23 RX ADMIN — IPRATROPIUM BROMIDE AND ALBUTEROL SULFATE 3 ML: 2.5; .5 SOLUTION RESPIRATORY (INHALATION) at 19:58

## 2024-05-23 RX ADMIN — POTASSIUM CHLORIDE 20 MEQ: 1500 TABLET, EXTENDED RELEASE ORAL at 08:56

## 2024-05-23 RX ADMIN — APIXABAN 5 MG: 5 TABLET, FILM COATED ORAL at 18:27

## 2024-05-23 RX ADMIN — ATENOLOL 50 MG: 50 TABLET ORAL at 12:24

## 2024-05-23 RX ADMIN — APIXABAN 5 MG: 5 TABLET, FILM COATED ORAL at 08:56

## 2024-05-23 RX ADMIN — NITROGLYCERIN 1 PATCH: 0.1 PATCH TRANSDERMAL at 12:21

## 2024-05-23 RX ADMIN — IPRATROPIUM BROMIDE AND ALBUTEROL SULFATE 3 ML: .5; 3 SOLUTION RESPIRATORY (INHALATION) at 08:27

## 2024-05-23 RX ADMIN — ATENOLOL 50 MG: 50 TABLET ORAL at 21:56

## 2024-05-23 RX ADMIN — Medication 5 MG/HR: at 07:04

## 2024-05-23 SDOH — ECONOMIC STABILITY: INCOME INSECURITY: IN THE PAST 12 MONTHS, HAS THE ELECTRIC, GAS, OIL, OR WATER COMPANY THREATENED TO SHUT OFF SERVICE IN YOUR HOME?: NO

## 2024-05-23 SDOH — SOCIAL STABILITY: SOCIAL NETWORK: ARE YOU MARRIED, WIDOWED, DIVORCED, SEPARATED, NEVER MARRIED, OR LIVING WITH A PARTNER?: PATIENT DECLINED

## 2024-05-23 SDOH — ECONOMIC STABILITY: INCOME INSECURITY: IN THE LAST 12 MONTHS, WAS THERE A TIME WHEN YOU WERE NOT ABLE TO PAY THE MORTGAGE OR RENT ON TIME?: YES

## 2024-05-23 SDOH — HEALTH STABILITY: MENTAL HEALTH: HOW OFTEN DO YOU HAVE A DRINK CONTAINING ALCOHOL?: NEVER

## 2024-05-23 SDOH — ECONOMIC STABILITY: FOOD INSECURITY: WITHIN THE PAST 12 MONTHS, YOU WORRIED THAT YOUR FOOD WOULD RUN OUT BEFORE YOU GOT MONEY TO BUY MORE.: SOMETIMES TRUE

## 2024-05-23 SDOH — HEALTH STABILITY: MENTAL HEALTH: HOW OFTEN DO YOU HAVE 6 OR MORE DRINKS ON ONE OCCASION?: NEVER

## 2024-05-23 SDOH — ECONOMIC STABILITY: TRANSPORTATION INSECURITY
IN THE PAST 12 MONTHS, HAS THE LACK OF TRANSPORTATION KEPT YOU FROM MEDICAL APPOINTMENTS OR FROM GETTING MEDICATIONS?: YES

## 2024-05-23 SDOH — SOCIAL STABILITY: SOCIAL INSECURITY: WITHIN THE LAST YEAR, HAVE YOU BEEN HUMILIATED OR EMOTIONALLY ABUSED IN OTHER WAYS BY YOUR PARTNER OR EX-PARTNER?: NO

## 2024-05-23 SDOH — SOCIAL STABILITY: SOCIAL INSECURITY: DOES ANYONE TRY TO KEEP YOU FROM HAVING/CONTACTING OTHER FRIENDS OR DOING THINGS OUTSIDE YOUR HOME?: NO

## 2024-05-23 SDOH — HEALTH STABILITY: MENTAL HEALTH: HOW MANY STANDARD DRINKS CONTAINING ALCOHOL DO YOU HAVE ON A TYPICAL DAY?: PATIENT DOES NOT DRINK

## 2024-05-23 SDOH — ECONOMIC STABILITY: HOUSING INSECURITY
IN THE LAST 12 MONTHS, WAS THERE A TIME WHEN YOU DID NOT HAVE A STEADY PLACE TO SLEEP OR SLEPT IN A SHELTER (INCLUDING NOW)?: NO

## 2024-05-23 SDOH — HEALTH STABILITY: MENTAL HEALTH
HOW OFTEN DO YOU NEED TO HAVE SOMEONE HELP YOU WHEN YOU READ INSTRUCTIONS, PAMPHLETS, OR OTHER WRITTEN MATERIAL FROM YOUR DOCTOR OR PHARMACY?: NEVER

## 2024-05-23 SDOH — SOCIAL STABILITY: SOCIAL INSECURITY: HAVE YOU HAD THOUGHTS OF HARMING ANYONE ELSE?: NO

## 2024-05-23 SDOH — SOCIAL STABILITY: SOCIAL INSECURITY: ARE THERE ANY APPARENT SIGNS OF INJURIES/BEHAVIORS THAT COULD BE RELATED TO ABUSE/NEGLECT?: NO

## 2024-05-23 SDOH — SOCIAL STABILITY: SOCIAL INSECURITY: WITHIN THE LAST YEAR, HAVE YOU BEEN AFRAID OF YOUR PARTNER OR EX-PARTNER?: NO

## 2024-05-23 SDOH — ECONOMIC STABILITY: INCOME INSECURITY: HOW HARD IS IT FOR YOU TO PAY FOR THE VERY BASICS LIKE FOOD, HOUSING, MEDICAL CARE, AND HEATING?: SOMEWHAT HARD

## 2024-05-23 SDOH — SOCIAL STABILITY: SOCIAL INSECURITY: WERE YOU ABLE TO COMPLETE ALL THE BEHAVIORAL HEALTH SCREENINGS?: YES

## 2024-05-23 SDOH — ECONOMIC STABILITY: FOOD INSECURITY: WITHIN THE PAST 12 MONTHS, THE FOOD YOU BOUGHT JUST DIDN'T LAST AND YOU DIDN'T HAVE MONEY TO GET MORE.: SOMETIMES TRUE

## 2024-05-23 SDOH — SOCIAL STABILITY: SOCIAL NETWORK: HOW OFTEN DO YOU GET TOGETHER WITH FRIENDS OR RELATIVES?: ONCE A WEEK

## 2024-05-23 SDOH — HEALTH STABILITY: PHYSICAL HEALTH: ON AVERAGE, HOW MANY DAYS PER WEEK DO YOU ENGAGE IN MODERATE TO STRENUOUS EXERCISE (LIKE A BRISK WALK)?: 0 DAYS

## 2024-05-23 SDOH — SOCIAL STABILITY: SOCIAL INSECURITY: DO YOU FEEL ANYONE HAS EXPLOITED OR TAKEN ADVANTAGE OF YOU FINANCIALLY OR OF YOUR PERSONAL PROPERTY?: NO

## 2024-05-23 SDOH — HEALTH STABILITY: PHYSICAL HEALTH: ON AVERAGE, HOW MANY MINUTES DO YOU ENGAGE IN EXERCISE AT THIS LEVEL?: 0 MIN

## 2024-05-23 SDOH — SOCIAL STABILITY: SOCIAL NETWORK: HOW OFTEN DO YOU ATTENT MEETINGS OF THE CLUB OR ORGANIZATION YOU BELONG TO?: PATIENT DECLINED

## 2024-05-23 SDOH — SOCIAL STABILITY: SOCIAL INSECURITY: HAVE YOU HAD ANY THOUGHTS OF HARMING ANYONE ELSE?: NO

## 2024-05-23 SDOH — ECONOMIC STABILITY: TRANSPORTATION INSECURITY
IN THE PAST 12 MONTHS, HAS LACK OF TRANSPORTATION KEPT YOU FROM MEETINGS, WORK, OR FROM GETTING THINGS NEEDED FOR DAILY LIVING?: YES

## 2024-05-23 SDOH — SOCIAL STABILITY: SOCIAL INSECURITY: HAS ANYONE EVER THREATENED TO HURT YOUR FAMILY OR YOUR PETS?: NO

## 2024-05-23 SDOH — SOCIAL STABILITY: SOCIAL INSECURITY: ARE YOU OR HAVE YOU BEEN THREATENED OR ABUSED PHYSICALLY, EMOTIONALLY, OR SEXUALLY BY ANYONE?: NO

## 2024-05-23 SDOH — SOCIAL STABILITY: SOCIAL INSECURITY: ABUSE: ADULT

## 2024-05-23 SDOH — SOCIAL STABILITY: SOCIAL NETWORK: IN A TYPICAL WEEK, HOW MANY TIMES DO YOU TALK ON THE PHONE WITH FAMILY, FRIENDS, OR NEIGHBORS?: ONCE A WEEK

## 2024-05-23 SDOH — SOCIAL STABILITY: SOCIAL INSECURITY: DO YOU FEEL UNSAFE GOING BACK TO THE PLACE WHERE YOU ARE LIVING?: NO

## 2024-05-23 SDOH — ECONOMIC STABILITY: HOUSING INSECURITY: IN THE LAST 12 MONTHS, HOW MANY PLACES HAVE YOU LIVED?: 1

## 2024-05-23 SDOH — SOCIAL STABILITY: SOCIAL NETWORK: HOW OFTEN DO YOU ATTEND CHURCH OR RELIGIOUS SERVICES?: PATIENT DECLINED

## 2024-05-23 ASSESSMENT — COGNITIVE AND FUNCTIONAL STATUS - GENERAL
PATIENT BASELINE BEDBOUND: NO
PERSONAL GROOMING: A LITTLE
MOBILITY SCORE: 24
PERSONAL GROOMING: A LITTLE
MOBILITY SCORE: 24
DAILY ACTIVITIY SCORE: 23
DAILY ACTIVITIY SCORE: 23

## 2024-05-23 ASSESSMENT — PATIENT HEALTH QUESTIONNAIRE - PHQ9
SUM OF ALL RESPONSES TO PHQ9 QUESTIONS 1 & 2: 0
1. LITTLE INTEREST OR PLEASURE IN DOING THINGS: NOT AT ALL
2. FEELING DOWN, DEPRESSED OR HOPELESS: NOT AT ALL

## 2024-05-23 ASSESSMENT — PAIN - FUNCTIONAL ASSESSMENT
PAIN_FUNCTIONAL_ASSESSMENT: 0-10
PAIN_FUNCTIONAL_ASSESSMENT: 0-10

## 2024-05-23 ASSESSMENT — LIFESTYLE VARIABLES
SKIP TO QUESTIONS 9-10: 1
HOW MANY STANDARD DRINKS CONTAINING ALCOHOL DO YOU HAVE ON A TYPICAL DAY: PATIENT DOES NOT DRINK
HOW OFTEN DO YOU HAVE 6 OR MORE DRINKS ON ONE OCCASION: NEVER
AUDIT-C TOTAL SCORE: 0
AUDIT-C TOTAL SCORE: 0
HOW OFTEN DO YOU HAVE A DRINK CONTAINING ALCOHOL: NEVER
SUBSTANCE_ABUSE_PAST_12_MONTHS: NO
AUDIT-C TOTAL SCORE: 0
PRESCIPTION_ABUSE_PAST_12_MONTHS: NO
SKIP TO QUESTIONS 9-10: 1

## 2024-05-23 ASSESSMENT — ACTIVITIES OF DAILY LIVING (ADL)
HEARING - RIGHT EAR: FUNCTIONAL
PATIENT'S MEMORY ADEQUATE TO SAFELY COMPLETE DAILY ACTIVITIES?: YES
TOILETING: INDEPENDENT
GROOMING: INDEPENDENT
HEARING - LEFT EAR: FUNCTIONAL
BATHING: INDEPENDENT
WALKS IN HOME: INDEPENDENT
FEEDING YOURSELF: INDEPENDENT
JUDGMENT_ADEQUATE_SAFELY_COMPLETE_DAILY_ACTIVITIES: YES
ADEQUATE_TO_COMPLETE_ADL: YES
DRESSING YOURSELF: INDEPENDENT

## 2024-05-23 ASSESSMENT — PAIN SCALES - GENERAL
PAINLEVEL_OUTOF10: 0 - NO PAIN

## 2024-05-23 NOTE — CONSULTS
Consults  History Of Present Illness:    Constance M Kocher is a 73 y.o. female known to me for an office visit in light of hypertension hyperlipidemia striae of atrial fibrillation history of PVD as well as GERD disorders.  Patient has underlying anxiety issues.  Had a reaction to anesthetic agent with anaphylactic reaction in the past.  Patient afraid of anesthesia at the moment for upcoming ANYA cardioversion.  Came to [facility] emergency room with A-fib RVR with short of breath at the time patient found having A-fib RVR rate about 160.  Now admitted with IV Cardizem currently is heart rate remains to 120.  Last Recorded Vitals:  Vitals:    05/23/24 0457 05/23/24 0827 05/23/24 0842 05/23/24 1213   BP: 108/68  (!) 96/49 116/76   BP Location: Right arm  Left arm Right arm   Patient Position: Lying  Lying Sitting   Pulse: 74  83 104   Resp: 22 21 23   Temp: 36.2 °C (97.2 °F)  35.6 °C (96.1 °F) 35.5 °C (95.9 °F)   TempSrc: Temporal  Temporal Temporal   SpO2: 92% 94% 92% 93%   Weight:       Height:           Past Medical History:  She has a past medical history of A-fib (Multi), Carotid stenosis, Dyspnea (03/27/2024), Essential hypertension (10/20/2009), Fatigue (10/19/2023), GERD (gastroesophageal reflux disease), Hyperlipidemia (02/12/2014), Hypokalemia (10/19/2023), Insomnia (03/09/2012), Murmur (10/20/2009), PMR (polymyalgia rheumatica) (Multi), and Polymyalgia rheumatica (Multi).    Past Surgical History:  She has no past surgical history on file.      Social History:  She reports that she quit smoking about 2 months ago. Her smoking use included cigarettes. She started smoking about 55 years ago. She has a 55 pack-year smoking history. She has never been exposed to tobacco smoke. She has never used smokeless tobacco. She reports that she does not drink alcohol and does not use drugs.    Family History:  Family History   Problem Relation Name Age of Onset    No Known Problems Mother      No Known Problems Father  "         Allergies:  Iodinated contrast media, Procaine hcl, and Propofol    Inpatient Medications:  Scheduled medications   Medication Dose Route Frequency    apixaban  5 mg oral q12h    atenolol  50 mg oral BID    dilTIAZem CD  120 mg oral BID    ipratropium-albuteroL  3 mL nebulization TID    nitroglycerin  1 patch transdermal Daily    ondansetron  4 mg intravenous Once    potassium chloride CR  20 mEq oral Daily     Outpatient Medications:  Current Outpatient Medications   Medication Instructions    acetaminophen (TYLENOL) 650 mg, oral, Every 4 hours PRN    albuterol 90 mcg/actuation inhaler 1 puff, inhalation, Every 6 hours PRN    apixaban (ELIQUIS) 5 mg, oral, Every 12 hours    atenolol (TENORMIN) 50 mg, oral, 2 times daily    bisoprolol (ZEBETA) 10 mg, oral, Daily    dilTIAZem CD (CARDIZEM CD) 120 mg, oral, 2 times daily    guaiFENesin (ROBITUSSIN) 200 mg, oral, Every 4 hours PRN    potassium chloride CR 20 mEq ER tablet 20 mEq, oral, Daily    spironolactone (ALDACTONE) 12.5 mg, oral, 2 times daily    torsemide (DEMADEX) 20 mg, oral, Daily    Vitamin D3 100 mcg, oral, Daily       Physical Exam:  HEENT: Normocephalic/atraumatic pupils equal react light  Neck exam mild JVD, no bruit  Lung exam clear to auscultation, few crackles at the bases  Cardiac exam is irregular rhythm S1-S2, soft systolic murmur heard.   Abdomen soft nontender, nondistended  Extremities no clubbing, cyanosis but trace edema  Neuro exam grossly intact.  Last Labs:  CBC - 5/23/2024:  5:43 AM  7.3 12.8 197    40.1      CMP - 5/23/2024:  5:43 AM  8.9 6.8 29 --- 0.2   _ 3.6 24 88      PTT - No results in last year.  _   _ _     No results found for: \"TROPHS\", \"BNP\", \"LDLCALC\", \"VLDL\"       ECG 12 lead  Poor data quality, interpretation may be adversely affected  Atrial fibrillation with rapid ventricular response  Nonspecific ST and T wave abnormality  Abnormal ECG  When compared with ECG of 27-MAR-2024 21:58,  Vent. rate has increased BY  54 " BPM      Principal Problem:    Atrial fibrillation with rapid ventricular response (Multi)    Assessment/Plan   73-year-old female patient with underlying multiple comorbid condition.  Now with A-fib RVR.  1.  A-fib RVR: Continue current Cardizem drip eventually switched to p.o. rate control medication.  Probably change atenolol and Cardizem for better rate control continue also Eliquis.  Upcoming ANYA cardioversion outpatient.  2.  Hypertension: Patient currently history of chronic diastolic CHF ejection eczema 55%.  Continue current medication.    Diet and exercise reviewed with patient..advice to walk about 10,000 steps or about 2 hours during day time. Cut back on salt, sugar and flour.  Advised patient to avoid lunch meats, canned soups, pizzas, bread rolls, and sandwiches. Advised patient to limit salt intake 1,500 mg daily. Advised patient to exercise 30 mins/3 times a week including treadmill or aerobic type, Goal to achieve 65% target HR.    Critical care time is spent at bedside includes review of diagnostic tests, labs, and radiographs, serial assessments and management of hemodynamics, EKGs, old echoes, cardiac work-up and coordination of care.  Assessment, impression and plans are reflected in the note above as well as the orders.    Code Status:  Full Code  I spent 60 minutes in the professional and overall care of this patient.  Hugo Rubin MD

## 2024-05-24 LAB
ANION GAP SERPL CALC-SCNC: 10 MMOL/L
BASOPHILS # BLD AUTO: 0.03 X10*3/UL (ref 0–0.1)
BASOPHILS NFR BLD AUTO: 0.4 %
BUN SERPL-MCNC: 22 MG/DL (ref 8–25)
CALCIUM SERPL-MCNC: 9 MG/DL (ref 8.5–10.4)
CHLORIDE SERPL-SCNC: 101 MMOL/L (ref 97–107)
CO2 SERPL-SCNC: 26 MMOL/L (ref 24–31)
CREAT SERPL-MCNC: 1.3 MG/DL (ref 0.4–1.6)
EGFRCR SERPLBLD CKD-EPI 2021: 44 ML/MIN/1.73M*2
EOSINOPHIL # BLD AUTO: 0.1 X10*3/UL (ref 0–0.4)
EOSINOPHIL NFR BLD AUTO: 1.2 %
ERYTHROCYTE [DISTWIDTH] IN BLOOD BY AUTOMATED COUNT: 16.5 % (ref 11.5–14.5)
GLUCOSE SERPL-MCNC: 112 MG/DL (ref 65–99)
HCT VFR BLD AUTO: 41.8 % (ref 36–46)
HGB BLD-MCNC: 13.3 G/DL (ref 12–16)
IMM GRANULOCYTES # BLD AUTO: 0.02 X10*3/UL (ref 0–0.5)
IMM GRANULOCYTES NFR BLD AUTO: 0.2 % (ref 0–0.9)
LYMPHOCYTES # BLD AUTO: 2.62 X10*3/UL (ref 0.8–3)
LYMPHOCYTES NFR BLD AUTO: 31.8 %
MCH RBC QN AUTO: 30.4 PG (ref 26–34)
MCHC RBC AUTO-ENTMCNC: 31.8 G/DL (ref 32–36)
MCV RBC AUTO: 95 FL (ref 80–100)
MONOCYTES # BLD AUTO: 0.58 X10*3/UL (ref 0.05–0.8)
MONOCYTES NFR BLD AUTO: 7 %
NEUTROPHILS # BLD AUTO: 4.9 X10*3/UL (ref 1.6–5.5)
NEUTROPHILS NFR BLD AUTO: 59.4 %
NRBC BLD-RTO: 0 /100 WBCS (ref 0–0)
PLATELET # BLD AUTO: 220 X10*3/UL (ref 150–450)
POTASSIUM SERPL-SCNC: 4.3 MMOL/L (ref 3.4–5.1)
RBC # BLD AUTO: 4.38 X10*6/UL (ref 4–5.2)
SODIUM SERPL-SCNC: 137 MMOL/L (ref 133–145)
WBC # BLD AUTO: 8.3 X10*3/UL (ref 4.4–11.3)

## 2024-05-24 PROCEDURE — 85025 COMPLETE CBC W/AUTO DIFF WBC: CPT | Performed by: NURSE PRACTITIONER

## 2024-05-24 PROCEDURE — 36415 COLL VENOUS BLD VENIPUNCTURE: CPT | Performed by: NURSE PRACTITIONER

## 2024-05-24 PROCEDURE — 2060000001 HC INTERMEDIATE ICU ROOM DAILY

## 2024-05-24 PROCEDURE — 2500000001 HC RX 250 WO HCPCS SELF ADMINISTERED DRUGS (ALT 637 FOR MEDICARE OP): Performed by: NURSE PRACTITIONER

## 2024-05-24 PROCEDURE — 2500000001 HC RX 250 WO HCPCS SELF ADMINISTERED DRUGS (ALT 637 FOR MEDICARE OP): Performed by: INTERNAL MEDICINE

## 2024-05-24 PROCEDURE — 9420000001 HC RT PATIENT EDUCATION 5 MIN

## 2024-05-24 PROCEDURE — 97166 OT EVAL MOD COMPLEX 45 MIN: CPT | Mod: GO

## 2024-05-24 PROCEDURE — 2500000002 HC RX 250 W HCPCS SELF ADMINISTERED DRUGS (ALT 637 FOR MEDICARE OP, ALT 636 FOR OP/ED): Mod: MUE | Performed by: NURSE PRACTITIONER

## 2024-05-24 PROCEDURE — 2500000002 HC RX 250 W HCPCS SELF ADMINISTERED DRUGS (ALT 637 FOR MEDICARE OP, ALT 636 FOR OP/ED): Mod: MUE | Performed by: INTERNAL MEDICINE

## 2024-05-24 PROCEDURE — 2500000004 HC RX 250 GENERAL PHARMACY W/ HCPCS (ALT 636 FOR OP/ED): Performed by: INTERNAL MEDICINE

## 2024-05-24 PROCEDURE — 94640 AIRWAY INHALATION TREATMENT: CPT

## 2024-05-24 PROCEDURE — 82374 ASSAY BLOOD CARBON DIOXIDE: CPT | Performed by: NURSE PRACTITIONER

## 2024-05-24 PROCEDURE — 99232 SBSQ HOSP IP/OBS MODERATE 35: CPT | Performed by: INTERNAL MEDICINE

## 2024-05-24 PROCEDURE — RXMED WILLOW AMBULATORY MEDICATION CHARGE

## 2024-05-24 RX ORDER — METOPROLOL TARTRATE 1 MG/ML
5 INJECTION, SOLUTION INTRAVENOUS
Status: DISCONTINUED | OUTPATIENT
Start: 2024-05-24 | End: 2024-05-27 | Stop reason: HOSPADM

## 2024-05-24 RX ORDER — DIGOXIN 250 MCG
250 TABLET ORAL DAILY
Status: DISCONTINUED | OUTPATIENT
Start: 2024-05-25 | End: 2024-05-26

## 2024-05-24 RX ORDER — CHOLECALCIFEROL (VITAMIN D3) 50 MCG
4000 TABLET ORAL DAILY
Status: DISCONTINUED | OUTPATIENT
Start: 2024-05-24 | End: 2024-05-27 | Stop reason: HOSPADM

## 2024-05-24 RX ORDER — DIGOXIN 0.25 MG/ML
250 INJECTION INTRAMUSCULAR; INTRAVENOUS ONCE
Status: COMPLETED | OUTPATIENT
Start: 2024-05-24 | End: 2024-05-24

## 2024-05-24 RX ADMIN — DIGOXIN 250 MCG: 0.25 INJECTION INTRAMUSCULAR; INTRAVENOUS at 12:57

## 2024-05-24 RX ADMIN — IPRATROPIUM BROMIDE AND ALBUTEROL SULFATE 3 ML: 2.5; .5 SOLUTION RESPIRATORY (INHALATION) at 14:35

## 2024-05-24 RX ADMIN — ACETAMINOPHEN 650 MG: 325 TABLET ORAL at 22:01

## 2024-05-24 RX ADMIN — NITROGLYCERIN 1 PATCH: 0.1 PATCH TRANSDERMAL at 08:51

## 2024-05-24 RX ADMIN — ATENOLOL 50 MG: 50 TABLET ORAL at 22:04

## 2024-05-24 RX ADMIN — DILTIAZEM HYDROCHLORIDE 120 MG: 120 CAPSULE, EXTENDED RELEASE ORAL at 22:05

## 2024-05-24 RX ADMIN — POTASSIUM CHLORIDE 20 MEQ: 1500 TABLET, EXTENDED RELEASE ORAL at 08:47

## 2024-05-24 RX ADMIN — APIXABAN 5 MG: 5 TABLET, FILM COATED ORAL at 07:32

## 2024-05-24 RX ADMIN — ATENOLOL 50 MG: 50 TABLET ORAL at 08:48

## 2024-05-24 RX ADMIN — IPRATROPIUM BROMIDE AND ALBUTEROL SULFATE 3 ML: 2.5; .5 SOLUTION RESPIRATORY (INHALATION) at 08:04

## 2024-05-24 RX ADMIN — APIXABAN 5 MG: 5 TABLET, FILM COATED ORAL at 18:37

## 2024-05-24 RX ADMIN — DILTIAZEM HYDROCHLORIDE 120 MG: 120 CAPSULE, EXTENDED RELEASE ORAL at 08:47

## 2024-05-24 RX ADMIN — IPRATROPIUM BROMIDE AND ALBUTEROL SULFATE 3 ML: 2.5; .5 SOLUTION RESPIRATORY (INHALATION) at 19:40

## 2024-05-24 ASSESSMENT — ACTIVITIES OF DAILY LIVING (ADL)
ADL_ASSISTANCE: INDEPENDENT
BATHING_ASSISTANCE: MINIMAL
ADL_ASSISTANCE: INDEPENDENT

## 2024-05-24 ASSESSMENT — COGNITIVE AND FUNCTIONAL STATUS - GENERAL
DAILY ACTIVITIY SCORE: 23
TURNING FROM BACK TO SIDE WHILE IN FLAT BAD: A LITTLE
DRESSING REGULAR LOWER BODY CLOTHING: A LOT
WALKING IN HOSPITAL ROOM: A LITTLE
STANDING UP FROM CHAIR USING ARMS: A LITTLE
MOBILITY SCORE: 18
PERSONAL GROOMING: A LITTLE
CLIMB 3 TO 5 STEPS WITH RAILING: A LOT
DRESSING REGULAR UPPER BODY CLOTHING: A LITTLE
HELP NEEDED FOR BATHING: A LITTLE
PERSONAL GROOMING: A LITTLE
TOILETING: A LITTLE
MOBILITY SCORE: 24
DAILY ACTIVITIY SCORE: 18
MOVING TO AND FROM BED TO CHAIR: A LITTLE

## 2024-05-24 ASSESSMENT — PAIN - FUNCTIONAL ASSESSMENT
PAIN_FUNCTIONAL_ASSESSMENT: 0-10

## 2024-05-24 ASSESSMENT — PAIN SCALES - GENERAL
PAINLEVEL_OUTOF10: 0 - NO PAIN
PAINLEVEL_OUTOF10: 0 - NO PAIN
PAINLEVEL_OUTOF10: 5 - MODERATE PAIN
PAINLEVEL_OUTOF10: 0 - NO PAIN
PAINLEVEL_OUTOF10: 7
PAINLEVEL_OUTOF10: 0 - NO PAIN

## 2024-05-24 ASSESSMENT — PAIN DESCRIPTION - ORIENTATION: ORIENTATION: RIGHT

## 2024-05-24 ASSESSMENT — PAIN DESCRIPTION - LOCATION: LOCATION: LEG

## 2024-05-24 NOTE — PROGRESS NOTES
Physical Therapy    Physical Therapy Evaluation    Patient Name: Constance M Kocher  MRN: [id number]  Today's Date: 5/24/2024   Time Calculation  Start Time: 1347  Stop Time: 1406  Time Calculation (min): 19 min  Documentation and services provided by student physical therapist while supervised under a licensed physical therapist.   Al Reddy, EVELYN      Assessment/Plan   PT Assessment  PT Assessment Results: Decreased strength, Decreased endurance, Impaired balance, Decreased mobility  Rehab Prognosis: Good  Medical Staff Made Aware: Yes  End of Session Communication: Bedside nurse  End of Session Patient Position: Bed, 3 rail up, Alarm off, not on at start of session    Assessment Comment: 72 y/o female presents with SOB.  Anticipated improved endurance with functional mobility with resolution of medical status and participation in skilled PT services while in-house.Pt ambulated in room without A.D but required frequent cues for safety and reduced falls risk. Education to include use of call bell prior to ambulation in room. Pt will benefit from assist of family following d/c.    IP OR SWING BED PT PLAN  Inpatient or Swing Bed: Inpatient  PT Plan  PT Plan: Skilled PT  PT Frequency: 3 times per week  PT Discharge Recommendations: Low intensity level of continued care  Equipment Recommended upon Discharge: Wheeled walker  PT Recommended Transfer Status: Assist x1, Assistive device  PT - OK to Discharge: Yes      Subjective   General Visit Information:  General  Reason for Referral: impaired mobility  Past Medical History Relevant to Rehab: PMH to include a-fib, CKD, chronic bronchitis  Missed Visit: No  Family/Caregiver Present: No  Prior to Session Communication: Bedside nurse  Patient Position Received: Bed, 3 rail up, Alarm off, not on at start of session  Preferred Learning Style: verbal, visual  General Comment: 72 y/o female presents with complications of a-fib noting SOB and heart palpitations on admission. Pt  denies any chest pain but does report minor SOB upon entry of PT. Pt received in supine position on RA but has supplemental O2 PRN per nursing. 94% SpO2 at start of session. Tele intact and pt pleasant and agreeable to participate in therapy services.  Home Living:  Home Living  Type of Home: House  Lives With: Adult children, Grandparent(s)  Home Adaptive Equipment: None  Home Layout: Two level  Home Access: Stairs to enter with rails  Entrance Stairs-Number of Steps: x 6 steps then another 6 steps to enter  Home Living Comments: Pt has to navigate x 6 steps then walk down schultz to another 6 steps to enter home. Pt reports her room is on the second floor and has to be able to go up a flight of steps. Handrails are available for all sets of stairs. Pt lives with son, daughter in-law and grandchildren. Pt denies having assistive device at home. She does have a shower chair with a back but no grab bars in the bathroom.  Prior Level of Function:  Prior Function Per Pt/Caregiver Report  Level of [place]: Independent with ADLs and functional transfers, Independent with homemaking with ambulation  Receives Help From: Family  ADL Assistance: Independent  Homemaking Assistance: Independent  Ambulatory Assistance: Independent  Prior Function Comments: Pt reports being ind with bathing, dressing and ambulation without A.D. Pt reports her PLOF includes playing with her grandchildren but as of lately she has to visit them while they play on their tablets due to her reduced activity tolerance.  Precautions:  Precautions  Medical Precautions: Fall precautions  Vital Signs:  Vital Signs  Heart Rate: 93 ()  Resp: 23  SpO2: 94 % (85-94)  BP: 104/79  MAP (mmHg): 89  Patient Position: Sitting    Objective   Pain:  Pain Assessment  Pain Assessment: 0-10  Pain Score: 0 - No pain  Cognition:  Cognition  Overall Cognitive Status: Within Functional Limits  Orientation Level: Oriented X4    General Assessments:  Activity  Tolerance  Endurance: Decreased tolerance for upright activites  Activity Tolerance Comments: decreased activity tolerance d/t SOB    Sensation  Light Touch: No apparent deficits    Strength  Strength Comments: > 3+/5 observed through functional mobility  Strength  Strength Comments: > 3+/5 observed through functional mobility    Coordination  Movements are Fluid and Coordinated: Yes    Postural Control  Postural Control: Within Functional Limits  Posture Comment: forward head. accessory respiratory muscle usage for breathing.    Static Sitting Balance  Static Sitting-Balance Support: Feet supported, No upper extremity supported  Static Sitting-Level of Assistance: Independent  Static Sitting-Comment/Number of Minutes: sitting EOB  Dynamic Sitting Balance  Dynamic Sitting-Balance Support: Feet supported, Bilateral upper extremity supported  Dynamic Sitting-Comments: Ind for scooting EOB    Static Standing Balance  Static Standing-Balance Support: No upper extremity supported  Static Standing-Level of Assistance: Contact guard  Static Standing-Comment/Number of Minutes: no A.D  Dynamic Standing Balance  Dynamic Standing-Balance Support: No upper extremity supported  Dynamic Standing-Balance: Turning  Dynamic Standing-Comments: no A.D  Functional Assessments:  Bed Mobility  Bed Mobility: Yes  Bed Mobility 1  Bed Mobility 1: Supine to sitting  Level of Assistance 1: Distant supervision  Bed Mobility Comments 1: HOB slightly elevated  Bed Mobility 2  Bed Mobility  2: Sitting to supine  Level of Assistance 2: Distant supervision  Bed Mobility Comments 2: HOB slightly elevated    Transfers  Transfer: Yes  Transfer 1  Transfer From 1: Sit to  Transfer to 1: Stand  Technique 1: Sit to stand  Transfer Level of Assistance 1: Close supervision  Trials/Comments 1: from bed and from toilet  Transfers 2  Transfer From 2: Stand to  Transfer to 2: Sit  Technique 2: Stand to sit  Transfer Level of Assistance 2: Close  supervision  Trials/Comments 2: to bed and to toilet    Ambulation/Gait Training  Ambulation/Gait Training Performed: Yes  Ambulation/Gait Training 1  Surface 1: Level tile  Device 1: No device  Assistance 1: Contact guard  Quality of Gait 1: Diminished heel strike, Decreased step length  Comments/Distance (ft) 1: no LOB with ambulation in room approx 30 feet with mult walks back and forth until needed to rest on side of bed. RR 34, 's, and SpO2 went to 85% but able to recover in approx 2-3 minutes on room air. Spo2 up to 92% or higher. Pt reportedly ambulating to and from bathroom independently.  Extremity/Trunk Assessments:  RLE   RLE : Within Functional Limits  LLE   LLE : Within Functional Limits  Outcome Measures:  [facility] Basic Mobility  Turning from your back to your side while in a flat bed without using bedrails: None  Moving from lying on your back to sitting on the side of a flat bed without using bedrails: A little  Moving to and from bed to chair (including a wheelchair): A little  Standing up from a chair using your arms (e.g. wheelchair or bedside chair): A little  To walk in hospital room: A little  Climbing 3-5 steps with railing: A lot  Basic Mobility - Total Score: 18    Encounter Problems       Encounter Problems (Active)       Balance       LTG - Patient will demonstrate Intervention to enhance balance for safe completion of daily activities (Progressing)       Start:  05/24/24    Expected End:  06/07/24               Mobility       LTG - Patient will navigate 4-6 steps with rails/device (Progressing)       Start:  05/24/24    Expected End:  06/07/24            Bed mobility (Progressing)       Start:  05/24/24    Expected End:  06/07/24       Pt will improve bed mobility to IND levels from flat surface.          Gait (Progressing)       Start:  05/24/24    Expected End:  06/07/24       Pt will ambulate with LRAD >75 feet at SUP assist with no signs of desaturation.              PT Transfers        Transfers (Progressing)       Start:  05/24/24    Expected End:  06/07/24       Pt will safely transfer with use of LRAD at Mod Ind to improve functional independence at home.             Pain - Adult          Safety       LTG - Patient will demonstrate safety requirements appropriate to situation/environment       Start:  05/23/24            STG - Patient uses call light consistently to request assistance with transfers       Start:  05/23/24               Safety       LTG - Patient will demonstrate safety requirements appropriate to situation/environment (Progressing)       Start:  05/24/24    Expected End:  06/07/24                   Education Documentation  Precautions, taught by JULIETTE Lorenzo at 5/24/2024  3:29 PM.  Learner: Patient  Readiness: Acceptance  Method: Explanation  Response: Needs Reinforcement    Body Mechanics, taught by JULIETTE Lorenzo at 5/24/2024  3:29 PM.  Learner: Patient  Readiness: Acceptance  Method: Explanation  Response: Needs Reinforcement    Mobility Training, taught by JULIETTE Lorenzo at 5/24/2024  3:29 PM.  Learner: Patient  Readiness: Acceptance  Method: Explanation  Response: Needs Reinforcement    Education Comments  No comments found.

## 2024-05-24 NOTE — CARE PLAN
The patient's goals for the shift include discharge?    The clinical goals for the shift include monitor heart rate

## 2024-05-24 NOTE — CARE PLAN
Problem: Balance  Goal: LTG - Patient will demonstrate Intervention to enhance balance for safe completion of daily activities  Outcome: Progressing     Problem: Mobility  Goal: LTG - Patient will navigate 4-6 steps with rails/device  Outcome: Progressing  Goal: Bed mobility  Description: Pt will improve bed mobility to IND levels from flat surface.   Outcome: Progressing  Goal: Gait  Description: Pt will ambulate with LRAD >75 feet at SUP assist with no signs of desaturation.    Outcome: Progressing     Problem: Safety  Goal: LTG - Patient will demonstrate safety requirements appropriate to situation/environment  Outcome: Progressing     Problem: PT Transfers  Goal: Transfers  Description: Pt will safely transfer with use of LRAD at Mod Ind to improve functional independence at home.   Outcome: Progressing

## 2024-05-24 NOTE — PROGRESS NOTES
05/24/24 1351   Discharge Planning   Living Arrangements Children  (Lives w/son, DIL and 5 kids (3 full time - ages 4,3,2))   Support Systems Children;Family members   Assistance Needed Showering - takes 2 hours, cleaning (unable to clean, pick items off floor, change linens), SOB on exertion and w/dialogue.  Relies on [employer] for transportation (difficult d/t SOB limiting independence, no O2).   Type of Residence Private residence   Home or Post Acute Services Post acute facilities (Rehab/SNF/etc)   Type of Post Acute Facility Services Skilled nursing   Patient expects to be discharged to: Patient prefers SNF if appropriate.  Awaiting therapy evals.  Will need precert.  She has programs through insurance that check in on her.   Does the patient need discharge transport arranged? Yes   RoundTrip coordination needed? Yes   Has discharge transport been arranged? No

## 2024-05-24 NOTE — PROGRESS NOTES
Evaluation    Patient Name: Constance M Kocher  MRN: [id number]  Today's Date: 5/24/2024  Time Calculation  Start Time: 1417  Stop Time: 1437  Time Calculation (min): 20 min    Assessment  IP OT Assessment  OT Assessment: 74 yo female with history of heart failure, a-fib, and carotid stenosis presents with poor activity tolerance and generalized weakness impacting ability to perform I/ADLs safety and independently.  Pt requires OT services to provide ADL retraining utilizing compensatory techniques and progressive strengthening.  Prognosis: Good  Barriers to Discharge: None  Evaluation/Treatment Tolerance: Patient limited by fatigue  Medical Staff Made Aware: Yes  End of Session Communication: Bedside nurse  End of Session Patient Position: Bed, 3 rail up, Alarm off, not on at start of session    Plan:  Treatment Interventions: ADL retraining, Functional transfer training, UE strengthening/ROM, Endurance training, Patient/family training, Equipment evaluation/education, Compensatory technique education  OT Frequency: 3 times per week  OT Discharge Recommendations: Moderate intensity level of continued care  Equipment Recommended upon Discharge: Wheeled walker  OT Recommended Transfer Status: Assist of 1  OT - OK to Discharge: Yes        Subjective     Current Problem:  1. Atrial fibrillation with rapid ventricular response (Multi)  atenolol (Tenormin) 50 mg tablet    dilTIAZem CD (Cardizem CD) 120 mg 24 hr capsule        General:  General  Reason for Referral: impaired ADLs  Referred By: Dr Ibarra  Past Medical History Relevant to Rehab: HLD, a-fib, essential HTN, CHF, carotid stenosis  Family/Caregiver Present: No  Prior to Session Communication: Bedside nurse  Patient Position Received: Bed, 3 rail up, Alarm off, not on at start of session  Preferred Learning Style: verbal, visual  General Comment: 74 yo female admitted with a-fib w/ RVR was cleared by nursing for therapy and agreeable to  same.    Precautions:  Medical Precautions: Fall precautions, Oxygen therapy device and L/min (2L PRN)    Vital Signs:  Heart Rate: 90  SpO2: 95 %    Pain:  Pain Assessment  Pain Assessment: 0-10  Pain Score: 0 - No pain        Objective     Cognition:  Overall Cognitive Status: Within Functional Limits  Orientation Level: Oriented X4     Home Living:  Type of Home: House  Lives With: Adult children, Grandchildren  Home Adaptive Equipment: None  Home Layout: Two level  Alternate Level Stairs-Number of Steps: flight  Entrance Stairs-Number of Steps: 6+6  Bathroom Shower/Tub: Walk-in shower  Bathroom Toilet: Standard  Bathroom Equipment: Shower chair with back  Bathroom Accessibility: bathrooms on both floors, bedroom upstairs     Prior Function:  Level of [place]: Independent with ADLs and functional transfers, Independent with homemaking with ambulation  Receives Help From: Family (also receives Meals-on-Wheels)  ADL Assistance: Independent (with increased difficulty, especially for bathing)  Prior Function Comments: Pt is primary caregiver for 1 yo grandchild.  Prior to ~4 months ago, was primary cook, , and caregiver to multiple grandchildren but has become progressively more SOB and is no longer able to perform IADLs as before.    ADL:  Eating Assistance: Independent  Grooming Assistance: Stand by  Grooming Deficit: Setup  Bathing Assistance: Minimal  Bathing Deficit: Right lower leg including foot, Left lower leg including foot  UE Dressing Assistance: Stand by  UE Dressing Deficit: Setup  LE Dressing Assistance: Moderate  LE Dressing Deficit: Don/doff R sock, Don/doff L sock, Don/doff R shoe, Don/doff L shoe, Steadying  Toileting Assistance with Device: Minimal  ADL Comments: all ADLs projected this date d/t impaired activity tolerance    Activity Tolerance:  Endurance: Decreased tolerance for upright activites  Activity Tolerance Comments: SOB with minimal activity  Bed Mobility/Transfers: Bed  Mobility  Bed Mobility: Yes  Bed Mobility 1  Bed Mobility 1: Supine to sitting  Level of Assistance 1: Distant supervision  Bed Mobility Comments 1: toward the left side of bed with head elevated ~30 degrees  Bed Mobility 2  Bed Mobility  2: Sitting to supine  Level of Assistance 2: Distant supervision    Transfers  Transfer: Yes  Transfer 1  Transfer From 1: Bed to  Transfer to 1: Stand  Technique 1: Sit to stand, Stand to sit  Transfer Level of Assistance 1: Close supervision    Sensation:  Light Touch: No apparent deficits  Sensation Comment: Denies tingling/numbness    Strength:  Strength Comments: BUEs=~3-3+/5 with significant weakness noted in hands and shoulders    Hand Function:  Hand Function  Gross Grasp: Functional  Coordination: Functional    Extremities: RUE   RUE : Within Functional Limits (reports rotator cuff dysfuntion) and LUE   LUE: Within Functional Limits (~100 degrees shoulder flexion)    Outcome Measures: [facility] Daily Activity  Putting on and taking off regular lower body clothing: A lot  Bathing (including washing, rinsing, drying): A little  Putting on and taking off regular upper body clothing: A little  Toileting, which includes using toilet, bedpan or urinal: A little  Taking care of personal grooming such as brushing teeth: A little  Eating Meals: None  Daily Activity - Total Score: 18      Education Documentation  ADL Training, taught by Shae Savage OT at 5/24/2024  4:11 PM.  Learner: Patient  Readiness: Acceptance  Method: Explanation  Response: Verbalizes Understanding, Needs Reinforcement  Comment: Initiated functional transfer retraining    Goals:   Encounter Problems       Encounter Problems (Active)       OT Goals       ADLs (Progressing)       Start:  05/24/24    Expected End:  06/07/24       Pt will complete bathing, dressing, and toileting tasks independently using adaptive aides and with increased time as needed.         Functional transfers (Progressing)       Start:   05/24/24    Expected End:  06/07/24       Pt will complete toilet, bed, and chair transfers independently from elevated seat heights and using bilateral arm supports as needed.         Activity tolerance (Progressing)       Start:  05/24/24    Expected End:  06/07/24       Pt will tolerate 25 minutes of therapeutic activity in order to increase functional endurance needed for I/ADLs.

## 2024-05-24 NOTE — PROGRESS NOTES
Subjective Data:  Patient underlying A-fib RVR.  No active chest pain tightness.  Still running A-fib RVR on the multiple rate control medication including atenolol and Cardizem.  Will add IV Lopressor as well as p.o. digoxin.  Overnight Events:    Monitor shortness of breath  Objective   Last Recorded Vitals  /79 (Patient Position: Sitting)   Pulse 90   Temp 35.9 °C (96.6 °F) (Temporal)   Resp 23   Wt 80.4 kg (177 lb 4.8 oz)   SpO2 95%     Intake/Output Summary (Last 24 hours) at 5/24/2024 1621  Last data filed at 5/23/2024 2200  Gross per 24 hour   Intake --   Output 250 ml   Net -250 ml     Physical Exam:  HEENT: Normocephalic/atraumatic pupils equal react light  Neck exam mild JVD, no bruit  Lung exam clear to auscultation, few crackles at the bases  Cardiac exam is irregular fast rhythm S1-S2, soft systolic murmur heard.   Abdomen soft nontender, nondistended  Extremities no clubbing, cyanosis, trace edema  Neuro exam grossly intact.  Image Results  ECG 12 lead  Poor data quality, interpretation may be adversely affected  Atrial fibrillation with rapid ventricular response  Nonspecific ST and T wave abnormality  Abnormal ECG  Confirmed by Tyrese Dunlap ([id number]) on 5/23/2024 9:33:23 PM    Last Labs:  CBC - 5/24/2024:  5:05 AM  8.3 13.3 220    41.8      CMP - 5/24/2024:  5:05 AM  9.0 6.8 29 --- 0.2   _ 3.6 24 88      PTT - No results in last year.  _   _ _     Inpatient Medications:  Scheduled medications   Medication Dose Route Frequency    apixaban  5 mg oral q12h    atenolol  50 mg oral BID    cholecalciferol  4,000 Units oral Daily    [START ON 5/25/2024] digoxin  250 mcg oral Daily    dilTIAZem CD  120 mg oral BID    ipratropium-albuteroL  3 mL nebulization TID    ondansetron  4 mg intravenous Once    potassium chloride CR  20 mEq oral Daily     Principal Problem:    Atrial fibrillation with rapid ventricular response (Multi)    Assessment/Plan   Patient underlying atrial fibrillation with a  rapid ventricular rate.  Continue current Eliquis, Tylenol as well diltiazem.  Also continue bronchodilators.  A-fib RVR: Patient need to get IV Lopressor as for digoxin to get her rate controlled heart below 90 bpm.  Continue Eliquis.  Eventually outpatient ANYA cardioversion.  Modify risk factor.  Optimize pulmonary status.    Pt. care time is spent includes review of diagnostic tests, labs, radiographs, EKGs, old echoes, cardiac work-up and coordination of care. Assessment, impression and plans are reflected in the note above as well as the orders.    Code Status:  Full Code  I spent 35 minutes in the professional and overall care of this patient.  Hugo Rubin MD

## 2024-05-24 NOTE — PROGRESS NOTES
"Constance M Kocher is a 73 y.o. female on day 2 of admission presenting with Atrial fibrillation with rapid ventricular response (Multi).    Subjective   Seen and examined patient remains in atrial fibrillation with highly variable ventricular response from 88 bpm to 130 bpm with her medication adjustment will occur today anticipating potential discharge tomorrow but the patient is certainly not ready for discharge from a hemodynamic standpoint today       Objective     Physical Exam  Vitals and nursing note reviewed.   Constitutional:       Appearance: Normal appearance.   HENT:      Head: Normocephalic and atraumatic.      Mouth/Throat:      Mouth: Mucous membranes are moist.   Eyes:      Extraocular Movements: Extraocular movements intact.      Pupils: Pupils are equal, round, and reactive to light.   Cardiovascular:      Rate and Rhythm: Tachycardia present. Rhythm irregular.      Pulses: Normal pulses.      Heart sounds: Normal heart sounds.   Pulmonary:      Effort: Pulmonary effort is normal.      Breath sounds: Normal breath sounds.   Abdominal:      General: Abdomen is flat.      Palpations: Abdomen is soft.   Musculoskeletal:         General: Normal range of motion.      Cervical back: Normal range of motion and neck supple.   Skin:     General: Skin is warm and dry.      Capillary Refill: Capillary refill takes less than 2 seconds.   Neurological:      General: No focal deficit present.      Mental Status: She is alert and oriented to person, place, and time. Mental status is at baseline.   Psychiatric:         Mood and Affect: Mood normal.         Last Recorded Vitals  Blood pressure 113/90, pulse (!) 115, temperature 35.9 °C (96.6 °F), temperature source Temporal, resp. rate 18, height 1.626 m (5' 4.02\"), weight 80.4 kg (177 lb 4.8 oz), SpO2 93%.  Intake/Output last 3 Shifts:  I/O last 3 completed shifts:  In: 101.8 (1.3 mL/kg) [I.V.:101.8 (1.3 mL/kg)]  Out: 450 (5.6 mL/kg) [Urine:450 (0.2 " mL/kg/hr)]  Weight: 80.4 kg     Relevant Results              Results for orders placed or performed during the hospital encounter of 05/22/24 (from the past 24 hour(s))   Basic Metabolic Panel   Result Value Ref Range    Glucose 112 (H) 65 - 99 mg/dL    Sodium 137 133 - 145 mmol/L    Potassium 4.3 3.4 - 5.1 mmol/L    Chloride 101 97 - 107 mmol/L    Bicarbonate 26 24 - 31 mmol/L    Urea Nitrogen 22 8 - 25 mg/dL    Creatinine 1.30 0.40 - 1.60 mg/dL    eGFR 44 (L) >60 mL/min/1.73m*2    Calcium 9.0 8.5 - 10.4 mg/dL    Anion Gap 10 <=19 mmol/L   CBC and Auto Differential   Result Value Ref Range    WBC 8.3 4.4 - 11.3 x10*3/uL    nRBC 0.0 0.0 - 0.0 /100 WBCs    RBC 4.38 4.00 - 5.20 x10*6/uL    Hemoglobin 13.3 12.0 - 16.0 g/dL    Hematocrit 41.8 36.0 - 46.0 %    MCV 95 80 - 100 fL    MCH 30.4 26.0 - 34.0 pg    MCHC 31.8 (L) 32.0 - 36.0 g/dL    RDW 16.5 (H) 11.5 - 14.5 %    Platelets 220 150 - 450 x10*3/uL    Neutrophils % 59.4 40.0 - 80.0 %    Immature Granulocytes %, Automated 0.2 0.0 - 0.9 %    Lymphocytes % 31.8 13.0 - 44.0 %    Monocytes % 7.0 2.0 - 10.0 %    Eosinophils % 1.2 0.0 - 6.0 %    Basophils % 0.4 0.0 - 2.0 %    Neutrophils Absolute 4.90 1.60 - 5.50 x10*3/uL    Immature Granulocytes Absolute, Automated 0.02 0.00 - 0.50 x10*3/uL    Lymphocytes Absolute 2.62 0.80 - 3.00 x10*3/uL    Monocytes Absolute 0.58 0.05 - 0.80 x10*3/uL    Eosinophils Absolute 0.10 0.00 - 0.40 x10*3/uL    Basophils Absolute 0.03 0.00 - 0.10 x10*3/uL     Scheduled medications  apixaban, 5 mg, oral, q12h  atenolol, 50 mg, oral, BID  dilTIAZem CD, 120 mg, oral, BID  ipratropium-albuteroL, 3 mL, nebulization, TID  nitroglycerin, 1 patch, transdermal, Daily  ondansetron, 4 mg, intravenous, Once  potassium chloride CR, 20 mEq, oral, Daily      Continuous medications     PRN medications  PRN medications: acetaminophen, ipratropium-albuteroL, polyethylene glycol                 Assessment/Plan   Principal Problem:    Atrial fibrillation with  rapid ventricular response (Multi)    Further rate control adjustment necessary patient is not acceptable risk for discharge today and highly variable ventricular response adjust medications and assess for discharge potentially tomorrow       I spent 45 minutes in the professional and overall care of this patient.      Alfa Walker DO

## 2024-05-24 NOTE — CARE PLAN
Problem: Respiratory  Goal: Minimal/no exertional discomfort or dyspnea this shift  Outcome: Met

## 2024-05-25 LAB
ALBUMIN SERPL-MCNC: 3.2 G/DL (ref 3.5–5)
ALP BLD-CCNC: 83 U/L (ref 35–125)
ALT SERPL-CCNC: 26 U/L (ref 5–40)
ANION GAP SERPL CALC-SCNC: 13 MMOL/L
AST SERPL-CCNC: 35 U/L (ref 5–40)
BILIRUB SERPL-MCNC: 0.5 MG/DL (ref 0.1–1.2)
BUN SERPL-MCNC: 22 MG/DL (ref 8–25)
CALCIUM SERPL-MCNC: 9 MG/DL (ref 8.5–10.4)
CHLORIDE SERPL-SCNC: 102 MMOL/L (ref 97–107)
CO2 SERPL-SCNC: 17 MMOL/L (ref 24–31)
CREAT SERPL-MCNC: 1.1 MG/DL (ref 0.4–1.6)
EGFRCR SERPLBLD CKD-EPI 2021: 53 ML/MIN/1.73M*2
GLUCOSE SERPL-MCNC: 95 MG/DL (ref 65–99)
MAGNESIUM SERPL-MCNC: 2.2 MG/DL (ref 1.6–3.1)
POTASSIUM SERPL-SCNC: 4.7 MMOL/L (ref 3.4–5.1)
PROT SERPL-MCNC: 6.6 G/DL (ref 5.9–7.9)
SODIUM SERPL-SCNC: 132 MMOL/L (ref 133–145)

## 2024-05-25 PROCEDURE — 2500000002 HC RX 250 W HCPCS SELF ADMINISTERED DRUGS (ALT 637 FOR MEDICARE OP, ALT 636 FOR OP/ED): Performed by: INTERNAL MEDICINE

## 2024-05-25 PROCEDURE — 94760 N-INVAS EAR/PLS OXIMETRY 1: CPT

## 2024-05-25 PROCEDURE — 36415 COLL VENOUS BLD VENIPUNCTURE: CPT | Performed by: INTERNAL MEDICINE

## 2024-05-25 PROCEDURE — 2500000001 HC RX 250 WO HCPCS SELF ADMINISTERED DRUGS (ALT 637 FOR MEDICARE OP): Performed by: NURSE PRACTITIONER

## 2024-05-25 PROCEDURE — 83735 ASSAY OF MAGNESIUM: CPT | Performed by: INTERNAL MEDICINE

## 2024-05-25 PROCEDURE — 2500000001 HC RX 250 WO HCPCS SELF ADMINISTERED DRUGS (ALT 637 FOR MEDICARE OP): Performed by: INTERNAL MEDICINE

## 2024-05-25 PROCEDURE — 2500000002 HC RX 250 W HCPCS SELF ADMINISTERED DRUGS (ALT 637 FOR MEDICARE OP, ALT 636 FOR OP/ED): Performed by: NURSE PRACTITIONER

## 2024-05-25 PROCEDURE — 80053 COMPREHEN METABOLIC PANEL: CPT | Performed by: INTERNAL MEDICINE

## 2024-05-25 PROCEDURE — 9420000001 HC RT PATIENT EDUCATION 5 MIN

## 2024-05-25 PROCEDURE — 94640 AIRWAY INHALATION TREATMENT: CPT

## 2024-05-25 PROCEDURE — 99232 SBSQ HOSP IP/OBS MODERATE 35: CPT | Performed by: INTERNAL MEDICINE

## 2024-05-25 PROCEDURE — 2060000001 HC INTERMEDIATE ICU ROOM DAILY

## 2024-05-25 PROCEDURE — 2500000005 HC RX 250 GENERAL PHARMACY W/O HCPCS: Performed by: FAMILY MEDICINE

## 2024-05-25 RX ADMIN — Medication 2 L/MIN: at 08:15

## 2024-05-25 RX ADMIN — Medication 21 PERCENT: at 23:00

## 2024-05-25 RX ADMIN — APIXABAN 5 MG: 5 TABLET, FILM COATED ORAL at 18:10

## 2024-05-25 RX ADMIN — ATENOLOL 50 MG: 50 TABLET ORAL at 09:36

## 2024-05-25 RX ADMIN — DIGOXIN 250 MCG: 250 TABLET ORAL at 09:36

## 2024-05-25 RX ADMIN — POTASSIUM CHLORIDE 20 MEQ: 1500 TABLET, EXTENDED RELEASE ORAL at 09:38

## 2024-05-25 RX ADMIN — Medication 21 PERCENT: at 15:00

## 2024-05-25 RX ADMIN — DILTIAZEM HYDROCHLORIDE 120 MG: 120 CAPSULE, EXTENDED RELEASE ORAL at 19:32

## 2024-05-25 RX ADMIN — Medication 4000 UNITS: at 09:36

## 2024-05-25 RX ADMIN — IPRATROPIUM BROMIDE AND ALBUTEROL SULFATE 3 ML: 2.5; .5 SOLUTION RESPIRATORY (INHALATION) at 22:02

## 2024-05-25 RX ADMIN — ATENOLOL 50 MG: 50 TABLET ORAL at 19:32

## 2024-05-25 RX ADMIN — ACETAMINOPHEN 650 MG: 325 TABLET ORAL at 19:32

## 2024-05-25 RX ADMIN — ACETAMINOPHEN 650 MG: 325 TABLET ORAL at 23:23

## 2024-05-25 RX ADMIN — APIXABAN 5 MG: 5 TABLET, FILM COATED ORAL at 08:36

## 2024-05-25 RX ADMIN — IPRATROPIUM BROMIDE AND ALBUTEROL SULFATE 3 ML: 2.5; .5 SOLUTION RESPIRATORY (INHALATION) at 13:28

## 2024-05-25 RX ADMIN — IPRATROPIUM BROMIDE AND ALBUTEROL SULFATE 3 ML: 2.5; .5 SOLUTION RESPIRATORY (INHALATION) at 08:10

## 2024-05-25 RX ADMIN — DILTIAZEM HYDROCHLORIDE 120 MG: 120 CAPSULE, EXTENDED RELEASE ORAL at 09:36

## 2024-05-25 ASSESSMENT — PAIN SCALES - GENERAL
PAINLEVEL_OUTOF10: 4
PAINLEVEL_OUTOF10: 4
PAINLEVEL_OUTOF10: 0 - NO PAIN

## 2024-05-25 ASSESSMENT — COGNITIVE AND FUNCTIONAL STATUS - GENERAL
MOBILITY SCORE: 24
MOBILITY SCORE: 24
DAILY ACTIVITIY SCORE: 24
DAILY ACTIVITIY SCORE: 24

## 2024-05-25 ASSESSMENT — PAIN - FUNCTIONAL ASSESSMENT
PAIN_FUNCTIONAL_ASSESSMENT: 0-10

## 2024-05-25 NOTE — PROGRESS NOTES
TCC spoke to patient. TCC emailed patient a list of skilled facilities that accept her insurance. If she agrees to any of the facilities, she will need a precert. At this time there is not a safe discharge plan in place. Will follow.      05/25/24 3584   Discharge Planning   Home or Post Acute Services Post acute facilities (Rehab/SNF/etc)   Type of Post Acute Facility Services Rehab   Patient expects to be discharged to: Patient thinking SNF at this time   Does the patient need discharge transport arranged? Yes   RoundTrip coordination needed? Yes

## 2024-05-25 NOTE — PROGRESS NOTES
"Constance M Kocher is a 73 y.o. female on day 3 of admission presenting with Atrial fibrillation with rapid ventricular response (Multi).    Subjective   She was short of breath overnight  Heart rate is better controlled    Objective     Physical Exam  Vitals and nursing note reviewed.   Constitutional:       Appearance: Normal appearance.   HENT:      Head: Normocephalic and atraumatic.      Mouth/Throat:      Mouth: Mucous membranes are moist.   Eyes:      Extraocular Movements: Extraocular movements intact.      Pupils: Pupils are equal, round, and reactive to light.   Cardiovascular:      Rate and Rhythm: Tachycardia present. Rhythm irregular.      Pulses: Normal pulses.      Heart sounds: Normal heart sounds.   Pulmonary:      Effort: Pulmonary effort is normal.      Breath sounds: Normal breath sounds.   Abdominal:      General: Abdomen is flat.      Palpations: Abdomen is soft.   Musculoskeletal:         General: Normal range of motion.      Cervical back: Normal range of motion and neck supple.   Skin:     General: Skin is warm and dry.      Capillary Refill: Capillary refill takes less than 2 seconds.   Neurological:      General: No focal deficit present.      Mental Status: She is alert and oriented to person, place, and time. Mental status is at baseline.   Psychiatric:         Mood and Affect: Mood normal.         Last Recorded Vitals  Blood pressure 120/54, pulse 81, temperature 36.7 °C (98.1 °F), temperature source Temporal, resp. rate 24, height 1.626 m (5' 4.02\"), weight 80.4 kg (177 lb 4.8 oz), SpO2 98%.  Intake/Output last 3 Shifts:  I/O last 3 completed shifts:  In: - (0 mL/kg)   Out: 252 (3.1 mL/kg) [Urine:252 (0.1 mL/kg/hr)]  Weight: 80.4 kg     Relevant Results              Results for orders placed or performed during the hospital encounter of 05/22/24 (from the past 24 hour(s))   Comprehensive Metabolic Panel   Result Value Ref Range    Glucose 95 65 - 99 mg/dL    Sodium 132 (L) 133 - 145 " mmol/L    Potassium 4.7 3.4 - 5.1 mmol/L    Chloride 102 97 - 107 mmol/L    Bicarbonate 17 (L) 24 - 31 mmol/L    Urea Nitrogen 22 8 - 25 mg/dL    Creatinine 1.10 0.40 - 1.60 mg/dL    eGFR 53 (L) >60 mL/min/1.73m*2    Calcium 9.0 8.5 - 10.4 mg/dL    Albumin 3.2 (L) 3.5 - 5.0 g/dL    Alkaline Phosphatase 83 35 - 125 U/L    Total Protein 6.6 5.9 - 7.9 g/dL    AST 35 5 - 40 U/L    Bilirubin, Total 0.5 0.1 - 1.2 mg/dL    ALT 26 5 - 40 U/L    Anion Gap 13 <=19 mmol/L   Magnesium   Result Value Ref Range    Magnesium 2.20 1.60 - 3.10 mg/dL     Scheduled medications  apixaban, 5 mg, oral, q12h  atenolol, 50 mg, oral, BID  cholecalciferol, 4,000 Units, oral, Daily  digoxin, 250 mcg, oral, Daily  dilTIAZem CD, 120 mg, oral, BID  ipratropium-albuteroL, 3 mL, nebulization, TID  ondansetron, 4 mg, intravenous, Once  oxygen, , inhalation, q8h  potassium chloride CR, 20 mEq, oral, Daily      Continuous medications     PRN medications  PRN medications: acetaminophen, ipratropium-albuteroL, metoprolol, polyethylene glycol      Assessment/Plan   Atrial fibrillation with rapid ventricular response  GERD, bilateral carotid stenosis hypertension  Congestive heart failure, EF 50 to 55%    Plan:  Heart rate is better controlled.    Await cardiology recommendations  Continue Eliquis. Eventually outpatient ANYA cardioversion.   DVT prophylaxis      Manav Dumont MD

## 2024-05-25 NOTE — PROGRESS NOTES
"Subjective Data:  Patient had rough night according to her.  She was having episodes of palpitations low heart rate.  Denies having chest pain.    Overnight Events:    Telemetry overnight reviewed no events     Objective Data:  Last Recorded Vitals:  Vitals:    05/25/24 0914 05/25/24 1232 05/25/24 1500 05/25/24 1700   BP: 119/83 120/54  121/75   BP Location: Right arm Right arm  Right arm   Patient Position: Sitting Sitting  Sitting   Pulse: 81 81  81   Resp: 24 24 18   Temp: 36.7 °C (98.1 °F) 36.7 °C (98.1 °F)  36.7 °C (98.1 °F)   TempSrc: Temporal Temporal  Temporal   SpO2: 98% 98% 95%    Weight:       Height:           Last Labs:  CBC - 5/24/2024:  5:05 AM  8.3 13.3 220    41.8      CMP - 5/25/2024:  5:14 AM  9.0 6.6 35 --- 0.5   _ 3.2 26 83      PTT - No results in last year.  _   _ _     No results found for: \"TROPHS\", \"BNP\", \"HGBA1C\", \"LDLCALC\", \"VLDL\"   Last I/O:  I/O last 3 completed shifts:  In: - (0 mL/kg)   Out: 252 (3.1 mL/kg) [Urine:252 (0.1 mL/kg/hr)]  Weight: 80.4 kg     Past Cardiology Tests (Last 3 Years):  EKG:  ECG 12 lead 05/22/2024      ECG 12 Lead 03/27/2024      ECG 12 lead 03/27/2024      ECG 12 lead 03/27/2024    Echo:  Transthoracic Echo (TTE) Complete 03/28/2024    Ejection Fractions:  EF   Date/Time Value Ref Range Status   03/28/2024 08:26 AM 54 %      Cath:  No results found for this or any previous visit from the past 1095 days.    Stress Test:  No results found for this or any previous visit from the past 1095 days.    Cardiac Imaging:  No results found for this or any previous visit from the past 1095 days.      Inpatient Medications:  Scheduled medications   Medication Dose Route Frequency    apixaban  5 mg oral q12h    atenolol  50 mg oral BID    cholecalciferol  4,000 Units oral Daily    digoxin  250 mcg oral Daily    dilTIAZem CD  120 mg oral BID    ipratropium-albuteroL  3 mL nebulization TID    ondansetron  4 mg intravenous Once    oxygen   inhalation q8h    potassium chloride " CR  20 mEq oral Daily     PRN medications   Medication    acetaminophen    ipratropium-albuteroL    metoprolol    polyethylene glycol     Continuous Medications   Medication Dose Last Rate       Physical Exam:  General: Patient is in no acute distress.  HEENT: atraumatic normocephalic.  Neck: is supple jugular venous pressure within normal limits no thyromegaly.  Cardiovascular irregular rate and rhythm normal heart sounds no murmurs rubs or gallops.  Lungs: clear to auscultation bilaterally.  Abdomen: is soft nontender.  Extremities warm to touch no edema.       Assessment/Plan   #1 atrial fibrillation.  Patient diagnosed recently of atrial fibrillation.  Had severe reaction than the [place] in the past.  Very worried about having ANYA cardioversion since she does not want anesthesia.  Transesophageal echocardiogram recently showed normal ejection fraction no major valve abnormalities.  Difficult situation at this point patient remains symptomatic will continue rate control strategy for now we will monitor her heart rate blood pressure.  NT-proBNP within normal limit.  TSH, magnesium and potassium within normal limit.  At this point most likely outpatient follow-up consider treatment with [place] after 4 weeks of being strict on oral anticoagulation.    2.  Hypertension continue current medications with diltiazem.    3.  Hyperlipidemia continue high intensity statin.    Thank you for allowing to participate in her care  Peripheral IV 05/22/24 20 G Left;Dorsal;Posterior Forearm (Active)   Site Assessment Clean;Dry;Intact 05/25/24 0900   Dressing Status Clean;Dry 05/25/24 0900   Number of days: 3       Code Status:  Full Code      [place] Kuo MD

## 2024-05-26 LAB
ALBUMIN SERPL-MCNC: 3.3 G/DL (ref 3.5–5)
ALP BLD-CCNC: 76 U/L (ref 35–125)
ALT SERPL-CCNC: 25 U/L (ref 5–40)
ANION GAP SERPL CALC-SCNC: 8 MMOL/L
AST SERPL-CCNC: 20 U/L (ref 5–40)
BASOPHILS # BLD AUTO: 0.02 X10*3/UL (ref 0–0.1)
BASOPHILS NFR BLD AUTO: 0.3 %
BILIRUB SERPL-MCNC: 0.3 MG/DL (ref 0.1–1.2)
BUN SERPL-MCNC: 19 MG/DL (ref 8–25)
CALCIUM SERPL-MCNC: 9.1 MG/DL (ref 8.5–10.4)
CHLORIDE SERPL-SCNC: 104 MMOL/L (ref 97–107)
CO2 SERPL-SCNC: 26 MMOL/L (ref 24–31)
CREAT SERPL-MCNC: 1.2 MG/DL (ref 0.4–1.6)
EGFRCR SERPLBLD CKD-EPI 2021: 48 ML/MIN/1.73M*2
EOSINOPHIL # BLD AUTO: 0.13 X10*3/UL (ref 0–0.4)
EOSINOPHIL NFR BLD AUTO: 2.1 %
ERYTHROCYTE [DISTWIDTH] IN BLOOD BY AUTOMATED COUNT: 16.9 % (ref 11.5–14.5)
GLUCOSE BLD MANUAL STRIP-MCNC: 135 MG/DL (ref 74–99)
GLUCOSE SERPL-MCNC: 95 MG/DL (ref 65–99)
HCT VFR BLD AUTO: 39.6 % (ref 36–46)
HGB BLD-MCNC: 12.6 G/DL (ref 12–16)
IMM GRANULOCYTES # BLD AUTO: 0.02 X10*3/UL (ref 0–0.5)
IMM GRANULOCYTES NFR BLD AUTO: 0.3 % (ref 0–0.9)
LYMPHOCYTES # BLD AUTO: 2.19 X10*3/UL (ref 0.8–3)
LYMPHOCYTES NFR BLD AUTO: 34.8 %
MAGNESIUM SERPL-MCNC: 1.8 MG/DL (ref 1.6–3.1)
MCH RBC QN AUTO: 30.4 PG (ref 26–34)
MCHC RBC AUTO-ENTMCNC: 31.8 G/DL (ref 32–36)
MCV RBC AUTO: 95 FL (ref 80–100)
MONOCYTES # BLD AUTO: 0.56 X10*3/UL (ref 0.05–0.8)
MONOCYTES NFR BLD AUTO: 8.9 %
NEUTROPHILS # BLD AUTO: 3.38 X10*3/UL (ref 1.6–5.5)
NEUTROPHILS NFR BLD AUTO: 53.6 %
NRBC BLD-RTO: 0 /100 WBCS (ref 0–0)
PLATELET # BLD AUTO: 195 X10*3/UL (ref 150–450)
POTASSIUM SERPL-SCNC: 4.5 MMOL/L (ref 3.4–5.1)
PROT SERPL-MCNC: 6.3 G/DL (ref 5.9–7.9)
RBC # BLD AUTO: 4.15 X10*6/UL (ref 4–5.2)
SODIUM SERPL-SCNC: 138 MMOL/L (ref 133–145)
WBC # BLD AUTO: 6.3 X10*3/UL (ref 4.4–11.3)

## 2024-05-26 PROCEDURE — 83735 ASSAY OF MAGNESIUM: CPT | Performed by: INTERNAL MEDICINE

## 2024-05-26 PROCEDURE — 94760 N-INVAS EAR/PLS OXIMETRY 1: CPT

## 2024-05-26 PROCEDURE — 2500000002 HC RX 250 W HCPCS SELF ADMINISTERED DRUGS (ALT 637 FOR MEDICARE OP, ALT 636 FOR OP/ED): Performed by: NURSE PRACTITIONER

## 2024-05-26 PROCEDURE — 36415 COLL VENOUS BLD VENIPUNCTURE: CPT | Performed by: FAMILY MEDICINE

## 2024-05-26 PROCEDURE — 2500000001 HC RX 250 WO HCPCS SELF ADMINISTERED DRUGS (ALT 637 FOR MEDICARE OP): Performed by: INTERNAL MEDICINE

## 2024-05-26 PROCEDURE — 2500000002 HC RX 250 W HCPCS SELF ADMINISTERED DRUGS (ALT 637 FOR MEDICARE OP, ALT 636 FOR OP/ED): Performed by: INTERNAL MEDICINE

## 2024-05-26 PROCEDURE — 2500000001 HC RX 250 WO HCPCS SELF ADMINISTERED DRUGS (ALT 637 FOR MEDICARE OP): Performed by: FAMILY MEDICINE

## 2024-05-26 PROCEDURE — 84460 ALANINE AMINO (ALT) (SGPT): CPT | Performed by: INTERNAL MEDICINE

## 2024-05-26 PROCEDURE — 99233 SBSQ HOSP IP/OBS HIGH 50: CPT | Performed by: INTERNAL MEDICINE

## 2024-05-26 PROCEDURE — 2500000005 HC RX 250 GENERAL PHARMACY W/O HCPCS: Performed by: FAMILY MEDICINE

## 2024-05-26 PROCEDURE — 2060000001 HC INTERMEDIATE ICU ROOM DAILY

## 2024-05-26 PROCEDURE — 9420000001 HC RT PATIENT EDUCATION 5 MIN

## 2024-05-26 PROCEDURE — 82947 ASSAY GLUCOSE BLOOD QUANT: CPT

## 2024-05-26 PROCEDURE — 85025 COMPLETE CBC W/AUTO DIFF WBC: CPT | Performed by: FAMILY MEDICINE

## 2024-05-26 PROCEDURE — 2500000001 HC RX 250 WO HCPCS SELF ADMINISTERED DRUGS (ALT 637 FOR MEDICARE OP): Performed by: NURSE PRACTITIONER

## 2024-05-26 PROCEDURE — 94640 AIRWAY INHALATION TREATMENT: CPT

## 2024-05-26 RX ORDER — DIGOXIN 125 MCG
125 TABLET ORAL DAILY
Status: DISCONTINUED | OUTPATIENT
Start: 2024-05-27 | End: 2024-05-27

## 2024-05-26 RX ORDER — ROPINIROLE 2 MG/1
2 TABLET, FILM COATED ORAL NIGHTLY
Status: DISCONTINUED | OUTPATIENT
Start: 2024-05-26 | End: 2024-05-27 | Stop reason: HOSPADM

## 2024-05-26 RX ORDER — DILTIAZEM HYDROCHLORIDE 120 MG/1
120 CAPSULE, COATED, EXTENDED RELEASE ORAL DAILY
Status: DISCONTINUED | OUTPATIENT
Start: 2024-05-27 | End: 2024-05-27 | Stop reason: HOSPADM

## 2024-05-26 RX ADMIN — ACETAMINOPHEN 650 MG: 325 TABLET ORAL at 04:57

## 2024-05-26 RX ADMIN — Medication 21 PERCENT: at 07:00

## 2024-05-26 RX ADMIN — ROPINIROLE HYDROCHLORIDE 2 MG: 2 TABLET, FILM COATED ORAL at 20:25

## 2024-05-26 RX ADMIN — IPRATROPIUM BROMIDE AND ALBUTEROL SULFATE 3 ML: 2.5; .5 SOLUTION RESPIRATORY (INHALATION) at 08:17

## 2024-05-26 RX ADMIN — ATENOLOL 50 MG: 50 TABLET ORAL at 20:25

## 2024-05-26 RX ADMIN — IPRATROPIUM BROMIDE AND ALBUTEROL SULFATE 3 ML: 2.5; .5 SOLUTION RESPIRATORY (INHALATION) at 12:39

## 2024-05-26 RX ADMIN — IPRATROPIUM BROMIDE AND ALBUTEROL SULFATE 3 ML: 2.5; .5 SOLUTION RESPIRATORY (INHALATION) at 21:02

## 2024-05-26 RX ADMIN — POTASSIUM CHLORIDE 20 MEQ: 1500 TABLET, EXTENDED RELEASE ORAL at 10:33

## 2024-05-26 RX ADMIN — Medication 3 L/MIN: at 23:00

## 2024-05-26 RX ADMIN — ATENOLOL 50 MG: 50 TABLET ORAL at 10:33

## 2024-05-26 RX ADMIN — APIXABAN 5 MG: 5 TABLET, FILM COATED ORAL at 04:57

## 2024-05-26 RX ADMIN — DIGOXIN 250 MCG: 250 TABLET ORAL at 10:33

## 2024-05-26 RX ADMIN — DILTIAZEM HYDROCHLORIDE 120 MG: 120 CAPSULE, EXTENDED RELEASE ORAL at 10:33

## 2024-05-26 RX ADMIN — Medication 4000 UNITS: at 10:33

## 2024-05-26 RX ADMIN — APIXABAN 5 MG: 5 TABLET, FILM COATED ORAL at 20:25

## 2024-05-26 RX ADMIN — Medication 32 PERCENT: at 15:00

## 2024-05-26 ASSESSMENT — PAIN SCALES - GENERAL
PAINLEVEL_OUTOF10: 4
PAINLEVEL_OUTOF10: 4

## 2024-05-26 ASSESSMENT — COGNITIVE AND FUNCTIONAL STATUS - GENERAL
MOBILITY SCORE: 24
MOBILITY SCORE: 24
DAILY ACTIVITIY SCORE: 24
DAILY ACTIVITIY SCORE: 24

## 2024-05-26 ASSESSMENT — PAIN SCALES - WONG BAKER: WONGBAKER_NUMERICALRESPONSE: NO HURT

## 2024-05-26 ASSESSMENT — PAIN - FUNCTIONAL ASSESSMENT
PAIN_FUNCTIONAL_ASSESSMENT: 0-10
PAIN_FUNCTIONAL_ASSESSMENT: 0-10

## 2024-05-26 NOTE — PROGRESS NOTES
"Constance M Kocher is a 73 y.o. female on day 4 of admission presenting with Atrial fibrillation with rapid ventricular response (Multi).    Subjective   She continues to be short of breath  She was concerned because she became bradycardic overnight with heart rate in the 40s    Objective     Physical Exam  Vitals and nursing note reviewed.   Constitutional:       Appearance: Normal appearance.   HENT:      Head: Normocephalic and atraumatic.      Mouth/Throat:      Mouth: Mucous membranes are moist.   Eyes:      Extraocular Movements: Extraocular movements intact.      Pupils: Pupils are equal, round, and reactive to light.   Cardiovascular:      Rate and Rhythm: Tachycardia present. Rhythm irregular.      Pulses: Normal pulses.      Heart sounds: Normal heart sounds.   Pulmonary:      Effort: Pulmonary effort is normal.      Breath sounds: Normal breath sounds.   Abdominal:      General: Abdomen is flat.      Palpations: Abdomen is soft.   Musculoskeletal:         General: Normal range of motion.      Cervical back: Normal range of motion and neck supple.   Skin:     General: Skin is warm and dry.      Capillary Refill: Capillary refill takes less than 2 seconds.   Neurological:      General: No focal deficit present.      Mental Status: She is alert and oriented to person, place, and time. Mental status is at baseline.   Psychiatric:         Mood and Affect: Mood normal.         Last Recorded Vitals  Blood pressure (!) 126/100, pulse 72, temperature 36.7 °C (98.1 °F), temperature source Temporal, resp. rate 16, height 1.626 m (5' 4.02\"), weight 80.4 kg (177 lb 4.8 oz), SpO2 94%.  Intake/Output last 3 Shifts:  I/O last 3 completed shifts:  In: 240 (3 mL/kg) [P.O.:240]  Out: 2 (0 mL/kg) [Urine:2 (0 mL/kg/hr)]  Weight: 80.4 kg     Relevant Results              Results for orders placed or performed during the hospital encounter of 05/22/24 (from the past 24 hour(s))   Comprehensive Metabolic Panel   Result Value Ref " Range    Glucose 95 65 - 99 mg/dL    Sodium 138 133 - 145 mmol/L    Potassium 4.5 3.4 - 5.1 mmol/L    Chloride 104 97 - 107 mmol/L    Bicarbonate 26 24 - 31 mmol/L    Urea Nitrogen 19 8 - 25 mg/dL    Creatinine 1.20 0.40 - 1.60 mg/dL    eGFR 48 (L) >60 mL/min/1.73m*2    Calcium 9.1 8.5 - 10.4 mg/dL    Albumin 3.3 (L) 3.5 - 5.0 g/dL    Alkaline Phosphatase 76 35 - 125 U/L    Total Protein 6.3 5.9 - 7.9 g/dL    AST 20 5 - 40 U/L    Bilirubin, Total 0.3 0.1 - 1.2 mg/dL    ALT 25 5 - 40 U/L    Anion Gap 8 <=19 mmol/L   Magnesium   Result Value Ref Range    Magnesium 1.80 1.60 - 3.10 mg/dL   CBC and Auto Differential   Result Value Ref Range    WBC 6.3 4.4 - 11.3 x10*3/uL    nRBC 0.0 0.0 - 0.0 /100 WBCs    RBC 4.15 4.00 - 5.20 x10*6/uL    Hemoglobin 12.6 12.0 - 16.0 g/dL    Hematocrit 39.6 36.0 - 46.0 %    MCV 95 80 - 100 fL    MCH 30.4 26.0 - 34.0 pg    MCHC 31.8 (L) 32.0 - 36.0 g/dL    RDW 16.9 (H) 11.5 - 14.5 %    Platelets 195 150 - 450 x10*3/uL    Neutrophils % 53.6 40.0 - 80.0 %    Immature Granulocytes %, Automated 0.3 0.0 - 0.9 %    Lymphocytes % 34.8 13.0 - 44.0 %    Monocytes % 8.9 2.0 - 10.0 %    Eosinophils % 2.1 0.0 - 6.0 %    Basophils % 0.3 0.0 - 2.0 %    Neutrophils Absolute 3.38 1.60 - 5.50 x10*3/uL    Immature Granulocytes Absolute, Automated 0.02 0.00 - 0.50 x10*3/uL    Lymphocytes Absolute 2.19 0.80 - 3.00 x10*3/uL    Monocytes Absolute 0.56 0.05 - 0.80 x10*3/uL    Eosinophils Absolute 0.13 0.00 - 0.40 x10*3/uL    Basophils Absolute 0.02 0.00 - 0.10 x10*3/uL     Scheduled medications  apixaban, 5 mg, oral, q12h  atenolol, 50 mg, oral, BID  cholecalciferol, 4,000 Units, oral, Daily  digoxin, 250 mcg, oral, Daily  dilTIAZem CD, 120 mg, oral, BID  ipratropium-albuteroL, 3 mL, nebulization, TID  ondansetron, 4 mg, intravenous, Once  oxygen, , inhalation, q8h  potassium chloride CR, 20 mEq, oral, Daily      Continuous medications     PRN medications  PRN medications: acetaminophen, ipratropium-albuteroL,  metoprolol, polyethylene glycol      Assessment/Plan   Atrial fibrillation with rapid ventricular response  GERD, bilateral carotid stenosis, hypertension, hyperlipidemia  Congestive heart failure, EF 50 to 55%    Plan:  The patient continues to be short of breath.  She was bradycardic overnight with a heart rate in the 40s.  Will discuss with cardiology  Continue Eliquis. Eventually outpatient ANYA cardioversion.   DVT prophylaxis      Manav Dumont MD

## 2024-05-26 NOTE — PROGRESS NOTES
"Subjective Data:  Patient still complain of shortness of breath.  Does not feel good.  Denies having chest pain.    Overnight Events:    Meds reviewed heart rate well-controlled.     Objective Data:  Last Recorded Vitals:  Vitals:    05/26/24 0454 05/26/24 0700 05/26/24 0817 05/26/24 1239   BP: 102/72  (!) 126/100    BP Location: Right arm  Right arm    Patient Position: Lying  Sitting    Pulse: 72  72    Resp: 16  16    Temp: 36.5 °C (97.7 °F)  36.7 °C (98.1 °F)    TempSrc: Temporal  Temporal    SpO2: 95% 94% 94% 95%   Weight:       Height:           Last Labs:  CBC - 5/26/2024:  5:43 AM  6.3 12.6 195    39.6      CMP - 5/26/2024:  5:43 AM  9.1 6.3 20 --- 0.3   _ 3.3 25 76      PTT - No results in last year.  _   _ _     No results found for: \"TROPHS\", \"BNP\", \"HGBA1C\", \"LDLCALC\", \"VLDL\"   Last I/O:  I/O last 3 completed shifts:  In: 240 (3 mL/kg) [P.O.:240]  Out: 2 (0 mL/kg) [Urine:2 (0 mL/kg/hr)]  Weight: 80.4 kg     Past Cardiology Tests (Last 3 Years):  EKG:  ECG 12 lead 05/22/2024      ECG 12 Lead 03/27/2024      ECG 12 lead 03/27/2024      ECG 12 lead 03/27/2024    Echo:  Transthoracic Echo (TTE) Complete 03/28/2024    Ejection Fractions:  EF   Date/Time Value Ref Range Status   03/28/2024 08:26 AM 54 %      Cath:  No results found for this or any previous visit from the past 1095 days.    Stress Test:  No results found for this or any previous visit from the past 1095 days.    Cardiac Imaging:  No results found for this or any previous visit from the past 1095 days.      Inpatient Medications:  Scheduled medications   Medication Dose Route Frequency    apixaban  5 mg oral q12h    atenolol  50 mg oral BID    cholecalciferol  4,000 Units oral Daily    digoxin  250 mcg oral Daily    dilTIAZem CD  120 mg oral BID    ipratropium-albuteroL  3 mL nebulization TID    ondansetron  4 mg intravenous Once    oxygen   inhalation q8h    potassium chloride CR  20 mEq oral Daily     PRN medications   Medication    " acetaminophen    ipratropium-albuteroL    metoprolol    polyethylene glycol     Continuous Medications   Medication Dose Last Rate       Physical Exam:  General: Patient is in no acute distress.  HEENT: atraumatic normocephalic.  Neck: is supple jugular venous pressure within normal limits no thyromegaly.  Cardiovascular regular rate and rhythm normal heart sounds no murmurs rubs or gallops.  Lungs: clear to auscultation bilaterally.  Abdomen: is soft nontender.  Extremities warm to touch no edema.  Neurologic examination: patient is awake alert oriented to person, place, date/time.  Psychiatric examination: patient has good insight denies feeling suicidal and depressed.  Pulses 2+ intact bilaterally     Assessment/Plan   1 atrial fibrillation.  Patient diagnosed recently of atrial fibrillation.  Had severe reaction to anesthesia in the past for time she according to her she then became back.  I had lengthy discussion with her.  She does not want to feel this way anyway she is willing to take the risk of anesthesia for cardioversion.  For now my plan is to proceed with ANYA using Versed and fentanyl very gentle dose.  And if anesthesia feels patient is candidate for cardioversion with propofol will give her propofol if not we will try with Corvert if she does not have left atrial appendage thrombus.  Discussed with her in length options.       2.  Hypertension continue current medications with diltiazem.     3.  Hyperlipidemia continue high intensity statin.    4 shortness of breath of unclear etiology doubt this is cardiac.  Recommend management by primary team     Thank you for allowing to participate in her care  Peripheral IV 05/22/24 20 G Left;Dorsal;Posterior Forearm (Active)   Site Assessment Clean;Dry;Intact 05/26/24 0900   Dressing Status Clean;Dry;Occlusive 05/26/24 0900   Number of days: 4       Code Status:  Full Code    Los Kuo MD

## 2024-05-26 NOTE — PROGRESS NOTES
05/26/24 0836   Discharge Planning   Patient expects to be discharged to: Patient still considering SNF vs C, but states she is undecided at this time. Reports she is short of breath today, did not sleep well. Care Transitions will continue to follow and assist with discharge needs.   Does the patient need discharge transport arranged? Yes   RoundTrip coordination needed? Yes   Has discharge transport been arranged? No

## 2024-05-27 VITALS
RESPIRATION RATE: 24 BRPM | BODY MASS INDEX: 33.2 KG/M2 | DIASTOLIC BLOOD PRESSURE: 84 MMHG | SYSTOLIC BLOOD PRESSURE: 132 MMHG | HEART RATE: 66 BPM | TEMPERATURE: 96.4 F | OXYGEN SATURATION: 99 % | HEIGHT: 64 IN | WEIGHT: 194.45 LBS

## 2024-05-27 LAB
ANION GAP SERPL CALC-SCNC: 10 MMOL/L
BUN SERPL-MCNC: 16 MG/DL (ref 8–25)
CALCIUM SERPL-MCNC: 9.8 MG/DL (ref 8.5–10.4)
CHLORIDE SERPL-SCNC: 102 MMOL/L (ref 97–107)
CO2 SERPL-SCNC: 24 MMOL/L (ref 24–31)
CREAT SERPL-MCNC: 1.2 MG/DL (ref 0.4–1.6)
EGFRCR SERPLBLD CKD-EPI 2021: 48 ML/MIN/1.73M*2
ERYTHROCYTE [DISTWIDTH] IN BLOOD BY AUTOMATED COUNT: 17 % (ref 11.5–14.5)
GLUCOSE SERPL-MCNC: 103 MG/DL (ref 65–99)
HCT VFR BLD AUTO: 46.3 % (ref 36–46)
HGB BLD-MCNC: 14.4 G/DL (ref 12–16)
MCH RBC QN AUTO: 29.9 PG (ref 26–34)
MCHC RBC AUTO-ENTMCNC: 31.1 G/DL (ref 32–36)
MCV RBC AUTO: 96 FL (ref 80–100)
NRBC BLD-RTO: 0 /100 WBCS (ref 0–0)
PLATELET # BLD AUTO: 236 X10*3/UL (ref 150–450)
POTASSIUM SERPL-SCNC: 4.8 MMOL/L (ref 3.4–5.1)
RBC # BLD AUTO: 4.82 X10*6/UL (ref 4–5.2)
SODIUM SERPL-SCNC: 136 MMOL/L (ref 133–145)
WBC # BLD AUTO: 7.5 X10*3/UL (ref 4.4–11.3)

## 2024-05-27 PROCEDURE — 2500000001 HC RX 250 WO HCPCS SELF ADMINISTERED DRUGS (ALT 637 FOR MEDICARE OP): Performed by: INTERNAL MEDICINE

## 2024-05-27 PROCEDURE — 94760 N-INVAS EAR/PLS OXIMETRY 1: CPT

## 2024-05-27 PROCEDURE — 94640 AIRWAY INHALATION TREATMENT: CPT

## 2024-05-27 PROCEDURE — 2500000002 HC RX 250 W HCPCS SELF ADMINISTERED DRUGS (ALT 637 FOR MEDICARE OP, ALT 636 FOR OP/ED): Performed by: INTERNAL MEDICINE

## 2024-05-27 PROCEDURE — 85027 COMPLETE CBC AUTOMATED: CPT | Performed by: FAMILY MEDICINE

## 2024-05-27 PROCEDURE — 9420000001 HC RT PATIENT EDUCATION 5 MIN

## 2024-05-27 PROCEDURE — 2500000002 HC RX 250 W HCPCS SELF ADMINISTERED DRUGS (ALT 637 FOR MEDICARE OP, ALT 636 FOR OP/ED): Performed by: FAMILY MEDICINE

## 2024-05-27 PROCEDURE — 2500000005 HC RX 250 GENERAL PHARMACY W/O HCPCS: Performed by: FAMILY MEDICINE

## 2024-05-27 PROCEDURE — 99233 SBSQ HOSP IP/OBS HIGH 50: CPT | Performed by: INTERNAL MEDICINE

## 2024-05-27 PROCEDURE — 80048 BASIC METABOLIC PNL TOTAL CA: CPT | Performed by: FAMILY MEDICINE

## 2024-05-27 PROCEDURE — 2500000001 HC RX 250 WO HCPCS SELF ADMINISTERED DRUGS (ALT 637 FOR MEDICARE OP): Performed by: NURSE PRACTITIONER

## 2024-05-27 PROCEDURE — 36415 COLL VENOUS BLD VENIPUNCTURE: CPT | Performed by: FAMILY MEDICINE

## 2024-05-27 RX ORDER — SULFAMETHOXAZOLE AND TRIMETHOPRIM 400; 80 MG/1; MG/1
1 TABLET ORAL EVERY 12 HOURS SCHEDULED
Status: DISCONTINUED | OUTPATIENT
Start: 2024-05-27 | End: 2024-05-27 | Stop reason: HOSPADM

## 2024-05-27 RX ORDER — DILTIAZEM HYDROCHLORIDE 120 MG/1
120 CAPSULE, EXTENDED RELEASE ORAL DAILY
Qty: 30 CAPSULE | Refills: 0 | Status: SHIPPED | OUTPATIENT
Start: 2024-05-28 | End: 2024-06-04 | Stop reason: WASHOUT

## 2024-05-27 RX ORDER — ROPINIROLE 2 MG/1
2 TABLET, FILM COATED ORAL NIGHTLY
Qty: 30 TABLET | Refills: 0 | Status: SHIPPED | OUTPATIENT
Start: 2024-05-27 | End: 2024-05-27

## 2024-05-27 RX ORDER — SULFAMETHOXAZOLE AND TRIMETHOPRIM 400; 80 MG/1; MG/1
1 TABLET ORAL EVERY 12 HOURS SCHEDULED
Qty: 6 TABLET | Refills: 0 | Status: SHIPPED | OUTPATIENT
Start: 2024-05-27 | End: 2024-05-27

## 2024-05-27 RX ORDER — ROPINIROLE 2 MG/1
2 TABLET, FILM COATED ORAL NIGHTLY
Qty: 30 TABLET | Refills: 1 | Status: SHIPPED | OUTPATIENT
Start: 2024-05-27

## 2024-05-27 RX ORDER — DILTIAZEM HYDROCHLORIDE 120 MG/1
120 CAPSULE, EXTENDED RELEASE ORAL DAILY
Qty: 30 CAPSULE | Refills: 0 | Status: SHIPPED | OUTPATIENT
Start: 2024-05-28 | End: 2024-05-27

## 2024-05-27 RX ORDER — SULFAMETHOXAZOLE AND TRIMETHOPRIM 400; 80 MG/1; MG/1
1 TABLET ORAL EVERY 12 HOURS SCHEDULED
Qty: 6 TABLET | Refills: 0 | Status: SHIPPED | OUTPATIENT
Start: 2024-05-27 | End: 2024-05-30

## 2024-05-27 RX ADMIN — IPRATROPIUM BROMIDE AND ALBUTEROL SULFATE 3 ML: 2.5; .5 SOLUTION RESPIRATORY (INHALATION) at 13:49

## 2024-05-27 RX ADMIN — IPRATROPIUM BROMIDE AND ALBUTEROL SULFATE 3 ML: 2.5; .5 SOLUTION RESPIRATORY (INHALATION) at 08:18

## 2024-05-27 RX ADMIN — DILTIAZEM HYDROCHLORIDE 120 MG: 120 CAPSULE, EXTENDED RELEASE ORAL at 09:21

## 2024-05-27 RX ADMIN — DIGOXIN 125 MCG: 125 TABLET ORAL at 09:20

## 2024-05-27 RX ADMIN — Medication 2 L/MIN: at 07:00

## 2024-05-27 RX ADMIN — ATENOLOL 50 MG: 50 TABLET ORAL at 09:21

## 2024-05-27 RX ADMIN — SULFAMETHOXAZOLE AND TRIMETHOPRIM 1 TABLET: 400; 80 TABLET ORAL at 11:50

## 2024-05-27 RX ADMIN — Medication 4000 UNITS: at 09:21

## 2024-05-27 RX ADMIN — APIXABAN 5 MG: 5 TABLET, FILM COATED ORAL at 09:25

## 2024-05-27 ASSESSMENT — COGNITIVE AND FUNCTIONAL STATUS - GENERAL
MOBILITY SCORE: 24
DAILY ACTIVITIY SCORE: 24

## 2024-05-27 ASSESSMENT — PAIN SCALES - GENERAL: PAINLEVEL_OUTOF10: 0 - NO PAIN

## 2024-05-27 NOTE — PROGRESS NOTES
"Constance M Kocher is a 73 y.o. female on day 5 of admission presenting with Atrial fibrillation with rapid ventricular response (Multi).    Subjective   She feels better today  She thinks record helped her neuropathy last night  She is also complaining of pain in the left anterior elbow area     Objective     Physical Exam  Vitals and nursing note reviewed.   Constitutional:       Appearance: Normal appearance.   HENT:      Head: Normocephalic and atraumatic.      Mouth/Throat:      Mouth: Mucous membranes are moist.   Eyes:      Extraocular Movements: Extraocular movements intact.      Pupils: Pupils are equal, round, and reactive to light.   Cardiovascular:      Rate and Rhythm: Tachycardia present. Rhythm irregular.      Pulses: Normal pulses.      Heart sounds: Normal heart sounds.   Pulmonary:      Effort: Pulmonary effort is normal.      Breath sounds: Normal breath sounds.   Abdominal:      General: Abdomen is flat.      Palpations: Abdomen is soft.   Musculoskeletal:         General: Normal range of motion.      Cervical back: Normal range of motion and neck supple.   Skin:     General: Skin is warm and dry.      Capillary Refill: Capillary refill takes less than 2 seconds.   Neurological:      General: No focal deficit present.      Mental Status: She is alert and oriented to person, place, and time. Mental status is at baseline.   Psychiatric:         Mood and Affect: Mood normal.         Last Recorded Vitals  Blood pressure 126/73, pulse 83, temperature 35.2 °C (95.4 °F), temperature source Temporal, resp. rate 14, height 1.626 m (5' 4.02\"), weight 88.2 kg (194 lb 7.1 oz), SpO2 94%.  Intake/Output last 3 Shifts:  I/O last 3 completed shifts:  In: 480 (5.4 mL/kg) [P.O.:480]  Out: - (0 mL/kg)   Weight: 88.2 kg     Relevant Results              Results for orders placed or performed during the hospital encounter of 05/22/24 (from the past 24 hour(s))   POCT GLUCOSE   Result Value Ref Range    POCT Glucose " 135 (H) 74 - 99 mg/dL   Basic Metabolic Panel   Result Value Ref Range    Glucose 103 (H) 65 - 99 mg/dL    Sodium 136 133 - 145 mmol/L    Potassium 4.8 3.4 - 5.1 mmol/L    Chloride 102 97 - 107 mmol/L    Bicarbonate 24 24 - 31 mmol/L    Urea Nitrogen 16 8 - 25 mg/dL    Creatinine 1.20 0.40 - 1.60 mg/dL    eGFR 48 (L) >60 mL/min/1.73m*2    Calcium 9.8 8.5 - 10.4 mg/dL    Anion Gap 10 <=19 mmol/L   CBC   Result Value Ref Range    WBC 7.5 4.4 - 11.3 x10*3/uL    nRBC 0.0 0.0 - 0.0 /100 WBCs    RBC 4.82 4.00 - 5.20 x10*6/uL    Hemoglobin 14.4 12.0 - 16.0 g/dL    Hematocrit 46.3 (H) 36.0 - 46.0 %    MCV 96 80 - 100 fL    MCH 29.9 26.0 - 34.0 pg    MCHC 31.1 (L) 32.0 - 36.0 g/dL    RDW 17.0 (H) 11.5 - 14.5 %    Platelets 236 150 - 450 x10*3/uL     Scheduled medications  apixaban, 5 mg, oral, q12h  atenolol, 50 mg, oral, BID  cholecalciferol, 4,000 Units, oral, Daily  digoxin, 125 mcg, oral, Daily  dilTIAZem CD, 120 mg, oral, Daily  ipratropium-albuteroL, 3 mL, nebulization, TID  ondansetron, 4 mg, intravenous, Once  oxygen, , inhalation, q8h  potassium chloride CR, 20 mEq, oral, Daily  rOPINIRole, 2 mg, oral, Nightly  sulfamethoxazole-trimethoprim, 1 tablet, oral, q12h LUNA      Continuous medications     PRN medications  PRN medications: acetaminophen, ipratropium-albuteroL, metoprolol, polyethylene glycol      Assessment/Plan   Atrial fibrillation with rapid ventricular response  GERD, bilateral carotid stenosis, hypertension, hyperlipidemia  Congestive heart failure, EF 50 to 55%    Plan:  The patient will undergo ANYA cardioversion, possibly tomorrow  She is complaining of left elbow pain, appears to have mild soft tissue infection at the site of a previous peripheral IV.  I will start a short course of p.o. Bactrim  DVT prophylaxis      Manav Dumont MD

## 2024-05-27 NOTE — DISCHARGE SUMMARY
Discharge Diagnosis  Atrial fibrillation with rapid ventricular response (Multi)      Discharge Meds     Your medication list        START taking these medications        Instructions Last Dose Given Next Dose Due   atenolol 50 mg tablet  Commonly known as: Tenormin      Take 1 tablet (50 mg) by mouth 2 times a day.       dilTIAZem  mg 24 hr capsule  Commonly known as: Cardizem CD      Take 1 capsule (120 mg) by mouth 2 times a day.       dilTIAZem  mg 24 hr capsule  Commonly known as: Tiazac  Start taking on: May 28, 2024      Take 1 capsule (120 mg) by mouth once daily.       rOPINIRole 2 mg tablet  Commonly known as: Requip      Take 1 tablet (2 mg) by mouth once daily at bedtime.       sulfamethoxazole-trimethoprim 400-80 mg tablet  Commonly known as: Bactrim      Take 1 tablet by mouth every 12 hours for 3 days.              CONTINUE taking these medications        Instructions Last Dose Given Next Dose Due   acetaminophen 325 mg tablet  Commonly known as: Tylenol      Take 2 tablets (650 mg) by mouth every 4 hours if needed for mild pain (1 - 3) or fever (temp greater than 38.0 C).       albuterol 90 mcg/actuation inhaler      Inhale 1 puff every 6 hours if needed for shortness of breath.       apixaban 5 mg tablet  Commonly known as: Eliquis      Take 1 tablet (5 mg) by mouth every 12 hours.       guaiFENesin 100 mg/5 mL syrup  Commonly known as: Robitussin      Take 10 mL (200 mg) by mouth every 4 hours if needed for congestion.       Vitamin D3 50 mcg (2,000 unit) capsule  Generic drug: cholecalciferol      Take 2 capsules (100 mcg) by mouth once daily.              STOP taking these medications      bisoprolol 10 mg tablet  Commonly known as: Zebeta        potassium chloride CR 20 mEq ER tablet  Commonly known as: Klor-Con M20        spironolactone 25 mg tablet  Commonly known as: Aldactone        torsemide 20 mg tablet  Commonly known as: Demadex                  Where to Get Your Medications         These medications were sent to [facility] - [facility] - [facility] AT Portal to Registered [facility] Sites  [facility], [place] [address]      Phone: [phone]   dilTIAZem  mg 24 hr capsule  rOPINIRole 2 mg tablet  sulfamethoxazole-trimethoprim 400-80 mg tablet       These medications were sent to [facility]  [address], [address], [place] [address]      Hours: 9 AM to 6 PM Mon-Fri, 9 AM to 1 PM Sat Phone: [phone]   atenolol 50 mg tablet  dilTIAZem  mg 24 hr capsule         Test Results Pending At Discharge  Pending Labs       No current pending labs.            Hospital Course   Patient is a 73-year-old female patient with past medical history of hypertension, A-fib, CHF, carotid stenosis, GERD, and PMR; presented at [facility] with shortness of breath and chest pain.  Patient was noted with elevated heart rate at home with heart rate of 160s and patient called 911.  In the emergency room EKG showed A-fib with RVR, patient was started patient on Cardizem drip, heart rate improved.  Currently heart rate is in the 80s and 90s.  Patient also reports improved respiratory status, overall she is feeling better.  Denies any nausea/ vomiting or abdominal pain, denies any fevers or chills.  Recently hospitalized with similar episode, patient was started on Cardizem and bisoprolol but Cardizem was stopped as outpatient.  Patient is on Eliquis and compliant with her medications.  Patient sees Dr. Rubin as outpatient, the long-term plan was cardioversion.  Patient reports significant side effects from anesthesia such as anaphylaxis.  Chest x-ray was unremarkable, proBNP elevated-[id number], troponin 13 and 14  Patient will be admitted for cardiology evaluation and treatment for A-fib with RVR.    The patient was treated with digoxin, atenolol, and diltiazem. Cardiology was consutled. ANYA cardioversion was  considered, however, the patient had some allergies putting her at risk for side effects from anesthesia. He symptoms improved. She was discharged in a stable condition on atenolol and daily cardizem, as well as eliquis, with close fu with Dr figueroa     Pertinent Physical Exam At Time of Discharge  Physical Exam  General: Patient is in no acute distress.  HEENT: atraumatic normocephalic.  Neck: is supple jugular venous pressure within normal limits no thyromegaly.  Cardiovascular irregular rate and rhythm normal heart sounds no murmurs rubs or gallops.  Lungs: clear to auscultation bilaterally.  Abdomen: is soft nontender.  Extremities warm to touch no edema.    Outpatient Follow-Up  Future Appointments   Date Time Provider Department Center   7/31/2024 10:45 AM Hugo Figueroa MD [id number] [facility]         Manav Dumont MD

## 2024-05-27 NOTE — PROGRESS NOTES
"Subjective Data:  Patient feeling good today.  Denies any chest pain palpitations dizziness or lightheadedness.    Overnight Events:    Telemetry overnight reviewed with heart rate well-controlled sometimes bradycardic.     Objective Data:  Last Recorded Vitals:  Vitals:    05/27/24 0432 05/27/24 0700 05/27/24 0806 05/27/24 1204   BP: 126/73   132/84   BP Location: Right arm   Right arm   Patient Position: Lying   Sitting   Pulse: 83   66   Resp: 16  14 24   Temp: 35.5 °C (95.9 °F)  35.2 °C (95.4 °F) 35.8 °C (96.4 °F)   TempSrc: Temporal  Temporal Temporal   SpO2: 90% 95% 94% 99%   Weight: 88.2 kg (194 lb 7.1 oz)      Height:           Last Labs:  CBC - 5/27/2024:  6:00 AM  7.5 14.4 236    46.3      CMP - 5/27/2024:  6:00 AM  9.8 6.3 20 --- 0.3   _ 3.3 25 76      PTT - No results in last year.  _   _ _     No results found for: \"TROPHS\", \"BNP\", \"HGBA1C\", \"LDLCALC\", \"VLDL\"   Last I/O:  I/O last 3 completed shifts:  In: 480 (5.4 mL/kg) [P.O.:480]  Out: - (0 mL/kg)   Weight: 88.2 kg     Past Cardiology Tests (Last 3 Years):  EKG:  ECG 12 lead 05/22/2024      ECG 12 Lead 03/27/2024      ECG 12 lead 03/27/2024      ECG 12 lead 03/27/2024    Echo:  Transthoracic Echo (TTE) Complete 03/28/2024    Ejection Fractions:  EF   Date/Time Value Ref Range Status   03/28/2024 08:26 AM 54 %      Cath:  No results found for this or any previous visit from the past 1095 days.    Stress Test:  No results found for this or any previous visit from the past 1095 days.    Cardiac Imaging:  No results found for this or any previous visit from the past 1095 days.      Inpatient Medications:  Scheduled medications   Medication Dose Route Frequency    apixaban  5 mg oral q12h    atenolol  50 mg oral BID    cholecalciferol  4,000 Units oral Daily    digoxin  125 mcg oral Daily    dilTIAZem CD  120 mg oral Daily    ipratropium-albuteroL  3 mL nebulization TID    ondansetron  4 mg intravenous Once    oxygen   inhalation q8h    potassium chloride " CR  20 mEq oral Daily    rOPINIRole  2 mg oral Nightly    sulfamethoxazole-trimethoprim  1 tablet oral q12h LUNA     PRN medications   Medication    acetaminophen    ipratropium-albuteroL    metoprolol    polyethylene glycol     Continuous Medications   Medication Dose Last Rate       Physical Exam:  General: Patient is in no acute distress.  HEENT: atraumatic normocephalic.  Neck: is supple jugular venous pressure within normal limits no thyromegaly.  Cardiovascular irregular rate and rhythm normal heart sounds no murmurs rubs or gallops.  Lungs: clear to auscultation bilaterally.  Abdomen: is soft nontender.  Extremities warm to touch no edema.       Assessment/Plan   1 atrial fibrillation.  Patient diagnosed recently of atrial fibrillation.  Had severe reaction than the Steege in the past.  Not candidate for transesophageal echocardiogram giving her history of allergy severe to local anesthetic.  She may have missed her Eliquis dose within the last month few days she ran out of medications.  Therefore we cannot do chemical cardioversion for the time being but she is asymptomatic with rate control.  My recommendation is to discharge home on diltiazem 120 mg oral daily sustained-release, atenolol 50 mg oral twice daily.  Stop digoxin since she is bradycardic.  Continue Eliquis strictly.  Follow-up with Dr. Rubin.  Had lengthy discussion with her and her son on the phone.     2.  Hypertension continue current medications with diltiazem.     3.  Hyperlipidemia continue high intensity statin.     Thank you for allowing to participate in her care  Peripheral IV 05/22/24 20 G Left;Dorsal;Posterior Forearm (Active)   Site Assessment Clean;Dry;Intact 05/27/24 0844   Dressing Status Clean;Dry;Occlusive 05/27/24 0844   Number of days: 5       Code Status:  Full Code          Los Kuo MD

## 2024-05-28 ENCOUNTER — PATIENT OUTREACH (OUTPATIENT)
Dept: PRIMARY CARE | Facility: CLINIC | Age: 74
End: 2024-05-28
Payer: COMMERCIAL

## 2024-05-28 ENCOUNTER — PHARMACY VISIT (OUTPATIENT)
Dept: PHARMACY | Facility: CLINIC | Age: 74
End: 2024-05-28
Payer: MEDICARE

## 2024-05-28 ENCOUNTER — DOCUMENTATION (OUTPATIENT)
Dept: PRIMARY CARE | Facility: CLINIC | Age: 74
End: 2024-05-28
Payer: COMMERCIAL

## 2024-05-28 NOTE — PROGRESS NOTES
Discharge Facility: [facility]     Discharge Diagnosis:    Afib RVR     Admission Date: 5/22/204   Discharge Date:  5/27/2024     PCP Appointment Date: 5/31/2024     Specialist Appointment Date:     Patient calling Dr. Scottie Rodriguez for a one week F/U     Hospital Encounter and Summary: Linked    See discharge assessment below for further details     Engagement  Call Start Time: 1246 (5/28/2024 12:46 PM)    Medications  Medications reviewed with patient/caregiver?: Yes (5/28/2024 12:46 PM)  Is the patient having any side effects they believe may be caused by any medication additions or changes?: No (5/28/2024 12:46 PM)  Does the patient have all medications ordered at discharge?: -- (awaiting delivery) (5/28/2024 12:46 PM)  Care Management Interventions: No intervention needed (5/28/2024 12:46 PM)  Prescription Comments: NEW reviewed,dilTIAZem  mg 24 hr capsule  rOPINIRole 2 mg tablet  sulfamethoxazole-trimethoprim 400-80 mg tablet,,atenolol 50 mg tablet (5/28/2024 12:46 PM)  Medication Comments: STOP taking these medications      bisoprolol 10 mg tablet  Commonly known as: Zebeta        potassium chloride CR 20 mEq ER tablet  Commonly known as: Klor-Con M20        spironolactone 25 mg tablet  Commonly known as: Aldactone        torsemide 20 mg tablet  Commonly known as: Demadex (5/28/2024 12:46 PM)    Appointments  Does the patient have a primary care provider?: Yes (5/28/2024 12:46 PM)  Care Management Interventions: Verified appointment date/time/provider (5/28/2024 12:46 PM)  Care Management Interventions: Advised to schedule with specialist (Scottie Rodriguez one week) (5/28/2024 12:46 PM)    Self Management  What is the home health agency?: NA (5/28/2024 12:46 PM)  What Durable Medical Equipment (DME) was ordered?: NA (5/28/2024 12:46 PM)    Patient Teaching  Does the patient have access to their discharge instructions?: Yes (5/28/2024 12:46 PM)  Care Management Interventions: Reviewed instructions with patient  (5/28/2024 12:46 PM)  What is the patient's perception of their health status since discharge?: Improving (5/28/2024 12:46 PM)  Is the patient/caregiver able to teach back the hierarchy of who to call/visit for symptoms/problems? PCP, Specialist, Home Health nurse, Urgent Care, ED, 911: Yes (5/28/2024 12:46 PM)  Patient/Caregiver Education Comments: Patient aware to call providers for any questions concerns or change  in condition, monitoring weight V/S daily (5/28/2024 12:46 PM)

## 2024-05-30 ENCOUNTER — TELEPHONE (OUTPATIENT)
Dept: INTENSIVE CARE | Facility: HOSPITAL | Age: 74
End: 2024-05-30
Payer: COMMERCIAL

## 2024-05-30 NOTE — TELEPHONE ENCOUNTER
Patient had ended up going back to hospital and she is coming into to office for appointment Dr. Rubin can try t o discuss with her again.

## 2024-05-31 ENCOUNTER — APPOINTMENT (OUTPATIENT)
Dept: PRIMARY CARE | Facility: CLINIC | Age: 74
End: 2024-05-31
Payer: COMMERCIAL

## 2024-05-31 ENCOUNTER — TELEPHONE (OUTPATIENT)
Dept: PRIMARY CARE | Facility: CLINIC | Age: 74
End: 2024-05-31
Payer: COMMERCIAL

## 2024-05-31 DIAGNOSIS — R11.0 NAUSEA: Primary | ICD-10-CM

## 2024-05-31 RX ORDER — ONDANSETRON 4 MG/1
4 TABLET, FILM COATED ORAL EVERY 8 HOURS PRN
Qty: 20 TABLET | Refills: 1 | Status: SHIPPED | OUTPATIENT
Start: 2024-05-31 | End: 2024-06-04 | Stop reason: WASHOUT

## 2024-06-03 ENCOUNTER — TELEPHONE (OUTPATIENT)
Dept: CARDIOLOGY | Facility: CLINIC | Age: 74
End: 2024-06-03

## 2024-06-03 ENCOUNTER — PHARMACY VISIT (OUTPATIENT)
Dept: PHARMACY | Facility: CLINIC | Age: 74
End: 2024-06-03
Payer: MEDICARE

## 2024-06-03 PROCEDURE — RXMED WILLOW AMBULATORY MEDICATION CHARGE

## 2024-06-03 NOTE — TELEPHONE ENCOUNTER
Patient has been having nausea symptoms for a week now. She thinks it's from cardiac medications that were started in the hospital. Since then she can't get the symptoms under control.

## 2024-06-03 NOTE — TELEPHONE ENCOUNTER
Pt called complaining of nausea and significant weight gain 2nd to swelling. Pt states weight gain approx 7 pounds with noted pitting bilat ankles.     Patient states has not been taking torsemide since discharged from hospital on 05/22/2024 due to being removed from med list.    Patient cancelled a follow up appointment 06/04/2024 due to nausea. Reluctant to reschedule for fear of needing to cancel again. Pt requesting something for nausea.    Routed to provider. Advised pt if symptoms continue or get worse to seek treatment at nearest urgent care or ER.

## 2024-06-03 NOTE — TELEPHONE ENCOUNTER
Please advise per Jaleesa's note if any of the medications that were started in the hospital could be causing her extreme nausea symptoms?

## 2024-06-04 ENCOUNTER — OFFICE VISIT (OUTPATIENT)
Dept: PRIMARY CARE | Facility: CLINIC | Age: 74
End: 2024-06-04
Payer: COMMERCIAL

## 2024-06-04 ENCOUNTER — OFFICE VISIT (OUTPATIENT)
Dept: CARDIOLOGY | Facility: CLINIC | Age: 74
End: 2024-06-04
Payer: COMMERCIAL

## 2024-06-04 ENCOUNTER — APPOINTMENT (OUTPATIENT)
Dept: CARDIOLOGY | Facility: CLINIC | Age: 74
End: 2024-06-04
Payer: COMMERCIAL

## 2024-06-04 VITALS
DIASTOLIC BLOOD PRESSURE: 63 MMHG | OXYGEN SATURATION: 97 % | BODY MASS INDEX: 30.56 KG/M2 | HEIGHT: 64 IN | TEMPERATURE: 98.6 F | HEART RATE: 67 BPM | WEIGHT: 179 LBS | SYSTOLIC BLOOD PRESSURE: 121 MMHG | RESPIRATION RATE: 18 BRPM

## 2024-06-04 VITALS
BODY MASS INDEX: 30.88 KG/M2 | HEART RATE: 46 BPM | SYSTOLIC BLOOD PRESSURE: 120 MMHG | RESPIRATION RATE: 20 BRPM | DIASTOLIC BLOOD PRESSURE: 82 MMHG | OXYGEN SATURATION: 98 % | WEIGHT: 180 LBS

## 2024-06-04 DIAGNOSIS — I50.21 ACUTE SYSTOLIC CONGESTIVE HEART FAILURE (MULTI): Primary | ICD-10-CM

## 2024-06-04 DIAGNOSIS — R07.9 CHEST PAIN, UNSPECIFIED TYPE: ICD-10-CM

## 2024-06-04 DIAGNOSIS — I48.91 ATRIAL FIBRILLATION WITH RAPID VENTRICULAR RESPONSE (MULTI): ICD-10-CM

## 2024-06-04 DIAGNOSIS — I10 PRIMARY HYPERTENSION: Primary | ICD-10-CM

## 2024-06-04 DIAGNOSIS — Z00.00 WELL ADULT EXAM: ICD-10-CM

## 2024-06-04 DIAGNOSIS — N18.31 STAGE 3A CHRONIC KIDNEY DISEASE (MULTI): ICD-10-CM

## 2024-06-04 DIAGNOSIS — I50.32 CHRONIC DIASTOLIC CHF (CONGESTIVE HEART FAILURE) (MULTI): ICD-10-CM

## 2024-06-04 LAB
ALBUMIN SERPL-MCNC: 3.8 G/DL (ref 3.5–5)
ALP BLD-CCNC: 80 U/L (ref 35–125)
ALT SERPL-CCNC: 9 U/L (ref 5–40)
ANION GAP SERPL CALC-SCNC: 13 MMOL/L
AST SERPL-CCNC: 13 U/L (ref 5–40)
BILIRUB SERPL-MCNC: 0.3 MG/DL (ref 0.1–1.2)
BUN SERPL-MCNC: 13 MG/DL (ref 8–25)
CALCIUM SERPL-MCNC: 9.6 MG/DL (ref 8.5–10.4)
CHLORIDE SERPL-SCNC: 103 MMOL/L (ref 97–107)
CO2 SERPL-SCNC: 25 MMOL/L (ref 24–31)
CREAT SERPL-MCNC: 1 MG/DL (ref 0.4–1.6)
EGFRCR SERPLBLD CKD-EPI 2021: 60 ML/MIN/1.73M*2
GLUCOSE SERPL-MCNC: 96 MG/DL (ref 65–99)
NT-PROBNP SERPL-MCNC: 1119 PG/ML (ref 0–353)
POTASSIUM SERPL-SCNC: 4.5 MMOL/L (ref 3.4–5.1)
PROT SERPL-MCNC: 6.5 G/DL (ref 5.9–7.9)
SODIUM SERPL-SCNC: 141 MMOL/L (ref 133–145)

## 2024-06-04 PROCEDURE — 3079F DIAST BP 80-89 MM HG: CPT | Performed by: NURSE PRACTITIONER

## 2024-06-04 PROCEDURE — 1111F DSCHRG MED/CURRENT MED MERGE: CPT | Performed by: INTERNAL MEDICINE

## 2024-06-04 PROCEDURE — 1126F AMNT PAIN NOTED NONE PRSNT: CPT | Performed by: NURSE PRACTITIONER

## 2024-06-04 PROCEDURE — G2211 COMPLEX E/M VISIT ADD ON: HCPCS | Performed by: NURSE PRACTITIONER

## 2024-06-04 PROCEDURE — 3078F DIAST BP <80 MM HG: CPT | Performed by: INTERNAL MEDICINE

## 2024-06-04 PROCEDURE — 1159F MED LIST DOCD IN RCRD: CPT | Performed by: INTERNAL MEDICINE

## 2024-06-04 PROCEDURE — 1126F AMNT PAIN NOTED NONE PRSNT: CPT | Performed by: INTERNAL MEDICINE

## 2024-06-04 PROCEDURE — 36415 COLL VENOUS BLD VENIPUNCTURE: CPT | Performed by: NURSE PRACTITIONER

## 2024-06-04 PROCEDURE — 93005 ELECTROCARDIOGRAM TRACING: CPT | Performed by: NURSE PRACTITIONER

## 2024-06-04 PROCEDURE — 93010 ELECTROCARDIOGRAM REPORT: CPT | Performed by: NURSE PRACTITIONER

## 2024-06-04 PROCEDURE — 99213 OFFICE O/P EST LOW 20 MIN: CPT | Performed by: INTERNAL MEDICINE

## 2024-06-04 PROCEDURE — 99213 OFFICE O/P EST LOW 20 MIN: CPT | Performed by: NURSE PRACTITIONER

## 2024-06-04 PROCEDURE — 80053 COMPREHEN METABOLIC PANEL: CPT | Performed by: NURSE PRACTITIONER

## 2024-06-04 PROCEDURE — 83880 ASSAY OF NATRIURETIC PEPTIDE: CPT | Performed by: NURSE PRACTITIONER

## 2024-06-04 PROCEDURE — 3074F SYST BP LT 130 MM HG: CPT | Performed by: INTERNAL MEDICINE

## 2024-06-04 PROCEDURE — 1111F DSCHRG MED/CURRENT MED MERGE: CPT | Performed by: NURSE PRACTITIONER

## 2024-06-04 PROCEDURE — 3074F SYST BP LT 130 MM HG: CPT | Performed by: NURSE PRACTITIONER

## 2024-06-04 PROCEDURE — 1159F MED LIST DOCD IN RCRD: CPT | Performed by: NURSE PRACTITIONER

## 2024-06-04 RX ORDER — TORSEMIDE 20 MG/1
20 TABLET ORAL DAILY
Qty: 30 TABLET | Refills: 11 | Status: SHIPPED | OUTPATIENT
Start: 2024-06-04 | End: 2025-06-04

## 2024-06-04 ASSESSMENT — LIFESTYLE VARIABLES
SKIP TO QUESTIONS 9-10: 1
HOW OFTEN DO YOU HAVE A DRINK CONTAINING ALCOHOL: NEVER
AUDIT-C TOTAL SCORE: 0
HOW MANY STANDARD DRINKS CONTAINING ALCOHOL DO YOU HAVE ON A TYPICAL DAY: PATIENT DOES NOT DRINK
HOW OFTEN DO YOU HAVE SIX OR MORE DRINKS ON ONE OCCASION: NEVER

## 2024-06-04 ASSESSMENT — PATIENT HEALTH QUESTIONNAIRE - PHQ9
SUM OF ALL RESPONSES TO PHQ9 QUESTIONS 1 AND 2: 0
2. FEELING DOWN, DEPRESSED OR HOPELESS: NOT AT ALL
1. LITTLE INTEREST OR PLEASURE IN DOING THINGS: NOT AT ALL

## 2024-06-04 ASSESSMENT — ENCOUNTER SYMPTOMS
OCCASIONAL FEELINGS OF UNSTEADINESS: 0
DEPRESSION: 0
DEPRESSION: 0
LOSS OF SENSATION IN FEET: 0
LOSS OF SENSATION IN FEET: 0
OCCASIONAL FEELINGS OF UNSTEADINESS: 0

## 2024-06-04 ASSESSMENT — PAIN SCALES - GENERAL
PAINLEVEL: 0-NO PAIN
PAINLEVEL: 0-NO PAIN

## 2024-06-04 NOTE — PROGRESS NOTES
Subjective   Patient ID: Constance M Kocher is a 73 y.o. female who presents for Shortness of Breath (Pt states that she is here for SOB and swelling. She is waiting to see Dr. Rubin. She was taken off the water pill after getting out of hospital. ).    HPI Following up after , swelling legs, has been weigh gain using scale from insurance, having nausea stopped her water pills   Stopped torsimed and spiralcone home weight previous 171 normal and increased 179 alert   Review of Systems    Objective   /82   Pulse (!) 46   Resp 20   Wt 81.6 kg (180 lb)   LMP  (LMP Unknown)   SpO2 98%   BMI 30.88 kg/m²     Physical Exam  Constitutional:       General: She is not in acute distress.     Appearance: Normal appearance.   Cardiovascular:      Rate and Rhythm: Normal rate and regular rhythm.      Heart sounds: No murmur heard.  Pulmonary:      Breath sounds: Normal breath sounds. No wheezing.   Musculoskeletal:      Right lower leg: Edema present.      Left lower leg: Edema present.   Skin:     General: Skin is warm and dry.   Neurological:      Mental Status: She is alert.         Assessment/Plan   Problem List Items Addressed This Visit             ICD-10-CM    Atrial fibrillation with rapid ventricular response (Multi) I48.91    Relevant Orders    ECG 12 Lead (Completed)    Stage 3a chronic kidney disease (Multi) N18.31    Relevant Orders    NT-PROBNP    Comprehensive metabolic panel     Other Visit Diagnoses         Codes    Acute systolic congestive heart failure (Multi)    -  Primary I50.21    Relevant Medications    torsemide (Demadex) 20 mg tablet    Other Relevant Orders    NT-PROBNP    Comprehensive metabolic panel    ECG 12 Lead (Completed)    Well adult exam     Z00.00    Relevant Orders    ECG 12 Lead (Completed)    Chest pain, unspecified type     R07.9    Relevant Orders    ECG 12 Lead (Completed)        Pt left and going to Dr Rubin , advise to follow cardiology advise

## 2024-06-04 NOTE — PROGRESS NOTES
Subjective   Chief Complaint   Patient presents with    ER Follow-up     Constance M Kocher is a/an established patient who presents to the office for an ER follow up for swelling/edema.       73-year-old female patient with only history of atrial fibrillation, hypertension hyperlipidemia history of diastolic CHF.  Probably heart rate is under control.  Patient was discharged on diltiazem, atenolol as well digoxin for rate control.  Patient also currently on anticoagulation.  Continue current Eliquis as well as rate control medication upcoming outpatient ANYA cardioversion.  Patient now on atenolol and diltiazem.  No active chest pain tightness.    Past Medical History:   Diagnosis Date    A-fib (Multi)     Carotid stenosis     Dyspnea 03/27/2024    Essential hypertension 10/20/2009    Last Assessment & Plan: Formatting of this note might be different from the original. -Uncontrolled -states she has not done well with meds in the past, but willing to try restarting something given how high her BP is.   -will start her on losartan 50 mg daily and see if she is able to tolerate this. -encouraged to watch salt in her diet and work on weight loss -risk of heart attack and stroke rev    Fatigue 10/19/2023    Last Assessment & Plan: Formatting of this note might be different from the original. -checking blood work including CBC and TSH    GERD (gastroesophageal reflux disease)     Hyperlipidemia 02/12/2014    Last Assessment & Plan: Formatting of this note might be different from the original. -updating lipid panel    Hypokalemia 10/19/2023    Insomnia 03/09/2012    Murmur 10/20/2009    PMR (polymyalgia rheumatica) (Multi)     Polymyalgia rheumatica (Multi)      History reviewed. No pertinent surgical history.  No relevant family history has been documented for this patient.  Current Outpatient Medications   Medication Sig Dispense Refill    acetaminophen (Tylenol) 325 mg tablet Take 2 tablets (650 mg) by mouth every 4  "hours if needed for mild pain (1 - 3) or fever (temp greater than 38.0 C). 30 tablet 0    apixaban (Eliquis) 5 mg tablet Take 1 tablet (5 mg) by mouth every 12 hours. 180 tablet 3    atenolol (Tenormin) 50 mg tablet Take 1 tablet (50 mg) by mouth 2 times a day. 60 tablet 0    cholecalciferol (Vitamin D3) 50 mcg (2,000 unit) capsule Take 2 capsules (100 mcg) by mouth once daily. 60 capsule 11    dilTIAZem CD (Cardizem CD) 120 mg 24 hr capsule Take 1 capsule (120 mg) by mouth 2 times a day. 60 capsule 0    rOPINIRole (Requip) 2 mg tablet Take 1 tablet (2 mg) by mouth once daily at bedtime. 30 tablet 1    albuterol 90 mcg/actuation inhaler Inhale 1 puff every 6 hours if needed for shortness of breath. 18 g 2    torsemide (Demadex) 20 mg tablet Take 1 tablet (20 mg) by mouth once daily. (Patient not taking: Reported on 6/4/2024) 30 tablet 11     No current facility-administered medications for this visit.      reports that she quit smoking about 3 months ago. Her smoking use included cigarettes. She started smoking about 55 years ago. She has a 55 pack-year smoking history. She has never been exposed to tobacco smoke. She has never used smokeless tobacco. She reports that she does not drink alcohol and does not use drugs.  Iodinated contrast media, Procaine hcl, and Propofol  Primary hypertension    Vitals:    06/04/24 1143   BP: 121/63   Pulse: 67   Resp: 18   Temp: 37 °C (98.6 °F)   SpO2: 97%   Weight: 81.2 kg (179 lb)   Height: 1.626 m (5' 4\")   PainSc: 0-No pain      BMI:Body mass index is 30.73 kg/m².   General Cardiology:  General Appearance: Alert, oriented and in no acute distress.  HEENT: extra ocular movements intact (EOMI), pupils equal,  round, reactive to light and accommodation (PERRLA).  Carotid Upstroke: no bruit, normal.  Jugular Venous Distention (JVD): flat.  Chest: normal.  Lungs: Clear to auscultation,   Heart Sounds: no S3 or S4, normal S1, S2, irregular rate.  Murmur, Click, Gallop: no systolic " murmur.  Abdomen: no hepatomegaly, no masses felt, soft.  Extremities: no leg edema.  Peripheral pulses: 2 plus bilateral.  NEUROLOGY Cranial nerves II-XII grossly intact.     Patient Active Problem List   Diagnosis    Primary hypertension    Atrial fibrillation with rapid ventricular response (Multi)    Chronic bronchitis, unspecified chronic bronchitis type (Multi)    Stage 3a chronic kidney disease (Multi)    Acute suppurative otitis media without spontaneous rupture of ear drum    Acute sinusitis    Chronic diastolic CHF (congestive heart failure) (Multi)       Problem List Items Addressed This Visit       Primary hypertension - Primary    Atrial fibrillation with rapid ventricular response (Multi)    Stage 3a chronic kidney disease (Multi)    Chronic diastolic CHF (congestive heart failure) (Multi)      70-year-old female patient with atrial fibrillation.  Reaction to anesthetic agent in the past.  Not a candidate for ANYA cardioversion.  Patient is not comfortable.  Trace leg edema  Recent hospitalization.  1.  Chronic atrial fibrillation: Continue current rate control vacation including atenolol and Cardizem with Eliquis.  2.  Chronic diastolic CHF: Advised patient to follow low-sodium diet as well as a continue torsemide half a dose 10 mg twice a week.    Diet and exercise reviewed with patient..advice to walk about 10,000 steps or about 2 hours during day time. Cut back on salt, sugar and flour.    Pt. care time is spent includes review of diagnostic tests, labs, radiographs, EKGs, old echoes, cardiac work-up and coordination of care. Assessment, impression and plans are reflected in the note above as well as the orders.    Hugo Rubin MD

## 2024-06-05 ENCOUNTER — PATIENT OUTREACH (OUTPATIENT)
Dept: PRIMARY CARE | Facility: CLINIC | Age: 74
End: 2024-06-05
Payer: COMMERCIAL

## 2024-06-05 NOTE — PROGRESS NOTES
Unable to reach patient for call back after patient's follow up appointment with PCP 6/4/2024 and Cardiology 6/4/2024     LVM with call back number for patient to call if needed   If no voicemail available call attempts x 2 were made to contact the patient to assist with any questions or concerns patient may have.       L/M Encouraged- patient to call providers for any questions concerns or change in condition

## 2024-06-06 DIAGNOSIS — I48.91 ATRIAL FIBRILLATION WITH RAPID VENTRICULAR RESPONSE (MULTI): ICD-10-CM

## 2024-06-06 DIAGNOSIS — R79.89 ELEVATED BRAIN NATRIURETIC PEPTIDE (BNP) LEVEL: Primary | ICD-10-CM

## 2024-06-06 DIAGNOSIS — I13.0 HYPERTENSIVE HEART AND CHRONIC KIDNEY DISEASE WITH HEART FAILURE AND STAGE 1 THROUGH STAGE 4 CHRONIC KIDNEY DISEASE, OR UNSPECIFIED CHRONIC KIDNEY DISEASE (MULTI): ICD-10-CM

## 2024-06-06 DIAGNOSIS — J42 CHRONIC BRONCHITIS, UNSPECIFIED CHRONIC BRONCHITIS TYPE (MULTI): ICD-10-CM

## 2024-06-06 RX ORDER — ALBUTEROL SULFATE 0.83 MG/ML
SOLUTION RESPIRATORY (INHALATION)
Qty: 75 ML | Refills: 11 | Status: SHIPPED | OUTPATIENT
Start: 2024-06-06 | End: 2024-06-07 | Stop reason: SDUPTHER

## 2024-06-06 RX ORDER — ALBUTEROL SULFATE 0.83 MG/ML
SOLUTION RESPIRATORY (INHALATION)
COMMUNITY
Start: 2024-06-05 | End: 2024-06-06 | Stop reason: SDUPTHER

## 2024-06-07 DIAGNOSIS — J42 CHRONIC BRONCHITIS, UNSPECIFIED CHRONIC BRONCHITIS TYPE (MULTI): ICD-10-CM

## 2024-06-10 DIAGNOSIS — E55.9 VITAMIN D DEFICIENCY: ICD-10-CM

## 2024-06-10 DIAGNOSIS — J42 CHRONIC BRONCHITIS, UNSPECIFIED CHRONIC BRONCHITIS TYPE (MULTI): ICD-10-CM

## 2024-06-10 PROCEDURE — RXMED WILLOW AMBULATORY MEDICATION CHARGE

## 2024-06-10 RX ORDER — ALBUTEROL SULFATE 0.83 MG/ML
SOLUTION RESPIRATORY (INHALATION)
Qty: 75 ML | Refills: 11 | Status: SHIPPED | OUTPATIENT
Start: 2024-06-10 | End: 2024-06-10 | Stop reason: SDUPTHER

## 2024-06-11 ENCOUNTER — PHARMACY VISIT (OUTPATIENT)
Dept: PHARMACY | Facility: CLINIC | Age: 74
End: 2024-06-11
Payer: MEDICARE

## 2024-06-11 PROCEDURE — RXMED WILLOW AMBULATORY MEDICATION CHARGE

## 2024-06-11 RX ORDER — TORSEMIDE 20 MG/1
20 TABLET ORAL DAILY
Qty: 90 TABLET | Refills: 3 | OUTPATIENT
Start: 2024-06-11

## 2024-06-11 RX ORDER — ALBUTEROL SULFATE 90 UG/1
AEROSOL, METERED RESPIRATORY (INHALATION)
Qty: 8.5 G | Refills: 3 | OUTPATIENT
Start: 2024-06-11

## 2024-06-11 RX ORDER — ALBUTEROL SULFATE 0.83 MG/ML
SOLUTION RESPIRATORY (INHALATION)
Qty: 75 ML | Refills: 11 | Status: SHIPPED | OUTPATIENT
Start: 2024-06-11

## 2024-06-11 RX ORDER — ACETAMINOPHEN 500 MG
4000 TABLET ORAL DAILY
Qty: 60 CAPSULE | Refills: 11 | Status: SHIPPED | OUTPATIENT
Start: 2024-06-11

## 2024-06-11 RX ORDER — ROPINIROLE 2 MG/1
2 TABLET, FILM COATED ORAL NIGHTLY
Qty: 90 TABLET | Refills: 0 | OUTPATIENT
Start: 2024-06-11

## 2024-06-11 RX ORDER — ALBUTEROL SULFATE 0.83 MG/ML
SOLUTION RESPIRATORY (INHALATION)
Qty: 90 ML | Refills: 3 | OUTPATIENT
Start: 2024-06-11

## 2024-06-11 RX ORDER — DILTIAZEM HYDROCHLORIDE 120 MG/1
120 CAPSULE, COATED, EXTENDED RELEASE ORAL 2 TIMES DAILY
Qty: 180 CAPSULE | Refills: 3 | OUTPATIENT
Start: 2024-06-11

## 2024-06-13 ENCOUNTER — PATIENT OUTREACH (OUTPATIENT)
Dept: PRIMARY CARE | Facility: CLINIC | Age: 74
End: 2024-06-13
Payer: COMMERCIAL

## 2024-06-13 NOTE — PROGRESS NOTES
Call regarding appt. with PCP on 6/4/2024 after hospitalization.    At time of outreach call the patient feels as if their condition has improved since last visit.    Reviewed the PCP appointment with the pt and addressed any questions or concerns.     Encouraged patient to call providers for any questions concerns or change in condition    CCM referral placed with patient approval.

## 2024-06-14 ENCOUNTER — APPOINTMENT (OUTPATIENT)
Dept: PRIMARY CARE | Facility: CLINIC | Age: 74
End: 2024-06-14
Payer: COMMERCIAL

## 2024-06-17 ENCOUNTER — APPOINTMENT (OUTPATIENT)
Dept: PRIMARY CARE | Facility: CLINIC | Age: 74
End: 2024-06-17
Payer: COMMERCIAL

## 2024-06-17 ENCOUNTER — PATIENT OUTREACH (OUTPATIENT)
Dept: PRIMARY CARE | Facility: CLINIC | Age: 74
End: 2024-06-17
Payer: COMMERCIAL

## 2024-06-18 ENCOUNTER — PATIENT OUTREACH (OUTPATIENT)
Dept: PRIMARY CARE | Facility: CLINIC | Age: 74
End: 2024-06-18
Payer: COMMERCIAL

## 2024-06-18 DIAGNOSIS — I50.9 CHRONIC HEART FAILURE, UNSPECIFIED HEART FAILURE TYPE (MULTI): ICD-10-CM

## 2024-06-18 DIAGNOSIS — I48.91 ATRIAL FIBRILLATION, UNSPECIFIED TYPE (MULTI): ICD-10-CM

## 2024-06-19 ENCOUNTER — APPOINTMENT (OUTPATIENT)
Dept: PRIMARY CARE | Facility: CLINIC | Age: 74
End: 2024-06-19
Payer: COMMERCIAL

## 2024-06-19 NOTE — PROGRESS NOTES
SPOKE TO:  CONSTANCE KOCHER  LAST OFFICE VISIT:  Kindra Wood 6/4/24  CCM CONDITONS:  ATRIAL FIBRILLATION, HEART FAILURE, HTN    Referral received from NOEMI Peralta.  Pt was hospitalized 5/22-28 for AFIB.  Pt was discharged on diltiazem, atenalol and requip.  Pts torsemide, spironalactone and potassium was stopped.  Pt has follow up 6/4 with Kindra Wood and Dr Rubin.  I spoke to pt (Mariana) today introduced myself as CCM with Dr Douglas agrees to program.  Pt states that when she was discharged she was told not to continue taking her diuretics and as a result of this she started to swell and have shortness of breath.  She has a scale that she received from her health insurance and has been doing her morning weights she noticed an increase in weight of 8# baseline weight 171# increased to 179#.  At PCP appointment was told to start torsemide 20mg and this was agreed with by Dr Rubin.  Today she states weight is pretty much back to baseline.  Mariana was IP 3/27 also for AFIB at that time she had shortness of breath as well initially thought to be heart failure but then determined to be resp distress from COPD.  She does have a history of HF an received education from [facility] HF Navigator.  Today we reviewed HF self management plan including morning weights (which Mariana has been doing), medication, low sodium and fluid restricted diet. Mariana states she has gotten used to her low sodium way of eating and was instructed to drink no more then 2 liters fluid a day (8 cups).  For the atrial fibrillation Mariana is compliant with her rate controlling medications atenalol and cardizem and also takes elliquis.  Discussed staying away from alcohol and caffeine which can trigger a-fib. Not sure if cardioversion was discussed.  Did not discuss smoking this call.  My plan will be to reinforce atrial fibrillation and heart failure self management plan. Mariana sees Dawna Skelton tomorrow.     IDENTIFIED  ISSUES/CONCERNS:  FREQUENT ADMITS ATRIAL FIBRILLATION  5/22-28 A FIB  3/27-4/1 A FIB  Smokes Cigarettes    [ ] [ ] [ ] REVIEW HEART FAILURE SELF MANAGEMENT PLAN  REVIEW HF MEDICATIONS  REVIEW SODIUM RESTRICTION  REVIEW FLUID RESTRICTION  REVIEW MORNING WEIGHTS  REVIEW FOR WHAT WEIGHT INCREASE TO CALL CARDIOLOGIST / PCP  REVIEW OTHER SIGNS/SYMPTOMS OF HF FLARE (SHORTNESS OF BREATH MORE THAN THE USUAL, SWELLING IN LEGS, FEET OR ABDOMEN, LOOSE SOUNDING COUGH THAT WILL NOT GO AWAY, NEEDING TO USE MORE PILLOWS IN ORDER TO BREATHE BETTER)  REVIEW IMPORTANCE OF PROMPT CALL TO CARDIOLOGIST / PCP FOR SIGNS OF FLUID OVERLOAD IN ORDER TO RECEIVE PROMPT TREATMENT AND PREVENT ED/HOSPITALIZATION  ROUTINE CARDIOLOGIST VISITS  DAILY ACTIVITY 30 MINS/DAY 5 DAYS/WK.   PACE ACITIVITES. IF EXPERIENCING SHORTNESS OF BREATH STOP AND RESTow and has cardiac follow up in July.    [X] [  ] [  ] [  ]  REVIEW ATRIAL FIBRILLATION SELF MANAGEMENT PLAN             -     MEDICATIONS (ELLIQUIS, ATENALOL, CARDIZEM)             -     AVOID ALCOHOL AND CAFFEINE             -     SMOKING CESSATION

## 2024-06-20 ENCOUNTER — APPOINTMENT (OUTPATIENT)
Dept: PRIMARY CARE | Facility: CLINIC | Age: 74
End: 2024-06-20
Payer: COMMERCIAL

## 2024-06-20 ENCOUNTER — PATIENT OUTREACH (OUTPATIENT)
Dept: PRIMARY CARE | Facility: CLINIC | Age: 74
End: 2024-06-20
Payer: COMMERCIAL

## 2024-06-20 NOTE — PROGRESS NOTES
Unable to reach patient for a F/U call, CCM enrolled     LVM with call back number for patient to call if needed   If no voicemail available call attempts x 2 were made to contact the patient to assist with any questions or concerns patient may have.    L/M Encouraged patient to call providers for any questions concerns or change in condition

## 2024-06-21 ENCOUNTER — TELEPHONE (OUTPATIENT)
Dept: CARDIOLOGY | Facility: CLINIC | Age: 74
End: 2024-06-21
Payer: COMMERCIAL

## 2024-06-21 NOTE — TELEPHONE ENCOUNTER
Received call from [facility] confirming that we received referral from Dr. Turner.

## 2024-06-25 ENCOUNTER — OFFICE VISIT (OUTPATIENT)
Dept: PRIMARY CARE | Facility: CLINIC | Age: 74
End: 2024-06-25
Payer: COMMERCIAL

## 2024-06-25 ENCOUNTER — HOSPITAL ENCOUNTER (OUTPATIENT)
Dept: RADIOLOGY | Facility: CLINIC | Age: 74
Discharge: HOME | End: 2024-06-25
Payer: COMMERCIAL

## 2024-06-25 ENCOUNTER — PHARMACY VISIT (OUTPATIENT)
Dept: PHARMACY | Facility: CLINIC | Age: 74
End: 2024-06-25
Payer: MEDICARE

## 2024-06-25 VITALS
WEIGHT: 182 LBS | SYSTOLIC BLOOD PRESSURE: 120 MMHG | OXYGEN SATURATION: 95 % | DIASTOLIC BLOOD PRESSURE: 60 MMHG | BODY MASS INDEX: 31.24 KG/M2 | HEART RATE: 72 BPM

## 2024-06-25 DIAGNOSIS — G25.81 RESTLESS LEG SYNDROME: ICD-10-CM

## 2024-06-25 DIAGNOSIS — R06.02 SHORTNESS OF BREATH: Primary | ICD-10-CM

## 2024-06-25 DIAGNOSIS — I50.32 CHRONIC DIASTOLIC CHF (CONGESTIVE HEART FAILURE) (MULTI): ICD-10-CM

## 2024-06-25 DIAGNOSIS — J41.0 SIMPLE CHRONIC BRONCHITIS (MULTI): ICD-10-CM

## 2024-06-25 DIAGNOSIS — N18.31 STAGE 3A CHRONIC KIDNEY DISEASE (MULTI): ICD-10-CM

## 2024-06-25 LAB — BNP SERPL-MCNC: 220 PG/ML (ref 0–99)

## 2024-06-25 PROCEDURE — 71046 X-RAY EXAM CHEST 2 VIEWS: CPT

## 2024-06-25 PROCEDURE — 83880 ASSAY OF NATRIURETIC PEPTIDE: CPT | Performed by: NURSE PRACTITIONER

## 2024-06-25 PROCEDURE — 99214 OFFICE O/P EST MOD 30 MIN: CPT | Performed by: NURSE PRACTITIONER

## 2024-06-25 PROCEDURE — 71046 X-RAY EXAM CHEST 2 VIEWS: CPT | Performed by: RADIOLOGY

## 2024-06-25 PROCEDURE — 3078F DIAST BP <80 MM HG: CPT | Performed by: NURSE PRACTITIONER

## 2024-06-25 PROCEDURE — G2211 COMPLEX E/M VISIT ADD ON: HCPCS | Performed by: NURSE PRACTITIONER

## 2024-06-25 PROCEDURE — 1159F MED LIST DOCD IN RCRD: CPT | Performed by: NURSE PRACTITIONER

## 2024-06-25 PROCEDURE — 1160F RVW MEDS BY RX/DR IN RCRD: CPT | Performed by: NURSE PRACTITIONER

## 2024-06-25 PROCEDURE — 36415 COLL VENOUS BLD VENIPUNCTURE: CPT | Performed by: NURSE PRACTITIONER

## 2024-06-25 PROCEDURE — 3074F SYST BP LT 130 MM HG: CPT | Performed by: NURSE PRACTITIONER

## 2024-06-25 PROCEDURE — RXMED WILLOW AMBULATORY MEDICATION CHARGE

## 2024-06-25 PROCEDURE — 1111F DSCHRG MED/CURRENT MED MERGE: CPT | Performed by: NURSE PRACTITIONER

## 2024-06-25 PROCEDURE — 1125F AMNT PAIN NOTED PAIN PRSNT: CPT | Performed by: NURSE PRACTITIONER

## 2024-06-25 RX ORDER — METHYLPREDNISOLONE 4 MG/1
TABLET ORAL
Qty: 21 TABLET | Refills: 0 | Status: SHIPPED | OUTPATIENT
Start: 2024-06-25 | End: 2024-07-02

## 2024-06-25 RX ORDER — LISINOPRIL 2.5 MG/1
2.5 TABLET ORAL DAILY
Qty: 100 TABLET | Refills: 3 | Status: SHIPPED | OUTPATIENT
Start: 2024-06-25 | End: 2025-07-30

## 2024-06-25 RX ORDER — TORSEMIDE 10 MG/1
10 TABLET ORAL DAILY
Qty: 30 TABLET | Refills: 11 | Status: SHIPPED | OUTPATIENT
Start: 2024-06-25 | End: 2025-06-25

## 2024-06-25 RX ORDER — FLUTICASONE FUROATE, UMECLIDINIUM BROMIDE AND VILANTEROL TRIFENATATE 100; 62.5; 25 UG/1; UG/1; UG/1
1 POWDER RESPIRATORY (INHALATION) DAILY
Qty: 60 EACH | Refills: 11 | Status: SHIPPED | OUTPATIENT
Start: 2024-06-25

## 2024-06-25 RX ORDER — ROPINIROLE 2 MG/1
2 TABLET, FILM COATED ORAL NIGHTLY
Qty: 30 TABLET | Refills: 1 | Status: SHIPPED | OUTPATIENT
Start: 2024-06-25

## 2024-06-25 ASSESSMENT — ENCOUNTER SYMPTOMS
SHORTNESS OF BREATH: 1
FATIGUE: 1
WHEEZING: 1
SLEEP DISTURBANCE: 1
COUGH: 1

## 2024-06-25 ASSESSMENT — PAIN SCALES - GENERAL: PAINLEVEL: 8

## 2024-06-25 NOTE — PATIENT INSTRUCTIONS
Start nebulizer 3 times daily   Start Trilgy inhaler rinse mouth once a day   Start Torsemide 20 today then 10 mg daily   Restless leg medication sent  Lisinopril help with kidney function

## 2024-06-26 DIAGNOSIS — R11.0 NAUSEA: ICD-10-CM

## 2024-06-26 DIAGNOSIS — I48.91 ATRIAL FIBRILLATION WITH RAPID VENTRICULAR RESPONSE (MULTI): ICD-10-CM

## 2024-06-26 PROCEDURE — RXMED WILLOW AMBULATORY MEDICATION CHARGE

## 2024-06-26 RX ORDER — ATENOLOL 50 MG/1
50 TABLET ORAL 2 TIMES DAILY
Qty: 60 TABLET | Refills: 0 | Status: CANCELLED | OUTPATIENT
Start: 2024-06-26 | End: 2024-07-26

## 2024-06-27 ENCOUNTER — PHARMACY VISIT (OUTPATIENT)
Dept: PHARMACY | Facility: CLINIC | Age: 74
End: 2024-06-27
Payer: MEDICARE

## 2024-06-27 ENCOUNTER — TELEPHONE (OUTPATIENT)
Dept: PRIMARY CARE | Facility: CLINIC | Age: 74
End: 2024-06-27
Payer: COMMERCIAL

## 2024-06-27 DIAGNOSIS — M51.9 LUMBAR DISC DISEASE: Primary | ICD-10-CM

## 2024-06-27 PROCEDURE — RXMED WILLOW AMBULATORY MEDICATION CHARGE

## 2024-06-27 RX ORDER — ONDANSETRON 4 MG/1
4 TABLET, FILM COATED ORAL EVERY 8 HOURS PRN
Qty: 20 TABLET | Refills: 1 | Status: SHIPPED | OUTPATIENT
Start: 2024-06-27 | End: 2024-07-04

## 2024-06-27 RX ORDER — TRAMADOL HYDROCHLORIDE 50 MG/1
50 TABLET ORAL DAILY
Qty: 10 TABLET | Refills: 0 | Status: SHIPPED | OUTPATIENT
Start: 2024-06-27 | End: 2024-07-07

## 2024-06-27 NOTE — TELEPHONE ENCOUNTER
Per CT pelvis note degenerative changes L5-S1. Pt is taking Eliquis a blood thinner. Can send limited supply of Tramadol of one week. Recommend to alternate with Tylenol arthritis 1000 mg twice daily

## 2024-07-01 ENCOUNTER — APPOINTMENT (OUTPATIENT)
Dept: CARDIOLOGY | Facility: CLINIC | Age: 74
End: 2024-07-01
Payer: COMMERCIAL

## 2024-07-08 ENCOUNTER — PATIENT OUTREACH (OUTPATIENT)
Dept: PRIMARY CARE | Facility: CLINIC | Age: 74
End: 2024-07-08
Payer: COMMERCIAL

## 2024-07-08 DIAGNOSIS — I50.9 CHRONIC HEART FAILURE, UNSPECIFIED HEART FAILURE TYPE (MULTI): ICD-10-CM

## 2024-07-08 DIAGNOSIS — I48.91 ATRIAL FIBRILLATION, UNSPECIFIED TYPE (MULTI): ICD-10-CM

## 2024-07-08 NOTE — PROGRESS NOTES
SPOKE TO:  CONSTANCE KOCHER  LAST OFFICE VISIT:  Kindra Wood 6/4/24  CCM CONDITONS:  ATRIAL FIBRILLATION, HEART FAILURE, HTN     Referral received from NOEMI Peralta.  Pt was hospitalized 5/22-28 for AFIB.  Pt was discharged on diltiazem, atenalol and requip.  Pts torsemide, spironalactone and potassium was stopped.  Pt has follow up 6/4 with Kindra Wood and Dr Rubin.  I spoke to pt (Mariana) today introduced myself as CCM with Dr Douglas agrees to program.  Pt states that when she was discharged she was told not to continue taking her diuretics and as a result of this she started to swell and have shortness of breath.  She has a scale that she received from her health insurance and has been doing her morning weights she noticed an increase in weight of 8# baseline weight 171# increased to 179#.  At PCP appointment was told to start torsemide 20mg and this was agreed with by Dr Rubin.  Today she states weight is pretty much back to baseline.  Mariana was IP 3/27 also for AFIB at that time she had shortness of breath as well initially thought to be heart failure but then determined to be resp distress from COPD.  She does have a history of HF an received education from [facility] HF Navigator.  Today we reviewed HF self management plan including morning weights (which Mariana has been doing), medication, low sodium and fluid restricted diet. Mariana states she has gotten used to her low sodium way of eating and was instructed to drink no more then 2 liters fluid a day (8 cups).  For the atrial fibrillation Mariana is compliant with her rate controlling medications atenalol and cardizem and also takes elliquis.  Discussed staying away from alcohol and caffeine which can trigger a-fib. Not sure if cardioversion was discussed.  Did not discuss smoking this call.  My plan will be to reinforce atrial fibrillation and heart failure self management plan. Mariana sees Dawna Skelton tomorrow.     7/8/24 Too  "conversation with Mariana.  She was in to see KEMAL Wood 6/25 for shortness of breath, cough , foot swelling and kidneys hurting she stopped taking her diuretic for 3 days. She had a chest xray with a small amount of fluid noted.  Mariana tells me that she found out she is allergic to torsemide which is the reason for her kidney hurting when taking it.  She was switched to Bumex has been taking it daily now with no ill effects.  Weight today is 175# swelling is gone, no shortness of breath and seems to be doing well.  We reviewed HF self management plan she continues to weigh herself daily and reviewed why this was important.  She is doing VERY well on sodium restriction and states is used to eating \"bland.\"  She is following fluid restrictions and is carefully measuring her liquids out she states has found out if she drinks 64 oz/day she will start to swell so shoots for under that.  Mariana is pacing her activities. We discussed summer safety running errands early in the day before it gets too hot.   tends to take a nap around noon we discussed the value of rest periods allowing cells to restore themselves and elevating legs which helps the circulation.  Mariana watches her 3 grandchildren which keeps her very busy.  At the appt she was put on nebulizer 3 x per day and trelogy states does the treatments but feels does not need the trelogy is doing fine without it.  Also prescribed requip for the restless leg and lisinipril to help kidney functions is taking both of these along with all her other medications.  Discharged 5/28 afib so surpassed 30 days no readmission.  Agreeable to another check in call next month. Reviewed upcoming appts 7/31/ 10:45 Dr Rubin cardiology.      IDENTIFIED ISSUES/CONCERNS:  FREQUENT ADMITS ATRIAL FIBRILLATION  5/22-28 A FIB  3/27-4/1 A FIB  Smokes Cigarettes     [ ] [ ] [ ] REVIEW HEART FAILURE SELF MANAGEMENT PLAN  REVIEW HF MEDICATIONS  REVIEW SODIUM RESTRICTION  REVIEW " FLUID RESTRICTION  REVIEW MORNING WEIGHTS  REVIEW FOR WHAT WEIGHT INCREASE TO CALL CARDIOLOGIST / PCP  REVIEW OTHER SIGNS/SYMPTOMS OF HF FLARE (SHORTNESS OF BREATH MORE THAN THE USUAL, SWELLING IN LEGS, FEET OR ABDOMEN, LOOSE SOUNDING COUGH THAT WILL NOT GO AWAY, NEEDING TO USE MORE PILLOWS IN ORDER TO BREATHE BETTER)  REVIEW IMPORTANCE OF PROMPT CALL TO CARDIOLOGIST / PCP FOR SIGNS OF FLUID OVERLOAD IN ORDER TO RECEIVE PROMPT TREATMENT AND PREVENT ED/HOSPITALIZATION  ROUTINE CARDIOLOGIST VISITS  DAILY ACTIVITY 30 MINS/DAY 5 DAYS/WK.   PACE ACITIVITES. IF EXPERIENCING SHORTNESS OF BREATH STOP AND RESTow and has cardiac follow up in July.     [X] [  ] [  ] [  ]  REVIEW ATRIAL FIBRILLATION SELF MANAGEMENT PLAN             -     MEDICATIONS (ELLIQUIS, ATENALOL, CARDIZEM)             -     AVOID ALCOHOL AND CAFFEINE             -     SMOKING CESSATION

## 2024-07-22 ENCOUNTER — PHARMACY VISIT (OUTPATIENT)
Dept: PHARMACY | Facility: CLINIC | Age: 74
End: 2024-07-22
Payer: MEDICARE

## 2024-07-22 PROCEDURE — RXMED WILLOW AMBULATORY MEDICATION CHARGE

## 2024-07-23 ENCOUNTER — PHARMACY VISIT (OUTPATIENT)
Dept: PHARMACY | Facility: CLINIC | Age: 74
End: 2024-07-23
Payer: MEDICARE

## 2024-07-23 PROCEDURE — RXMED WILLOW AMBULATORY MEDICATION CHARGE

## 2024-07-31 ENCOUNTER — APPOINTMENT (OUTPATIENT)
Dept: CARDIOLOGY | Facility: CLINIC | Age: 74
End: 2024-07-31
Payer: COMMERCIAL

## 2024-08-13 ENCOUNTER — PATIENT OUTREACH (OUTPATIENT)
Dept: PRIMARY CARE | Facility: CLINIC | Age: 74
End: 2024-08-13
Payer: COMMERCIAL

## 2024-08-13 DIAGNOSIS — I48.91 ATRIAL FIBRILLATION, UNSPECIFIED TYPE (MULTI): ICD-10-CM

## 2024-08-13 NOTE — PROGRESS NOTES
SPOKE TO:  CONSTANCE KOCHER  LAST OFFICE VISIT:  Kindra Wood 6/4/24  CCM CONDITONS:  ATRIAL FIBRILLATION, HEART FAILURE, HTN     Referral received from NOEMI Peralta.  Pt was hospitalized 5/22-28 for AFIB.  Pt was discharged on diltiazem, atenalol and requip.  Pts torsemide, spironalactone and potassium was stopped.  Pt has follow up 6/4 with Kindra Wood and Dr Rubin.  I spoke to pt (Mariana) today introduced myself as CCM with Dr Douglas agrees to program.  Pt states that when she was discharged she was told not to continue taking her diuretics and as a result of this she started to swell and have shortness of breath.  She has a scale that she received from her health insurance and has been doing her morning weights she noticed an increase in weight of 8# baseline weight 171# increased to 179#.  At PCP appointment was told to start torsemide 20mg and this was agreed with by Dr Rubin.  Today she states weight is pretty much back to baseline.  Mariana was IP 3/27 also for AFIB at that time she had shortness of breath as well initially thought to be heart failure but then determined to be resp distress from COPD.  She does have a history of HF an received education from Shania Phillip HF Navigator.  Today we reviewed HF self management plan including morning weights (which Mariana has been doing), medication, low sodium and fluid restricted diet. Mariana states she has gotten used to her low sodium way of eating and was instructed to drink no more then 2 liters fluid a day (8 cups).  For the atrial fibrillation Mariana is compliant with her rate controlling medications atenalol and cardizem and also takes elliquis.  Discussed staying away from alcohol and caffeine which can trigger a-fib. Not sure if cardioversion was discussed.  Did not discuss smoking this call.  My plan will be to reinforce atrial fibrillation and heart failure self management plan. Mariana sees Dawna Skelton tomorrow.      7/8/24 Too  "conversation with Mariana.  She was in to see KEMAL Wood 6/25 for shortness of breath, cough , foot swelling and kidneys hurting she stopped taking her diuretic for 3 days. She had a chest xray with a small amount of fluid noted.  Mariana tells me that she found out she is allergic to torsemide which is the reason for her kidney hurting when taking it.  She was switched to Bumex has been taking it daily now with no ill effects.  Weight today is 175# swelling is gone, no shortness of breath and seems to be doing well.  We reviewed HF self management plan she continues to weigh herself daily and reviewed why this was important.  She is doing VERY well on sodium restriction and states is used to eating \"bland.\"  She is following fluid restrictions and is carefully measuring her liquids out she states has found out if she drinks 64 oz/day she will start to swell so shoots for under that.  Mariana is pacing her activities. We discussed summer safety running errands early in the day before it gets too hot.   tends to take a nap around noon we discussed the value of rest periods allowing cells to restore themselves and elevating legs which helps the circulation.  aMriana watches her 3 grandchildren which keeps her very busy.  At the appt she was put on nebulizer 3 x per day and bob states does the treatments but feels does not need the trelogy is doing fine without it.  Also prescribed requip for the restless leg and lisinipril to help kidney functions is taking both of these along with all her other medications.  Discharged 5/28 afib so surpassed 30 days no readmission.  Agreeable to another check in call next month. Reviewed upcoming appts 7/31/ 10:45 Dr Rubin cardiology.     8/13/24 Spoke to Mariana today she states she has no energy and it is hindering her ability to carry on daily tasks.  Mariana states this is relatively new over the past month.  She is wondering about getting lab work.  She has " "been doing her morning weights which has been steady 175# she denies foot or abdominal swelling. She does get winded and this morning used albuteral with relief.  She has chronic bronchitis should be taking trelegy ellipta but doesn't due to past experience with thrush despite rinsing mouth.  She consistently takes bumex 1mg without issue.  She was previously on torsemide but this was stopped due to kidneys hurting.  Mariana was agreeable to PCP appt and will be seeing Dr Douglas tomorrow.  Mariana mentioned needing a mobility scooter. I spoke with Dominga at [facility] they no longer carry them the only company she is aware of is [employer] who I called they are private pay only do not bill insurance.  I called [facility] [phone] they carry and bill insurance but do not have a contract with [facility].  I attempted to call pt to check with insurance to see which DME providers are in network with her insurance no VM set up. Per [facility] in order for insurance to cover:  - Needs face to face with sole purpose of the visit to state \"scooter\"  - Why they need the scooter  - give quantifiable analysis of arm strength (must be 3/5 or lower)  - Must indicate for home use only (cannot state using outside the home)  - Must state no dexterity or muscle strength to use a cane, walker, walker with wheels or manual wheelchair  - Must state can safely transfer, operate the tiller, sit upright and safely drive (must have all 4 of these)  - Prescription with name, mobility scooter, diagnosis code, doctors signature and 2 dates.     8/18/24 I spoke to pt about scheduling an appt for Mobility Scooter. Also told her to check with [employer] to find out which equipment company she can use to get the scooter.  8/20/24 I received VMM from pt stating she spoke to her insurance they have a contract with [employer] this is where she can get the mobility scooter.  I called [facility] [employer] " which is now [employer] they no longer carry mobility scooters on power chairs and manual wheelchairs I was told the companies that carry these scooters generally do not accept insurance private pay only. I provided this update to pt.  1131 Call from pt states she ran out of Elliquis tried to get it filled was told given 90 day supply in June so should still have another month of medication. Pt states she has no more pills left and must not have been given the full 90 days.  Call to  pharmacy was told [facility] pharmacy manager will call pt to work something out. Pt tells me that she spoke to [facility] does have the scooters  I called this number they do carry them and work with [employer] insurance there fax is      IDENTIFIED ISSUES/CONCERNS:  FREQUENT ADMITS ATRIAL FIBRILLATION  5/22-28 A FIB  3/27-4/1 A FIB  Smokes Cigarettes     [x] [ ] [ ] REVIEW HEART FAILURE SELF MANAGEMENT PLAN  REVIEW HF MEDICATIONS  REVIEW SODIUM RESTRICTION  REVIEW FLUID RESTRICTION  REVIEW MORNING WEIGHTS  REVIEW FOR WHAT WEIGHT INCREASE TO CALL CARDIOLOGIST / PCP  REVIEW OTHER SIGNS/SYMPTOMS OF HF FLARE (SHORTNESS OF BREATH MORE THAN THE USUAL, SWELLING IN LEGS, FEET OR ABDOMEN, LOOSE SOUNDING COUGH THAT WILL NOT GO AWAY, NEEDING TO USE MORE PILLOWS IN ORDER TO BREATHE BETTER)  REVIEW IMPORTANCE OF PROMPT CALL TO CARDIOLOGIST / PCP FOR SIGNS OF FLUID OVERLOAD IN ORDER TO RECEIVE PROMPT TREATMENT AND PREVENT ED/HOSPITALIZATION  ROUTINE CARDIOLOGIST VISITS  DAILY ACTIVITY 30 MINS/DAY 5 DAYS/WK.   PACE ACITIVITES. IF EXPERIENCING SHORTNESS OF BREATH STOP AND RESTow and has cardiac follow up in July.     [X] [  ] [  ] [  ]  REVIEW ATRIAL FIBRILLATION SELF MANAGEMENT PLAN             -     MEDICATIONS (ELLIQUIS, ATENALOL, CARDIZEM)             -     AVOID ALCOHOL AND CAFFEINE             -     SMOKING CESSATION

## 2024-08-14 ENCOUNTER — APPOINTMENT (OUTPATIENT)
Dept: PRIMARY CARE | Facility: CLINIC | Age: 74
End: 2024-08-14
Payer: COMMERCIAL

## 2024-08-16 ENCOUNTER — APPOINTMENT (OUTPATIENT)
Dept: PRIMARY CARE | Facility: CLINIC | Age: 74
End: 2024-08-16
Payer: COMMERCIAL

## 2024-08-20 PROCEDURE — RXMED WILLOW AMBULATORY MEDICATION CHARGE

## 2024-08-21 ENCOUNTER — PHARMACY VISIT (OUTPATIENT)
Dept: PHARMACY | Facility: CLINIC | Age: 74
End: 2024-08-21
Payer: MEDICARE

## 2024-08-21 PROCEDURE — RXMED WILLOW AMBULATORY MEDICATION CHARGE

## 2024-08-23 ENCOUNTER — TELEPHONE (OUTPATIENT)
Dept: PRIMARY CARE | Facility: CLINIC | Age: 74
End: 2024-08-23
Payer: COMMERCIAL

## 2024-08-23 PROCEDURE — RXMED WILLOW AMBULATORY MEDICATION CHARGE

## 2024-08-24 ENCOUNTER — PHARMACY VISIT (OUTPATIENT)
Dept: PHARMACY | Facility: CLINIC | Age: 74
End: 2024-08-24
Payer: MEDICARE

## 2024-08-24 PROCEDURE — RXMED WILLOW AMBULATORY MEDICATION CHARGE

## 2024-09-03 ENCOUNTER — PHARMACY VISIT (OUTPATIENT)
Dept: PHARMACY | Facility: CLINIC | Age: 74
End: 2024-09-03
Payer: MEDICARE

## 2024-09-03 PROCEDURE — RXMED WILLOW AMBULATORY MEDICATION CHARGE

## 2024-09-17 ENCOUNTER — PATIENT OUTREACH (OUTPATIENT)
Dept: PRIMARY CARE | Facility: CLINIC | Age: 74
End: 2024-09-17
Payer: COMMERCIAL

## 2024-09-17 DIAGNOSIS — I10 HTN (HYPERTENSION), BENIGN: ICD-10-CM

## 2024-09-17 DIAGNOSIS — I48.91 ATRIAL FIBRILLATION, UNSPECIFIED TYPE (MULTI): ICD-10-CM

## 2024-09-17 DIAGNOSIS — I50.9 CHRONIC HEART FAILURE, UNSPECIFIED HEART FAILURE TYPE: ICD-10-CM

## 2024-09-17 NOTE — PROGRESS NOTES
"SPOKE TO:  MARIANA KOCHER  LAST OFFICE VISIT:  Kindra Wood 6/4/24  [facility] CONDITONS:  ATRIAL FIBRILLATION, HEART FAILURE, HTN    PREVIOUS ENCOUNTERS   8/13/24 Spoke to Mariana today she states she has no energy and it is hindering her ability to carry on daily tasks.  Mariana states this is relatively new over the past month.  She is wondering about getting lab work.  She has been doing her morning weights which has been steady 175# she denies foot or abdominal swelling. She does get winded and this morning used albuteral with relief.  She has chronic bronchitis should be taking trelegy ellipta but doesn't due to past experience with thrush despite rinsing mouth.  She consistently takes bumex 1mg without issue.  She was previously on torsemide but this was stopped due to kidneys hurting.  Mariana was agreeable to PCP appt and will be seeing Dr Douglas tomorrow.  Mariana mentioned needing a mobility scooter. I spoke with Dominga at [facility] they no longer carry them the only company she is aware of is [employer] [facility] who I called they are private pay only do not bill insurance.  I called [facility] [phone] they carry and bill insurance but do not have a contract with [facility].  I attempted to call pt to check with insurance to see which DME providers are in network with her insurance no VM set up. Per [facility] in order for insurance to cover:  - Needs face to face with sole purpose of the visit to state \"scooter\"  - Why they need the scooter  - give quantifiable analysis of arm strength (must be 3/5 or lower)  - Must indicate for home use only (cannot state using outside the home)  - Must state no dexterity or muscle strength to use a cane, walker, walker with wheels or manual wheelchair  - Must state can safely transfer, operate the tiller, sit upright and safely drive (must have all 4 of these)  - Prescription with name, mobility scooter, diagnosis code, doctors signature " and 2 dates.      8/18/24 I spoke to pt about scheduling an appt for Mobility Scooter. Also told her to check with [employer] to find out which equipment company she can use to get the scooter.  8/20/24 I received VMM from pt stating she spoke to her insurance they have a contract with [employer] this is where she can get the mobility scooter.  I called [employer] which is now [employer] they no longer carry mobility scooters on power chairs and manual wheelchairs I was told the companies that carry these scooters generally do not accept insurance private pay only. I provided this update to pt.  1131 Call from pt states she ran out of Elliquis tried to get it filled was told given 90 day supply in June so should still have another month of medication. Pt states she has no more pills left and must not have been given the full 90 days.  Call to  pharmacy was told [facility] pharmacy manager will call pt to work something out. Pt tells me that she spoke to [facility] does have the scooters  I called this number they do carry them and work with [employer] insurance there fax is     9/17/24  I spoke to Jazzy today states has not been doing all that well and feels one of her medications is the culprit.  States when she takes them 10-15 minutes later gets nauseated, just doesn't feel well and can't function during the day.  Last week she decided to stop all of her medications and now the last 3 days started to introduce them one at a time. States so far elliquis, vitamin D and torsemide are not causing issues so she is taking them.  She wants to know which one she should next introduce.  I told Jazzy being without her heart medications is dangerous as she has atrial fibrillation and HTN I would like her to see Dr Douglas but she states no transportation brakes broke on car cannot get it fixed due to finances.  Jazzy decides tomorrow she is going to take diltiazem.  I  asked Jazzy what her HR has been states it was 156 but now it is more 100-114.  Jazzy was taking her pills in the morning on empty stomach but did change to once she ate breakfast but this change did not stop her feelings of nausea etc. I will send communication to Dr Douglas.  Pt states she is making dietary changes she completely stopped drinking pop and is drinking ice tea and water.  She is connected to a telehealth scale thru [facility] [employer] they called her regarding weight increase of 6# but went back to her baseline the next morning.  I asked if this was when she stopped taking the water pill she said it was not.  I will call Jazzy in a few days to check on the medication situation.      IDENTIFIED ISSUES/CONCERNS:  FREQUENT ADMITS ATRIAL FIBRILLATION  5/22-28 A FIB  3/27-4/1 A FIB  Smokes Cigarettes     [x] [ ] [ ] REVIEW HEART FAILURE SELF MANAGEMENT PLAN  REVIEW HF MEDICATIONS  REVIEW SODIUM RESTRICTION  REVIEW FLUID RESTRICTION  REVIEW MORNING WEIGHTS  REVIEW FOR WHAT WEIGHT INCREASE TO CALL CARDIOLOGIST / PCP  REVIEW OTHER SIGNS/SYMPTOMS OF HF FLARE (SHORTNESS OF BREATH MORE THAN THE USUAL, SWELLING IN LEGS, FEET OR ABDOMEN, LOOSE SOUNDING COUGH THAT WILL NOT GO AWAY, NEEDING TO USE MORE PILLOWS IN ORDER TO BREATHE BETTER)  REVIEW IMPORTANCE OF PROMPT CALL TO CARDIOLOGIST / PCP FOR SIGNS OF FLUID OVERLOAD IN ORDER TO RECEIVE PROMPT TREATMENT AND PREVENT ED/HOSPITALIZATION  ROUTINE CARDIOLOGIST VISITS  DAILY ACTIVITY 30 MINS/DAY 5 DAYS/WK.   PACE ACITIVITES. IF EXPERIENCING SHORTNESS OF BREATH STOP AND RESTow and has cardiac follow up in July.     [X] [  ] [  ] [  ]  REVIEW ATRIAL FIBRILLATION SELF MANAGEMENT PLAN             -     MEDICATIONS (ELLIQUIS, ATENALOL, CARDIZEM)             -     AVOID ALCOHOL AND CAFFEINE             -     SMOKING CESSATION

## 2024-09-18 ENCOUNTER — PHARMACY VISIT (OUTPATIENT)
Dept: PHARMACY | Facility: CLINIC | Age: 74
End: 2024-09-18
Payer: MEDICARE

## 2024-09-18 PROCEDURE — RXMED WILLOW AMBULATORY MEDICATION CHARGE

## 2024-09-24 PROCEDURE — RXMED WILLOW AMBULATORY MEDICATION CHARGE

## 2024-09-25 ENCOUNTER — TELEPHONE (OUTPATIENT)
Dept: PRIMARY CARE | Facility: CLINIC | Age: 74
End: 2024-09-25
Payer: COMMERCIAL

## 2024-09-30 ENCOUNTER — TELEPHONE (OUTPATIENT)
Dept: CARDIOLOGY | Facility: CLINIC | Age: 74
End: 2024-09-30
Payer: COMMERCIAL

## 2024-09-30 ENCOUNTER — PHARMACY VISIT (OUTPATIENT)
Dept: PHARMACY | Facility: CLINIC | Age: 74
End: 2024-09-30
Payer: MEDICARE

## 2024-09-30 PROCEDURE — RXMED WILLOW AMBULATORY MEDICATION CHARGE

## 2024-09-30 NOTE — TELEPHONE ENCOUNTER
"I received a call from patient stating that she discontinued her atenolol due to the fact that she had swelling, nausea and felt like a \"zombie\" while taking it. Since stopping it she feels better but now her HR is very elevated in the 150's. I advised her to go to ER. She stated she was recently displaced from home due to lead paint and is currently in a hotel and car has no brakes. She said she can't go to ER because she has 3 kids to take care of at the hotel. She would like you to change her medication for now and she will schedule with you in November once her situation improves. Please advise.   "

## 2024-10-01 NOTE — TELEPHONE ENCOUNTER
Patient notified. She is likely going to the ER tomorrow 10/2/24 and will try the half dose for a couple of days. If she is still having symptoms, I will schedule her with Dr. Kuo since Rubin is out.

## 2024-10-02 ENCOUNTER — PATIENT OUTREACH (OUTPATIENT)
Dept: PRIMARY CARE | Facility: CLINIC | Age: 74
End: 2024-10-02
Payer: COMMERCIAL

## 2024-10-02 DIAGNOSIS — I48.91 ATRIAL FIBRILLATION, UNSPECIFIED TYPE (MULTI): ICD-10-CM

## 2024-10-02 DIAGNOSIS — I50.9 CHRONIC HEART FAILURE, UNSPECIFIED HEART FAILURE TYPE: ICD-10-CM

## 2024-10-02 NOTE — PROGRESS NOTES
SPOKE TO:  EUSEBIA KOCHER  LAST OFFICE VISIT:  Kindra Wood 6/4/24  [facility] CONDITONS:  ATRIAL FIBRILLATION, HEART FAILURE, HTN     PREVIOUS ENCOUNTER  9/17/24  I spoke to Jazzy today states has not been doing all that well and feels one of her medications is the culprit.  States when she takes them 10-15 minutes later gets nauseated, just doesn't feel well and can't function during the day.  Last week she decided to stop all of her medications and now the last 3 days started to introduce them one at a time. States so far elliquis, vitamin D and torsemide are not causing issues so she is taking them.  She wants to know which one she should next introduce.  I told Jazzy being without her heart medications is dangerous as she has atrial fibrillation and HTN I would like her to see Dr Douglas but she states no transportation brakes broke on car cannot get it fixed due to finances.  Jazzy decides tomorrow she is going to take diltiazem.  I asked Jazzy what her HR has been states it was 156 but now it is more 100-114.  Jazzy was taking her pills in the morning on empty stomach but did change to once she ate breakfast but this change did not stop her feelings of nausea etc. I will send communication to Dr Douglas.  Pt states she is making dietary changes she completely stopped drinking pop and is drinking ice tea and water.  She is connected to a telehealth scale thru [facility] they called her regarding weight increase of 6# but went back to her baseline the next morning.  I asked if this was when she stopped taking the water pill she said it was not.  I will call Jazzy in a few days to check on the medication situation.    10/2/24 In reading the notes Jazzy identified Atenalol was the culprit drug causing her to feel nauseated and like a zombie.  She stopped taking it and now her HR is 150's.  She asked cardiologist to switch her to something else he could not do this without a visit. Pt is now living in a hotel  with the 3 grand kids due to lead paint issues in her home and still has car without brakes.  Dr Rubin advised taking half dose of the atenalol.  She has appt with Dr Rubin Oct 30 3:15.  I attempted to reach Mariana today no answer and VM not set up.      IDENTIFIED ISSUES/CONCERNS:  FREQUENT ADMITS ATRIAL FIBRILLATION  5/22-28 A FIB  3/27-4/1 A FIB  Smokes Cigarettes     [x] [ ] [ ] REVIEW HEART FAILURE SELF MANAGEMENT PLAN  REVIEW HF MEDICATIONS  REVIEW SODIUM RESTRICTION  REVIEW FLUID RESTRICTION  REVIEW MORNING WEIGHTS  REVIEW FOR WHAT WEIGHT INCREASE TO CALL CARDIOLOGIST / PCP  REVIEW OTHER SIGNS/SYMPTOMS OF HF FLARE (SHORTNESS OF BREATH MORE THAN THE USUAL, SWELLING IN LEGS, FEET OR ABDOMEN, LOOSE SOUNDING COUGH THAT WILL NOT GO AWAY, NEEDING TO USE MORE PILLOWS IN ORDER TO BREATHE BETTER)  REVIEW IMPORTANCE OF PROMPT CALL TO CARDIOLOGIST / PCP FOR SIGNS OF FLUID OVERLOAD IN ORDER TO RECEIVE PROMPT TREATMENT AND PREVENT ED/HOSPITALIZATION  ROUTINE CARDIOLOGIST VISITS  DAILY ACTIVITY 30 MINS/DAY 5 DAYS/WK.   PACE ACITIVITES. IF EXPERIENCING SHORTNESS OF BREATH STOP AND RESTow and has cardiac follow up in July.     [X] [  ] [  ] [  ]  REVIEW ATRIAL FIBRILLATION SELF MANAGEMENT PLAN             -     MEDICATIONS (ELLIQUIS, ATENALOL, CARDIZEM)             -     AVOID ALCOHOL AND CAFFEINE             -     SMOKING CESSATION

## 2024-10-10 ENCOUNTER — PATIENT OUTREACH (OUTPATIENT)
Dept: PRIMARY CARE | Facility: CLINIC | Age: 74
End: 2024-10-10
Payer: COMMERCIAL

## 2024-10-10 DIAGNOSIS — I10 HTN (HYPERTENSION), BENIGN: ICD-10-CM

## 2024-10-10 DIAGNOSIS — I50.9 CHRONIC HEART FAILURE, UNSPECIFIED HEART FAILURE TYPE: ICD-10-CM

## 2024-10-10 DIAGNOSIS — I48.91 ATRIAL FIBRILLATION, UNSPECIFIED TYPE (MULTI): ICD-10-CM

## 2024-10-10 NOTE — PROGRESS NOTES
SPOKE TO:  EUSEBIA KOCHER  LAST OFFICE VISIT:  Kindra Wood 6/4/24  [facility] CONDITONS:  ATRIAL FIBRILLATION, HEART FAILURE, HTN     PREVIOUS ENCOUNTER  9/17/24  I spoke to Jazzy today states has not been doing all that well and feels one of her medications is the culprit.  States when she takes them 10-15 minutes later gets nauseated, just doesn't feel well and can't function during the day.  Last week she decided to stop all of her medications and now the last 3 days started to introduce them one at a time. States so far elliquis, vitamin D and torsemide are not causing issues so she is taking them.  She wants to know which one she should next introduce.  I told Jazzy being without her heart medications is dangerous as she has atrial fibrillation and HTN I would like her to see Dr Douglas but she states no transportation brakes broke on car cannot get it fixed due to finances.  Jazzy decides tomorrow she is going to take diltiazem.  I asked Jazzy what her HR has been states it was 156 but now it is more 100-114.  Jazzy was taking her pills in the morning on empty stomach but did change to once she ate breakfast but this change did not stop her feelings of nausea etc. I will send communication to Dr Douglas.  Pt states she is making dietary changes she completely stopped drinking pop and is drinking ice tea and water.  She is connected to a telehealth scale thru [facility] [email or web address] they called her regarding weight increase of 6# but went back to her baseline the next morning.  I asked if this was when she stopped taking the water pill she said it was not.  I will call Jazzy in a few days to check on the medication situation.     10/10/24 In reading the notes Jazzy identified Atenalol was the culprit drug causing her to feel nauseated and like a zombie.  She stopped taking it and now her HR is 150's.  She asked cardiologist to switch her to something else he could not do this without a visit. Pt is now living in a hotel  with the 3 grand kids due to lead paint issues in her home and still has car without brakes.  Dr Rubin advised taking half dose of the atenalol.  She has appt with Dr Rubin Oct 30 3:15.    I spoke to Jazzy today states she is taking half dose of Atenalol and not experiencing any side effects. Her HR during our conversation was 142 but she checked it further into our conversation it went down to 104 and then 87.  She feels tired at times and has to take a periodic nap during the day.  I applauded Jazzy for paying attention to her body, if she is tired then a rest is good to recharge her body and allow her heart to slow down.  She shares that she is aware that she cannot do what she used to do and let her family know she is unable to cook and clean like she did before and her DIL is being more helpful since the discussion.  Jazzy states she has changed the way she takes her medications she waits until she eats and since this change meds are not making her sick.  She is weighing herself for the HF has lost some weight down to 178 she is taking her diuretic and no leg swelling. We discussed for what weight increase to call doctor and importance  of early recognition s/s HF flare.  Jazzy was successful in stopping smoking for 3 months but picked it up again although not smoking quite as much.  Jazzy states cigarettes are her  she needs to have them. No thoughts on quitting again at this time. I appreciated Jazzy's honesty.      IDENTIFIED ISSUES/CONCERNS:  FREQUENT ADMITS ATRIAL FIBRILLATION  5/22-28 A FIB  3/27-4/1 A FIB  Smokes Cigarettes     [x] [ ] [ ] REVIEW HEART FAILURE SELF MANAGEMENT PLAN  REVIEW HF MEDICATIONS  REVIEW SODIUM RESTRICTION  REVIEW FLUID RESTRICTION  REVIEW MORNING WEIGHTS  REVIEW FOR WHAT WEIGHT INCREASE TO CALL CARDIOLOGIST / PCP  REVIEW OTHER SIGNS/SYMPTOMS OF HF FLARE (SHORTNESS OF BREATH MORE THAN THE USUAL, SWELLING IN LEGS, FEET OR ABDOMEN, LOOSE SOUNDING COUGH THAT WILL NOT  GO AWAY, NEEDING TO USE MORE PILLOWS IN ORDER TO BREATHE BETTER)  REVIEW IMPORTANCE OF PROMPT CALL TO CARDIOLOGIST / PCP FOR SIGNS OF FLUID OVERLOAD IN ORDER TO RECEIVE PROMPT TREATMENT AND PREVENT ED/HOSPITALIZATION  ROUTINE CARDIOLOGIST VISITS  DAILY ACTIVITY 30 MINS/DAY 5 DAYS/WK.   PACE ACITIVITES. IF EXPERIENCING SHORTNESS OF BREATH STOP AND RESTow and has cardiac follow up in July.     [X] [  ] [  ] [  ]  REVIEW ATRIAL FIBRILLATION SELF MANAGEMENT PLAN             -     MEDICATIONS (ELLIQUIS, ATENALOL, CARDIZEM)             -     AVOID ALCOHOL AND CAFFEINE             -     SMOKING CESSATION

## 2024-10-18 PROCEDURE — RXMED WILLOW AMBULATORY MEDICATION CHARGE

## 2024-10-24 ENCOUNTER — PHARMACY VISIT (OUTPATIENT)
Dept: PHARMACY | Facility: CLINIC | Age: 74
End: 2024-10-24
Payer: MEDICARE

## 2024-10-24 PROCEDURE — RXMED WILLOW AMBULATORY MEDICATION CHARGE

## 2024-10-30 ENCOUNTER — PHARMACY VISIT (OUTPATIENT)
Dept: PHARMACY | Facility: CLINIC | Age: 74
End: 2024-10-30
Payer: MEDICARE

## 2024-10-30 ENCOUNTER — APPOINTMENT (OUTPATIENT)
Dept: CARDIOLOGY | Facility: CLINIC | Age: 74
End: 2024-10-30
Payer: COMMERCIAL

## 2024-10-30 VITALS
BODY MASS INDEX: 31.24 KG/M2 | SYSTOLIC BLOOD PRESSURE: 125 MMHG | HEART RATE: 89 BPM | WEIGHT: 182 LBS | DIASTOLIC BLOOD PRESSURE: 72 MMHG | RESPIRATION RATE: 18 BRPM

## 2024-10-30 DIAGNOSIS — J42 CHRONIC BRONCHITIS, UNSPECIFIED CHRONIC BRONCHITIS TYPE (MULTI): ICD-10-CM

## 2024-10-30 DIAGNOSIS — N18.31 STAGE 3A CHRONIC KIDNEY DISEASE (MULTI): ICD-10-CM

## 2024-10-30 DIAGNOSIS — I10 PRIMARY HYPERTENSION: Primary | ICD-10-CM

## 2024-10-30 DIAGNOSIS — I48.91 ATRIAL FIBRILLATION WITH RAPID VENTRICULAR RESPONSE (MULTI): ICD-10-CM

## 2024-10-30 DIAGNOSIS — I50.32 CHRONIC DIASTOLIC CHF (CONGESTIVE HEART FAILURE): ICD-10-CM

## 2024-10-30 PROCEDURE — 3078F DIAST BP <80 MM HG: CPT | Performed by: INTERNAL MEDICINE

## 2024-10-30 PROCEDURE — G2211 COMPLEX E/M VISIT ADD ON: HCPCS | Performed by: INTERNAL MEDICINE

## 2024-10-30 PROCEDURE — 1160F RVW MEDS BY RX/DR IN RCRD: CPT | Performed by: INTERNAL MEDICINE

## 2024-10-30 PROCEDURE — 1159F MED LIST DOCD IN RCRD: CPT | Performed by: INTERNAL MEDICINE

## 2024-10-30 PROCEDURE — 99214 OFFICE O/P EST MOD 30 MIN: CPT | Performed by: INTERNAL MEDICINE

## 2024-10-30 PROCEDURE — 1126F AMNT PAIN NOTED NONE PRSNT: CPT | Performed by: INTERNAL MEDICINE

## 2024-10-30 PROCEDURE — 3074F SYST BP LT 130 MM HG: CPT | Performed by: INTERNAL MEDICINE

## 2024-10-30 PROCEDURE — RXMED WILLOW AMBULATORY MEDICATION CHARGE

## 2024-10-30 RX ORDER — AMIODARONE HYDROCHLORIDE 200 MG/1
200 TABLET ORAL 2 TIMES DAILY
Qty: 180 TABLET | Refills: 3 | Status: SHIPPED | OUTPATIENT
Start: 2024-10-30 | End: 2025-10-30

## 2024-10-30 ASSESSMENT — PAIN SCALES - GENERAL: PAINLEVEL_OUTOF10: 0-NO PAIN

## 2024-10-30 ASSESSMENT — LIFESTYLE VARIABLES
AUDIT TOTAL SCORE: 0
HAS A RELATIVE, FRIEND, DOCTOR, OR ANOTHER HEALTH PROFESSIONAL EXPRESSED CONCERN ABOUT YOUR DRINKING OR SUGGESTED YOU CUT DOWN: NO
HAVE YOU OR SOMEONE ELSE BEEN INJURED AS A RESULT OF YOUR DRINKING: NO
HOW OFTEN DO YOU HAVE A DRINK CONTAINING ALCOHOL: NEVER
HOW MANY STANDARD DRINKS CONTAINING ALCOHOL DO YOU HAVE ON A TYPICAL DAY: PATIENT DOES NOT DRINK
SKIP TO QUESTIONS 9-10: 1
AUDIT-C TOTAL SCORE: 0
HOW OFTEN DO YOU HAVE SIX OR MORE DRINKS ON ONE OCCASION: NEVER

## 2024-10-30 ASSESSMENT — PATIENT HEALTH QUESTIONNAIRE - PHQ9
1. LITTLE INTEREST OR PLEASURE IN DOING THINGS: NOT AT ALL
SUM OF ALL RESPONSES TO PHQ9 QUESTIONS 1 AND 2: 0
2. FEELING DOWN, DEPRESSED OR HOPELESS: NOT AT ALL

## 2024-10-30 ASSESSMENT — ENCOUNTER SYMPTOMS
OCCASIONAL FEELINGS OF UNSTEADINESS: 0
DEPRESSION: 0
LOSS OF SENSATION IN FEET: 0

## 2024-11-07 ENCOUNTER — TELEPHONE (OUTPATIENT)
Dept: CARDIOLOGY | Facility: CLINIC | Age: 74
End: 2024-11-07
Payer: COMMERCIAL

## 2024-11-07 NOTE — TELEPHONE ENCOUNTER
Patient called the office asking for a letter stating she uses an electronic nebulizer so that she can get her electric turned back on for medical necessity. I told her I would send you the encounter but also told her she should call Dr. Douglas's office to see if they could do a letter sooner than us since he prescribes the medication. She stated she's called all day and can't get through to them. Please advise.

## 2024-11-08 ENCOUNTER — TELEPHONE (OUTPATIENT)
Dept: PRIMARY CARE | Facility: CLINIC | Age: 74
End: 2024-11-08
Payer: COMMERCIAL

## 2024-11-11 DIAGNOSIS — G25.81 RESTLESS LEG SYNDROME: ICD-10-CM

## 2024-11-11 PROCEDURE — RXMED WILLOW AMBULATORY MEDICATION CHARGE

## 2024-11-11 RX ORDER — ROPINIROLE 2 MG/1
2 TABLET, FILM COATED ORAL NIGHTLY
Qty: 30 TABLET | Refills: 3 | Status: SHIPPED | OUTPATIENT
Start: 2024-11-11

## 2024-11-14 ENCOUNTER — PHARMACY VISIT (OUTPATIENT)
Dept: PHARMACY | Facility: CLINIC | Age: 74
End: 2024-11-14
Payer: MEDICARE

## 2024-11-19 ENCOUNTER — OFFICE VISIT (OUTPATIENT)
Dept: PRIMARY CARE | Facility: CLINIC | Age: 74
End: 2024-11-19
Payer: COMMERCIAL

## 2024-11-19 ENCOUNTER — PHARMACY VISIT (OUTPATIENT)
Dept: PHARMACY | Facility: CLINIC | Age: 74
End: 2024-11-19
Payer: COMMERCIAL

## 2024-11-19 VITALS — SYSTOLIC BLOOD PRESSURE: 106 MMHG | HEART RATE: 89 BPM | OXYGEN SATURATION: 93 % | DIASTOLIC BLOOD PRESSURE: 64 MMHG

## 2024-11-19 DIAGNOSIS — N18.31 STAGE 3A CHRONIC KIDNEY DISEASE (MULTI): ICD-10-CM

## 2024-11-19 DIAGNOSIS — R26.89 BALANCE DISORDER: ICD-10-CM

## 2024-11-19 DIAGNOSIS — R09.02 HYPOXEMIA: ICD-10-CM

## 2024-11-19 DIAGNOSIS — E53.8 B12 DEFICIENCY: ICD-10-CM

## 2024-11-19 DIAGNOSIS — R68.89 ACTIVITY INTOLERANCE: Primary | ICD-10-CM

## 2024-11-19 DIAGNOSIS — R26.9 GAIT DISORDER: ICD-10-CM

## 2024-11-19 DIAGNOSIS — G47.01 INSOMNIA SECONDARY TO RESTLESS LEG SYNDROME: ICD-10-CM

## 2024-11-19 DIAGNOSIS — R06.02 SHORTNESS OF BREATH: ICD-10-CM

## 2024-11-19 DIAGNOSIS — G25.81 INSOMNIA SECONDARY TO RESTLESS LEG SYNDROME: ICD-10-CM

## 2024-11-19 DIAGNOSIS — I10 PRIMARY HYPERTENSION: ICD-10-CM

## 2024-11-19 DIAGNOSIS — I48.91 ATRIAL FIBRILLATION WITH RAPID VENTRICULAR RESPONSE (MULTI): ICD-10-CM

## 2024-11-19 DIAGNOSIS — E78.5 HYPERLIPIDEMIA, UNSPECIFIED HYPERLIPIDEMIA TYPE: ICD-10-CM

## 2024-11-19 DIAGNOSIS — I50.32 CHRONIC DIASTOLIC CHF (CONGESTIVE HEART FAILURE): ICD-10-CM

## 2024-11-19 LAB
ALBUMIN SERPL BCP-MCNC: 3.8 G/DL (ref 3.4–5)
ALP SERPL-CCNC: 73 U/L (ref 33–136)
ALT SERPL W P-5'-P-CCNC: 10 U/L (ref 7–45)
ANION GAP SERPL CALCULATED.3IONS-SCNC: 11 MMOL/L (ref 10–20)
AST SERPL W P-5'-P-CCNC: 15 U/L (ref 9–39)
BASOPHILS # BLD AUTO: 0.04 X10*3/UL (ref 0–0.1)
BASOPHILS NFR BLD AUTO: 0.5 %
BILIRUB SERPL-MCNC: 0.5 MG/DL (ref 0–1.2)
BUN SERPL-MCNC: 15 MG/DL (ref 6–23)
CALCIUM SERPL-MCNC: 9.2 MG/DL (ref 8.6–10.3)
CHLORIDE SERPL-SCNC: 96 MMOL/L (ref 98–107)
CO2 SERPL-SCNC: 35 MMOL/L (ref 21–32)
CREAT SERPL-MCNC: 1.16 MG/DL (ref 0.5–1.05)
EGFRCR SERPLBLD CKD-EPI 2021: 50 ML/MIN/1.73M*2
EOSINOPHIL # BLD AUTO: 0.1 X10*3/UL (ref 0–0.4)
EOSINOPHIL NFR BLD AUTO: 1.3 %
ERYTHROCYTE [DISTWIDTH] IN BLOOD BY AUTOMATED COUNT: 15.2 % (ref 11.5–14.5)
GLUCOSE SERPL-MCNC: 93 MG/DL (ref 74–99)
HCT VFR BLD AUTO: 47.4 % (ref 36–46)
HGB BLD-MCNC: 15 G/DL (ref 12–16)
IMM GRANULOCYTES # BLD AUTO: 0.03 X10*3/UL (ref 0–0.5)
IMM GRANULOCYTES NFR BLD AUTO: 0.4 % (ref 0–0.9)
LYMPHOCYTES # BLD AUTO: 1.9 X10*3/UL (ref 0.8–3)
LYMPHOCYTES NFR BLD AUTO: 24.3 %
MCH RBC QN AUTO: 29.7 PG (ref 26–34)
MCHC RBC AUTO-ENTMCNC: 31.6 G/DL (ref 32–36)
MCV RBC AUTO: 94 FL (ref 80–100)
MONOCYTES # BLD AUTO: 0.58 X10*3/UL (ref 0.05–0.8)
MONOCYTES NFR BLD AUTO: 7.4 %
NEUTROPHILS # BLD AUTO: 5.17 X10*3/UL (ref 1.6–5.5)
NEUTROPHILS NFR BLD AUTO: 66.1 %
NRBC BLD-RTO: 0 /100 WBCS (ref 0–0)
PLATELET # BLD AUTO: 310 X10*3/UL (ref 150–450)
POTASSIUM SERPL-SCNC: 3.3 MMOL/L (ref 3.5–5.3)
PROT SERPL-MCNC: 6.9 G/DL (ref 6.4–8.2)
RBC # BLD AUTO: 5.05 X10*6/UL (ref 4–5.2)
SODIUM SERPL-SCNC: 139 MMOL/L (ref 136–145)
WBC # BLD AUTO: 7.8 X10*3/UL (ref 4.4–11.3)

## 2024-11-19 PROCEDURE — 85025 COMPLETE CBC W/AUTO DIFF WBC: CPT | Performed by: NURSE PRACTITIONER

## 2024-11-19 PROCEDURE — 3074F SYST BP LT 130 MM HG: CPT | Performed by: NURSE PRACTITIONER

## 2024-11-19 PROCEDURE — 2500000004 HC RX 250 GENERAL PHARMACY W/ HCPCS (ALT 636 FOR OP/ED): Performed by: NURSE PRACTITIONER

## 2024-11-19 PROCEDURE — 1159F MED LIST DOCD IN RCRD: CPT | Performed by: NURSE PRACTITIONER

## 2024-11-19 PROCEDURE — 3078F DIAST BP <80 MM HG: CPT | Performed by: NURSE PRACTITIONER

## 2024-11-19 PROCEDURE — 36415 COLL VENOUS BLD VENIPUNCTURE: CPT | Performed by: NURSE PRACTITIONER

## 2024-11-19 PROCEDURE — 99417 PROLNG OP E/M EACH 15 MIN: CPT | Performed by: NURSE PRACTITIONER

## 2024-11-19 PROCEDURE — 84075 ASSAY ALKALINE PHOSPHATASE: CPT | Performed by: NURSE PRACTITIONER

## 2024-11-19 PROCEDURE — 99214 OFFICE O/P EST MOD 30 MIN: CPT | Performed by: NURSE PRACTITIONER

## 2024-11-19 PROCEDURE — 1126F AMNT PAIN NOTED NONE PRSNT: CPT | Performed by: NURSE PRACTITIONER

## 2024-11-19 PROCEDURE — 99214 OFFICE O/P EST MOD 30 MIN: CPT | Mod: 25 | Performed by: NURSE PRACTITIONER

## 2024-11-19 PROCEDURE — 96372 THER/PROPH/DIAG INJ SC/IM: CPT | Performed by: NURSE PRACTITIONER

## 2024-11-19 PROCEDURE — RXMED WILLOW AMBULATORY MEDICATION CHARGE

## 2024-11-19 RX ORDER — CYANOCOBALAMIN 1000 UG/ML
1000 INJECTION, SOLUTION INTRAMUSCULAR; SUBCUTANEOUS ONCE
Status: COMPLETED | OUTPATIENT
Start: 2024-11-19 | End: 2024-11-19

## 2024-11-19 RX ORDER — ROPINIROLE 2 MG/1
2 TABLET, FILM COATED ORAL NIGHTLY
Qty: 90 TABLET | Refills: 1 | Status: SHIPPED | OUTPATIENT
Start: 2024-11-19

## 2024-11-19 RX ORDER — VIT C/E/ZN/COPPR/LUTEIN/ZEAXAN 250MG-90MG
500 CAPSULE ORAL DAILY
Qty: 30 TABLET | Refills: 11 | Status: SHIPPED | OUTPATIENT
Start: 2024-11-19 | End: 2025-11-19

## 2024-11-19 ASSESSMENT — PAIN SCALES - GENERAL: PAINLEVEL_OUTOF10: 0-NO PAIN

## 2024-11-19 ASSESSMENT — ENCOUNTER SYMPTOMS
LOSS OF SENSATION IN FEET: 0
OCCASIONAL FEELINGS OF UNSTEADINESS: 0
DEPRESSION: 0

## 2024-11-19 NOTE — PROGRESS NOTES
Subjective   Patient ID: Constance M Kocher is a 74 y.o. female who presents for evaluation of scooter for home  She is limited in home activities not able cook, difficulty due to fatigue and SOB due to CHF not able to move room to room freely due to physical intolerance and fatigue, has to sit  concern with SOB and low O2 severe activity intolerance    Having dizziness and lightheaded concern about Afib trigger,  seen by cardiology   Needs Roprinol for restless  Short of breath , cough and sinus drainage   HPI still taking lasix for hold water, no energy      Review of Systems   Constitutional:  Positive for activity change and fatigue.   HENT:  Positive for congestion.    Respiratory:  Positive for cough and shortness of breath.    Cardiovascular:  Positive for leg swelling.   Musculoskeletal:  Positive for gait problem.   Neurological:  Positive for weakness.   Psychiatric/Behavioral:  Positive for sleep disturbance.        Objective   /64 (BP Location: Left arm, Patient Position: Sitting, BP Cuff Size: Adult)   Pulse 89   LMP  (LMP Unknown)   SpO2 93%     Pulse ox room air  at rest 93% , ambulatory 86- 88%  walking,   Physical Exam  Vitals and nursing note reviewed.   Constitutional:       General: She is not in acute distress.  HENT:      Right Ear: Tympanic membrane and ear canal normal.      Left Ear: Tympanic membrane and ear canal normal.      Nose: Nose normal. No rhinorrhea.      Mouth/Throat:      Pharynx: Oropharynx is clear. No oropharyngeal exudate or posterior oropharyngeal erythema.      Comments: Dentition wnl  Eyes:      Extraocular Movements: Extraocular movements intact.      Conjunctiva/sclera: Conjunctivae normal.      Pupils: Pupils are equal, round, and reactive to light.   Neck:      Vascular: No carotid bruit.   Cardiovascular:      Rate and Rhythm: Normal rate and regular rhythm.      Heart sounds: Normal heart sounds. No murmur heard.  Pulmonary:      Breath sounds: Wheezing and  rhonchi present.   Abdominal:      General: Bowel sounds are normal. There is no distension.      Palpations: Abdomen is soft. There is no mass.      Tenderness: There is no abdominal tenderness. There is no guarding or rebound.      Hernia: No hernia is present.   Musculoskeletal:         General: No swelling or tenderness. Normal range of motion.      Cervical back: Normal range of motion and neck supple.      Right lower leg: Edema present.      Left lower leg: Edema present.   Lymphadenopathy:      Cervical: No cervical adenopathy.   Skin:     General: Skin is warm.      Findings: No rash.   Neurological:      General: No focal deficit present.      Mental Status: She is alert and oriented to person, place, and time.      Motor: Weakness present.         Assessment/Plan   Problem List Items Addressed This Visit             ICD-10-CM    Primary hypertension I10    Relevant Orders    CBC and Auto Differential (Completed)    Atrial fibrillation with rapid ventricular response (Multi) I48.91    Stage 3a chronic kidney disease (Multi) N18.31    Relevant Orders    Comprehensive metabolic panel (Completed)    Chronic diastolic CHF (congestive heart failure) I50.32    Relevant Orders    Comprehensive metabolic panel (Completed)     Other Visit Diagnoses         Codes    Activity intolerance    -  Primary R68.89    Gait disorder     R26.9    Shortness of breath     R06.02    Balance disorder     R26.89    Hypoxemia     R09.02    Hyperlipidemia, unspecified hyperlipidemia type     E78.5    Relevant Orders    CBC and Auto Differential (Completed)    Insomnia secondary to restless leg syndrome     G25.81, G47.01    Relevant Medications    rOPINIRole (Requip) 2 mg tablet    B12 deficiency     E53.8    Relevant Medications    cyanocobalamin (Vitamin B-12) injection 1,000 mcg (Completed)    cyanocobalamin (Vitamin B-12) 500 mcg tablet        Case management to assist with home evaluation for hypoxia for O2   PT evaluation for  mobility and strength  Scooter for home use

## 2024-11-20 ENCOUNTER — PATIENT OUTREACH (OUTPATIENT)
Dept: PRIMARY CARE | Facility: CLINIC | Age: 74
End: 2024-11-20
Payer: COMMERCIAL

## 2024-11-20 DIAGNOSIS — I48.91 ATRIAL FIBRILLATION WITH RAPID VENTRICULAR RESPONSE (MULTI): ICD-10-CM

## 2024-11-20 DIAGNOSIS — R26.9 GAIT DISORDER: ICD-10-CM

## 2024-11-20 DIAGNOSIS — E87.6 HYPOKALEMIA: Primary | ICD-10-CM

## 2024-11-20 DIAGNOSIS — I50.32 CHRONIC DIASTOLIC CHF (CONGESTIVE HEART FAILURE): ICD-10-CM

## 2024-11-20 PROCEDURE — RXMED WILLOW AMBULATORY MEDICATION CHARGE

## 2024-11-20 RX ORDER — POTASSIUM CHLORIDE 750 MG/1
10 TABLET, FILM COATED, EXTENDED RELEASE ORAL DAILY
Qty: 30 TABLET | Refills: 2 | Status: SHIPPED | OUTPATIENT
Start: 2024-11-20 | End: 2025-11-20

## 2024-11-20 ASSESSMENT — ENCOUNTER SYMPTOMS
SLEEP DISTURBANCE: 1
SHORTNESS OF BREATH: 1
ACTIVITY CHANGE: 1
FATIGUE: 1
COUGH: 1
WEAKNESS: 1

## 2024-11-20 NOTE — PROGRESS NOTES
SPOKE TO:  CONSTANCE KOCHER  LAST OFFICE VISIT:  Kindra Wood 11/19/24  [facility] CONDITONS:  ATRIAL FIBRILLATION, HEART FAILURE, HTN     PREVIOUS ENCOUNTER  10/10/24 In reading the notes Jazzy identified Atenalol was the culprit drug causing her to feel nauseated and like a zombie.  She stopped taking it and now her HR is 150's.  She asked cardiologist to switch her to something else he could not do this without a visit. Pt is now living in a hotel with the 3 grand kids due to lead paint issues in her home and still has car without brakes.  Dr Rubin advised taking half dose of the atenalol.  She has appt with Dr Rubin Oct 30 3:15.    I spoke to Jazzy today states she is taking half dose of Atenalol and not experiencing any side effects. Her HR during our conversation was 142 but she checked it further into our conversation it went down to 104 and then 87.  She feels tired at times and has to take a periodic nap during the day.  I applauded Jazzy for paying attention to her body, if she is tired then a rest is good to recharge her body and allow her heart to slow down.  She shares that she is aware that she cannot do what she used to do and let her family know she is unable to cook and clean like she did before and her DIL is being more helpful since the discussion.  Jazzy states she has changed the way she takes her medications she waits until she eats and since this change meds are not making her sick.  She is weighing herself for the HF has lost some weight down to 178 she is taking her diuretic and no leg swelling. We discussed for what weight increase to call doctor and importance  of early recognition s/s HF flare.  Jazzy was successful in stopping smoking for 3 months but picked it up again although not smoking quite as much.  Jazzy states cigarettes are her  she needs to have them. No thoughts on quitting again at this time. I appreciated Jazzy's honesty.     11/20/24 Jazzy had her appt yesterday  "for the scooter. Kindra shared with me the need for home 02 testing I suggested [facility] I do not see that orders were placed yet.  I sent her the criteria for getting the scooter and what needs to go in the visit note and prescription:  - Needs face to face with sole purpose of the visit to state \"scooter\"  - Why they need the scooter  - give quantifiable analysis of arm strength (must be 3/5 or lower)  - Must indicate for home use only (cannot state using outside the home)  - Must state no dexterity or muscle strength to use a cane, walker, walker with wheels or manual wheelchair  - Must state can safely transfer, operate the tiller, sit upright and safely drive (must have all 4 of these)  - Prescription with name, mobility scooter, diagnosis code, doctors signature and 2 dates.     *MUST GO TO [facility] [phone]    Kindra mentioned need for PT due to unsteady gait but did not put order in. I sent message if I could assist with that. PT could do the arm strength testing.  Also states she forgot to mention needs prescription for handicap plaquard message was routed.      Jazzy states she received B12 shot at the office and in  a few minutes head cleared up and she felt better.      K level low yesterday Kindra Wood has not addressed this yet.      IDENTIFIED ISSUES/CONCERNS:  FREQUENT ADMITS ATRIAL FIBRILLATION  5/22-28 A FIB  3/27-4/1 A FIB  Smokes Cigarettes    [X] Face 2 face appt for scooter.  See 11/20 note  [x] [ ] [ ] REVIEW HEART FAILURE SELF MANAGEMENT PLAN  REVIEW HF MEDICATIONS  REVIEW SODIUM RESTRICTION  REVIEW FLUID RESTRICTION  REVIEW MORNING WEIGHTS  REVIEW FOR WHAT WEIGHT INCREASE TO CALL CARDIOLOGIST / PCP  REVIEW OTHER SIGNS/SYMPTOMS OF HF FLARE (SHORTNESS OF BREATH MORE THAN THE USUAL, SWELLING IN LEGS, FEET OR ABDOMEN, LOOSE SOUNDING COUGH THAT WILL NOT GO AWAY, NEEDING TO USE MORE PILLOWS IN ORDER TO BREATHE BETTER)  REVIEW IMPORTANCE OF PROMPT CALL TO CARDIOLOGIST / PCP FOR SIGNS OF " FLUID OVERLOAD IN ORDER TO RECEIVE PROMPT TREATMENT AND PREVENT ED/HOSPITALIZATION  ROUTINE CARDIOLOGIST VISITS  DAILY ACTIVITY 30 MINS/DAY 5 DAYS/WK.   PACE ACITIVITES. IF EXPERIENCING SHORTNESS OF BREATH STOP AND RESTow and has cardiac follow up in July.     [X] [  ] [  ] [  ]  REVIEW ATRIAL FIBRILLATION SELF MANAGEMENT PLAN             -     MEDICATIONS (ELLIQUIS, ATENALOL, CARDIZEM)             -     AVOID ALCOHOL AND CAFFEINE             -     SMOKING CESSATION

## 2024-11-21 ENCOUNTER — TELEPHONE (OUTPATIENT)
Dept: HOME HEALTH SERVICES | Facility: HOME HEALTH | Age: 74
End: 2024-11-21
Payer: COMMERCIAL

## 2024-11-21 ENCOUNTER — DOCUMENTATION (OUTPATIENT)
Dept: HOME HEALTH SERVICES | Facility: HOME HEALTH | Age: 74
End: 2024-11-21
Payer: COMMERCIAL

## 2024-11-21 ENCOUNTER — HOME HEALTH ADMISSION (OUTPATIENT)
Dept: HOME HEALTH SERVICES | Facility: HOME HEALTH | Age: 74
End: 2024-11-21
Payer: COMMERCIAL

## 2024-11-21 DIAGNOSIS — E87.6 LOW SERUM POTASSIUM LEVEL: ICD-10-CM

## 2024-11-21 DIAGNOSIS — R53.83 FATIGUE, UNSPECIFIED TYPE: ICD-10-CM

## 2024-11-21 NOTE — TELEPHONE ENCOUNTER
Hello!  [facility] received referral for home care and processed for SOC Sunday 11/24-Monday 11/25/24.    Thank you!  Intake

## 2024-11-21 NOTE — HH CARE COORDINATION
Home Care received a Referral for Physical Therapy. We have processed the referral for a Start of Care on 11/24-11/25/24.     If you have any questions or concerns, please feel free to contact us at [phone]. Follow the prompts, enter your five digit zip code, and you will be directed to your care team on EAST 1.

## 2024-11-22 ENCOUNTER — PHARMACY VISIT (OUTPATIENT)
Dept: PHARMACY | Facility: CLINIC | Age: 74
End: 2024-11-22
Payer: MEDICARE

## 2024-11-25 ENCOUNTER — HOME CARE VISIT (OUTPATIENT)
Dept: HOME HEALTH SERVICES | Facility: HOME HEALTH | Age: 74
End: 2024-11-25
Payer: COMMERCIAL

## 2024-11-25 VITALS
TEMPERATURE: 97.6 F | RESPIRATION RATE: 18 BRPM | OXYGEN SATURATION: 97 % | HEART RATE: 60 BPM | SYSTOLIC BLOOD PRESSURE: 128 MMHG | DIASTOLIC BLOOD PRESSURE: 78 MMHG

## 2024-11-25 PROCEDURE — G0151 HHCP-SERV OF PT,EA 15 MIN: HCPCS

## 2024-11-25 SDOH — HEALTH STABILITY: PHYSICAL HEALTH: PHYSICAL EXERCISE: SITTING

## 2024-11-25 SDOH — HEALTH STABILITY: PHYSICAL HEALTH: PHYSICAL EXERCISE: 10

## 2024-11-25 SDOH — HEALTH STABILITY: PHYSICAL HEALTH: EXERCISE ACTIVITIES SETS: 1

## 2024-11-25 SDOH — HEALTH STABILITY: PHYSICAL HEALTH

## 2024-11-25 SDOH — HEALTH STABILITY: PHYSICAL HEALTH: EXERCISE ACTIVITY: APS, MARCHING, LAQ, HIP ABD/ADD

## 2024-11-25 ASSESSMENT — ACTIVITIES OF DAILY LIVING (ADL)
CURRENT_FUNCTION: ONE PERSON
AMBULATION ASSISTANCE ON FLAT SURFACES: 1
CURRENT_FUNCTION: CONTACT GUARD ASSIST
AMBULATION ASSISTANCE: ONE PERSON
ENTERING_EXITING_HOME: CONTACT GUARD ASSIST
OASIS_M1830: 03
AMBULATION_DISTANCE/DURATION_TOLERATED: 50 FEET
PHYSICAL TRANSFERS ASSESSED: 1
AMBULATION ASSISTANCE: 1
AMBULATION ASSISTANCE: CONTACT GUARD ASSIST

## 2024-11-25 ASSESSMENT — ENCOUNTER SYMPTOMS
SHORTNESS OF BREATH: T
OCCASIONAL FEELINGS OF UNSTEADINESS: 1
DENIES PAIN: 1
MUSCLE WEAKNESS: 1

## 2024-11-26 ENCOUNTER — HOME CARE VISIT (OUTPATIENT)
Dept: HOME HEALTH SERVICES | Facility: HOME HEALTH | Age: 74
End: 2024-11-26
Payer: COMMERCIAL

## 2024-11-26 VITALS — SYSTOLIC BLOOD PRESSURE: 100 MMHG | DIASTOLIC BLOOD PRESSURE: 50 MMHG | HEART RATE: 100 BPM | OXYGEN SATURATION: 93 %

## 2024-11-26 PROCEDURE — G0152 HHCP-SERV OF OT,EA 15 MIN: HCPCS

## 2024-11-26 ASSESSMENT — ENCOUNTER SYMPTOMS
PAIN LOCATION: RIGHT SHOULDER
PERSON REPORTING PAIN: PATIENT
PAIN LOCATION - PAIN SEVERITY: 10/10
PAIN LOCATION - PAIN SEVERITY: 2/10
PAIN LOCATION: RIGHT HIP
PAIN: 1

## 2024-11-30 ASSESSMENT — ACTIVITIES OF DAILY LIVING (ADL)
BATHING ASSESSED: 1
AMBULATION ASSISTANCE: 1
LAUNDRY: DEPENDENT
TOILETING: CONTACT GUARD ASSIST
LIGHT HOUSEKEEPING: DEPENDENT
LAUNDRY ASSESSED: 1
BATHING_CURRENT_FUNCTION: MINIMUM ASSIST
DRESSING_LB_CURRENT_FUNCTION: MINIMUM ASSIST
PREPARING MEALS: DEPENDENT
TOILETING: 1
DRESSING_UB_CURRENT_FUNCTION: STAND BY ASSIST
HOUSEKEEPING ASSESSED: 1
AMBULATION ASSISTANCE: CONTACT GUARD ASSIST

## 2024-11-30 ASSESSMENT — ENCOUNTER SYMPTOMS
PAIN LOCATION - EXACERBATING FACTORS: OVERHEAD ACTIVITY
PAIN LOCATION - PAIN FREQUENCY: WITH ACTIVITY
PAIN SEVERITY GOAL: 0/10
LOWEST PAIN SEVERITY IN PAST 24 HOURS: 0/10
PAIN LOCATION - EXACERBATING FACTORS: SITTING, STANDING
PAIN LOCATION - PAIN QUALITY: ACHING
PAIN LOCATION - RELIEVING FACTORS: REST, MEDS
PAIN LOCATION - PAIN QUALITY: ACHING
HIGHEST PAIN SEVERITY IN PAST 24 HOURS: 10/10
PAIN LOCATION - PAIN FREQUENCY: FREQUENT

## 2024-12-03 DIAGNOSIS — G25.81 RESTLESS LEG SYNDROME: ICD-10-CM

## 2024-12-03 RX ORDER — ROPINIROLE 2 MG/1
2 TABLET, FILM COATED ORAL NIGHTLY
Qty: 30 TABLET | Refills: 3 | Status: SHIPPED | OUTPATIENT
Start: 2024-12-03

## 2024-12-04 ENCOUNTER — PATIENT OUTREACH (OUTPATIENT)
Dept: PRIMARY CARE | Facility: CLINIC | Age: 74
End: 2024-12-04
Payer: COMMERCIAL

## 2024-12-04 DIAGNOSIS — I48.91 ATRIAL FIBRILLATION WITH RAPID VENTRICULAR RESPONSE (MULTI): ICD-10-CM

## 2024-12-04 DIAGNOSIS — I50.32 CHRONIC DIASTOLIC CHF (CONGESTIVE HEART FAILURE): ICD-10-CM

## 2024-12-04 DIAGNOSIS — E78.5 HYPERLIPIDEMIA, UNSPECIFIED HYPERLIPIDEMIA TYPE: ICD-10-CM

## 2024-12-04 PROCEDURE — RXMED WILLOW AMBULATORY MEDICATION CHARGE

## 2024-12-04 NOTE — PROGRESS NOTES
"SPOKE TO:  CONSTANCE KOCHER  LAST OFFICE VISIT:  Kindra Wood 11/19/24  [facility] CONDITONS:  ATRIAL FIBRILLATION, HEART FAILURE, HTN     PREVIOUS ENCOUNTER  11/20/24 Jazzy had her appt yesterday for the scooter. Kindra shared with me the need for home 02 testing I suggested [facility] I do not see that orders were placed yet.  I sent her the criteria for getting the scooter and what needs to go in the visit note and prescription:  - Needs face to face with sole purpose of the visit to state \"scooter\"  - Why they need the scooter  - give quantifiable analysis of arm strength (must be 3/5 or lower)  - Must indicate for home use only (cannot state using outside the home)  - Must state no dexterity or muscle strength to use a cane, walker, walker with wheels or manual wheelchair  - Must state can safely transfer, operate the tiller, sit upright and safely drive (must have all 4 of these)  - Prescription with name, mobility scooter, diagnosis code, doctors signature and 2 dates.      *MUST GO TO [facility] [phone]     Kindra mentioned need for PT due to unsteady gait but did not put order in. I sent message if I could assist with that. PT could do the arm strength testing.  Also states she forgot to mention needs prescription for handicap plaquard message was routed.       Jazzy states she received B12 shot at the office and in  a few minutes head cleared up and she felt better.       K level low yesterday Kindra Wood has not addressed this yet.     12/4/24 I spoke to Jazzy today for monthly follow up she tells me she she took a fall last week on her steps outside fell on her right buttocks she did not go to the ED states bruising but otherwise fine.  Jazzy has plans to soon move out of her current residence to an apartment on the first floor.  Jazzy has started home PT and OT had the initial evals.  I asked Jazzy if she heard from [facility] for the home 02 testing states has not.  I spoke to Jorge Alberto at [facility] states they " "never received the order so I faxed everything over to them today [phone] [facility].  Now that PT has come out I sent the therapist Afshin Davis a secure chat asking if he could do the arm strength testing, inability to use cane/walker etc and include in next visit note.  Once this is completed I will send everything over to [facility].  Mariana has gained 12# since our last conversation (190# previously 178#) and states more short of breath and \"panting\" when she is moving and this is a change. Also swollen ankles.   She is using a brand new scale and states the weights on her scale match the telehealth scale she is also using from [employer].  I confirmed that she is taking her bumex 1mg daily.  She described her diet which seems low sodium and is not adding salt.  She states she is \"constantly urinating.\"  Message routed to Dr Rubin.  Pt also tells me she is using Ropinirole 2 times a day it is prescribed as daily but states KEMAL Wood told her if she needed it can be used up to 3 times daily.  Prescription as written she is concerned about running out of medication message routed to Kindra about changing prescription.      IDENTIFIED ISSUES/CONCERNS:  FREQUENT ADMITS ATRIAL FIBRILLATION  5/22-28 A FIB  3/27-4/1 A FIB  Smokes Cigarettes     [X] Face 2 face appt for scooter.  See 11/20 note  [x] [ ] [ ] REVIEW HEART FAILURE SELF MANAGEMENT PLAN  REVIEW HF MEDICATIONS  REVIEW SODIUM RESTRICTION  REVIEW FLUID RESTRICTION  REVIEW MORNING WEIGHTS  REVIEW FOR WHAT WEIGHT INCREASE TO CALL CARDIOLOGIST / PCP  REVIEW OTHER SIGNS/SYMPTOMS OF HF FLARE (SHORTNESS OF BREATH MORE THAN THE USUAL, SWELLING IN LEGS, FEET OR ABDOMEN, LOOSE SOUNDING COUGH THAT WILL NOT GO AWAY, NEEDING TO USE MORE PILLOWS IN ORDER TO BREATHE BETTER)  REVIEW IMPORTANCE OF PROMPT CALL TO CARDIOLOGIST / PCP FOR SIGNS OF FLUID OVERLOAD IN ORDER TO RECEIVE PROMPT TREATMENT AND PREVENT ED/HOSPITALIZATION  ROUTINE CARDIOLOGIST VISITS  DAILY " ACTIVITY 30 MINS/DAY 5 DAYS/WK.   PACE ACITIVITES. IF EXPERIENCING SHORTNESS OF BREATH STOP AND RESTow and has cardiac follow up in July.     [X] [  ] [  ] [  ]  REVIEW ATRIAL FIBRILLATION SELF MANAGEMENT PLAN             -     MEDICATIONS (ELLIQUIS, ATENALOL, CARDIZEM)             -     AVOID ALCOHOL AND CAFFEINE             -     SMOKING CESSATION

## 2024-12-05 ENCOUNTER — PHARMACY VISIT (OUTPATIENT)
Dept: PHARMACY | Facility: CLINIC | Age: 74
End: 2024-12-05
Payer: MEDICARE

## 2024-12-05 ENCOUNTER — HOME CARE VISIT (OUTPATIENT)
Dept: HOME HEALTH SERVICES | Facility: HOME HEALTH | Age: 74
End: 2024-12-05
Payer: COMMERCIAL

## 2024-12-05 PROCEDURE — G0152 HHCP-SERV OF OT,EA 15 MIN: HCPCS

## 2024-12-06 ENCOUNTER — HOME CARE VISIT (OUTPATIENT)
Dept: HOME HEALTH SERVICES | Facility: HOME HEALTH | Age: 74
End: 2024-12-06
Payer: COMMERCIAL

## 2024-12-06 VITALS — SYSTOLIC BLOOD PRESSURE: 122 MMHG | HEART RATE: 88 BPM | DIASTOLIC BLOOD PRESSURE: 70 MMHG

## 2024-12-06 ASSESSMENT — ENCOUNTER SYMPTOMS
PAIN LOCATION - EXACERBATING FACTORS: SHOULDER ELEVATION
PAIN LOCATION - PAIN DURATION: DAILY
PAIN LOCATION - PAIN QUALITY: ACHING
PERSON REPORTING PAIN: PATIENT
PAIN LOCATION - PAIN SEVERITY: 7/10
PAIN LOCATION: RIGHT SHOULDER
PAIN LOCATION - RELIEVING FACTORS: REST, MEDS
PAIN: 1

## 2024-12-07 ASSESSMENT — ENCOUNTER SYMPTOMS
LOWEST PAIN SEVERITY IN PAST 24 HOURS: 0/10
HIGHEST PAIN SEVERITY IN PAST 24 HOURS: 8/10
PAIN SEVERITY GOAL: 0/10

## 2024-12-09 PROCEDURE — RXMED WILLOW AMBULATORY MEDICATION CHARGE

## 2024-12-10 ENCOUNTER — PHARMACY VISIT (OUTPATIENT)
Dept: PHARMACY | Facility: CLINIC | Age: 74
End: 2024-12-10
Payer: MEDICARE

## 2024-12-13 ENCOUNTER — HOME CARE VISIT (OUTPATIENT)
Dept: HOME HEALTH SERVICES | Facility: HOME HEALTH | Age: 74
End: 2024-12-13
Payer: COMMERCIAL

## 2024-12-13 PROCEDURE — G0152 HHCP-SERV OF OT,EA 15 MIN: HCPCS

## 2024-12-15 ASSESSMENT — ACTIVITIES OF DAILY LIVING (ADL)
DRESSING_LB_CURRENT_FUNCTION: INDEPENDENT
TOILETING: INDEPENDENT
BATHING ASSESSED: 1
PREPARING MEALS: INDEPENDENT
TOILETING: 1
DRESSING_UB_CURRENT_FUNCTION: INDEPENDENT
BATHING_CURRENT_FUNCTION: INDEPENDENT

## 2024-12-16 ENCOUNTER — APPOINTMENT (OUTPATIENT)
Dept: HOME HEALTH SERVICES | Facility: HOME HEALTH | Age: 74
End: 2024-12-16
Payer: COMMERCIAL

## 2024-12-16 ENCOUNTER — HOME CARE VISIT (OUTPATIENT)
Dept: HOME HEALTH SERVICES | Facility: HOME HEALTH | Age: 74
End: 2024-12-16
Payer: COMMERCIAL

## 2024-12-17 PROCEDURE — RXMED WILLOW AMBULATORY MEDICATION CHARGE

## 2024-12-19 ENCOUNTER — PHARMACY VISIT (OUTPATIENT)
Dept: PHARMACY | Facility: CLINIC | Age: 74
End: 2024-12-19
Payer: MEDICARE

## 2024-12-24 ENCOUNTER — PATIENT OUTREACH (OUTPATIENT)
Dept: PRIMARY CARE | Facility: CLINIC | Age: 74
End: 2024-12-24
Payer: COMMERCIAL

## 2024-12-24 DIAGNOSIS — I10 PRIMARY HYPERTENSION: ICD-10-CM

## 2024-12-24 DIAGNOSIS — E78.5 HYPERLIPIDEMIA, UNSPECIFIED HYPERLIPIDEMIA TYPE: ICD-10-CM

## 2024-12-24 DIAGNOSIS — I50.32 CHRONIC DIASTOLIC CHF (CONGESTIVE HEART FAILURE): ICD-10-CM

## 2024-12-24 DIAGNOSIS — I48.91 ATRIAL FIBRILLATION WITH RAPID VENTRICULAR RESPONSE (MULTI): ICD-10-CM

## 2024-12-24 NOTE — PROGRESS NOTES
"SPOKE TO:  EUSEBIA KOCHER  LAST OFFICE VISIT:  Kindra Wood 11/19/24  [facility] CONDITONS:  ATRIAL FIBRILLATION, HEART FAILURE, HTN                 SPOKE TO:  EUSEBIA KOCHER  LAST OFFICE VISIT:  Kindra Wood 11/19/24  [facility] CONDITONS:  ATRIAL FIBRILLATION, HEART FAILURE, HTN    Spoke to Jazzy for follow up states weight is down to 181.8#, no foot swelling and breathing is much better.  Never got the prescription for furosemide 60mg BID that Dr Rubin was supposed to send but does not feel she needs.  She is doing her best with sodium restriction \"everything has salt in it.\"  Re-education provided to stay away from packaged, canned, processed, fast and salty snack foods.  Discussed best way to get ingredient control is to prepare own foods using healthy ingredients and not adding salt and using sauces, condiments, dressings etc.  Wants to know why legs bother her at night told Jazzy she has lab requisition for K and Mg levels she has not done yet suggested getting lab work drawn states she will.  States she was approved for the scooter it will be delivered day after [place].         IDENTIFIED ISSUES/CONCERNS:  FREQUENT ADMITS ATRIAL FIBRILLATION  5/22-28 A FIB  3/27-4/1 A FIB  Smokes Cigarettes     [X] Face 2 face appt for scooter.  See 11/20 note  [x] [ ] [ ] REVIEW HEART FAILURE SELF MANAGEMENT PLAN  REVIEW HF MEDICATIONS  REVIEW SODIUM RESTRICTION  REVIEW FLUID RESTRICTION  REVIEW MORNING WEIGHTS  REVIEW FOR WHAT WEIGHT INCREASE TO CALL CARDIOLOGIST / PCP  REVIEW OTHER SIGNS/SYMPTOMS OF HF FLARE (SHORTNESS OF BREATH MORE THAN THE USUAL, SWELLING IN LEGS, FEET OR ABDOMEN, LOOSE SOUNDING COUGH THAT WILL NOT GO AWAY, NEEDING TO USE MORE PILLOWS IN ORDER TO BREATHE BETTER)  REVIEW IMPORTANCE OF PROMPT CALL TO CARDIOLOGIST / PCP FOR SIGNS OF FLUID OVERLOAD IN ORDER TO RECEIVE PROMPT TREATMENT AND PREVENT ED/HOSPITALIZATION  ROUTINE CARDIOLOGIST VISITS  DAILY ACTIVITY 30 MINS/DAY 5 DAYS/WK.   PACE ACITIVITES. IF " EXPERIENCING SHORTNESS OF BREATH STOP AND RESTow and has cardiac follow up in July.     [X] [  ] [  ] [  ]  REVIEW ATRIAL FIBRILLATION SELF MANAGEMENT PLAN             -     MEDICATIONS (ELLIQUIS, ATENALOL, CARDIZEM)             -     AVOID ALCOHOL AND CAFFEINE             -     SMOKING CESSATION

## 2024-12-30 PROCEDURE — RXMED WILLOW AMBULATORY MEDICATION CHARGE

## 2025-01-02 ENCOUNTER — PHARMACY VISIT (OUTPATIENT)
Dept: PHARMACY | Facility: CLINIC | Age: 75
End: 2025-01-02
Payer: MEDICARE

## 2025-01-03 ENCOUNTER — PHARMACY VISIT (OUTPATIENT)
Dept: PHARMACY | Facility: CLINIC | Age: 75
End: 2025-01-03
Payer: COMMERCIAL

## 2025-01-03 PROCEDURE — RXMED WILLOW AMBULATORY MEDICATION CHARGE

## 2025-01-13 PROCEDURE — RXMED WILLOW AMBULATORY MEDICATION CHARGE

## 2025-01-16 ENCOUNTER — PHARMACY VISIT (OUTPATIENT)
Dept: PHARMACY | Facility: CLINIC | Age: 75
End: 2025-01-16
Payer: MEDICARE

## 2025-01-24 ENCOUNTER — PATIENT OUTREACH (OUTPATIENT)
Dept: PRIMARY CARE | Facility: CLINIC | Age: 75
End: 2025-01-24
Payer: COMMERCIAL

## 2025-01-24 DIAGNOSIS — I48.91 ATRIAL FIBRILLATION WITH RAPID VENTRICULAR RESPONSE (MULTI): ICD-10-CM

## 2025-01-24 DIAGNOSIS — I50.32 CHRONIC DIASTOLIC CHF (CONGESTIVE HEART FAILURE): ICD-10-CM

## 2025-01-24 PROBLEM — R09.02 HYPOXIA: Status: ACTIVE | Noted: 2025-01-24

## 2025-01-24 NOTE — PROGRESS NOTES
SPOKE TO:  CONSTANCE KOCHER  LAST OFFICE VISIT:  Kindra Wood 11/19/24  [facility] CONDITONS:  ATRIAL FIBRILLATION, HEART FAILURE, HTN                  SPOKE TO:  CONSTANCE KOCHER  LAST OFFICE VISIT:  Kindra Wood 11/19/24  [facility] CONDITONS:  ATRIAL FIBRILLATION, HEART FAILURE, HTN     I spoke to Jazzy today for monthly outreach.   12/20/24  Jazzy had overnight oxygen testing thru [facility] the results state SP02 88% or < qualify for 02  Jazzy states no one called her about setting her up with 02  CM sent communication to KEMAL Wood (who ordered the testing) about 02 results.     Last month Jazzy was having issues with fluid retention, SOB and swelling from the HF. Weight went up to 190# (baseline 178#). Dr Rubin was supposed to send prescription for lasix 60 mg BID but never did.  In the meantime weight went down to 181.8#, no more SOB or swelling with pt cutting back on salt.  Pt also states it takes awhile for her bladder to empty and she is capable of losing  3# or more after a large urination.   Today Jazzy reports weight 179# no swelling in feet and denies SOB more than her usual.  She continues to take her bumex and trying hard to keep diet low in salt.     Jazzy was supposed to have Mg and K drawn in Nov due to complaints of leg cramps and discomfort.  She has not been able to get to the lab due to the weather and decided to just go ahead and take Mg supplements on her own. States with taking Mg OTC the leg cramps stopped and she is also able to sleep better.  She will still get the labs drawn informed of new routine with [facility] and gave the number to call to schedule the labs.     Jazzy is happy with acquiring her new scooter.  She will only use it on outings when needing to walk long distances.  In the home she does as much walking as she can tolerate.     Jazzy is now at a new address (demo updated).  She is on first floor residence which is much easier / safer for pt.  No falls since last conversation.      Discussed new CM transfer to [facility] CCM pt is agreeable.      IDENTIFIED ISSUES/CONCERNS:  FREQUENT ADMITS ATRIAL FIBRILLATION  5/22-28 A FIB  3/27-4/1 A FIB  Smokes Cigarettes    [X] Home overnight 02 testing  1/24/25 done message to KEMAL Wood  [X] Face 2 face appt for scooter.  1/24/25 scooter obtained  [x] [x] [ ] REVIEW HEART FAILURE SELF MANAGEMENT PLAN  REVIEW HF MEDICATIONS  REVIEW SODIUM RESTRICTION  REVIEW FLUID RESTRICTION  REVIEW MORNING WEIGHTS  REVIEW FOR WHAT WEIGHT INCREASE TO CALL CARDIOLOGIST / PCP  REVIEW OTHER SIGNS/SYMPTOMS OF HF FLARE (SHORTNESS OF BREATH MORE THAN THE USUAL, SWELLING IN LEGS, FEET OR ABDOMEN, LOOSE SOUNDING COUGH THAT WILL NOT GO AWAY, NEEDING TO USE MORE PILLOWS IN ORDER TO BREATHE BETTER)  REVIEW IMPORTANCE OF PROMPT CALL TO CARDIOLOGIST / PCP FOR SIGNS OF FLUID OVERLOAD IN ORDER TO RECEIVE PROMPT TREATMENT AND PREVENT ED/HOSPITALIZATION  ROUTINE CARDIOLOGIST VISITS  DAILY ACTIVITY 30 MINS/DAY 5 DAYS/WK.   PACE ACITIVITES. IF EXPERIENCING SHORTNESS OF BREATH STOP AND RESTow and has cardiac follow up in July.     [X] [  ] [  ] [  ]  REVIEW ATRIAL FIBRILLATION SELF MANAGEMENT PLAN             -     MEDICATIONS (ELLIQUIS, ATENALOL, CARDIZEM)             -     AVOID ALCOHOL AND CAFFEINE             -     SMOKING CESSATION

## 2025-01-27 ENCOUNTER — TELEPHONE (OUTPATIENT)
Dept: PRIMARY CARE | Facility: CLINIC | Age: 75
End: 2025-01-27
Payer: COMMERCIAL

## 2025-01-27 DIAGNOSIS — R09.02 HYPOXEMIA: ICD-10-CM

## 2025-01-27 DIAGNOSIS — R09.02 HYPOXIA: ICD-10-CM

## 2025-01-30 ENCOUNTER — PHARMACY VISIT (OUTPATIENT)
Dept: PHARMACY | Facility: CLINIC | Age: 75
End: 2025-01-30
Payer: MEDICARE

## 2025-01-30 PROCEDURE — RXMED WILLOW AMBULATORY MEDICATION CHARGE

## 2025-02-07 ENCOUNTER — PATIENT OUTREACH (OUTPATIENT)
Dept: PRIMARY CARE | Facility: CLINIC | Age: 75
End: 2025-02-07
Payer: COMMERCIAL

## 2025-02-07 DIAGNOSIS — I48.91 ATRIAL FIBRILLATION, UNSPECIFIED TYPE (MULTI): ICD-10-CM

## 2025-02-07 DIAGNOSIS — I10 HTN (HYPERTENSION), BENIGN: ICD-10-CM

## 2025-02-07 DIAGNOSIS — I50.32 CHRONIC DIASTOLIC CHF (CONGESTIVE HEART FAILURE): ICD-10-CM

## 2025-02-07 NOTE — PROGRESS NOTES
Spoke with patient today for monthly CCM outreach.  Introduced myself as her new Care Manager and provided my phone number.   Patient provided me with her new insurance information through [employer].  Her [place] Member ID # [id number] and Group # [id number].   Will message office staff with this information to update demographics.  Patient is inquiring about her handicap placard and if the paperwork has been completed by Dr. Douglas.  Will send message to PCP office staff to get update on status of paperwork.  Will call patient with response and continue outreach.

## 2025-02-10 ENCOUNTER — TELEPHONE (OUTPATIENT)
Dept: PRIMARY CARE | Facility: CLINIC | Age: 75
End: 2025-02-10
Payer: COMMERCIAL

## 2025-02-10 DIAGNOSIS — R26.9 GAIT DISORDER: ICD-10-CM

## 2025-02-10 DIAGNOSIS — R26.89 BALANCE DISORDER: ICD-10-CM

## 2025-02-11 ENCOUNTER — TELEPHONE (OUTPATIENT)
Dept: PRIMARY CARE | Facility: CLINIC | Age: 75
End: 2025-02-11
Payer: COMMERCIAL

## 2025-02-11 NOTE — PROGRESS NOTES
Wanted to make you aware.   We reviewed CHF symptoms of concern: SOB (report to ER), sudden wgt gain of 2 pounds overnight or 3-5 pounds in week, swelling in feet/ankles, fatigue or weakness to contact PCP right away. Pt verbalized understanding.  Patient weighs herself every morning.   We reviewed to make sure to weigh after urinating and before eating to get most accurate reading.  Patient reports today she weighed 177 and usually weighs 172.   She states she has a history of her bladder not emptying completely and every 3-4 days will empty completely.  Patient assesses her feet/ankles daily for swelling and has not noticed any swelling at this time.  No chest pain, SOB, numbness tingling down either arm at this time.  Patient takes her medications religiously and avoid cooking with sodium.  We reviewed looking at labels when buying food to see what the sodium content is and avoiding process meats, canned soups/vegs, boxed dinners, etc.   Patient reports she is very active within the home with cooking, cleaning, and recently moved-putting things away.  Patient states she eats a lot of popsicles, coffee in the am, and about half bottle of water.  Encouraged patient to increase fluids but limit to 8 cups of fluids daily.    Patient's weight seems to fluctuate a lot during CCM outreach's  and not sure if it is because of history of not emptying bladder completely. Should patient continue to monitor for swelling and other CHF symptoms due to weight fluctuating?   Patient last seen you in Oct 2024. When would you like her to follow up?

## 2025-02-17 PROCEDURE — RXMED WILLOW AMBULATORY MEDICATION CHARGE

## 2025-02-19 ENCOUNTER — PHARMACY VISIT (OUTPATIENT)
Dept: PHARMACY | Facility: CLINIC | Age: 75
End: 2025-02-19
Payer: MEDICARE

## 2025-02-26 DIAGNOSIS — E87.6 HYPOKALEMIA: ICD-10-CM

## 2025-02-26 PROCEDURE — RXMED WILLOW AMBULATORY MEDICATION CHARGE

## 2025-02-26 RX ORDER — POTASSIUM CHLORIDE 750 MG/1
10 TABLET, FILM COATED, EXTENDED RELEASE ORAL DAILY
Qty: 30 TABLET | Refills: 2 | Status: CANCELLED | OUTPATIENT
Start: 2025-02-26 | End: 2026-02-26

## 2025-02-28 ENCOUNTER — PHARMACY VISIT (OUTPATIENT)
Dept: PHARMACY | Facility: CLINIC | Age: 75
End: 2025-02-28
Payer: MEDICARE

## 2025-02-28 ENCOUNTER — CLINICAL SUPPORT (OUTPATIENT)
Dept: PRIMARY CARE | Facility: CLINIC | Age: 75
End: 2025-02-28
Payer: MEDICARE

## 2025-02-28 DIAGNOSIS — E87.6 HYPOKALEMIA: ICD-10-CM

## 2025-02-28 DIAGNOSIS — I10 PRIMARY HYPERTENSION: ICD-10-CM

## 2025-02-28 DIAGNOSIS — E53.8 B12 DEFICIENCY: Primary | ICD-10-CM

## 2025-02-28 PROCEDURE — RXMED WILLOW AMBULATORY MEDICATION CHARGE

## 2025-02-28 PROCEDURE — 96372 THER/PROPH/DIAG INJ SC/IM: CPT | Performed by: FAMILY MEDICINE

## 2025-02-28 PROCEDURE — 2500000004 HC RX 250 GENERAL PHARMACY W/ HCPCS (ALT 636 FOR OP/ED): Performed by: FAMILY MEDICINE

## 2025-02-28 RX ORDER — POTASSIUM CHLORIDE 750 MG/1
10 TABLET, FILM COATED, EXTENDED RELEASE ORAL DAILY
Qty: 30 TABLET | Refills: 2 | Status: SHIPPED | OUTPATIENT
Start: 2025-02-28 | End: 2026-02-28

## 2025-02-28 RX ORDER — CYANOCOBALAMIN 1000 UG/ML
1000 INJECTION, SOLUTION INTRAMUSCULAR; SUBCUTANEOUS ONCE
Status: COMPLETED | OUTPATIENT
Start: 2025-02-28 | End: 2025-02-28

## 2025-02-28 RX ADMIN — CYANOCOBALAMIN 1000 MCG: 1000 INJECTION, SOLUTION INTRAMUSCULAR at 12:14

## 2025-03-02 LAB
MAGNESIUM SERPL-MCNC: 2.4 MG/DL (ref 1.5–2.5)
POTASSIUM SERPL-SCNC: 4.9 MMOL/L (ref 3.5–5.3)
VIT B12 SERPL-MCNC: 800 PG/ML (ref 200–1100)

## 2025-03-03 ENCOUNTER — PHARMACY VISIT (OUTPATIENT)
Dept: PHARMACY | Facility: CLINIC | Age: 75
End: 2025-03-03
Payer: MEDICARE

## 2025-03-11 ENCOUNTER — PATIENT OUTREACH (OUTPATIENT)
Dept: PRIMARY CARE | Facility: CLINIC | Age: 75
End: 2025-03-11
Payer: MEDICARE

## 2025-03-11 DIAGNOSIS — I50.32 CHRONIC DIASTOLIC CHF (CONGESTIVE HEART FAILURE): ICD-10-CM

## 2025-03-11 DIAGNOSIS — I48.91 ATRIAL FIBRILLATION, UNSPECIFIED TYPE (MULTI): ICD-10-CM

## 2025-03-11 DIAGNOSIS — I10 PRIMARY HYPERTENSION: ICD-10-CM

## 2025-03-11 NOTE — PROGRESS NOTES
Spoke with patient today for monthly CCM outreach.  Patient states she has been feeling more weakness and low energy lately. She stayed in bed all day Sat and did feel a little better.  Patient reports she has a history of low iron levels and is wondering if she is running lower again.  No blood to stool at this time.  Patient continues to have increase in mucous/cough utilizing her nebulizer, which helps.  In the past patient used decongestants but her body does not eliminate them well and was in hospital for decongestant overdose.  Patient continues to struggle with RLS and will take her Roprinole 2mg 1 tab po BID instead of how it is prescribed as 1 tab po daily.  Patient would like to have a new RX with Roprinole 2mg, 1 tab po BID. Will send message to PCP .  We reviewed CHF flare up symptoms: weight gain 2 lbs overnight or 3 to 5 pounds in week, SOB (report to ER), swelling or weakness/fatigue.  Patient has not noticed any swelling or worsening SOB at this time but is experiencing weakness/fatigue with some weight changes.  Patient states she recently had an episode of panting with her heart feeling like it was racing.  Message sent to PCP and cardiologist. Per Cardio, would like patient to schedule with EP.   We reviewed recent lab work and results per PCP.   Patient is concerns with increase weakness/fatigue but has a lot of contributing factors as well: She currently helps take care of her 3 grandchildren ages 3,4,5 with special needs.  Patient states some of her grandchildren will be starting school next week to allow for a little break.  Grandchildren do live with their mom and dad at patients house. Patient reports she does get help from them and especially transportation.  Encouraged self care through meditation, yoga, reading a book, hobbies, etc   Patient also wonders if she is getting a UTI with having trouble emptying her bladder and holding her bladder for longer periods of time. No vaginal odor or  urine discoloration but will have some pain at times with urinating.   Patient reports her sleeping patterns are irregular and is not sure if this may also be contributing to increased fatigue.  She has tried to use melatonin to help with sleep but no success.  We reviewed her current medication list and patient is not taking amiodarone. Cardiology notified and would like patient to follow up with EP.    GOALS: Review UTI prevention plan    Review EP appointment    Educate CHF/HTN

## 2025-03-17 PROCEDURE — RXMED WILLOW AMBULATORY MEDICATION CHARGE

## 2025-03-18 ENCOUNTER — PHARMACY VISIT (OUTPATIENT)
Dept: PHARMACY | Facility: CLINIC | Age: 75
End: 2025-03-18
Payer: MEDICARE

## 2025-03-19 ENCOUNTER — APPOINTMENT (OUTPATIENT)
Dept: CARDIOLOGY | Facility: CLINIC | Age: 75
End: 2025-03-19
Payer: MEDICARE

## 2025-04-03 ENCOUNTER — TELEPHONE (OUTPATIENT)
Dept: PRIMARY CARE | Facility: CLINIC | Age: 75
End: 2025-04-03

## 2025-04-03 ENCOUNTER — PATIENT OUTREACH (OUTPATIENT)
Dept: PRIMARY CARE | Facility: CLINIC | Age: 75
End: 2025-04-03
Payer: MEDICARE

## 2025-04-03 ENCOUNTER — DOCUMENTATION (OUTPATIENT)
Dept: PRIMARY CARE | Facility: CLINIC | Age: 75
End: 2025-04-03

## 2025-04-03 DIAGNOSIS — J41.0 SIMPLE CHRONIC BRONCHITIS (MULTI): Primary | ICD-10-CM

## 2025-04-03 DIAGNOSIS — I50.32 CHRONIC DIASTOLIC CHF (CONGESTIVE HEART FAILURE): ICD-10-CM

## 2025-04-03 DIAGNOSIS — I10 HTN (HYPERTENSION), BENIGN: ICD-10-CM

## 2025-04-03 DIAGNOSIS — I48.91 ATRIAL FIBRILLATION, UNSPECIFIED TYPE (MULTI): ICD-10-CM

## 2025-04-03 RX ORDER — PEN NEEDLE, DIABETIC 31 GX5/16"
1 NEEDLE, DISPOSABLE MISCELLANEOUS
Qty: 6 EACH | Refills: 3 | Status: SHIPPED | OUTPATIENT
Start: 2025-04-03

## 2025-04-03 NOTE — PROGRESS NOTES
Spoke with patient today for monthly outreach.   Patient states she has been feeling weakness/fatigue but no swelling. It is hard to know her accurate weight due to holding her bladder for long periods of times. Patient stating she can go to the bathroom and lose 3-5 pounds just from emptying her bladder. Today her weight was 170.6 and states it usually is around 172 pounds. She has a HX of CHF so hard to know if the weakness/fatigue is from this or watching her 3 grandchildren.  Patient reports she has been increased SOB with coughing and sinus lauren and using her nebulizer more. Patient is wondering if she can try OTC decongestants but reports approx. year ago she passed away from her body not eliminating the decongestant and was brought back. Advised patient CM will send message to PCP and to discuss at upcoming OV prior to trying to retake decongestants.  Upon researching her chart patient was suppose to be set-up with oxygen as she qualified but states she has been on the fence regarding getting the oxygen because of having 3 little children in the home. Cm will reach out to [employer] regarding setting up home oxygen. Per [employer], they do not except [employer]. Patient will need new orders for oxygen sent to another supplier.   Patient states she has had some chest pain off/on but thinks it is from gas pain. She was very upset the other day and had a different kind of chest pain but it subsided. No numbness/tingling down either arm. Advised if symptoms get worse to go to ER.   Patient BP has been running 148/98 p84, 118/76 p70, 129/90 p 81, 123/98 hr 90, 121/88. Patient was suppose to see EP but was not able to make appt d/t transportation. She was advised by cardiology to see EP d/t not taking her amiodarone and having an episode of panting and feeling like heart was racing. We rescheduled her appoint today for 4/30.   Patient was suppose to come in for an appointment regarding a possible UTI but had to  cancel d/t transportation and was advised to go to  but never went. Patient would like to have this checked during her OV d/t urinary freq, and some blood. She believes the blood is bc the membrane from her bladder hanging out may be torn. Encouraged patient to drop off a urine sample this week and patient states transportation is concern and she will just wait until her PCP appointment on 04.08.2025.   Patient was re-scheduled to see PCP on 04.08.2025 and EP on 04.30.2025. We discussed the importance of keeping these appointments. Patient has a concern for transportation and relying on family to take her to and from appointments.  Order placed by PCP for house calls.   Patient is requesting a refill on nebulizer tubing be sent to [facility] pharmacy and a refill on her Eliquis by Dr. Rubin. Will send message.  Patient reports she has supplies of all her medications and has her medications delivered to her home.  Sent PCP a message regarding today outreach. Per PCP, Will need to do a pelvic exam when she comes for the appointment then would likely need to refer to urology or GYN, Continue Lisinopril, and needs the oxygen in the house.     GOALS: Review PCP/EP appt    Review HTN and Home BP readings

## 2025-04-03 NOTE — PROGRESS NOTES
Followed up with patient regarding PCP response (see April monthly outreach). Patient reports she went to go to the bathroom and blood started pouring out. Advised patient to go to the ER. Patient states she wants to wait until she goes to the bathroom again and if blood pours out again she will go to ER. Explained multiple times the importance of getting evaluated in ER.

## 2025-04-03 NOTE — PROGRESS NOTES
"Stephanie LPN CM expresses concern with pt telling her she experienced a gushing of blood in the toilet today from a torn bladder membrane.  Stephanie is concerned with possible excessive blood loss as pt also told her she was feeling weakness and increased shortness of breath.  I spoke to Jazzy who shares that she has a prolapsed bladder that extends all the way out of the vaginal cavity and from time to time the membrane tears and causes bleeding.  Jazzy shares that there was not that much blood that was in the toilet and she is resting so the bleeding stops.  She states this happens from time to time and wishes she could \"put some Nu Skin\" on the area when this happens.  Jazzy states the bleeding today was the result of her pressing on the bladder to promote better emptying.  Jazzy states she developed the bladder prolapse at age 35, seen 2 specialists and was told she is not a candidate for surgery.  No one ever discussed a possible pessary.  It might be a good idea for Jazzy to see GYN again for another opinion regarding possible treatment.    Jazzy states she is having painful urination and feels she has a UTI.  Jazzy has an appointment with Kindra on Tuesday but we discussed if she has a UTI it is best to start treatment right away.  We called the office and spoke to Naa who put Jazzy on the nurse schedule for a urine specimen tomorrow.  Jazzy states everyone is home tomorrow so she will have a ride.  We discussed the importance of keeping her appointment on Tuesday she assures me she will and that her DIL will be taking her.    I asked Jazzy about the oxygen it appears as though she was never set up.  Jazzy states the order was sent to a company that is not in network with [employer].  She told me that when I called her she was actually on the phone with [employer] to find out which company she can use but no one was answering.  I advised Jazzy to call [employer] back she " shared she can never reach anyone when she calls and she is just going to call her  to find out.  I asked Jazzy to please call either myself, Stephanie or the office with a company she can use and we will send the orders.

## 2025-04-04 ENCOUNTER — TELEPHONE (OUTPATIENT)
Dept: PRIMARY CARE | Facility: CLINIC | Age: 75
End: 2025-04-04
Payer: MEDICARE

## 2025-04-04 DIAGNOSIS — I48.91 ATRIAL FIBRILLATION WITH RAPID VENTRICULAR RESPONSE (MULTI): ICD-10-CM

## 2025-04-04 PROCEDURE — RXMED WILLOW AMBULATORY MEDICATION CHARGE

## 2025-04-04 NOTE — TELEPHONE ENCOUNTER
Referred to House Calls program via Amara Wood CNP.  Patient in need of primary care visits at home due to CHF making it difficulty to get to MD office for traditional visits.

## 2025-04-04 NOTE — PROGRESS NOTES
Subjective   Patient ID: Constance M Kocher is a 74 y.o. female who presents for Follow-up (Patient here to discuss barron ).    HPI     Review of Systems    Objective   /64 (BP Location: Left arm, Patient Position: Sitting, BP Cuff Size: Adult)   Pulse 89   LMP  (LMP Unknown)   SpO2 93%     Physical Exam    Assessment/Plan

## 2025-04-04 NOTE — TELEPHONE ENCOUNTER
Please send the attached prescription to the patient's pharmacy as soon as possible. Thank you!    Requested Prescriptions     Pending Prescriptions Disp Refills    apixaban (Eliquis) 5 mg tablet 180 tablet 3     Sig: Take 1 tablet (5 mg) by mouth every 12 hours.

## 2025-04-04 NOTE — TELEPHONE ENCOUNTER
"Call placed to patient number as listed, unable to leave voicemail, recording reached \"voicemail has not been set up, please try again\".  Will attempt again.   Call placed to dtr # as listed, line no longer in service.   "

## 2025-04-07 ENCOUNTER — PHARMACY VISIT (OUTPATIENT)
Dept: PHARMACY | Facility: CLINIC | Age: 75
End: 2025-04-07
Payer: MEDICARE

## 2025-04-07 NOTE — TELEPHONE ENCOUNTER
Call placed to patient number as listed, no voicemail set up, unable to leave message, no answer.  Will attempt again.

## 2025-04-08 ENCOUNTER — OFFICE VISIT (OUTPATIENT)
Dept: PRIMARY CARE | Facility: CLINIC | Age: 75
End: 2025-04-08
Payer: MEDICARE

## 2025-04-08 ENCOUNTER — PHARMACY VISIT (OUTPATIENT)
Dept: PHARMACY | Facility: CLINIC | Age: 75
End: 2025-04-08
Payer: MEDICARE

## 2025-04-08 VITALS
OXYGEN SATURATION: 96 % | BODY MASS INDEX: 30.3 KG/M2 | HEART RATE: 113 BPM | WEIGHT: 176.5 LBS | DIASTOLIC BLOOD PRESSURE: 80 MMHG | SYSTOLIC BLOOD PRESSURE: 120 MMHG

## 2025-04-08 DIAGNOSIS — I10 PRIMARY HYPERTENSION: ICD-10-CM

## 2025-04-08 DIAGNOSIS — I50.9 CHRONIC HEART FAILURE, UNSPECIFIED HEART FAILURE TYPE: ICD-10-CM

## 2025-04-08 DIAGNOSIS — Z00.00 ROUTINE MEDICAL EXAM: Primary | ICD-10-CM

## 2025-04-08 DIAGNOSIS — E55.9 VITAMIN D DEFICIENCY: ICD-10-CM

## 2025-04-08 DIAGNOSIS — J44.9 CHRONIC OBSTRUCTIVE PULMONARY DISEASE, UNSPECIFIED COPD TYPE (MULTI): ICD-10-CM

## 2025-04-08 DIAGNOSIS — R35.0 URINARY FREQUENCY: ICD-10-CM

## 2025-04-08 DIAGNOSIS — E78.5 HYPERLIPIDEMIA, UNSPECIFIED HYPERLIPIDEMIA TYPE: ICD-10-CM

## 2025-04-08 LAB
POC APPEARANCE, URINE: CLEAR
POC BILIRUBIN, URINE: NEGATIVE
POC BLOOD, URINE: ABNORMAL
POC COLOR, URINE: YELLOW
POC GLUCOSE, URINE: NEGATIVE MG/DL
POC KETONES, URINE: NEGATIVE MG/DL
POC LEUKOCYTES, URINE: NEGATIVE
POC NITRITE,URINE: NEGATIVE
POC PH, URINE: 6 PH
POC PROTEIN, URINE: NEGATIVE MG/DL
POC SPECIFIC GRAVITY, URINE: 1.02
POC UROBILINOGEN, URINE: 1 EU/DL

## 2025-04-08 PROCEDURE — RXMED WILLOW AMBULATORY MEDICATION CHARGE

## 2025-04-08 PROCEDURE — 99397 PER PM REEVAL EST PAT 65+ YR: CPT | Performed by: NURSE PRACTITIONER

## 2025-04-08 PROCEDURE — 81003 URINALYSIS AUTO W/O SCOPE: CPT | Mod: QW | Performed by: NURSE PRACTITIONER

## 2025-04-08 PROCEDURE — 93005 ELECTROCARDIOGRAM TRACING: CPT | Performed by: NURSE PRACTITIONER

## 2025-04-08 PROCEDURE — 99214 OFFICE O/P EST MOD 30 MIN: CPT | Mod: 25 | Performed by: NURSE PRACTITIONER

## 2025-04-08 RX ORDER — NITROFURANTOIN 25; 75 MG/1; MG/1
100 CAPSULE ORAL 2 TIMES DAILY
Qty: 14 CAPSULE | Refills: 0 | Status: SHIPPED | OUTPATIENT
Start: 2025-04-08 | End: 2025-04-15

## 2025-04-08 ASSESSMENT — ENCOUNTER SYMPTOMS
LOSS OF SENSATION IN FEET: 0
DEPRESSION: 0
OCCASIONAL FEELINGS OF UNSTEADINESS: 0

## 2025-04-08 ASSESSMENT — PAIN SCALES - GENERAL: PAINLEVEL_OUTOF10: 0-NO PAIN

## 2025-04-08 NOTE — TELEPHONE ENCOUNTER
Call placed to patient number, no answer, no option to leave message.  Letter mailed via [employer] to patient home in effort to establish contact and offer services. No contact established, unable to see this patient at this time. Should patient return call to House Calls office, we will reconsider.

## 2025-04-08 NOTE — PROGRESS NOTES
Subjective   Patient ID: Constance M Kocher is a 74 y.o. female who presents for Hypertension, Shortness of Breath, and Fatigue.    HPI Having increase water 3-4 pounds not seen by Dr Rubin, checking BP and HR at home, house calls   [employer] delivered home oxygen yesterday improving SOB at 2 liters and using at night does not have portable oxygen   States she has hematuria which is not new has bladder prolaspe and using incontience pads had more bleeding the other day has seen multiple urologist and state they can not due surgery she is high risk due to anesthesia     Review of Systems   Constitutional:  Positive for fatigue.   Respiratory:  Positive for shortness of breath.    Cardiovascular:  Positive for leg swelling.       Objective   /80 (BP Location: Right arm, Patient Position: Sitting, BP Cuff Size: Adult)   Pulse (!) 44   LMP  (LMP Unknown)   SpO2 96%     Physical Exam  Constitutional:       General: She is not in acute distress.     Appearance: Normal appearance.   HENT:      Head: Normocephalic.   Cardiovascular:      Rate and Rhythm: Normal rate and regular rhythm.      Heart sounds: No murmur heard.  Pulmonary:      Breath sounds: Normal breath sounds. No wheezing.   Genitourinary:     Comments: Pt decline pelvic exam   Musculoskeletal:      Right lower leg: Edema present.   Neurological:      General: No focal deficit present.      Mental Status: She is alert.   Psychiatric:         Mood and Affect: Mood normal.         Assessment/Plan   Problem List Items Addressed This Visit             ICD-10-CM    Primary hypertension I10    Relevant Orders    Lipid Panel     Other Visit Diagnoses         Codes    Urinary frequency    -  Primary R35.0    Relevant Orders    POCT UA Automated manually resulted    Urine Culture    Chronic heart failure, unspecified heart failure type     I50.9    Relevant Orders    ECG 12 lead (Clinic Performed)    B-type natriuretic peptide    Magnesium    Chronic  obstructive pulmonary disease, unspecified COPD type (Multi)     J44.9    Routine medical exam     Z00.00    Relevant Orders    Lipid Panel    Hyperlipidemia, unspecified hyperlipidemia type     E78.5    Relevant Orders    Lipid Panel    Vitamin D deficiency     E55.9    Relevant Orders    Vitamin D 25-Hydroxy,Total (for eval of Vitamin D levels)

## 2025-04-10 ENCOUNTER — TELEPHONE (OUTPATIENT)
Dept: PRIMARY CARE | Facility: CLINIC | Age: 75
End: 2025-04-10
Payer: MEDICARE

## 2025-04-10 DIAGNOSIS — E78.5 HYPERLIPIDEMIA, UNSPECIFIED HYPERLIPIDEMIA TYPE: Primary | ICD-10-CM

## 2025-04-10 LAB
25(OH)D3+25(OH)D2 SERPL-MCNC: 50 NG/ML (ref 30–100)
BACTERIA UR CULT: NORMAL
BNP SERPL-MCNC: 85 PG/ML
CHOLEST SERPL-MCNC: 214 MG/DL
CHOLEST/HDLC SERPL: 3.3 (CALC)
HDLC SERPL-MCNC: 64 MG/DL
LDLC SERPL CALC-MCNC: 122 MG/DL (CALC)
MAGNESIUM SERPL-MCNC: 1.8 MG/DL (ref 1.5–2.5)
NONHDLC SERPL-MCNC: 150 MG/DL (CALC)
TRIGL SERPL-MCNC: 165 MG/DL

## 2025-04-10 NOTE — PROGRESS NOTES
Patient calling for recent test results, please. Patient has been experiencing GI upset and nausea since starting the Microbid. Patient reports she took on an empty stomach and encouraged to take ATB with food to help with symptoms.

## 2025-04-11 ENCOUNTER — TELEPHONE (OUTPATIENT)
Dept: PRIMARY CARE | Facility: CLINIC | Age: 75
End: 2025-04-11
Payer: MEDICARE

## 2025-04-11 DIAGNOSIS — E78.2 MIXED HYPERLIPIDEMIA: Primary | ICD-10-CM

## 2025-04-11 PROCEDURE — RXMED WILLOW AMBULATORY MEDICATION CHARGE

## 2025-04-11 RX ORDER — ATORVASTATIN CALCIUM 10 MG/1
10 TABLET, FILM COATED ORAL DAILY
Qty: 100 TABLET | Refills: 3 | Status: SHIPPED | OUTPATIENT
Start: 2025-04-11 | End: 2026-05-16

## 2025-04-11 NOTE — TELEPHONE ENCOUNTER
Call placed to patient. Discussed referral to House Calls program. Visit scheduled with Ángela Rowell CNP for 4/14/25 at 9:00 am. Travel screen negative. Insurance and address verified.

## 2025-04-12 PROCEDURE — RXMED WILLOW AMBULATORY MEDICATION CHARGE

## 2025-04-12 ASSESSMENT — ENCOUNTER SYMPTOMS
SHORTNESS OF BREATH: 1
FATIGUE: 1

## 2025-04-14 ENCOUNTER — PHARMACY VISIT (OUTPATIENT)
Dept: PHARMACY | Facility: CLINIC | Age: 75
End: 2025-04-14
Payer: MEDICARE

## 2025-04-14 ENCOUNTER — APPOINTMENT (OUTPATIENT)
Dept: PRIMARY CARE | Facility: CLINIC | Age: 75
End: 2025-04-14
Payer: MEDICARE

## 2025-04-14 PROCEDURE — RXMED WILLOW AMBULATORY MEDICATION CHARGE

## 2025-04-15 PROCEDURE — RXMED WILLOW AMBULATORY MEDICATION CHARGE

## 2025-04-15 RX ORDER — ROSUVASTATIN CALCIUM 5 MG/1
5 TABLET, COATED ORAL DAILY
Qty: 100 TABLET | Refills: 3 | Status: SHIPPED | OUTPATIENT
Start: 2025-04-15 | End: 2026-05-20

## 2025-04-16 ENCOUNTER — PHARMACY VISIT (OUTPATIENT)
Dept: PHARMACY | Facility: CLINIC | Age: 75
End: 2025-04-16
Payer: MEDICARE

## 2025-04-16 ENCOUNTER — TELEPHONE (OUTPATIENT)
Dept: PRIMARY CARE | Facility: CLINIC | Age: 75
End: 2025-04-16
Payer: MEDICARE

## 2025-04-16 DIAGNOSIS — G47.00 INSOMNIA, UNSPECIFIED TYPE: Primary | ICD-10-CM

## 2025-04-16 PROCEDURE — RXMED WILLOW AMBULATORY MEDICATION CHARGE

## 2025-04-16 RX ORDER — TRAZODONE HYDROCHLORIDE 50 MG/1
50 TABLET ORAL NIGHTLY PRN
Qty: 30 TABLET | Refills: 0 | Status: SHIPPED | OUTPATIENT
Start: 2025-04-16 | End: 2025-04-17 | Stop reason: SDUPTHER

## 2025-04-16 NOTE — PROGRESS NOTES
Message sent to PCP: Patient called in tears stating she is so fatigued and has not been sleeping very well getting up a few times a night. She lives in a smaller house with her son and 3 grandchildren. Patient reports she has been out of her eliquis, and potassium for a couple days but now has a supply. Patient states she currently is out of her albuterol nebulizer and has been working with [facility] to get supply which should be coming in 2-3 days. Will work with patient to get supply from [facility] until supply come from [facility]. Patient reports that she has had increase in weight by 6 lbs, very fatigued, SOB getting worse, increased swelling in R leg. She has a lot of family concerns right now with taking care of 3 grandchildren... her BP while on phone 169/149 hr 150 and 117/95 hr 139 advised ER but patient wanted to wait a little bit to see if her bp numbers would go down since she was upset when she took her bp.   Per PCP office Leandra Skelton: I sent trazodone which should be taken at bed time.  this should help with sleep    CM followed up with patient and sent message to PCP: Okay thank you. Patient notified and reports that her BP is still running around 130/118 p 127, 127/104, and 143/86 p 106.  Patient was able to find a vial of nebulizer and put oxygen on which helped while sitting but gets SOB with exertion. Continued to encourage to go to ER. She has an appointment with house calls next Tuesday as well. patient also reports she was able to empty her bladder a few times as well which has sometimes contributed to her increased weight but states even with emptying her weight is still 6 lb increased.   Per PCP office Leandra Skelton: She should really go to ER or make an appointment to be seen by us ASAP    CM notified patient per PCP recommend ER.  Patient states she is going to go  her medications today and if she is still feeling bad , plans to go to ER at that time. But she did  schedule with Kindra tomorrow as well.

## 2025-04-17 ENCOUNTER — OFFICE VISIT (OUTPATIENT)
Dept: PRIMARY CARE | Facility: CLINIC | Age: 75
End: 2025-04-17
Payer: MEDICARE

## 2025-04-17 ENCOUNTER — PHARMACY VISIT (OUTPATIENT)
Dept: PHARMACY | Facility: CLINIC | Age: 75
End: 2025-04-17
Payer: MEDICARE

## 2025-04-17 VITALS
WEIGHT: 171 LBS | SYSTOLIC BLOOD PRESSURE: 118 MMHG | DIASTOLIC BLOOD PRESSURE: 60 MMHG | HEART RATE: 60 BPM | BODY MASS INDEX: 29.35 KG/M2 | OXYGEN SATURATION: 95 %

## 2025-04-17 DIAGNOSIS — R45.1 RESTLESS: ICD-10-CM

## 2025-04-17 DIAGNOSIS — I48.91 ATRIAL FIBRILLATION WITH RAPID VENTRICULAR RESPONSE (MULTI): ICD-10-CM

## 2025-04-17 DIAGNOSIS — G47.00 INSOMNIA, UNSPECIFIED TYPE: ICD-10-CM

## 2025-04-17 DIAGNOSIS — J44.9 CHRONIC OBSTRUCTIVE PULMONARY DISEASE, UNSPECIFIED COPD TYPE (MULTI): ICD-10-CM

## 2025-04-17 DIAGNOSIS — R33.9 URINARY RETENTION: ICD-10-CM

## 2025-04-17 DIAGNOSIS — D50.0 IRON DEFICIENCY ANEMIA DUE TO CHRONIC BLOOD LOSS: ICD-10-CM

## 2025-04-17 DIAGNOSIS — I50.9 CHRONIC HEART FAILURE, UNSPECIFIED HEART FAILURE TYPE: Primary | ICD-10-CM

## 2025-04-17 DIAGNOSIS — I10 PRIMARY HYPERTENSION: ICD-10-CM

## 2025-04-17 DIAGNOSIS — G25.81 RESTLESS LEG: ICD-10-CM

## 2025-04-17 DIAGNOSIS — G25.81 INSOMNIA SECONDARY TO RESTLESS LEG SYNDROME: ICD-10-CM

## 2025-04-17 DIAGNOSIS — G47.01 INSOMNIA SECONDARY TO RESTLESS LEG SYNDROME: ICD-10-CM

## 2025-04-17 DIAGNOSIS — N18.31 STAGE 3A CHRONIC KIDNEY DISEASE (MULTI): ICD-10-CM

## 2025-04-17 LAB
POC APPEARANCE, URINE: CLEAR
POC BILIRUBIN, URINE: NEGATIVE
POC BLOOD, URINE: ABNORMAL
POC COLOR, URINE: YELLOW
POC GLUCOSE, URINE: NEGATIVE MG/DL
POC KETONES, URINE: NEGATIVE MG/DL
POC LEUKOCYTES, URINE: ABNORMAL
POC NITRITE,URINE: NEGATIVE
POC PH, URINE: 7 PH
POC PROTEIN, URINE: NEGATIVE MG/DL
POC SPECIFIC GRAVITY, URINE: 1.01
POC UROBILINOGEN, URINE: 0.2 EU/DL

## 2025-04-17 PROCEDURE — RXMED WILLOW AMBULATORY MEDICATION CHARGE

## 2025-04-17 PROCEDURE — 1159F MED LIST DOCD IN RCRD: CPT | Performed by: NURSE PRACTITIONER

## 2025-04-17 PROCEDURE — 99214 OFFICE O/P EST MOD 30 MIN: CPT | Performed by: NURSE PRACTITIONER

## 2025-04-17 PROCEDURE — 81002 URINALYSIS NONAUTO W/O SCOPE: CPT | Performed by: NURSE PRACTITIONER

## 2025-04-17 PROCEDURE — 3074F SYST BP LT 130 MM HG: CPT | Performed by: NURSE PRACTITIONER

## 2025-04-17 PROCEDURE — 3078F DIAST BP <80 MM HG: CPT | Performed by: NURSE PRACTITIONER

## 2025-04-17 PROCEDURE — G2211 COMPLEX E/M VISIT ADD ON: HCPCS | Performed by: NURSE PRACTITIONER

## 2025-04-17 RX ORDER — TRAZODONE HYDROCHLORIDE 50 MG/1
100 TABLET ORAL NIGHTLY PRN
Qty: 60 TABLET | Refills: 0 | Status: SHIPPED | OUTPATIENT
Start: 2025-04-17 | End: 2026-04-17

## 2025-04-17 RX ORDER — FERROUS SULFATE 325(65) MG
325 TABLET ORAL
Qty: 90 TABLET | Refills: 1 | Status: SHIPPED | OUTPATIENT
Start: 2025-04-17 | End: 2026-04-17

## 2025-04-17 RX ORDER — ROPINIROLE 1 MG/1
1 TABLET, FILM COATED ORAL 3 TIMES DAILY
Qty: 90 TABLET | Refills: 1 | Status: SHIPPED | OUTPATIENT
Start: 2025-04-17 | End: 2026-04-17

## 2025-04-17 ASSESSMENT — ENCOUNTER SYMPTOMS
DEPRESSION: 0
LOSS OF SENSATION IN FEET: 0
DIFFICULTY URINATING: 1
PALPITATIONS: 1
FATIGUE: 1
SHORTNESS OF BREATH: 1
OCCASIONAL FEELINGS OF UNSTEADINESS: 0

## 2025-04-17 ASSESSMENT — PATIENT HEALTH QUESTIONNAIRE - PHQ9
2. FEELING DOWN, DEPRESSED OR HOPELESS: NOT AT ALL
SUM OF ALL RESPONSES TO PHQ9 QUESTIONS 1 AND 2: 0
1. LITTLE INTEREST OR PLEASURE IN DOING THINGS: NOT AT ALL

## 2025-04-17 NOTE — PROGRESS NOTES
Subjective   Patient ID: Constance M Kocher is a 74 y.o. female who presents for Follow-up.    HPI Having leg spasms, using requip and difficulty sleeping, last 3-4 days was fatigued and feels like bladder not releasing.    Was not able to clean last week due to her fatigue , has issues living with family due to fiancial and fatigue   Discussed anemia  Hand swelling     Review of Systems   Constitutional:  Positive for fatigue.   Respiratory:  Positive for shortness of breath.    Cardiovascular:  Positive for palpitations.   Genitourinary:  Positive for difficulty urinating.   Musculoskeletal:  Positive for joint swelling.       Objective   /60 (BP Location: Left arm, Patient Position: Sitting, BP Cuff Size: Adult)   Pulse 60   LMP  (LMP Unknown)   SpO2 95%     Physical Exam  Constitutional:       General: She is not in acute distress.     Appearance: Normal appearance.   Cardiovascular:      Rate and Rhythm: Normal rate and regular rhythm.      Heart sounds: No murmur heard.  Pulmonary:      Breath sounds: Normal breath sounds. No wheezing.   Abdominal:      Palpations: Abdomen is soft.   Musculoskeletal:      Right lower leg: Edema present.      Left lower leg: Edema present.   Skin:     Capillary Refill: Capillary refill takes less than 2 seconds.   Neurological:      Mental Status: She is alert.   Psychiatric:         Mood and Affect: Mood normal.       Assessment/Plan   Problem List Items Addressed This Visit           ICD-10-CM    Primary hypertension I10    Relevant Orders    CBC and Auto Differential    Atrial fibrillation with rapid ventricular response (Multi) I48.91    Stage 3a chronic kidney disease (Multi) N18.31    Relevant Orders    Comprehensive metabolic panel     Other Visit Diagnoses         Codes      Chronic heart failure, unspecified heart failure type    -  Primary I50.9      Chronic obstructive pulmonary disease, unspecified COPD type (Multi)     J44.9      Insomnia secondary to  restless leg syndrome     G25.81, G47.01      Iron deficiency anemia due to chronic blood loss     D50.0    Relevant Orders    Iron and TIBC    Ferritin      Urinary retention     R33.9    Relevant Orders    POCT UA (nonautomated) manually resulted          Encourage use of oxygen at home, limit activity as tolerated  Discuss change meds for restless legs

## 2025-04-18 ENCOUNTER — DOCUMENTATION (OUTPATIENT)
Dept: PRIMARY CARE | Facility: CLINIC | Age: 75
End: 2025-04-18
Payer: MEDICARE

## 2025-04-18 LAB
BASOPHILS # BLD AUTO: 37 CELLS/UL (ref 0–200)
BASOPHILS NFR BLD AUTO: 0.5 %
EOSINOPHIL # BLD AUTO: 118 CELLS/UL (ref 15–500)
EOSINOPHIL NFR BLD AUTO: 1.6 %
ERYTHROCYTE [DISTWIDTH] IN BLOOD BY AUTOMATED COUNT: 15.4 % (ref 11–15)
HCT VFR BLD AUTO: 46.8 % (ref 35–45)
HGB BLD-MCNC: 15.4 G/DL (ref 11.7–15.5)
LYMPHOCYTES # BLD AUTO: 2050 CELLS/UL (ref 850–3900)
LYMPHOCYTES NFR BLD AUTO: 27.7 %
MCH RBC QN AUTO: 29.4 PG (ref 27–33)
MCHC RBC AUTO-ENTMCNC: 32.9 G/DL (ref 32–36)
MCV RBC AUTO: 89.5 FL (ref 80–100)
MONOCYTES # BLD AUTO: 607 CELLS/UL (ref 200–950)
MONOCYTES NFR BLD AUTO: 8.2 %
NEUTROPHILS # BLD AUTO: 4588 CELLS/UL (ref 1500–7800)
NEUTROPHILS NFR BLD AUTO: 62 %
PLATELET # BLD AUTO: 271 THOUSAND/UL (ref 140–400)
PMV BLD REES-ECKER: 11 FL (ref 7.5–12.5)
RBC # BLD AUTO: 5.23 MILLION/UL (ref 3.8–5.1)
WBC # BLD AUTO: 7.4 THOUSAND/UL (ref 3.8–10.8)

## 2025-04-18 ASSESSMENT — ENCOUNTER SYMPTOMS: JOINT SWELLING: 1

## 2025-04-18 NOTE — PROGRESS NOTES
Patient called to give an update that she slept last night for a full 6 hours, her legs are not as painful with the change in medications and has helped. She does not feel as SOB or that her heart is not racing. PCP notified.

## 2025-04-19 LAB
ALBUMIN SERPL-MCNC: 4 G/DL (ref 3.6–5.1)
ALP SERPL-CCNC: 82 U/L (ref 37–153)
ALT SERPL-CCNC: 11 U/L (ref 6–29)
ANION GAP SERPL CALCULATED.4IONS-SCNC: 11 MMOL/L (CALC) (ref 7–17)
AST SERPL-CCNC: 16 U/L (ref 10–35)
BACTERIA UR CULT: NORMAL
BILIRUB SERPL-MCNC: 0.5 MG/DL (ref 0.2–1.2)
BUN SERPL-MCNC: 18 MG/DL (ref 7–25)
CALCIUM SERPL-MCNC: 9.3 MG/DL (ref 8.6–10.4)
CHLORIDE SERPL-SCNC: 99 MMOL/L (ref 98–110)
CO2 SERPL-SCNC: 28 MMOL/L (ref 20–32)
CREAT SERPL-MCNC: 1.21 MG/DL (ref 0.6–1)
EGFRCR SERPLBLD CKD-EPI 2021: 47 ML/MIN/1.73M2
FERRITIN SERPL-MCNC: 18 NG/ML (ref 16–288)
GLUCOSE SERPL-MCNC: 93 MG/DL (ref 65–99)
IRON SATN MFR SERPL: 39 % (CALC) (ref 16–45)
IRON SERPL-MCNC: 167 MCG/DL (ref 45–160)
POTASSIUM SERPL-SCNC: 4.1 MMOL/L (ref 3.5–5.3)
PROT SERPL-MCNC: 7.1 G/DL (ref 6.1–8.1)
SODIUM SERPL-SCNC: 138 MMOL/L (ref 135–146)
TIBC SERPL-MCNC: 425 MCG/DL (CALC) (ref 250–450)

## 2025-04-21 ENCOUNTER — TELEPHONE (OUTPATIENT)
Dept: PRIMARY CARE | Facility: CLINIC | Age: 75
End: 2025-04-21
Payer: MEDICARE

## 2025-04-21 DIAGNOSIS — N18.31 STAGE 3A CHRONIC KIDNEY DISEASE (MULTI): Primary | ICD-10-CM

## 2025-04-21 DIAGNOSIS — M35.3 POLYMYALGIA (MULTI): Primary | ICD-10-CM

## 2025-04-21 RX ORDER — METHYLPREDNISOLONE 4 MG/1
TABLET ORAL
Qty: 21 TABLET | Refills: 0 | Status: SHIPPED | OUTPATIENT
Start: 2025-04-21 | End: 2025-04-22 | Stop reason: ALTCHOICE

## 2025-04-21 NOTE — TELEPHONE ENCOUNTER
House Calls visit for 4/22 confirmed with patient via phone. Travel Screen negative. Address and insurance verified.

## 2025-04-21 NOTE — PROGRESS NOTES
Patient calling stating that she has been noticing muscle aches all over and it causes no strength in her arms or legs but will get better with rest. Patient states she has a history of polymyalgia rheumatica and is wondering if maybe it is coming back.     Patient also was wondering if she could get blood work to test for magnesium levels in her body? Please advise.

## 2025-04-22 ENCOUNTER — PHARMACY VISIT (OUTPATIENT)
Dept: PHARMACY | Facility: CLINIC | Age: 75
End: 2025-04-22
Payer: MEDICARE

## 2025-04-22 ENCOUNTER — OFFICE VISIT (OUTPATIENT)
Dept: PRIMARY CARE | Facility: CLINIC | Age: 75
End: 2025-04-22
Payer: MEDICARE

## 2025-04-22 VITALS
HEART RATE: 61 BPM | BODY MASS INDEX: 31.43 KG/M2 | RESPIRATION RATE: 20 BRPM | TEMPERATURE: 97.4 F | SYSTOLIC BLOOD PRESSURE: 138 MMHG | WEIGHT: 177.4 LBS | HEIGHT: 63 IN | OXYGEN SATURATION: 97 % | DIASTOLIC BLOOD PRESSURE: 68 MMHG

## 2025-04-22 DIAGNOSIS — H65.01 NON-RECURRENT ACUTE SEROUS OTITIS MEDIA OF RIGHT EAR: ICD-10-CM

## 2025-04-22 DIAGNOSIS — E78.2 MIXED HYPERLIPIDEMIA: ICD-10-CM

## 2025-04-22 DIAGNOSIS — I10 HTN (HYPERTENSION), BENIGN: ICD-10-CM

## 2025-04-22 DIAGNOSIS — M35.3 POLYMYALGIA RHEUMATICA (MULTI): Primary | ICD-10-CM

## 2025-04-22 DIAGNOSIS — I50.32 CHRONIC DIASTOLIC CHF (CONGESTIVE HEART FAILURE): ICD-10-CM

## 2025-04-22 DIAGNOSIS — I48.11 LONGSTANDING PERSISTENT ATRIAL FIBRILLATION (MULTI): ICD-10-CM

## 2025-04-22 PROCEDURE — 99349 HOME/RES VST EST MOD MDM 40: CPT | Performed by: NURSE PRACTITIONER

## 2025-04-22 PROCEDURE — 3008F BODY MASS INDEX DOCD: CPT | Performed by: NURSE PRACTITIONER

## 2025-04-22 PROCEDURE — 1159F MED LIST DOCD IN RCRD: CPT | Performed by: NURSE PRACTITIONER

## 2025-04-22 PROCEDURE — 3078F DIAST BP <80 MM HG: CPT | Performed by: NURSE PRACTITIONER

## 2025-04-22 PROCEDURE — 3075F SYST BP GE 130 - 139MM HG: CPT | Performed by: NURSE PRACTITIONER

## 2025-04-22 PROCEDURE — 1160F RVW MEDS BY RX/DR IN RCRD: CPT | Performed by: NURSE PRACTITIONER

## 2025-04-22 PROCEDURE — 1126F AMNT PAIN NOTED NONE PRSNT: CPT | Performed by: NURSE PRACTITIONER

## 2025-04-22 PROCEDURE — RXMED WILLOW AMBULATORY MEDICATION CHARGE

## 2025-04-22 RX ORDER — ATORVASTATIN CALCIUM 10 MG/1
10 TABLET, FILM COATED ORAL DAILY
COMMUNITY

## 2025-04-22 RX ORDER — METHYLPREDNISOLONE 4 MG/1
TABLET ORAL
Qty: 21 TABLET | Refills: 0 | Status: SHIPPED | OUTPATIENT
Start: 2025-04-22 | End: 2025-04-28

## 2025-04-22 RX ORDER — AMOXICILLIN AND CLAVULANATE POTASSIUM 875; 125 MG/1; MG/1
875 TABLET, FILM COATED ORAL 2 TIMES DAILY
Qty: 20 TABLET | Refills: 0 | Status: SHIPPED | OUTPATIENT
Start: 2025-04-22 | End: 2025-05-02

## 2025-04-22 ASSESSMENT — ENCOUNTER SYMPTOMS
ARTHRALGIAS: 1
WEAKNESS: 1
SHORTNESS OF BREATH: 1
EYES NEGATIVE: 1
SLEEP DISTURBANCE: 1
LIGHT-HEADEDNESS: 1
APPETITE CHANGE: 1
NECK PAIN: 1
UNEXPECTED WEIGHT CHANGE: 1
SINUS PAIN: 1
NECK STIFFNESS: 1
COUGH: 1
FREQUENCY: 1
CONSTIPATION: 1
NAUSEA: 1
ACTIVITY CHANGE: 1

## 2025-04-22 ASSESSMENT — PAIN SCALES - GENERAL: PAINLEVEL_OUTOF10: 0-NO PAIN

## 2025-04-22 NOTE — PROGRESS NOTES
Subjective   Patient ID: Constance M Kocher is a 74 y.o. female who is being seen to establish care with the house calls program..    HPI Pt seen in single family home accompanied by young granddaughter. PMHx: polymyalgia rheumatica, Afib. HTN, HLD, CHF, CKD 3a, COPD, Insomnia, Pt referred to house calls from Amara Wood NP due to difficulty getting into MD office for appointments due to SOB and difficulty with ambulating far distances. Pt alert and oriented and cooperative with examination. Pt lives with son, wife and their children. Pt is the only one that does house work and tries as best she can to keep up with it. Pt wears 2.5 liters of oxygen. Pt watches grandchildren during. Pt has a car and drives but car has no reverse. Pt rarely has car available. Pts bedroom downstairs next to bathroom. Pt independent with ADL's. Pt able to shower on own when has the strength, hasn't had the strength this week and hasn't been able to shower. Pt does all cooking or they order out. Daughter in law does not help around the house. Pt reports energy as low and and hasn't been able to get up from chair as easily as usual. Pt appetite reported as low, pt uses CBD gummies. Pt uses one gummy in the evening to help relax before going to bed and that will help her want to eat something. Pt eats 2 meals a day. Pt does not use devices to ambulate and has a steady slow gait. Pt sleep at night reported as better and is sleeping 5 to 6 hours a night since having Requip increased. Pt using nebulizer 3 times a day. Pt denies fever, chills, night sweats or headaches. Pt admits to lightheadedness if moves to quickly. Pt admits to SOB that resolves with rest and moist productive cough. Pt with c/o increased swelling in legs and has been taking extra dose of Bumex or the last 3 days, pt has been eating a lot of ham. Pt denies CP or palpitations. Pt with prolapsed bladder that will bleed often. Pt has seen multiple urologist who state pt is  not a surgical candidate. Pt denies N/V/D admits constipation with a bowel movement every other day. Pt admits to urinary frequency and urgency. Pt with c/o with right shoulder pain and ankle pain. Pt with c/o pain and diffuluty hearing out of right ear and believes she has a ear infection. Pt will benefit from house calls due to difficulty with ambulating due to FARAH and lack of transportation.  Initial encounter for House Calls NP visit. Program explained and contact information provided, all questions answered with apparent satisfaction.  More than 50% of time spent in face to face discussion a total of 60 minutes with this patient on counseling, coordination, of care, collaboration with staff and family, and review of medical records and  diagnostics  Home Visit Medically necessary due to: pt has chronic condition that makes access to a traditional office visit very difficult, illness or condition that results in activity limitation or restriction that impacts the ability to leave home such as; unsteady gait/ poor condition.      Review of Systems   Constitutional:  Positive for activity change, appetite change and unexpected weight change.   HENT:  Positive for congestion, ear pain, postnasal drip and sinus pain.    Eyes: Negative.    Respiratory:  Positive for cough (moist productive clear) and shortness of breath.    Cardiovascular:  Positive for leg swelling. Negative for chest pain.   Gastrointestinal:  Positive for constipation and nausea.   Endocrine: Positive for cold intolerance.   Genitourinary:  Positive for frequency and urgency.   Musculoskeletal:  Positive for arthralgias, neck pain and neck stiffness.   Skin: Negative.    Allergic/Immunologic: Positive for environmental allergies.   Neurological:  Positive for weakness and light-headedness.   Psychiatric/Behavioral:  Positive for sleep disturbance.        Objective   /68 (BP Location: Right arm, Patient Position: Sitting, BP Cuff Size: Adult)  "  Pulse 61   Temp 36.3 °C (97.4 °F) (Temporal)   Resp 20   Ht 1.6 m (5' 3\")   Wt 80.5 kg (177 lb 6.4 oz)   LMP  (LMP Unknown)   SpO2 97%   BMI 31.42 kg/m²     Current Medications[1]      Physical Exam  Constitutional:       Appearance: She is obese. She is ill-appearing (chronically).   HENT:      Head: Normocephalic and atraumatic.      Right Ear: Decreased hearing noted. Swelling and tenderness present. A middle ear effusion is present. There is impacted cerumen. Tympanic membrane is injected.      Left Ear: Decreased hearing noted.      Nose: Nose normal.      Mouth/Throat:      Mouth: Mucous membranes are moist.      Pharynx: Oropharynx is clear.   Eyes:      Extraocular Movements: Extraocular movements intact.      Conjunctiva/sclera: Conjunctivae normal.      Pupils: Pupils are equal, round, and reactive to light.   Cardiovascular:      Rate and Rhythm: Normal rate. Rhythm irregular.      Pulses: Normal pulses.      Heart sounds: Normal heart sounds.      Comments: +1 pedal edema  Pulmonary:      Effort: Pulmonary effort is normal.      Breath sounds: Decreased air movement present. Examination of the right-upper field reveals wheezing and rhonchi. Examination of the left-upper field reveals wheezing and rhonchi. Examination of the right-middle field reveals wheezing. Examination of the left-middle field reveals wheezing. Examination of the right-lower field reveals wheezing. Examination of the left-lower field reveals wheezing. Wheezing and rhonchi present.   Musculoskeletal:      Cervical back: Normal range of motion and neck supple.      Right lower le+ Edema present.      Left lower le+ Edema present.   Skin:     General: Skin is warm and dry.      Capillary Refill: Capillary refill takes 2 to 3 seconds.   Neurological:      Mental Status: She is alert and oriented to person, place, and time.      Motor: Weakness present.      Gait: Gait abnormal.   Psychiatric:         Mood and Affect: " Mood normal.         Behavior: Behavior normal.         Thought Content: Thought content normal.         Judgment: Judgment normal.       Problem List[2]      Lab Results   Component Value Date    GLUCOSE 93 04/17/2025    CALCIUM 9.3 04/17/2025     04/17/2025    K 4.1 04/17/2025    CO2 28 04/17/2025    CL 99 04/17/2025    BUN 18 04/17/2025    CREATININE 1.21 (H) 04/17/2025     Lab Results   Component Value Date    WBC 7.4 04/17/2025    HGB 15.4 04/17/2025    HCT 46.8 (H) 04/17/2025    MCV 89.5 04/17/2025     04/17/2025           Assessment/Plan   Diagnoses and all orders for this visit:  Polymyalgia rheumatica (Multi)  Comments:  Medrol dose pack ordered by previous provider for overall joint pain, reordered due to accidentally discontinuing order.  Orders:  -     methylPREDNISolone (Medrol Dospak) 4 mg tablets; Take as directed on package.  Non-recurrent acute serous otitis media of right ear  Comments:  Begin Augmenitn 875/125 mg po twice daily for 7 days.  Orders:  -     amoxicillin-clavulanate (Augmentin) 875-125 mg tablet; Take 1 tablet (875 mg) by mouth 2 times a day for 7 days.  Chronic diastolic CHF (congestive heart failure)  Comments:  Continue Bumex 1 mg po daily.   Elevate legs when sitting. Limits sodium intake. Daily weights.  Orders:  -     Referral to [facility]  HTN (hypertension), benign  Comments:  Stable  Continue Lisinopril 2.5 mg po daily.  Orders:  -     Referral to [facility]  Mixed hyperlipidemia  Comments:  Continue Lipitor 10 mg po daily.  Longstanding persistent atrial fibrillation (Multi)  Comments:  Rate stable  Continue Eliquis 5 mg po twice daily.   Continue Cardizem CD 1290 mg po twice a day.    I spent a total of 60 minutes on the date of service which included preparing to see the pt, face to face pt care, completing clinical documentation, performing a medically appropriate examination, counseling and education of the pt, ordering medications, and  coordination of care.   Follow up in 2 months or prn.   Ángela Rowell, APRN-CNP           [1]   Current Outpatient Medications:     albuterol 2.5 mg /3 mL (0.083 %) nebulizer solution, Inhale by nebulizer four times a day as needed, Disp: 90 mL, Rfl: 3    albuterol 90 mcg/actuation inhaler, Inhale 1 puff every 6 hours if needed for shortness of breath., Disp: 18 g, Rfl: 2    apixaban (Eliquis) 5 mg tablet, Take 1 tablet (5 mg) by mouth every 12 hours., Disp: 180 tablet, Rfl: 3    atorvastatin (Lipitor) 10 mg tablet, Take 1 tablet (10 mg) by mouth once daily., Disp: , Rfl:     atorvastatin (Lipitor) 10 mg tablet, Take 1 tablet (10 mg) by mouth once daily., Disp: , Rfl:     bumetanide (Bumex) 1 mg tablet, Take 1 tablet (1 mg) by mouth once daily., Disp: 90 tablet, Rfl: 3    cholecalciferol (Vitamin D3) 50 mcg (2,000 unit) capsule, Take 2 capsules (100 mcg) by mouth once daily., Disp: 60 capsule, Rfl: 11    cyanocobalamin (Vitamin B-12) 500 mcg tablet, Take 1 tablet (500 mcg) by mouth once daily., Disp: 30 tablet, Rfl: 11    dilTIAZem CD (Cardizem CD) 120 mg 24 hr capsule, Take 1 capsule (120 mg) by mouth 2 times a day., Disp: 180 capsule, Rfl: 3    ferrous sulfate 325 mg (65 mg elemental) tablet, Take 1 tablet (325 mg) by mouth 3 times daily (morning, midday, late afternoon). Do not crush, chew, or split., Disp: 90 tablet, Rfl: 1    lisinopril 2.5 mg tablet, Take 1 tablet (2.5 mg) by mouth once daily., Disp: 100 tablet, Rfl: 3    potassium chloride CR (Klor-Con) 10 mEq ER tablet, Take 1 tablet (10 mEq) by mouth once daily. Do not crush, chew, or split., Disp: 30 tablet, Rfl: 2    rOPINIRole (Requip) 1 mg tablet, Take 1 tablet (1 mg) by mouth 3 times a day., Disp: 90 tablet, Rfl: 1    traZODone (Desyrel) 50 mg tablet, Take 2 tablets (100 mg) by mouth as needed at bedtime for sleep., Disp: 60 tablet, Rfl: 0    acetaminophen (Tylenol) 325 mg tablet, Take 2 tablets (650 mg) by mouth every 4 hours if needed for mild pain  (1 - 3) or fever (temp greater than 38.0 C)., Disp: 30 tablet, Rfl: 0    albuterol 2.5 mg /3 mL (0.083 %) nebulizer solution, Inhale contents of 1 vial with nebulizer every 4 hours as needed for shortness of breath, Disp: 75 mL, Rfl: 11    albuterol 90 mcg/actuation inhaler, Inhale 2 puffs by mouth twice a day as needed for SOB (Patient taking differently: Inhale 2 puffs once daily as needed for shortness of breath.), Disp: 8.5 g, Rfl: 3    amoxicillin-clavulanate (Augmentin) 875-125 mg tablet, Take 1 tablet (875 mg) by mouth 2 times a day for 7 days., Disp: 20 tablet, Rfl: 0    fluticasone-umeclidin-vilanter (Trelegy Ellipta) 100-62.5-25 mcg blister with device, Inhale 1 puff once daily. (Patient not taking: Reported on 4/22/2025), Disp: 60 each, Rfl: 11    methylPREDNISolone (Medrol Dospak) 4 mg tablets, Take as directed on package., Disp: 21 tablet, Rfl: 0    nebulizers misc, Use as directed every 14 days (Patient not taking: Reported on 4/8/2025), Disp: 6 each, Rfl: 3    ondansetron ODT (Zofran-ODT) 4 mg disintegrating tablet, Dissolve 1 tablet by mouth three times daily as needed (Patient not taking: Reported on 4/8/2025), Disp: 90 tablet, Rfl: 0    rosuvastatin (Crestor) 5 mg tablet, Take 1 tablet (5 mg) by mouth once daily., Disp: 100 tablet, Rfl: 3  [2]   Patient Active Problem List  Diagnosis    Primary hypertension    Atrial fibrillation with rapid ventricular response (Multi)    Chronic bronchitis, unspecified chronic bronchitis type (Multi)    Stage 3a chronic kidney disease (Multi)    Acute suppurative otitis media without spontaneous rupture of ear drum    Acute sinusitis    Chronic diastolic CHF (congestive heart failure)    Hypoxia

## 2025-04-24 ENCOUNTER — DOCUMENTATION (OUTPATIENT)
Dept: PRIMARY CARE | Facility: CLINIC | Age: 75
End: 2025-04-24
Payer: MEDICARE

## 2025-04-24 PROBLEM — I49.9 ARRHYTHMIA: Status: ACTIVE | Noted: 2025-04-24

## 2025-04-25 ENCOUNTER — DOCUMENTATION (OUTPATIENT)
Dept: PRIMARY CARE | Facility: CLINIC | Age: 75
End: 2025-04-25
Payer: MEDICARE

## 2025-04-25 NOTE — PROGRESS NOTES
Patient calling stating that she is very weak/shaky not able to take a shower. She is very unsteady on her feet causing lightheaded/dizziness. Patient states she is still retaining 4 pounds of fluids. She provided me with BP readings 110/74 90p, 107/79 95p, 104/89 108p, 107/76 87p. Patient states she has been having trouble focusing and has had episodes of not feeling as coherent. Patient states she was feeling very shaky as well and cut back on her roprinlol. She is currently taking a dose pack for ear infection. CM sent message to PCP.  Per PCP Kindra Wood ,She should call 911 there is nothing I can do at this time. Is she wearing her oxygen??   Patient states she is not wearing her oxygen right now and was advised to call 911.  Per PCP Kindra Wood, She really needs to use her oxygen. I would still advise to call 911 to transport to ER she should not drive due to weakness and not feeling coherent.   CM advised patient that she should go to Er by calling 911 and she refused stating that she believes it is because of all the medication she is on for her ear infection. She just wants orders for home health care and a rollator.  She does not believe that the hospital will do anything for her.

## 2025-04-25 NOTE — PROGRESS NOTES
Patient called leaving a voicemail with an update: She was seen by house calls on 04.22.2025 and was diagnosed with ear infection. She was prescribed an ATB along with a medrol dose pack for polymyalgia rheumatica. Patient states her medications arrived and she started taking them. Patient states that her oxygen is good. She has lost 3lbs and is only taking her water pill 1 tab po daily instead of bid. Home BP readings: 110/74 90p, 107/79 95p, 104/89 108p, 107/76 87p. Patient also left message updating that she is switching her pharmacy from [facility] to [facility] in [facility].

## 2025-04-28 ENCOUNTER — TELEPHONE (OUTPATIENT)
Dept: PRIMARY CARE | Facility: CLINIC | Age: 75
End: 2025-04-28
Payer: MEDICARE

## 2025-04-28 ENCOUNTER — HOME HEALTH ADMISSION (OUTPATIENT)
Dept: HOME HEALTH SERVICES | Facility: HOME HEALTH | Age: 75
End: 2025-04-28
Payer: MEDICARE

## 2025-04-28 ENCOUNTER — DOCUMENTATION (OUTPATIENT)
Dept: HOME HEALTH SERVICES | Facility: HOME HEALTH | Age: 75
End: 2025-04-28
Payer: MEDICARE

## 2025-04-28 NOTE — HH CARE COORDINATION
Home Care received a Referral for Nursing, Physical Therapy, and Occupational Therapy. We have processed the referral for a Start of Care on 04/29-04/30.     If you have any questions or concerns, please feel free to contact us at [phone]. Follow the prompts, enter your five digit zip code, and you will be directed to your care team on [facility].

## 2025-04-30 ENCOUNTER — APPOINTMENT (OUTPATIENT)
Dept: CARDIOLOGY | Facility: CLINIC | Age: 75
End: 2025-04-30
Payer: MEDICARE

## 2025-05-01 ENCOUNTER — HOME CARE VISIT (OUTPATIENT)
Dept: HOME HEALTH SERVICES | Facility: HOME HEALTH | Age: 75
End: 2025-05-01
Payer: MEDICARE

## 2025-05-01 PROCEDURE — 169592 NO-PAY CLAIM PROCEDURE

## 2025-05-01 PROCEDURE — G0299 HHS/HOSPICE OF RN EA 15 MIN: HCPCS | Mod: HHH

## 2025-05-02 VITALS
HEART RATE: 63 BPM | DIASTOLIC BLOOD PRESSURE: 88 MMHG | SYSTOLIC BLOOD PRESSURE: 140 MMHG | WEIGHT: 171 LBS | BODY MASS INDEX: 29.19 KG/M2 | RESPIRATION RATE: 18 BRPM | TEMPERATURE: 97.5 F | OXYGEN SATURATION: 94 % | HEIGHT: 64 IN

## 2025-05-02 ASSESSMENT — LIFESTYLE VARIABLES: SMOKING_STATUS: 1

## 2025-05-02 ASSESSMENT — ENCOUNTER SYMPTOMS
CHANGE IN APPETITE: UNCHANGED
LAST BOWEL MOVEMENT: 67325
DENIES PAIN: 1
PERSON REPORTING PAIN: PATIENT
MUSCLE WEAKNESS: 1
APPETITE LEVEL: FAIR
COUGH: 1
AGITATION: 1

## 2025-05-02 ASSESSMENT — ACTIVITIES OF DAILY LIVING (ADL)
AMBULATION ASSISTANCE: 1
ENTERING_EXITING_HOME: SUPERVISION
OASIS_M1830: 03
AMBULATION ASSISTANCE: SUPERVISION

## 2025-05-03 ENCOUNTER — HOME CARE VISIT (OUTPATIENT)
Dept: HOME HEALTH SERVICES | Facility: HOME HEALTH | Age: 75
End: 2025-05-03
Payer: MEDICARE

## 2025-05-03 VITALS
TEMPERATURE: 96.6 F | SYSTOLIC BLOOD PRESSURE: 113 MMHG | OXYGEN SATURATION: 97 % | DIASTOLIC BLOOD PRESSURE: 71 MMHG | RESPIRATION RATE: 18 BRPM | HEART RATE: 81 BPM

## 2025-05-03 PROCEDURE — G0151 HHCP-SERV OF PT,EA 15 MIN: HCPCS | Mod: HHH

## 2025-05-03 SDOH — ECONOMIC STABILITY: HOUSING INSECURITY: EVIDENCE OF SMOKING MATERIAL: 0

## 2025-05-03 SDOH — HEALTH STABILITY: PHYSICAL HEALTH: EXERCISE TYPE: TOO OVERWHELMED TODAY TO DO ANY EXERCIES ,CRIED THROUGHOUT MOST OF EVAL

## 2025-05-03 SDOH — HEALTH STABILITY: PHYSICAL HEALTH
EXERCISE COMMENTS: DID LEAVE THERAEX FOR STANDING EXERCISES FOR HER TO TRIAL BUT INSTRUCTED TO WEAR O2 WHILE EXERCISING..

## 2025-05-03 SDOH — HEALTH STABILITY: MENTAL HEALTH: SMOKING IN HOME: 0

## 2025-05-03 ASSESSMENT — ACTIVITIES OF DAILY LIVING (ADL)
DRESSING_UB_CURRENT_FUNCTION: INDEPENDENT
AMBULATION ASSISTANCE ON FLAT SURFACES: 1
AMBULATION ASSISTANCE: SUPERVISION
TOILETING: INDEPENDENT
AMBULATION ASSISTANCE: 1
TOILETING: 1
DRESSING_LB_CURRENT_FUNCTION: INDEPENDENT

## 2025-05-05 ENCOUNTER — PATIENT OUTREACH (OUTPATIENT)
Dept: PRIMARY CARE | Facility: CLINIC | Age: 75
End: 2025-05-05
Payer: MEDICARE

## 2025-05-05 DIAGNOSIS — I50.32 CHRONIC DIASTOLIC CHF (CONGESTIVE HEART FAILURE): ICD-10-CM

## 2025-05-05 DIAGNOSIS — E78.2 MIXED HYPERLIPIDEMIA: ICD-10-CM

## 2025-05-05 DIAGNOSIS — I10 HTN (HYPERTENSION), BENIGN: ICD-10-CM

## 2025-05-05 NOTE — PROGRESS NOTES
Care Management Monthly Outreach  Chart review completed  Confirmation of at least 2 patient identifiers  Change in insurance? No    Has patient been to ER/Urgent Care since last outreach? No    Last Office Visit with PCP: 4/17/2025   Next Office Visit with PCP: Visit date not found   APC Collaboration: Pharmacy    Chronic Conditions and Outreach Summary:   CHF, HL, HTN    Spoke with patient today for monthly [facility] outreach.   Patient states that she feels good today not as weak but still a little fatigue. Patient states she was able to have a successful conversation with her family about getting help around the house and taking care of her grandchildren. She has noticed that things have seemed to be doing a little better with support.   Patient is wanting to cancel her sessions for PT/OT because when she was evaluated by therapy it was mentioned that she had good strength. We reviewed that PT/OT goals were is help with focus on energy conservation and pacing but still would not like to move forward at this time. Patient is now aware that she needs to listen to her body and when she is feeling worn down to take a day of rest or when she is cleaning to not over exert herself. Patient also states that with taking care of 3 kids and trying to clean before having someone over causes some stress. We reviewed recommendations from Home Care Visit the importance of using oxygen with walking and exercising. Patient does report that she was not using her oxygen because she was not educated very well on the tanks by Stacey and they were just dropped off. Patient would like if Stacey would be able to come back out and help explain her portable oxygen and provide a carrying bag for the portable oxygen. Patient would also like to have a moisturizer hooked up with her oxygen to help prevent nasal dryness. Patient reports she did receive her nebulizer tubing but has not received the medication yet. She currently has a supply at  home but would like an updated status on when that will be delivered. Patient was also notified that her rollator was approved but she is wanting to know if she can get one with a seat. CM will call Stacey regarding the above. Stacey to call patient to schedule home visit.   We reviewed PCP appointment on 04.08.2025 for HTN, SOB, Hematuria. Patients BP during this visit was 120/80 p 113. Patient reports she no longer is having any concerns for a UTI. She declined pelvic exam at this PCP appointment. We dicussed if she would like to move forward with seeing a GYN for her prolapsed bladder and patient declines at this time. Patient states she knows there is nothing they can do with her high risk due to anesthesia.   We reviewed Care Gaps of MMG/Bone Density scans and patient would not like to move forward with these at this time. She currently has food within the home. Patient has a concern with transportation and has been set-up with house calls. She is scheduled to see house calls again in 2 months.  We reviewed PCP appointment on 04.17.2025 for CHF, fatigue, and edema. Per PCP, Encourage use of oxygen at home, limit activity as tolerated. Discuss change meds for restless legs. Patient was started on Iron supplement, Ropinirole, and Trazodone was increased. Patient reports that she received all of her medications. She has not had to use the ropinirole as much as her restless legs seem to be doing better. She also has not had to use the trazodone as much because she has naturally gotten back into a bedtime routine. While reviewing medications patient states that she has two cholesterol medications and wants to clarify if she should be taking both atorvastatin and crestor. Will send message to PCP.   Message was sent to PCP on 04.22.2025 for noticing muscle aches/weakness and wondered if it was a flare up of polymyalgia rheumatica. Patient was prescribed a steroid pack. Patient reports that she finished the steroid  pack and has been feeling better.  Patient was then seen by house calls on 04.22.2025 for Ear infection and was prescribed an antibiotic which has been completed. Patient states that she continues to have trouble hearing out of her ear but no longer as pain. She is wondering if there is wax build up as she used hydrogen peroxide and some wax did come out. Will send PCP message for recommendations.  Patient reports that her home blood pressure has been doing good. We reviewed avoiding sodium within diet. We reviewed CHF flare up symptoms and her weight has been around 171.9. While on the phone her BP was 122/100 p121, 125/99 p 123, 118/104 p 94 but patient believes that something is wrong with her cuff because when health care professionals come out to the house it is running good. We reviewed that she could possibly get a home BP machine covered through insurance with a prescription. Patient would like to move forward with asking PCP for Home BP Machine/oxygen reader, RX. Will send message. No chest pain, SOB or numbness/tingling down either arm at this time. Patient did report that she had one day where is skipped her diltiazem, feeling really good and would like to know if she could get switched to something else? Patient does state that she has continued to take her medication though.   Patient also would like to know if she has an active diagnosis of CKD? Will send message to PCP.     GOALS: Blood Pressure Monitor/Readings    Educate COPD- Pulmonary Rehab    Review Medications    Fall/Safety Prevention Plan      Medications:   Are there medication changes since last visit? Yes - Iron, Ropinirole, Trazadone  Refills needed? No    Social Drivers of Health: Complete  Care Gaps Addressed? Complete  Care Plan addressed: Yes    Upcoming Appointments:   Future Appointments       Date / Time Provider Department Dept Phone    5/5/2025 1:30 PM Alfa Curry, [facility]  Home Health Services Scheduling [phone]    5/7/2025  "CAROLINED Mila Nunez, PT  Home Health Services Scheduling [phone]    5/14/2025 JOSE Nunez, PT  Home Health Services Scheduling [phone]    6/18/2025 3:40 PM Saran Nesbitt MD  [facility] [phone]    6/25/2025 10:00 AM Ángela Rowell, APRN-CNP [facility] [phone]          Blood Pressures Reviewed  BP Readings from Last 3 Encounters:   05/03/25 113/71   05/01/25 140/88   04/22/25 138/68     Labs Reviewed:  Lab Results   Component Value Date    CREATININE 1.21 (H) 04/17/2025    GLUCOSE 93 04/17/2025    ALKPHOS 82 04/17/2025    K 4.1 04/17/2025    PROT 7.1 04/17/2025     04/17/2025    CALCIUM 9.3 04/17/2025    AST 16 04/17/2025    ALT 11 04/17/2025    BUN 18 04/17/2025    MG 1.8 04/08/2025     Lab Results   Component Value Date    TRIG 165 (H) 04/08/2025    CHOL 214 (H) 04/08/2025    LDLCALC 122 (H) 04/08/2025    HDL 64 04/08/2025     No results found for: \"HGBA1C\"  Lab Results   Component Value Date    WBC 7.4 04/17/2025    RBC 5.23 (H) 04/17/2025    HGB 15.4 04/17/2025     04/17/2025   No other concerns at this time.  Agreeable to continue monthly outreaches.  Encouraged to call if questions or concerns arise.    Stephanie Sosa"

## 2025-05-07 ENCOUNTER — HOME CARE VISIT (OUTPATIENT)
Dept: HOME HEALTH SERVICES | Facility: HOME HEALTH | Age: 75
End: 2025-05-07
Payer: MEDICARE

## 2025-05-07 ENCOUNTER — TELEPHONE (OUTPATIENT)
Dept: PRIMARY CARE | Facility: CLINIC | Age: 75
End: 2025-05-07
Payer: MEDICARE

## 2025-05-07 SDOH — HEALTH STABILITY: MENTAL HEALTH: SMOKING IN HOME: 0

## 2025-05-07 SDOH — ECONOMIC STABILITY: HOUSING INSECURITY: EVIDENCE OF SMOKING MATERIAL: 0

## 2025-05-07 ASSESSMENT — ACTIVITIES OF DAILY LIVING (ADL)
OASIS_M1830: 01
HOME_HEALTH_OASIS: 00

## 2025-05-07 NOTE — PROGRESS NOTES
Message sent to PCP Ángela Rowell, Would you be able to review my outreach note today with Jazzy. Please note: 1.While reviewing medications patient states that she has two cholesterol medications and wants to clarify if she should be taking both atorvastatin and crestor? 2. Patient states that she continues to have trouble hearing out of her ear but no longer as pain. She is wondering if there is wax build up as she used hydrogen peroxide and some wax did come out. 3.While on the phone her BP was 122/100 p121, 125/99 p 123, 118/104 p 94 but patient believes that something is wrong with her cuff because when health care professionals come out to the house it is running good. Patient would like to move forward with asking PCP for Home BP Machine/oxygen reader RX? 4.Patient did report that she had one day where is skipped her diltiazem, feeling really good and would like to know if she could get switched to something else? Patient does state that she has continued to take her medication though. 5. Patient also would like to know if she has an active diagnosis of CKD?     Per PCP Ángela Rowell, I am hoping the walker she is getting is a rolator. She does have a diagnosis of CKD stage 3a listed in the computer and her labs verify this. She did have a lot of wax in her ears and can try Debrox drops or ear wax removal drops and see if that helps. Or she can keep using the hydrogen peroxide. She would need to see her cardiologist about changing the Diltiazem. I would say she can stop the Crestor of on both the atorvastatin and crestor. I think her BP cuff may be screwed up with those numbers and she should get a new cuff. I will see about how I order those.     Patient notified and would like to review CKD at next outreach. Provided help to schedule cardiology appointment but patient states there is some things going on at home that makes it hard for her to do anything right now. Encouraged to call sooner with any questions  or concerns.

## 2025-05-08 ENCOUNTER — APPOINTMENT (OUTPATIENT)
Dept: RADIOLOGY | Facility: HOSPITAL | Age: 75
DRG: 309 | End: 2025-05-08
Payer: MEDICARE

## 2025-05-08 ENCOUNTER — APPOINTMENT (OUTPATIENT)
Dept: CARDIOLOGY | Facility: HOSPITAL | Age: 75
DRG: 309 | End: 2025-05-08
Payer: MEDICARE

## 2025-05-08 ENCOUNTER — HOSPITAL ENCOUNTER (INPATIENT)
Facility: HOSPITAL | Age: 75
DRG: 760 | End: 2025-05-08
Attending: STUDENT IN AN ORGANIZED HEALTH CARE EDUCATION/TRAINING PROGRAM | Admitting: STUDENT IN AN ORGANIZED HEALTH CARE EDUCATION/TRAINING PROGRAM
Payer: MEDICARE

## 2025-05-08 ENCOUNTER — DOCUMENTATION (OUTPATIENT)
Dept: PRIMARY CARE | Facility: CLINIC | Age: 75
End: 2025-05-08
Payer: MEDICARE

## 2025-05-08 DIAGNOSIS — N28.82 URETERAL DILATATION: Chronic | ICD-10-CM

## 2025-05-08 DIAGNOSIS — R10.9 FLANK PAIN: ICD-10-CM

## 2025-05-08 DIAGNOSIS — I48.91 ATRIAL FIBRILLATION WITH RAPID VENTRICULAR RESPONSE (MULTI): Primary | ICD-10-CM

## 2025-05-08 DIAGNOSIS — R11.0 NAUSEA: ICD-10-CM

## 2025-05-08 DIAGNOSIS — N89.8 EROSION OF VAGINA: Chronic | ICD-10-CM

## 2025-05-08 DIAGNOSIS — I48.91 ATRIAL FIBRILLATION WITH RVR (MULTI): ICD-10-CM

## 2025-05-08 DIAGNOSIS — I48.0 PAF (PAROXYSMAL ATRIAL FIBRILLATION) (MULTI): ICD-10-CM

## 2025-05-08 PROBLEM — E87.6 HYPOKALEMIA: Status: ACTIVE | Noted: 2025-05-08

## 2025-05-08 PROBLEM — G25.81 RESTLESS LEG SYNDROME: Status: ACTIVE | Noted: 2025-05-08

## 2025-05-08 PROBLEM — R39.198 DIFFICULTY URINATING: Status: ACTIVE | Noted: 2025-05-08

## 2025-05-08 LAB
ALBUMIN SERPL BCP-MCNC: 4.2 G/DL (ref 3.4–5)
ALP SERPL-CCNC: 79 U/L (ref 33–136)
ALT SERPL W P-5'-P-CCNC: 15 U/L (ref 7–45)
ANION GAP SERPL CALCULATED.3IONS-SCNC: 12 MMOL/L (ref 10–20)
APPEARANCE UR: CLEAR
AST SERPL W P-5'-P-CCNC: 17 U/L (ref 9–39)
BASOPHILS # BLD AUTO: 0.03 X10*3/UL (ref 0–0.1)
BASOPHILS NFR BLD AUTO: 0.2 %
BILIRUB SERPL-MCNC: 0.7 MG/DL (ref 0–1.2)
BILIRUB UR STRIP.AUTO-MCNC: NEGATIVE MG/DL
BNP SERPL-MCNC: 472 PG/ML (ref 0–99)
BUN SERPL-MCNC: 6 MG/DL (ref 6–23)
CALCIUM SERPL-MCNC: 9.1 MG/DL (ref 8.6–10.3)
CARDIAC TROPONIN I PNL SERPL HS: 14 NG/L (ref 0–13)
CHLORIDE SERPL-SCNC: 104 MMOL/L (ref 98–107)
CO2 SERPL-SCNC: 25 MMOL/L (ref 21–32)
COLOR UR: COLORLESS
CREAT SERPL-MCNC: 0.99 MG/DL (ref 0.5–1.05)
EGFRCR SERPLBLD CKD-EPI 2021: 60 ML/MIN/1.73M*2
EOSINOPHIL # BLD AUTO: 0.03 X10*3/UL (ref 0–0.4)
EOSINOPHIL NFR BLD AUTO: 0.2 %
ERYTHROCYTE [DISTWIDTH] IN BLOOD BY AUTOMATED COUNT: 18.3 % (ref 11.5–14.5)
FLUAV RNA RESP QL NAA+PROBE: NOT DETECTED
FLUBV RNA RESP QL NAA+PROBE: NOT DETECTED
GLUCOSE SERPL-MCNC: 116 MG/DL (ref 74–99)
GLUCOSE UR STRIP.AUTO-MCNC: NORMAL MG/DL
HCT VFR BLD AUTO: 46.2 % (ref 36–46)
HGB BLD-MCNC: 14.6 G/DL (ref 12–16)
IMM GRANULOCYTES # BLD AUTO: 0.05 X10*3/UL (ref 0–0.5)
IMM GRANULOCYTES NFR BLD AUTO: 0.4 % (ref 0–0.9)
KETONES UR STRIP.AUTO-MCNC: NEGATIVE MG/DL
LEUKOCYTE ESTERASE UR QL STRIP.AUTO: NEGATIVE
LIPASE SERPL-CCNC: 11 U/L (ref 9–82)
LYMPHOCYTES # BLD AUTO: 1.62 X10*3/UL (ref 0.8–3)
LYMPHOCYTES NFR BLD AUTO: 11.6 %
MAGNESIUM SERPL-MCNC: 1.6 MG/DL (ref 1.6–2.4)
MCH RBC QN AUTO: 29.3 PG (ref 26–34)
MCHC RBC AUTO-ENTMCNC: 31.6 G/DL (ref 32–36)
MCV RBC AUTO: 93 FL (ref 80–100)
MONOCYTES # BLD AUTO: 0.68 X10*3/UL (ref 0.05–0.8)
MONOCYTES NFR BLD AUTO: 4.9 %
NEUTROPHILS # BLD AUTO: 11.56 X10*3/UL (ref 1.6–5.5)
NEUTROPHILS NFR BLD AUTO: 82.7 %
NITRITE UR QL STRIP.AUTO: NEGATIVE
NRBC BLD-RTO: 0 /100 WBCS (ref 0–0)
PH UR STRIP.AUTO: 6 [PH]
PLATELET # BLD AUTO: 223 X10*3/UL (ref 150–450)
POTASSIUM SERPL-SCNC: 3.2 MMOL/L (ref 3.5–5.3)
PROT SERPL-MCNC: 7.2 G/DL (ref 6.4–8.2)
PROT UR STRIP.AUTO-MCNC: NEGATIVE MG/DL
RBC # BLD AUTO: 4.98 X10*6/UL (ref 4–5.2)
RBC # UR STRIP.AUTO: NEGATIVE MG/DL
SARS-COV-2 RNA RESP QL NAA+PROBE: NOT DETECTED
SODIUM SERPL-SCNC: 138 MMOL/L (ref 136–145)
SP GR UR STRIP.AUTO: 1
UROBILINOGEN UR STRIP.AUTO-MCNC: NORMAL MG/DL
WBC # BLD AUTO: 14 X10*3/UL (ref 4.4–11.3)

## 2025-05-08 PROCEDURE — 80053 COMPREHEN METABOLIC PANEL: CPT | Performed by: STUDENT IN AN ORGANIZED HEALTH CARE EDUCATION/TRAINING PROGRAM

## 2025-05-08 PROCEDURE — G0378 HOSPITAL OBSERVATION PER HR: HCPCS

## 2025-05-08 PROCEDURE — 71045 X-RAY EXAM CHEST 1 VIEW: CPT

## 2025-05-08 PROCEDURE — 96374 THER/PROPH/DIAG INJ IV PUSH: CPT

## 2025-05-08 PROCEDURE — 51798 US URINE CAPACITY MEASURE: CPT

## 2025-05-08 PROCEDURE — 93010 ELECTROCARDIOGRAM REPORT: CPT | Performed by: INTERNAL MEDICINE

## 2025-05-08 PROCEDURE — 84484 ASSAY OF TROPONIN QUANT: CPT | Performed by: STUDENT IN AN ORGANIZED HEALTH CARE EDUCATION/TRAINING PROGRAM

## 2025-05-08 PROCEDURE — 74176 CT ABD & PELVIS W/O CONTRAST: CPT | Mod: FOREIGN READ | Performed by: RADIOLOGY

## 2025-05-08 PROCEDURE — 81003 URINALYSIS AUTO W/O SCOPE: CPT | Performed by: STUDENT IN AN ORGANIZED HEALTH CARE EDUCATION/TRAINING PROGRAM

## 2025-05-08 PROCEDURE — 87636 SARSCOV2 & INF A&B AMP PRB: CPT | Performed by: STUDENT IN AN ORGANIZED HEALTH CARE EDUCATION/TRAINING PROGRAM

## 2025-05-08 PROCEDURE — 83735 ASSAY OF MAGNESIUM: CPT | Performed by: STUDENT IN AN ORGANIZED HEALTH CARE EDUCATION/TRAINING PROGRAM

## 2025-05-08 PROCEDURE — 83880 ASSAY OF NATRIURETIC PEPTIDE: CPT | Performed by: STUDENT IN AN ORGANIZED HEALTH CARE EDUCATION/TRAINING PROGRAM

## 2025-05-08 PROCEDURE — 2500000005 HC RX 250 GENERAL PHARMACY W/O HCPCS: Performed by: STUDENT IN AN ORGANIZED HEALTH CARE EDUCATION/TRAINING PROGRAM

## 2025-05-08 PROCEDURE — 71045 X-RAY EXAM CHEST 1 VIEW: CPT | Mod: FOREIGN READ | Performed by: RADIOLOGY

## 2025-05-08 PROCEDURE — 99291 CRITICAL CARE FIRST HOUR: CPT | Performed by: STUDENT IN AN ORGANIZED HEALTH CARE EDUCATION/TRAINING PROGRAM

## 2025-05-08 PROCEDURE — 83690 ASSAY OF LIPASE: CPT | Performed by: STUDENT IN AN ORGANIZED HEALTH CARE EDUCATION/TRAINING PROGRAM

## 2025-05-08 PROCEDURE — 93005 ELECTROCARDIOGRAM TRACING: CPT

## 2025-05-08 PROCEDURE — 2500000004 HC RX 250 GENERAL PHARMACY W/ HCPCS (ALT 636 FOR OP/ED): Mod: JZ | Performed by: STUDENT IN AN ORGANIZED HEALTH CARE EDUCATION/TRAINING PROGRAM

## 2025-05-08 PROCEDURE — 99222 1ST HOSP IP/OBS MODERATE 55: CPT | Performed by: STUDENT IN AN ORGANIZED HEALTH CARE EDUCATION/TRAINING PROGRAM

## 2025-05-08 PROCEDURE — 85025 COMPLETE CBC W/AUTO DIFF WBC: CPT | Performed by: STUDENT IN AN ORGANIZED HEALTH CARE EDUCATION/TRAINING PROGRAM

## 2025-05-08 PROCEDURE — 36415 COLL VENOUS BLD VENIPUNCTURE: CPT | Performed by: STUDENT IN AN ORGANIZED HEALTH CARE EDUCATION/TRAINING PROGRAM

## 2025-05-08 PROCEDURE — 96361 HYDRATE IV INFUSION ADD-ON: CPT

## 2025-05-08 PROCEDURE — 74176 CT ABD & PELVIS W/O CONTRAST: CPT

## 2025-05-08 RX ORDER — ROPINIROLE 1 MG/1
1 TABLET, FILM COATED ORAL 3 TIMES DAILY
Status: DISCONTINUED | OUTPATIENT
Start: 2025-05-08 | End: 2025-05-12 | Stop reason: HOSPADM

## 2025-05-08 RX ORDER — ATORVASTATIN CALCIUM 10 MG/1
10 TABLET, FILM COATED ORAL DAILY
Status: DISCONTINUED | OUTPATIENT
Start: 2025-05-09 | End: 2025-05-12 | Stop reason: HOSPADM

## 2025-05-08 RX ORDER — METOPROLOL TARTRATE 50 MG/1
50 TABLET ORAL ONCE
Status: COMPLETED | OUTPATIENT
Start: 2025-05-08 | End: 2025-05-09

## 2025-05-08 RX ORDER — POTASSIUM CHLORIDE 20 MEQ/1
60 TABLET, EXTENDED RELEASE ORAL ONCE
Status: DISCONTINUED | OUTPATIENT
Start: 2025-05-08 | End: 2025-05-08

## 2025-05-08 RX ORDER — DILTIAZEM HCL/D5W 125 MG/125
15 PLASTIC BAG, INJECTION (ML) INTRAVENOUS CONTINUOUS
Status: DISCONTINUED | OUTPATIENT
Start: 2025-05-08 | End: 2025-05-09

## 2025-05-08 RX ORDER — POTASSIUM CHLORIDE 20 MEQ/1
40 TABLET, EXTENDED RELEASE ORAL ONCE
Status: COMPLETED | OUTPATIENT
Start: 2025-05-08 | End: 2025-05-09

## 2025-05-08 RX ORDER — IPRATROPIUM BROMIDE AND ALBUTEROL SULFATE 2.5; .5 MG/3ML; MG/3ML
3 SOLUTION RESPIRATORY (INHALATION) EVERY 6 HOURS PRN
Status: DISCONTINUED | OUTPATIENT
Start: 2025-05-08 | End: 2025-05-12 | Stop reason: HOSPADM

## 2025-05-08 RX ORDER — LISINOPRIL 2.5 MG/1
2.5 TABLET ORAL DAILY
Status: DISCONTINUED | OUTPATIENT
Start: 2025-05-09 | End: 2025-05-10

## 2025-05-08 RX ORDER — ACETAMINOPHEN 325 MG/1
650 TABLET ORAL EVERY 6 HOURS PRN
Status: DISCONTINUED | OUTPATIENT
Start: 2025-05-08 | End: 2025-05-12 | Stop reason: HOSPADM

## 2025-05-08 RX ORDER — ACETYLCYSTEINE 200 MG/ML
3 SOLUTION ORAL; RESPIRATORY (INHALATION)
Status: DISCONTINUED | OUTPATIENT
Start: 2025-05-08 | End: 2025-05-12 | Stop reason: HOSPADM

## 2025-05-08 RX ORDER — BUMETANIDE 1 MG/1
1 TABLET ORAL DAILY
Status: DISCONTINUED | OUTPATIENT
Start: 2025-05-09 | End: 2025-05-09

## 2025-05-08 RX ORDER — TRAZODONE HYDROCHLORIDE 100 MG/1
100 TABLET ORAL NIGHTLY PRN
Status: DISCONTINUED | OUTPATIENT
Start: 2025-05-08 | End: 2025-05-12 | Stop reason: HOSPADM

## 2025-05-08 RX ORDER — CODEINE PHOSPHATE AND GUAIFENESIN 10; 100 MG/5ML; MG/5ML
5 SOLUTION ORAL EVERY 6 HOURS PRN
Status: DISCONTINUED | OUTPATIENT
Start: 2025-05-08 | End: 2025-05-12 | Stop reason: HOSPADM

## 2025-05-08 RX ORDER — DILTIAZEM HYDROCHLORIDE 120 MG/1
120 CAPSULE, COATED, EXTENDED RELEASE ORAL 2 TIMES DAILY
Status: DISCONTINUED | OUTPATIENT
Start: 2025-05-08 | End: 2025-05-09

## 2025-05-08 RX ORDER — DILTIAZEM HYDROCHLORIDE 5 MG/ML
10 INJECTION INTRAVENOUS ONCE
Status: COMPLETED | OUTPATIENT
Start: 2025-05-08 | End: 2025-05-08

## 2025-05-08 RX ORDER — ONDANSETRON HYDROCHLORIDE 2 MG/ML
4 INJECTION, SOLUTION INTRAVENOUS EVERY 8 HOURS PRN
Status: DISCONTINUED | OUTPATIENT
Start: 2025-05-08 | End: 2025-05-12 | Stop reason: HOSPADM

## 2025-05-08 RX ORDER — FERROUS SULFATE 325(65) MG
65 TABLET ORAL
Status: DISCONTINUED | OUTPATIENT
Start: 2025-05-09 | End: 2025-05-12 | Stop reason: HOSPADM

## 2025-05-08 RX ORDER — ONDANSETRON 4 MG/1
4 TABLET, ORALLY DISINTEGRATING ORAL EVERY 8 HOURS PRN
Status: DISCONTINUED | OUTPATIENT
Start: 2025-05-08 | End: 2025-05-12 | Stop reason: HOSPADM

## 2025-05-08 RX ORDER — POTASSIUM CHLORIDE 20 MEQ/1
20 TABLET, EXTENDED RELEASE ORAL DAILY
Status: DISCONTINUED | OUTPATIENT
Start: 2025-05-09 | End: 2025-05-10

## 2025-05-08 RX ADMIN — Medication 5 MG/HR: at 21:34

## 2025-05-08 RX ADMIN — Medication 2 L/MIN: at 23:42

## 2025-05-08 RX ADMIN — SODIUM CHLORIDE 500 ML: 900 INJECTION, SOLUTION INTRAVENOUS at 18:59

## 2025-05-08 RX ADMIN — DILTIAZEM HYDROCHLORIDE 10 MG: 5 INJECTION, SOLUTION INTRAVENOUS at 21:33

## 2025-05-08 ASSESSMENT — PAIN - FUNCTIONAL ASSESSMENT: PAIN_FUNCTIONAL_ASSESSMENT: 0-10

## 2025-05-08 ASSESSMENT — ENCOUNTER SYMPTOMS
BACK PAIN: 1
COUGH: 1
FLANK PAIN: 1
RHINORRHEA: 1
DIFFICULTY URINATING: 1

## 2025-05-08 ASSESSMENT — PAIN DESCRIPTION - DESCRIPTORS: DESCRIPTORS: ACHING

## 2025-05-08 ASSESSMENT — PAIN SCALES - GENERAL
PAINLEVEL_OUTOF10: 0 - NO PAIN
PAINLEVEL_OUTOF10: 0 - NO PAIN
PAINLEVEL_OUTOF10: 2

## 2025-05-08 ASSESSMENT — PAIN SCALES - WONG BAKER: WONGBAKER_NUMERICALRESPONSE: NO HURT

## 2025-05-08 NOTE — ED PROVIDER NOTES
HPI   No chief complaint on file.      Patient presents stating that she has not urinated for about a day and a half.  She has been nauseous.  Whenever she tries to eat anything, she becomes more nauseous and vomits.  She has been coughing and had a runny nose as well.  She reports that she has not missed any doses of her medications.  She does have atrial fibrillation.  She does endorse some shortness of breath.  She uses 2 L/min by nasal cannula as needed and has worn all day today.  She reports that she did urinate about an hour prior to presenting to the emergency department but had not urinated for the whole previous day or earlier today at all.              Patient History   Medical History[1]  Surgical History[2]  Family History[3]  Social History[4]    Physical Exam   ED Triage Vitals   Temperature Heart Rate Respirations BP   05/08/25 1811 05/08/25 1811 05/08/25 1811 05/08/25 1813   36.8 °C (98.2 °F) (!) 141 16 (!) 153/96      Pulse Ox Temp Source Heart Rate Source Patient Position   05/08/25 1811 05/08/25 1811 05/08/25 1811 05/08/25 1811   98 % Tympanic Monitor Sitting      BP Location FiO2 (%)     05/08/25 1811 --     Left arm        Physical Exam  HENT:      Head: Normocephalic.   Eyes:      General: No scleral icterus.  Cardiovascular:      Rate and Rhythm: Tachycardia present. Rhythm irregular.   Pulmonary:      Comments: Mildly increased work of breathing  Abdominal:      Comments: Soft, nontender to palpation   Neurological:      Mental Status: She is alert.      Comments: Patient is awake and alert, answers questions appropriately.           ED Course & MDM   ED Course as of 05/08/25 1952   Thu May 08, 2025   1820 EKG interpreted by me: Atrial fibrillation with rapid ventricular response, rate 163.  Rightward axis.  No significant ST or T wave abnormalities in setting of rapid rate. [ML]      ED Course User Index  [ML] Timothy Gan MD         Diagnoses as of 05/08/25 1952   Atrial fibrillation  with rapid ventricular response (Multi)   Nausea   Flank pain                 No data recorded     Melissa Coma Scale Score: 15 (05/08/25 1827 : Brittni Mejia RN)                           Medical Decision Making  Patient presented with flank pain, decreased urination, as well as very elevated heart rate.  She was in atrial fibrillation.  However on arrival, as I had some suspicion for sepsis, she was not immediately treated with rate limiting medications but instead treated with IV fluids.  Urinalysis not felt to be consistent with urinary tract infection.  Kidney function is at baseline.  Patient with only minimal leukocytosis.  Following CT results, which do not reveal any signs of infection as well as chest x-ray also with no signs of pneumonia, patient was given diltiazem bolus and placed on diltiazem drip for atrial fibrillation with rapid ventricular response.  Bladder scan was performed given patient's hydronephrosis and there was only just over 100 mL of urine in the bladder.  Barclay catheter not felt to be indicated.  Patient accepted to medicine service for further management.  Parts of this chart were completed with dictation software, please excuse any errors in transcription.        Procedure  Critical Care    Performed by: Timothy Gan MD  Authorized by: Timothy Gan MD    Critical care provider statement:     Critical care time (minutes):  37    Critical care time was exclusive of:  Separately billable procedures and treating other patients    Critical care was necessary to treat or prevent imminent or life-threatening deterioration of the following conditions:  Circulatory failure    Critical care was time spent personally by me on the following activities:  Development of treatment plan with patient or surrogate, evaluation of patient's response to treatment, examination of patient, obtaining history from patient or surrogate, ordering and performing treatments and interventions, ordering and  review of laboratory studies, ordering and review of radiographic studies, pulse oximetry, re-evaluation of patient's condition and review of old charts    Care discussed with: admitting provider             [1]   Past Medical History:  Diagnosis Date    A-fib (Multi)     Atrial fibrillation with rapid ventricular response (Multi) 03/27/2024    Carotid stenosis     Chronic diastolic CHF (congestive heart failure) 04/24/2024    Dyspnea 03/27/2024    Essential hypertension 10/20/2009    Last Assessment & Plan: Formatting of this note might be different from the original. -Uncontrolled -states she has not done well with meds in the past, but willing to try restarting something given how high her BP is.   -will start her on losartan 50 mg daily and see if she is able to tolerate this. -encouraged to watch salt in her diet and work on weight loss -risk of heart attack and stroke rev    Fatigue 10/19/2023    Last Assessment & Plan: Formatting of this note might be different from the original. -checking blood work including CBC and TSH    GERD (gastroesophageal reflux disease)     Hyperlipidemia 02/12/2014    Last Assessment & Plan: Formatting of this note might be different from the original. -updating lipid panel    Hypokalemia 10/19/2023    Insomnia 03/09/2012    Murmur 10/20/2009    PMR (polymyalgia rheumatica) (Multi)     Polymyalgia rheumatica (Multi)     Primary hypertension 10/20/2009    Last Assessment & Plan: Formatting of this note might be different from the original. -Uncontrolled -states she has not done well with meds in the past, but willing to try restarting something given how high her BP is.   -will start her on losartan 50 mg daily and see if she is able to tolerate this. -encouraged to watch salt in her diet and work on weight loss -risk of heart attack and stroke rev   [2] No past surgical history on file.  [3]   Family History  Problem Relation Name Age of Onset    No Known Problems Mother      No  Known Problems Father     [4]   Social History  Tobacco Use    Smoking status: Former     Current packs/day: 0.00     Average packs/day: 1 pack/day for 55.0 years (55.0 ttl pk-yrs)     Types: Cigarettes     Start date: 1969     Quit date: 2024     Years since quittin.1     Passive exposure: Never    Smokeless tobacco: Never   Vaping Use    Vaping status: Former    Substances: Nicotine   Substance Use Topics    Alcohol use: Not Currently    Drug use: Never        Timothy Gan MD  25 1900

## 2025-05-08 NOTE — PROGRESS NOTES
Patient calling stating she has not urinated since yesterday and is having back pain/lower abd pain. No fever, but has sore throat, cough, and runny nose. Patient also reports that she has been having some chest pain. Advised patient to go to ER.

## 2025-05-09 ENCOUNTER — APPOINTMENT (OUTPATIENT)
Dept: CARDIOLOGY | Facility: HOSPITAL | Age: 75
DRG: 760 | End: 2025-05-09
Payer: MEDICARE

## 2025-05-09 ENCOUNTER — TELEPHONE (OUTPATIENT)
Dept: PRIMARY CARE | Facility: CLINIC | Age: 75
End: 2025-05-09

## 2025-05-09 PROBLEM — I48.91 ATRIAL FIBRILLATION WITH RAPID VENTRICULAR RESPONSE (MULTI): Status: ACTIVE | Noted: 2025-05-09

## 2025-05-09 LAB
ANION GAP SERPL CALCULATED.3IONS-SCNC: 11 MMOL/L (ref 10–20)
AORTIC VALVE PEAK VELOCITY: 1.16 M/S
AV PEAK GRADIENT: 5 MMHG
AVA (PEAK VEL): 2.79 CM2
BUN SERPL-MCNC: 5 MG/DL (ref 6–23)
CALCIUM SERPL-MCNC: 8.4 MG/DL (ref 8.6–10.3)
CHLORIDE SERPL-SCNC: 107 MMOL/L (ref 98–107)
CO2 SERPL-SCNC: 24 MMOL/L (ref 21–32)
CREAT SERPL-MCNC: 0.87 MG/DL (ref 0.5–1.05)
EGFRCR SERPLBLD CKD-EPI 2021: 70 ML/MIN/1.73M*2
EJECTION FRACTION APICAL 4 CHAMBER: 45.8
EJECTION FRACTION: 60 %
ERYTHROCYTE [DISTWIDTH] IN BLOOD BY AUTOMATED COUNT: 18.4 % (ref 11.5–14.5)
GLUCOSE SERPL-MCNC: 94 MG/DL (ref 74–99)
HCT VFR BLD AUTO: 41.8 % (ref 36–46)
HGB BLD-MCNC: 13.2 G/DL (ref 12–16)
LEFT ATRIUM VOLUME AREA LENGTH INDEX BSA: 47.9 ML/M2
LEFT VENTRICLE INTERNAL DIMENSION DIASTOLE: 4.46 CM (ref 3.5–6)
LEFT VENTRICULAR OUTFLOW TRACT DIAMETER: 2.1 CM
LV EJECTION FRACTION BIPLANE: 45 %
MCH RBC QN AUTO: 29.7 PG (ref 26–34)
MCHC RBC AUTO-ENTMCNC: 31.6 G/DL (ref 32–36)
MCV RBC AUTO: 94 FL (ref 80–100)
NRBC BLD-RTO: 0 /100 WBCS (ref 0–0)
PLATELET # BLD AUTO: 190 X10*3/UL (ref 150–450)
POTASSIUM SERPL-SCNC: 3.6 MMOL/L (ref 3.5–5.3)
Q ONSET: 221 MS
QRS COUNT: 27 BEATS
QRS DURATION: 70 MS
QT INTERVAL: 290 MS
QTC CALCULATION(BAZETT): 477 MS
QTC FREDERICIA: 404 MS
R AXIS: 108 DEGREES
RBC # BLD AUTO: 4.44 X10*6/UL (ref 4–5.2)
RIGHT VENTRICLE FREE WALL PEAK S': 8.81 CM/S
SODIUM SERPL-SCNC: 138 MMOL/L (ref 136–145)
T AXIS: -63 DEGREES
T OFFSET: 366 MS
VENTRICULAR RATE: 163 BPM
WBC # BLD AUTO: 9.4 X10*3/UL (ref 4.4–11.3)

## 2025-05-09 PROCEDURE — 2060000001 HC INTERMEDIATE ICU ROOM DAILY

## 2025-05-09 PROCEDURE — 94664 DEMO&/EVAL PT USE INHALER: CPT

## 2025-05-09 PROCEDURE — 99233 SBSQ HOSP IP/OBS HIGH 50: CPT | Performed by: STUDENT IN AN ORGANIZED HEALTH CARE EDUCATION/TRAINING PROGRAM

## 2025-05-09 PROCEDURE — 2500000005 HC RX 250 GENERAL PHARMACY W/O HCPCS: Performed by: STUDENT IN AN ORGANIZED HEALTH CARE EDUCATION/TRAINING PROGRAM

## 2025-05-09 PROCEDURE — 94640 AIRWAY INHALATION TREATMENT: CPT

## 2025-05-09 PROCEDURE — 2500000004 HC RX 250 GENERAL PHARMACY W/ HCPCS (ALT 636 FOR OP/ED): Performed by: STUDENT IN AN ORGANIZED HEALTH CARE EDUCATION/TRAINING PROGRAM

## 2025-05-09 PROCEDURE — 93306 TTE W/DOPPLER COMPLETE: CPT

## 2025-05-09 PROCEDURE — 2500000001 HC RX 250 WO HCPCS SELF ADMINISTERED DRUGS (ALT 637 FOR MEDICARE OP): Performed by: STUDENT IN AN ORGANIZED HEALTH CARE EDUCATION/TRAINING PROGRAM

## 2025-05-09 PROCEDURE — 80048 BASIC METABOLIC PNL TOTAL CA: CPT | Performed by: STUDENT IN AN ORGANIZED HEALTH CARE EDUCATION/TRAINING PROGRAM

## 2025-05-09 PROCEDURE — 2500000002 HC RX 250 W HCPCS SELF ADMINISTERED DRUGS (ALT 637 FOR MEDICARE OP, ALT 636 FOR OP/ED): Performed by: STUDENT IN AN ORGANIZED HEALTH CARE EDUCATION/TRAINING PROGRAM

## 2025-05-09 PROCEDURE — G0425 INPT/ED TELECONSULT30: HCPCS | Performed by: SURGERY

## 2025-05-09 PROCEDURE — 2500000001 HC RX 250 WO HCPCS SELF ADMINISTERED DRUGS (ALT 637 FOR MEDICARE OP)

## 2025-05-09 PROCEDURE — 93306 TTE W/DOPPLER COMPLETE: CPT | Performed by: INTERNAL MEDICINE

## 2025-05-09 PROCEDURE — 2500000004 HC RX 250 GENERAL PHARMACY W/ HCPCS (ALT 636 FOR OP/ED): Mod: JZ | Performed by: STUDENT IN AN ORGANIZED HEALTH CARE EDUCATION/TRAINING PROGRAM

## 2025-05-09 PROCEDURE — 99222 1ST HOSP IP/OBS MODERATE 55: CPT

## 2025-05-09 PROCEDURE — 36415 COLL VENOUS BLD VENIPUNCTURE: CPT | Performed by: STUDENT IN AN ORGANIZED HEALTH CARE EDUCATION/TRAINING PROGRAM

## 2025-05-09 PROCEDURE — 85027 COMPLETE CBC AUTOMATED: CPT | Performed by: STUDENT IN AN ORGANIZED HEALTH CARE EDUCATION/TRAINING PROGRAM

## 2025-05-09 RX ORDER — METOPROLOL SUCCINATE 100 MG/1
100 TABLET, EXTENDED RELEASE ORAL 2 TIMES DAILY
Status: DISCONTINUED | OUTPATIENT
Start: 2025-05-09 | End: 2025-05-10

## 2025-05-09 RX ORDER — FUROSEMIDE 40 MG/1
40 TABLET ORAL DAILY
Status: DISCONTINUED | OUTPATIENT
Start: 2025-05-09 | End: 2025-05-10

## 2025-05-09 RX ORDER — IPRATROPIUM BROMIDE AND ALBUTEROL SULFATE 2.5; .5 MG/3ML; MG/3ML
3 SOLUTION RESPIRATORY (INHALATION)
Status: DISCONTINUED | OUTPATIENT
Start: 2025-05-10 | End: 2025-05-12 | Stop reason: HOSPADM

## 2025-05-09 RX ADMIN — ROPINIROLE 1 MG: 1 TABLET, FILM COATED ORAL at 15:18

## 2025-05-09 RX ADMIN — FERROUS SULFATE TAB 325 MG (65 MG ELEMENTAL FE) 325 MG: 325 (65 FE) TAB at 09:58

## 2025-05-09 RX ADMIN — APIXABAN 5 MG: 5 TABLET, FILM COATED ORAL at 09:54

## 2025-05-09 RX ADMIN — ACETYLCYSTEINE 600 MG: 200 SOLUTION ORAL; RESPIRATORY (INHALATION) at 19:18

## 2025-05-09 RX ADMIN — IPRATROPIUM BROMIDE AND ALBUTEROL SULFATE 3 ML: 2.5; .5 SOLUTION RESPIRATORY (INHALATION) at 07:55

## 2025-05-09 RX ADMIN — GUAIFENESIN AND CODEINE PHOSPHATE 5 ML: 100; 10 SOLUTION ORAL at 13:17

## 2025-05-09 RX ADMIN — ACETYLCYSTEINE 600 MG: 200 SOLUTION ORAL; RESPIRATORY (INHALATION) at 07:55

## 2025-05-09 RX ADMIN — DILTIAZEM HYDROCHLORIDE 120 MG: 120 CAPSULE, COATED, EXTENDED RELEASE ORAL at 00:57

## 2025-05-09 RX ADMIN — Medication 21 PERCENT: at 07:55

## 2025-05-09 RX ADMIN — ATORVASTATIN CALCIUM 10 MG: 10 TABLET, FILM COATED ORAL at 09:53

## 2025-05-09 RX ADMIN — Medication 10 MG/HR: at 09:15

## 2025-05-09 RX ADMIN — IPRATROPIUM BROMIDE AND ALBUTEROL SULFATE 3 ML: 2.5; .5 SOLUTION RESPIRATORY (INHALATION) at 13:13

## 2025-05-09 RX ADMIN — METOPROLOL SUCCINATE 100 MG: 100 TABLET, EXTENDED RELEASE ORAL at 09:53

## 2025-05-09 RX ADMIN — LISINOPRIL 2.5 MG: 2.5 TABLET ORAL at 09:54

## 2025-05-09 RX ADMIN — Medication 2 L/MIN: at 19:21

## 2025-05-09 RX ADMIN — ROPINIROLE 1 MG: 1 TABLET, FILM COATED ORAL at 21:30

## 2025-05-09 RX ADMIN — IPRATROPIUM BROMIDE AND ALBUTEROL SULFATE 3 ML: 2.5; .5 SOLUTION RESPIRATORY (INHALATION) at 19:18

## 2025-05-09 RX ADMIN — ROPINIROLE 1 MG: 1 TABLET, FILM COATED ORAL at 01:38

## 2025-05-09 RX ADMIN — METOPROLOL TARTRATE 50 MG: 50 TABLET, FILM COATED ORAL at 00:58

## 2025-05-09 RX ADMIN — APIXABAN 5 MG: 5 TABLET, FILM COATED ORAL at 00:58

## 2025-05-09 RX ADMIN — ROPINIROLE 1 MG: 1 TABLET, FILM COATED ORAL at 09:57

## 2025-05-09 RX ADMIN — POTASSIUM CHLORIDE 20 MEQ: 1500 TABLET, EXTENDED RELEASE ORAL at 09:53

## 2025-05-09 RX ADMIN — FUROSEMIDE 40 MG: 40 TABLET ORAL at 09:58

## 2025-05-09 RX ADMIN — APIXABAN 5 MG: 5 TABLET, FILM COATED ORAL at 21:31

## 2025-05-09 RX ADMIN — METOPROLOL SUCCINATE 100 MG: 100 TABLET, EXTENDED RELEASE ORAL at 21:31

## 2025-05-09 RX ADMIN — POTASSIUM CHLORIDE 40 MEQ: 1500 TABLET, EXTENDED RELEASE ORAL at 00:57

## 2025-05-09 RX ADMIN — TRAZODONE HYDROCHLORIDE 100 MG: 50 TABLET ORAL at 21:32

## 2025-05-09 SDOH — ECONOMIC STABILITY: FOOD INSECURITY: WITHIN THE PAST 12 MONTHS, THE FOOD YOU BOUGHT JUST DIDN'T LAST AND YOU DIDN'T HAVE MONEY TO GET MORE.: SOMETIMES TRUE

## 2025-05-09 SDOH — SOCIAL STABILITY: SOCIAL INSECURITY: WITHIN THE LAST YEAR, HAVE YOU BEEN AFRAID OF YOUR PARTNER OR EX-PARTNER?: NO

## 2025-05-09 SDOH — SOCIAL STABILITY: SOCIAL INSECURITY: HAS ANYONE EVER THREATENED TO HURT YOUR FAMILY OR YOUR PETS?: NO

## 2025-05-09 SDOH — ECONOMIC STABILITY: HOUSING INSECURITY: IN THE LAST 12 MONTHS, WAS THERE A TIME WHEN YOU WERE NOT ABLE TO PAY THE MORTGAGE OR RENT ON TIME?: YES

## 2025-05-09 SDOH — HEALTH STABILITY: PHYSICAL HEALTH: ON AVERAGE, HOW MANY MINUTES DO YOU ENGAGE IN EXERCISE AT THIS LEVEL?: 10 MIN

## 2025-05-09 SDOH — SOCIAL STABILITY: SOCIAL INSECURITY: WITHIN THE LAST YEAR, HAVE YOU BEEN HUMILIATED OR EMOTIONALLY ABUSED IN OTHER WAYS BY YOUR PARTNER OR EX-PARTNER?: NO

## 2025-05-09 SDOH — ECONOMIC STABILITY: FOOD INSECURITY
WITHIN THE PAST 12 MONTHS, YOU WORRIED THAT YOUR FOOD WOULD RUN OUT BEFORE YOU GOT THE MONEY TO BUY MORE.: SOMETIMES TRUE

## 2025-05-09 SDOH — SOCIAL STABILITY: SOCIAL NETWORK
DO YOU BELONG TO ANY CLUBS OR ORGANIZATIONS SUCH AS CHURCH GROUPS, UNIONS, FRATERNAL OR ATHLETIC GROUPS, OR SCHOOL GROUPS?: NO

## 2025-05-09 SDOH — ECONOMIC STABILITY: HOUSING INSECURITY: AT ANY TIME IN THE PAST 12 MONTHS, WERE YOU HOMELESS OR LIVING IN A SHELTER (INCLUDING NOW)?: NO

## 2025-05-09 SDOH — SOCIAL STABILITY: SOCIAL INSECURITY
WITHIN THE LAST YEAR, HAVE YOU BEEN RAPED OR FORCED TO HAVE ANY KIND OF SEXUAL ACTIVITY BY YOUR PARTNER OR EX-PARTNER?: NO

## 2025-05-09 SDOH — SOCIAL STABILITY: SOCIAL INSECURITY: ARE YOU OR HAVE YOU BEEN THREATENED OR ABUSED PHYSICALLY, EMOTIONALLY, OR SEXUALLY BY ANYONE?: NO

## 2025-05-09 SDOH — ECONOMIC STABILITY: HOUSING INSECURITY: IN THE PAST 12 MONTHS, HOW MANY TIMES HAVE YOU MOVED WHERE YOU WERE LIVING?: 1

## 2025-05-09 SDOH — SOCIAL STABILITY: SOCIAL NETWORK: IN A TYPICAL WEEK, HOW MANY TIMES DO YOU TALK ON THE PHONE WITH FAMILY, FRIENDS, OR NEIGHBORS?: ONCE A WEEK

## 2025-05-09 SDOH — SOCIAL STABILITY: SOCIAL NETWORK: HOW OFTEN DO YOU ATTEND MEETINGS OF THE CLUBS OR ORGANIZATIONS YOU BELONG TO?: NEVER

## 2025-05-09 SDOH — SOCIAL STABILITY: SOCIAL INSECURITY: WERE YOU ABLE TO COMPLETE ALL THE BEHAVIORAL HEALTH SCREENINGS?: YES

## 2025-05-09 SDOH — SOCIAL STABILITY: SOCIAL INSECURITY: ARE YOU MARRIED, WIDOWED, DIVORCED, SEPARATED, NEVER MARRIED, OR LIVING WITH A PARTNER?: DIVORCED

## 2025-05-09 SDOH — SOCIAL STABILITY: SOCIAL NETWORK: HOW OFTEN DO YOU ATTEND CHURCH OR RELIGIOUS SERVICES?: NEVER

## 2025-05-09 SDOH — SOCIAL STABILITY: SOCIAL NETWORK: HOW OFTEN DO YOU GET TOGETHER WITH FRIENDS OR RELATIVES?: ONCE A WEEK

## 2025-05-09 SDOH — HEALTH STABILITY: MENTAL HEALTH
DO YOU FEEL STRESS - TENSE, RESTLESS, NERVOUS, OR ANXIOUS, OR UNABLE TO SLEEP AT NIGHT BECAUSE YOUR MIND IS TROUBLED ALL THE TIME - THESE DAYS?: RATHER MUCH

## 2025-05-09 SDOH — SOCIAL STABILITY: SOCIAL INSECURITY: DO YOU FEEL UNSAFE GOING BACK TO THE PLACE WHERE YOU ARE LIVING?: NO

## 2025-05-09 SDOH — ECONOMIC STABILITY: TRANSPORTATION INSECURITY: IN THE PAST 12 MONTHS, HAS LACK OF TRANSPORTATION KEPT YOU FROM MEDICAL APPOINTMENTS OR FROM GETTING MEDICATIONS?: NO

## 2025-05-09 SDOH — SOCIAL STABILITY: SOCIAL INSECURITY: ABUSE: ADULT

## 2025-05-09 SDOH — HEALTH STABILITY: PHYSICAL HEALTH: ON AVERAGE, HOW MANY DAYS PER WEEK DO YOU ENGAGE IN MODERATE TO STRENUOUS EXERCISE (LIKE A BRISK WALK)?: 7 DAYS

## 2025-05-09 SDOH — ECONOMIC STABILITY: FOOD INSECURITY: HOW HARD IS IT FOR YOU TO PAY FOR THE VERY BASICS LIKE FOOD, HOUSING, MEDICAL CARE, AND HEATING?: SOMEWHAT HARD

## 2025-05-09 SDOH — SOCIAL STABILITY: SOCIAL INSECURITY: HAVE YOU HAD ANY THOUGHTS OF HARMING ANYONE ELSE?: NO

## 2025-05-09 SDOH — SOCIAL STABILITY: SOCIAL INSECURITY: ARE THERE ANY APPARENT SIGNS OF INJURIES/BEHAVIORS THAT COULD BE RELATED TO ABUSE/NEGLECT?: NO

## 2025-05-09 SDOH — SOCIAL STABILITY: SOCIAL INSECURITY: HAVE YOU HAD THOUGHTS OF HARMING ANYONE ELSE?: NO

## 2025-05-09 SDOH — ECONOMIC STABILITY: TRANSPORTATION INSECURITY: IN THE PAST 12 MONTHS, HAS LACK OF TRANSPORTATION KEPT YOU FROM MEDICAL APPOINTMENTS OR FROM GETTING MEDICATIONS?: YES

## 2025-05-09 SDOH — SOCIAL STABILITY: SOCIAL INSECURITY: DOES ANYONE TRY TO KEEP YOU FROM HAVING/CONTACTING OTHER FRIENDS OR DOING THINGS OUTSIDE YOUR HOME?: NO

## 2025-05-09 SDOH — SOCIAL STABILITY: SOCIAL INSECURITY: DO YOU FEEL ANYONE HAS EXPLOITED OR TAKEN ADVANTAGE OF YOU FINANCIALLY OR OF YOUR PERSONAL PROPERTY?: NO

## 2025-05-09 ASSESSMENT — COGNITIVE AND FUNCTIONAL STATUS - GENERAL
PATIENT BASELINE BEDBOUND: NO
MOBILITY SCORE: 23
DAILY ACTIVITIY SCORE: 24
CLIMB 3 TO 5 STEPS WITH RAILING: A LITTLE
DAILY ACTIVITIY SCORE: 24
DAILY ACTIVITIY SCORE: 24
CLIMB 3 TO 5 STEPS WITH RAILING: A LITTLE
PATIENT BASELINE BEDBOUND: NO
CLIMB 3 TO 5 STEPS WITH RAILING: A LITTLE
CLIMB 3 TO 5 STEPS WITH RAILING: A LITTLE
MOBILITY SCORE: 23

## 2025-05-09 ASSESSMENT — PAIN SCALES - GENERAL
PAINLEVEL_OUTOF10: 0 - NO PAIN
PAINLEVEL_OUTOF10: 0 - NO PAIN

## 2025-05-09 ASSESSMENT — LIFESTYLE VARIABLES
HOW MANY STANDARD DRINKS CONTAINING ALCOHOL DO YOU HAVE ON A TYPICAL DAY: 1 OR 2
SUBSTANCE_ABUSE_PAST_12_MONTHS: YES
AUDIT-C TOTAL SCORE: 1
PRESCIPTION_ABUSE_PAST_12_MONTHS: NO
HOW OFTEN DO YOU HAVE 6 OR MORE DRINKS ON ONE OCCASION: NEVER
SKIP TO QUESTIONS 9-10: 1
HOW OFTEN DO YOU HAVE A DRINK CONTAINING ALCOHOL: MONTHLY OR LESS
AUDIT-C TOTAL SCORE: 1

## 2025-05-09 ASSESSMENT — ENCOUNTER SYMPTOMS
DIFFICULTY URINATING: 1
SHORTNESS OF BREATH: 1
PALPITATIONS: 1

## 2025-05-09 ASSESSMENT — ACTIVITIES OF DAILY LIVING (ADL)
LACK_OF_TRANSPORTATION: YES
LACK_OF_TRANSPORTATION: NO
LACK_OF_TRANSPORTATION: YES

## 2025-05-09 ASSESSMENT — PAIN SCALES - WONG BAKER: WONGBAKER_NUMERICALRESPONSE: NO HURT

## 2025-05-09 ASSESSMENT — PAIN - FUNCTIONAL ASSESSMENT: PAIN_FUNCTIONAL_ASSESSMENT: 0-10

## 2025-05-09 NOTE — PROGRESS NOTES
Constance M Kocher is a 74 y.o. female on day 0 of admission presenting with Atrial fibrillation with RVR (Multi).      Subjective   Few days of back pain and difficulty urinating, she was found to have chronic cystocele.  Reported chronic cough from chronic bronchitis and sinusitis.  She was admitted for A-fib RVR.  This morning continues to have cough, no other new acute complaints.       Objective     Last Recorded Vitals  BP (!) 134/99 (BP Location: Left arm, Patient Position: Lying)   Pulse 108   Temp 36.5 °C (97.7 °F) (Temporal)   Resp (!) 28   Wt 83.2 kg (183 lb 6.4 oz)   SpO2 92%   Intake/Output last 3 Shifts:    Intake/Output Summary (Last 24 hours) at 5/9/2025 1148  Last data filed at 5/9/2025 1055  Gross per 24 hour   Intake 866.25 ml   Output 200 ml   Net 666.25 ml       Admission Weight  Weight: 77.6 kg (171 lb) (05/08/25 1811)    Daily Weight  05/08/25 : 83.2 kg (183 lb 6.4 oz)    Image Results  ECG 12 lead  Atrial fibrillation with rapid ventricular response  Rightward axis  Cannot rule out Anterior infarct , age undetermined  Abnormal ECG  No previous ECGs available      Physical Exam  General: alert, no diaphoresis, obese   HENT: mucous membranes moist, external ears normal, no rhinorrhea   Eyes: no icterus or injection, no discharge   Lungs: Bilateral diffuse coarse breath sounds   Heart: Irregular tachycardia, no murmurs, no LE edema BL   GI: abdomen soft, nontender, nondistended, BS present   MSK: no joint effusion or deformity   Skin: no rashes, erythema, or ecchymosis   Neuro: grossly normal cognition, motor strength, sensation, there is no focal neurological deficit    Relevant Results  Results for orders placed or performed during the hospital encounter of 05/08/25 (from the past 24 hours)   CBC and Auto Differential   Result Value Ref Range    WBC 14.0 (H) 4.4 - 11.3 x10*3/uL    nRBC 0.0 0.0 - 0.0 /100 WBCs    RBC 4.98 4.00 - 5.20 x10*6/uL    Hemoglobin 14.6 12.0 - 16.0 g/dL     Hematocrit 46.2 (H) 36.0 - 46.0 %    MCV 93 80 - 100 fL    MCH 29.3 26.0 - 34.0 pg    MCHC 31.6 (L) 32.0 - 36.0 g/dL    RDW 18.3 (H) 11.5 - 14.5 %    Platelets 223 150 - 450 x10*3/uL    Neutrophils % 82.7 40.0 - 80.0 %    Immature Granulocytes %, Automated 0.4 0.0 - 0.9 %    Lymphocytes % 11.6 13.0 - 44.0 %    Monocytes % 4.9 2.0 - 10.0 %    Eosinophils % 0.2 0.0 - 6.0 %    Basophils % 0.2 0.0 - 2.0 %    Neutrophils Absolute 11.56 (H) 1.60 - 5.50 x10*3/uL    Immature Granulocytes Absolute, Automated 0.05 0.00 - 0.50 x10*3/uL    Lymphocytes Absolute 1.62 0.80 - 3.00 x10*3/uL    Monocytes Absolute 0.68 0.05 - 0.80 x10*3/uL    Eosinophils Absolute 0.03 0.00 - 0.40 x10*3/uL    Basophils Absolute 0.03 0.00 - 0.10 x10*3/uL   Comprehensive metabolic panel   Result Value Ref Range    Glucose 116 (H) 74 - 99 mg/dL    Sodium 138 136 - 145 mmol/L    Potassium 3.2 (L) 3.5 - 5.3 mmol/L    Chloride 104 98 - 107 mmol/L    Bicarbonate 25 21 - 32 mmol/L    Anion Gap 12 10 - 20 mmol/L    Urea Nitrogen 6 6 - 23 mg/dL    Creatinine 0.99 0.50 - 1.05 mg/dL    eGFR 60 (L) >60 mL/min/1.73m*2    Calcium 9.1 8.6 - 10.3 mg/dL    Albumin 4.2 3.4 - 5.0 g/dL    Alkaline Phosphatase 79 33 - 136 U/L    Total Protein 7.2 6.4 - 8.2 g/dL    AST 17 9 - 39 U/L    Bilirubin, Total 0.7 0.0 - 1.2 mg/dL    ALT 15 7 - 45 U/L   Troponin I, High Sensitivity   Result Value Ref Range    Troponin I, High Sensitivity 14 (H) 0 - 13 ng/L   B-type natriuretic peptide   Result Value Ref Range     (H) 0 - 99 pg/mL   Lipase   Result Value Ref Range    Lipase 11 9 - 82 U/L   Magnesium   Result Value Ref Range    Magnesium 1.60 1.60 - 2.40 mg/dL   Urinalysis with Reflex Culture and Microscopic   Result Value Ref Range    Color, Urine Colorless (N) Light-Yellow, Yellow, Dark-Yellow    Appearance, Urine Clear Clear    Specific Gravity, Urine 1.001 (N) 1.005 - 1.035    pH, Urine 6.0 5.0, 5.5, 6.0, 6.5, 7.0, 7.5, 8.0    Protein, Urine NEGATIVE NEGATIVE, 10 (TRACE), 20  (TRACE) mg/dL    Glucose, Urine Normal Normal mg/dL    Blood, Urine NEGATIVE NEGATIVE mg/dL    Ketones, Urine NEGATIVE NEGATIVE mg/dL    Bilirubin, Urine NEGATIVE NEGATIVE mg/dL    Urobilinogen, Urine Normal Normal mg/dL    Nitrite, Urine NEGATIVE NEGATIVE    Leukocyte Esterase, Urine NEGATIVE NEGATIVE   Sars-CoV-2 and Influenza A/B PCR   Result Value Ref Range    Flu A Result Not Detected Not Detected    Flu B Result Not Detected Not Detected    Coronavirus 2019, PCR Not Detected Not Detected   ECG 12 lead   Result Value Ref Range    Ventricular Rate 163 BPM    QRS Duration 70 ms    QT Interval 290 ms    QTC Calculation(Bazett) 477 ms    R Axis 108 degrees    T Axis -63 degrees    QRS Count 27 beats    Q Onset 221 ms    T Offset 366 ms    QTC Fredericia 404 ms   CBC   Result Value Ref Range    WBC 9.4 4.4 - 11.3 x10*3/uL    nRBC 0.0 0.0 - 0.0 /100 WBCs    RBC 4.44 4.00 - 5.20 x10*6/uL    Hemoglobin 13.2 12.0 - 16.0 g/dL    Hematocrit 41.8 36.0 - 46.0 %    MCV 94 80 - 100 fL    MCH 29.7 26.0 - 34.0 pg    MCHC 31.6 (L) 32.0 - 36.0 g/dL    RDW 18.4 (H) 11.5 - 14.5 %    Platelets 190 150 - 450 x10*3/uL   Basic Metabolic Panel   Result Value Ref Range    Glucose 94 74 - 99 mg/dL    Sodium 138 136 - 145 mmol/L    Potassium 3.6 3.5 - 5.3 mmol/L    Chloride 107 98 - 107 mmol/L    Bicarbonate 24 21 - 32 mmol/L    Anion Gap 11 10 - 20 mmol/L    Urea Nitrogen 5 (L) 6 - 23 mg/dL    Creatinine 0.87 0.50 - 1.05 mg/dL    eGFR 70 >60 mL/min/1.73m*2    Calcium 8.4 (L) 8.6 - 10.3 mg/dL   Transthoracic Echo (TTE) Complete   Result Value Ref Range    BSA 1.94 m2         Assessment & Plan  Atrial fibrillation with RVR (Multi)  History of chronic A-fib,   Continue Eliquis  Continue with diltiazem drip, increased from 10 to 15 mL/h  Status post 1 dose of Lopressor overnight  Start metoprolol succinate 100 mg twice daily  Oral diltiazem on hold  Cardiology following    Difficulty urinating  Secondary to rectovaginal prolapse, no UTI on  UA  Urinary retention from distended cystocele with hydronephrosis  Urology consult pending    Chronic bronchitis, unspecified chronic bronchitis type (Multi)  Was recently started on 2 L of home nasal cannula  She has refused oxygen before due to increasing cough    Chronic diastolic CHF (congestive heart failure)  Continue to hold bumex, she is euvolemic  Lasix started 40 mg oral daily    Hypokalemia  Continue home potassium  Replete as necessary    Restless leg syndrome  Continue ropinirole  Continue oral iron    Primary hypertension  Continue lisinopril    Prophylaxis: Continue Eliquis    CODE STATUS: Full code    Plan:  Cardiology following, recommendations starting metoprolol succinate 100 mg mg twice daily.  Oral diltiazem on hold.  Continue IV diltiazem  Continue to hold Bumex, cardiology recommends 40 mg Lasix daily  Neurology consult pending for urinary retention secondary to cystocele    Disposition: Likely 24 to 48 hours pending clinical improvement.         Charly Saucedo MD

## 2025-05-09 NOTE — ASSESSMENT & PLAN NOTE
-has chronic afib, on diltiazem and Eliquis  -wean dilt drip off  -continue home diltiazem, give 1x dose metoprolol, consult cardiology

## 2025-05-09 NOTE — ASSESSMENT & PLAN NOTE
-secondary to rectovaginal prolapse, no UTI on UA  -due to unknown amount of urine in bladder from distended cystocele, difficulty voiding, and hydronephrosis, discussed urinary catheter with patient, she initially wanted to be placed but then decided against it  -urology consulted

## 2025-05-09 NOTE — H&P
History of Present Illness  Constance M Kocher is a 74 y.o. female who presented with difficulty urinating and is admitted for Atrial fibrillation with RVR (Multi)    Subjective    She reports that she has had a few days of back pain, malaise and difficulty urinating. She has a cystocele which she reports she has had for 30 years and has not had anything done about it. She reports that she cannot have surgery because she has had her heart stop multiple times. She does not see a urologist nor gynecologist. She reports sometimes she can empty half her bladder when she sits on the cystocele. She has never had the back pain before. She specifically denied nausea, vomiting, headache, body aches (to ER provider she had reported nausea and vomiting). Reports she has chronic cough from chronic bronchitis and sinusitis. She reports that on her way to the ER, her heart went into RVR. She feels fine and is not bothered by the RVR    In the ER she had refused the oxygen until it was humidified, she is on 2L at home that she does not always wear. She was in Afib with RVR and was started on a diltiazem drip in the ER    Review of Systems   HENT:  Positive for congestion (chronic) and rhinorrhea (chronic).    Respiratory:  Positive for cough (chronic).    Genitourinary:  Positive for difficulty urinating and flank pain.   Musculoskeletal:  Positive for back pain.         History    Constance M Kocher  has a past medical history of A-fib (Multi), Atrial fibrillation with rapid ventricular response (Multi) (03/27/2024), Carotid stenosis, Chronic diastolic CHF (congestive heart failure) (04/24/2024), Dyspnea (03/27/2024), Essential hypertension (10/20/2009), Fatigue (10/19/2023), GERD (gastroesophageal reflux disease), Hyperlipidemia (02/12/2014), Hypokalemia (10/19/2023), Insomnia (03/09/2012), Murmur (10/20/2009), PMR (polymyalgia rheumatica) (Multi), Polymyalgia rheumatica (Multi), and Primary hypertension (10/20/2009).    "  Surgical History[1]   Patient  reports that she quit smoking about 14 months ago. Her smoking use included cigarettes. She started smoking about 56 years ago. She has a 55 pack-year smoking history. She has never been exposed to tobacco smoke. She has never used smokeless tobacco. She reports that she does not currently use alcohol. She reports that she does not use drugs.     Patient's family history includes No Known Problems in her father and mother.          Objective    Blood pressure 163/87, pulse (!) 132, temperature 37 °C (98.6 °F), temperature source Temporal, resp. rate (!) 30, height 1.626 m (5' 4\"), weight 77.6 kg (171 lb), SpO2 94%.  Vitals Reviewed: yes  Constitutional: laying back on ER stretcher no acute distress  Eyes: EOMI, normal conjunctiva  HENT: tachy mucus membranes, airway patent  Pulm: diffusely coarse breath sounds  Cardiac: tachycardic (110s on monitor) rate, irregular rhythm, no murmur  GI: Soft, non tender, non-distended, normal bowel sounds  : no handy  MSK: no lower extremity edema  Neuro: No focal deficit, oriented, CN II-XII grossly intact  Psych: Cooperative, appropriate affect, appropriate judgement      Intake/Output Summary (Last 24 hours) at 5/8/2025 2311  Last data filed at 5/8/2025 2039  Gross per 24 hour   Intake 500 ml   Output --   Net 500 ml       Relevant Results  XR chest 1 view  Result Date: 5/8/2025  Cardiomegaly remains but the lungs appear clear with no edema or consolidation.. Signed by Arnie Trejo MD    CT abdomen pelvis wo IV contrast  Result Date: 5/8/2025  Since the prior CT of last year there is increase in the degree of rectovaginal prolapse which appears to be the cause of increasing bilateral hydroureter and hydronephrosis.  There is some bowel involvement in the prolapsed but no definite bowel obstruction or inflammatory changes are visible.. Signed by Arnie Trejo MD      Microbiology  No results found for the last 90 days.      Scheduled " medications  Scheduled Medications[2]  Continuous medications  Continuous Medications[3]  PRN medications  PRN Medications[4]        Assessment    Assessment & Plan  Atrial fibrillation with RVR (Multi)  -has chronic afib, on diltiazem and Eliquis  -wean dilt drip off  -continue home diltiazem, give 1x dose metoprolol, consult cardiology  Difficulty urinating  -secondary to rectovaginal prolapse, no UTI on UA  -due to unknown amount of urine in bladder from distended cystocele, difficulty voiding, and hydronephrosis, discussed urinary catheter with patient, she initially wanted to be placed but then decided against it  -urology consulted  Chronic bronchitis, unspecified chronic bronchitis type (Multi)  -on home O2 2L, initially had been refusing oxygen in ER because it was not humidified  -prn nebs  Chronic diastolic CHF (congestive heart failure)  -hold bumex due to euvolemia on exam  Hypokalemia  -replete and continue home potassium, monitor on labs  Restless leg syndrome  -recent iron studies reviewed, continue ropinirole and iron     DVT prophylaxis: Eliquis  Disposition: Admit to Stepdown      Latrice Echavarria         [1] No past surgical history on file.  [2] acetylcysteine, 3 mL, nebulization, BID  apixaban, 5 mg, oral, BID  [START ON 5/9/2025] atorvastatin, 10 mg, oral, Daily  [Held by provider] bumetanide, 1 mg, oral, Daily  dilTIAZem CD, 120 mg, oral, BID  [START ON 5/9/2025] ferrous sulfate, 65 mg of iron, oral, Daily with breakfast  [START ON 5/9/2025] lisinopril, 2.5 mg, oral, Daily  metoprolol tartrate, 50 mg, oral, Once  oxygen, , inhalation, Continuous - Inhalation  [START ON 5/9/2025] potassium chloride CR, 20 mEq, oral, Daily  potassium chloride CR, 40 mEq, oral, Once  rOPINIRole, 1 mg, oral, TID     [3] dilTIAZem, 10 mg/hr, Last Rate: 10 mg/hr (05/08/25 2151)     [4] PRN medications: acetaminophen, codeine-guaifenesin, ipratropium-albuteroL, ondansetron ODT **OR** ondansetron, traZODone

## 2025-05-09 NOTE — ASSESSMENT & PLAN NOTE
History of chronic A-fib,   Continue Eliquis  Continue with diltiazem drip, increased from 10 to 15 mL/h  Status post 1 dose of Lopressor overnight  Start metoprolol succinate 100 mg twice daily  Oral diltiazem on hold  Cardiology following

## 2025-05-09 NOTE — CONSULTS
Reason For Consult  A-fib RVR    History Of Present Illness  Constance M Kocher is a 74 y.o. female presenting with difficulty urinating.  Patient has a past medical history significant for atrial fibrillation, chronic diastolic heart failure.  She is current with Dr. Scottie Barrera as well as Eliquis for her atrial fibrillation.  Evidently patient had been having a few days of back pain, malaise and difficulty urinating she does have a cystocele which she reportedly has had for 30 years however has not had any interventions.  Patient states that she felt her heart going to A-fib on her way to the ED.  ED workup reveals a sodium of 138, potassium of 3.2, creatinine 0.99.  High-sensitivity troponin 14 .   She had a CT of abdomen pelvis which shows an increase in the rectovaginal prolapse with bowel involvement.  Chest x-ray was negative.  She was started on a diltiazem drip and admitted for further workup and evaluation.     Patient reports yesterday she presented to the ED after a 2-day history of difficulty urinating.  On her way here she states she went into a rapid A-fib.  States she goes in and out of A-fib and is usually pretty aware when she goes into it.  Endorses shortness of breath and palpitations.  These have improved after the diltiazem drip.  She endorses sinus congestion.  Denies smoking although she does smell heavily of cigarettes.     Past Medical History  She has a past medical history of A-fib (Multi), Atrial fibrillation with rapid ventricular response (Multi) (03/27/2024), Carotid stenosis, Chronic diastolic CHF (congestive heart failure) (04/24/2024), Dyspnea (03/27/2024), Essential hypertension (10/20/2009), Fatigue (10/19/2023), GERD (gastroesophageal reflux disease), Hyperlipidemia (02/12/2014), Hypokalemia (10/19/2023), Insomnia (03/09/2012), Murmur (10/20/2009), PMR (polymyalgia rheumatica) (Multi), Polymyalgia rheumatica (Multi), and Primary hypertension  "(10/20/2009).    Surgical History  She has no past surgical history on file.     Social History  She reports that she quit smoking about 14 months ago. Her smoking use included cigarettes. She started smoking about 56 years ago. She has a 55 pack-year smoking history. She has never been exposed to tobacco smoke. She has never used smokeless tobacco. She reports that she does not currently use alcohol. She reports that she does not use drugs.    Family History  Family History[1]     Allergies  Aller-chlor decongestant, Iodinated contrast media, Procaine hcl, and Propofol    Review of Systems  Review of Systems   Respiratory:  Positive for shortness of breath.    Cardiovascular:  Positive for palpitations.   Genitourinary:  Positive for difficulty urinating.   All other systems reviewed and are negative.        Physical Exam  Physical Exam  Constitutional:       Appearance: She is obese. She is ill-appearing.   Cardiovascular:      Pulses: Normal pulses.      Heart sounds: Normal heart sounds.      Comments: Irregularly irregular  Pulmonary:      Effort: Pulmonary effort is normal.      Breath sounds: Wheezing present.   Abdominal:      General: Abdomen is flat.      Palpations: Abdomen is soft.   Musculoskeletal:      Right lower leg: No edema.      Left lower leg: No edema.   Skin:     General: Skin is warm and dry.   Neurological:      Mental Status: She is alert and oriented to person, place, and time.           Last Recorded Vitals  Blood pressure 130/82, pulse (!) 113, temperature 36.6 °C (97.9 °F), temperature source Temporal, resp. rate (!) 27, height 1.626 m (5' 4\"), weight 83.2 kg (183 lb 6.4 oz), SpO2 93%.    Relevant Results  Results for orders placed or performed during the hospital encounter of 05/08/25 (from the past 24 hours)   CBC and Auto Differential   Result Value Ref Range    WBC 14.0 (H) 4.4 - 11.3 x10*3/uL    nRBC 0.0 0.0 - 0.0 /100 WBCs    RBC 4.98 4.00 - 5.20 x10*6/uL    Hemoglobin 14.6 12.0 - " 16.0 g/dL    Hematocrit 46.2 (H) 36.0 - 46.0 %    MCV 93 80 - 100 fL    MCH 29.3 26.0 - 34.0 pg    MCHC 31.6 (L) 32.0 - 36.0 g/dL    RDW 18.3 (H) 11.5 - 14.5 %    Platelets 223 150 - 450 x10*3/uL    Neutrophils % 82.7 40.0 - 80.0 %    Immature Granulocytes %, Automated 0.4 0.0 - 0.9 %    Lymphocytes % 11.6 13.0 - 44.0 %    Monocytes % 4.9 2.0 - 10.0 %    Eosinophils % 0.2 0.0 - 6.0 %    Basophils % 0.2 0.0 - 2.0 %    Neutrophils Absolute 11.56 (H) 1.60 - 5.50 x10*3/uL    Immature Granulocytes Absolute, Automated 0.05 0.00 - 0.50 x10*3/uL    Lymphocytes Absolute 1.62 0.80 - 3.00 x10*3/uL    Monocytes Absolute 0.68 0.05 - 0.80 x10*3/uL    Eosinophils Absolute 0.03 0.00 - 0.40 x10*3/uL    Basophils Absolute 0.03 0.00 - 0.10 x10*3/uL   Comprehensive metabolic panel   Result Value Ref Range    Glucose 116 (H) 74 - 99 mg/dL    Sodium 138 136 - 145 mmol/L    Potassium 3.2 (L) 3.5 - 5.3 mmol/L    Chloride 104 98 - 107 mmol/L    Bicarbonate 25 21 - 32 mmol/L    Anion Gap 12 10 - 20 mmol/L    Urea Nitrogen 6 6 - 23 mg/dL    Creatinine 0.99 0.50 - 1.05 mg/dL    eGFR 60 (L) >60 mL/min/1.73m*2    Calcium 9.1 8.6 - 10.3 mg/dL    Albumin 4.2 3.4 - 5.0 g/dL    Alkaline Phosphatase 79 33 - 136 U/L    Total Protein 7.2 6.4 - 8.2 g/dL    AST 17 9 - 39 U/L    Bilirubin, Total 0.7 0.0 - 1.2 mg/dL    ALT 15 7 - 45 U/L   Troponin I, High Sensitivity   Result Value Ref Range    Troponin I, High Sensitivity 14 (H) 0 - 13 ng/L   B-type natriuretic peptide   Result Value Ref Range     (H) 0 - 99 pg/mL   Lipase   Result Value Ref Range    Lipase 11 9 - 82 U/L   Magnesium   Result Value Ref Range    Magnesium 1.60 1.60 - 2.40 mg/dL   Urinalysis with Reflex Culture and Microscopic   Result Value Ref Range    Color, Urine Colorless (N) Light-Yellow, Yellow, Dark-Yellow    Appearance, Urine Clear Clear    Specific Gravity, Urine 1.001 (N) 1.005 - 1.035    pH, Urine 6.0 5.0, 5.5, 6.0, 6.5, 7.0, 7.5, 8.0    Protein, Urine NEGATIVE NEGATIVE,  10 (TRACE), 20 (TRACE) mg/dL    Glucose, Urine Normal Normal mg/dL    Blood, Urine NEGATIVE NEGATIVE mg/dL    Ketones, Urine NEGATIVE NEGATIVE mg/dL    Bilirubin, Urine NEGATIVE NEGATIVE mg/dL    Urobilinogen, Urine Normal Normal mg/dL    Nitrite, Urine NEGATIVE NEGATIVE    Leukocyte Esterase, Urine NEGATIVE NEGATIVE   Sars-CoV-2 and Influenza A/B PCR   Result Value Ref Range    Flu A Result Not Detected Not Detected    Flu B Result Not Detected Not Detected    Coronavirus 2019, PCR Not Detected Not Detected   ECG 12 lead   Result Value Ref Range    Ventricular Rate 163 BPM    QRS Duration 70 ms    QT Interval 290 ms    QTC Calculation(Bazett) 477 ms    R Axis 108 degrees    T Axis -63 degrees    QRS Count 27 beats    Q Onset 221 ms    T Offset 366 ms    QTC Fredericia 404 ms   CBC   Result Value Ref Range    WBC 9.4 4.4 - 11.3 x10*3/uL    nRBC 0.0 0.0 - 0.0 /100 WBCs    RBC 4.44 4.00 - 5.20 x10*6/uL    Hemoglobin 13.2 12.0 - 16.0 g/dL    Hematocrit 41.8 36.0 - 46.0 %    MCV 94 80 - 100 fL    MCH 29.7 26.0 - 34.0 pg    MCHC 31.6 (L) 32.0 - 36.0 g/dL    RDW 18.4 (H) 11.5 - 14.5 %    Platelets 190 150 - 450 x10*3/uL   Basic Metabolic Panel   Result Value Ref Range    Glucose 94 74 - 99 mg/dL    Sodium 138 136 - 145 mmol/L    Potassium 3.6 3.5 - 5.3 mmol/L    Chloride 107 98 - 107 mmol/L    Bicarbonate 24 21 - 32 mmol/L    Anion Gap 11 10 - 20 mmol/L    Urea Nitrogen 5 (L) 6 - 23 mg/dL    Creatinine 0.87 0.50 - 1.05 mg/dL    eGFR 70 >60 mL/min/1.73m*2    Calcium 8.4 (L) 8.6 - 10.3 mg/dL          Assessment/Plan     Atrial fibrillation with rapid ventricular response: Continue Eliquis for stroke prevention.  Will start metoprolol succinate 50 mg twice daily.  Difficulty urinating: Secondary to cystocele, urology consult  Cystocele: Urology consult    Overall impression/plan:    5/8: 74-year-old female admitted with the complaint of difficulty urinating, she was subsequently found to be in atrial fibrillation with rapid  ventricular response.  She was started on a diltiazem drip.  She is currently in the 1 teens to 120s on the diltiazem drip.  I have discontinued her p.o. diltiazem in favor for metoprolol succinate 100 mg twice daily.  Discussed with nursing to begin to wean diltiazem after she gets her first dose of metoprolol.  She was hypertensive yesterday however her blood pressures are improved this morning.  We can increase her lisinopril if needed.  I have also discontinued her Bumex in favor of a less potent 40 mg daily Lasix.  Patient does appear to be euvolemic in the absence of kidney disease I am unsure why she is on such heavy diuretics.  Echocardiogram has been completed today will review results once available.  Continue Eliquis for stroke prevention in the setting of atrial fibrillation.  Lab results reviewed this morning reveal a sodium of 138, potassium of 3.6, creatinine 0.87.  White blood cell count of 9.4, hemoglobin 13.2 and hematocrit 41.8.  Will continue to follow with you.  Will discuss plan of care with Dr. Olivarez.    I spent 60 minutes in the professional and overall care of this patient.      Ayla Stewart PA-C         [1]   Family History  Problem Relation Name Age of Onset    No Known Problems Mother      No Known Problems Father

## 2025-05-09 NOTE — ASSESSMENT & PLAN NOTE
Was recently started on 2 L of home nasal cannula  She has refused oxygen before due to increasing cough

## 2025-05-09 NOTE — TELEPHONE ENCOUNTER
Patient is active with [facility], followed by Ángela Rowell NP. Patient admitted to  [facility] 5/8/25. Per H/P: Presented with difficulty urinating and is admitted for AFib with RVR. She has had a few days of back pain, malaise and difficulty urinating. She has a cystocele which she reports she has had for 30 years and has not had anything done about it. Started on a diltiazem drip in the ED. Cardiology consulted. Due to unknown amount of urine in bladder from distended cystocele, difficulty voiding, and hydronephrosis, discussed urinary catheter with patient, she initially wanted to be placed but then decided against it.   Urology consulted. [facility] will monitor hospitalization and discharge plan.

## 2025-05-09 NOTE — CONSULTS
I, Dr. Tono Thomas, saw this patient via Telehealth today.  The details of the visit are listed below.  This visit was completed via video and voice.        History of Present Illness (HPI):  TODAY (05/09/25)  Constance M Kocher is a 74 y.o. female presenting virtually urinary problems.  She has a known history of pelvic prolapse and cystocele for nearly 30 years.  She came to the ER with difficulty urinating.  She was found to be in rapid A-fib.  At one point she was offered surgical repair of her cystocele.  However due to her surgical risk and prior complications of anesthesia she has opted to not pursue surgery.  She voids spontaneously with some incontinence.  Her urinalysis on admission was negative.  A CT scan done revealed bilateral hydronephrosis down to the bladder with evidence of prolapse.  She has no bowel problems.    Medical History[1]  Surgical History[2]  Family History[3]  Tobacco Use History[4]  Current Medications[5]  Allergies[6]  Past medical, surgical, family and social history in the chart was reviewed and is accurate including any additions to what is in this HPI.    Review of systems (ROS):   Pertinent information as listed in the HPI.     Objective   Visit Vitals  /77 (BP Location: Left arm, Patient Position: Lying)   Pulse 99   Temp 36.9 °C (98.4 °F) (Temporal)   Resp 24     PHYSICAL EXAM:  Exam limited 2/2 telehealth visit.     Lab Review  CBC, BMP, urinalysis    Imaging Reviewed  CT Abdomen/pelvis      ASSESSMENT:  Problem List Items Addressed This Visit          Cardiac and Vasculature    * (Principal) Atrial fibrillation with RVR (Multi)    Relevant Medications    dilTIAZem (Cardizem) injection 10 mg (Completed)    metoprolol tartrate (Lopressor) tablet 50 mg (Completed)    metoprolol succinate XL (Toprol-XL) 24 hr tablet 100 mg    Other Relevant Orders    Transthoracic Echo (TTE) Complete (Completed)    Atrial fibrillation with rapid ventricular response (Multi) - Primary     Relevant Medications    dilTIAZem (Cardizem) injection 10 mg (Completed)    metoprolol tartrate (Lopressor) tablet 50 mg (Completed)    metoprolol succinate XL (Toprol-XL) 24 hr tablet 100 mg     Other Visit Diagnoses         Nausea          Flank pain          PAF (paroxysmal atrial fibrillation) (Multi)        Relevant Medications    dilTIAZem (Cardizem) injection 10 mg (Completed)    metoprolol tartrate (Lopressor) tablet 50 mg (Completed)    metoprolol succinate XL (Toprol-XL) 24 hr tablet 100 mg    Other Relevant Orders    Transthoracic Echo (TTE) Complete (Completed)           PLAN:  I reviewed her CT scan.  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  Her renal function is preserved.  We discussed future management options for her prolapse.  She may benefit from a pessary.  I will refer her to my urogynecology colleagues for outpatient management.      Please call with any future urologic questions or concerns.    All questions were answered to the patient’s satisfaction.  Patient agrees with the plan and wishes to proceed.  Follow-up for ongoing care of chronic medical conditions.  Follow-up will be scheduled appropriately.     Tono Thomas MD       [1]   Past Medical History:  Diagnosis Date    A-fib (Multi)     Atrial fibrillation with rapid ventricular response (Multi) 03/27/2024    Carotid stenosis     Chronic diastolic CHF (congestive heart failure) 04/24/2024    Dyspnea 03/27/2024    Essential hypertension 10/20/2009    Last Assessment & Plan: Formatting of this note might be different from the original. -Uncontrolled -states she has not done well with meds in the past, but willing to try restarting something given how high her BP is.   -will start her on losartan 50 mg daily and see if she is able to tolerate this. -encouraged to watch salt in her diet and work on weight loss -risk of heart attack and stroke rev    Fatigue 10/19/2023    Last Assessment & Plan: Formatting of this  note might be different from the original. -checking blood work including CBC and TSH    GERD (gastroesophageal reflux disease)     Hyperlipidemia 2014    Last Assessment & Plan: Formatting of this note might be different from the original. -updating lipid panel    Hypokalemia 10/19/2023    Insomnia 2012    Murmur 10/20/2009    PMR (polymyalgia rheumatica) (Multi)     Polymyalgia rheumatica (Multi)     Primary hypertension 10/20/2009    Last Assessment & Plan: Formatting of this note might be different from the original. -Uncontrolled -states she has not done well with meds in the past, but willing to try restarting something given how high her BP is.   -will start her on losartan 50 mg daily and see if she is able to tolerate this. -encouraged to watch salt in her diet and work on weight loss -risk of heart attack and stroke rev   [2] No past surgical history on file.  [3]   Family History  Problem Relation Name Age of Onset    No Known Problems Mother      No Known Problems Father     [4]   Social History  Tobacco Use   Smoking Status Former    Current packs/day: 0.00    Average packs/day: 1 pack/day for 55.0 years (55.0 ttl pk-yrs)    Types: Cigarettes    Start date: 1969    Quit date: 2024    Years since quittin.1    Passive exposure: Never   Smokeless Tobacco Never   [5]   Current Facility-Administered Medications   Medication Dose Route Frequency Provider Last Rate Last Admin    acetaminophen (Tylenol) tablet 650 mg  650 mg oral q6h PRN Latrice Echavarria MD        acetylcysteine (Mucomyst) 200 mg/mL (20 %) nebulizer solution 600 mg  3 mL nebulization BID Timothy Gan MD   600 mg at 25 0755    apixaban (Eliquis) tablet 5 mg  5 mg oral BID Latrice Echavarria MD   5 mg at 25 0954    atorvastatin (Lipitor) tablet 10 mg  10 mg oral Daily Latrice Echavarria MD   10 mg at 25 0953    codeine-guaifenesin (Robitussin-AC)  mg/5 mL syrup 5 mL  5 mL oral q6h PRN Latrice  MD Jer[place]o   5 mL at 05/09/25 1317    ferrous sulfate tablet 325 mg  65 mg of iron oral Daily with breakfast Latrice Echavarria MD   325 mg at 05/09/25 0958    furosemide (Lasix) tablet 40 mg  40 mg oral Daily Ayla Stewart PA-C   40 mg at 05/09/25 0958    ipratropium-albuteroL (Duo-Neb) 0.5-2.5 mg/3 mL nebulizer solution 3 mL  3 mL nebulization q6h PRN Latrice Echavarria MD   3 mL at 05/09/25 1313    lisinopril tablet 2.5 mg  2.5 mg oral Daily Latrice Echavarria MD   2.5 mg at 05/09/25 0954    metoprolol succinate XL (Toprol-XL) 24 hr tablet 100 mg  100 mg oral BID Ayla Stewart PA-C   100 mg at 05/09/25 0953    ondansetron ODT (Zofran-ODT) disintegrating tablet 4 mg  4 mg oral q8h PRN Latrice Echavarria MD        Or    ondansetron (Zofran) injection 4 mg  4 mg intravenous q8h PRN Latrice Echavarria MD        oxygen (O2) therapy   inhalation Continuous - Inhalation Timothy Gan MD   21 percent at 05/09/25 0755    perflutren lipid microspheres (Definity) injection 2 mL of dilution  2 mL of dilution intravenous Once in imaging Ayla Stewart PA-C        potassium chloride CR (Klor-Con M20) ER tablet 20 mEq  20 mEq oral Daily Latrice Echavarria MD   20 mEq at 05/09/25 0953    rOPINIRole (Requip) tablet 1 mg  1 mg oral TID Latrice Echavarria MD   1 mg at 05/09/25 1518    traZODone (Desyrel) tablet 100 mg  100 mg oral Nightly PRN Latrice Echavarria MD       [6]   Allergies  Allergen Reactions    Aller-Chlor Decongestant Anaphylaxis    Iodinated Contrast Media Anaphylaxis    Procaine Hcl Cardiac arrhythmia/arrest     Per patient    Propofol Anaphylaxis

## 2025-05-09 NOTE — CARE PLAN
The patient's goals for the shift include sleep    The clinical goals for the shift include monitor breathing, vitals, labes    Over the shift, the patient did not make progress toward the following goals. Barriers to progression include self limiting. Recommendations to address these barriers include breathing tx.

## 2025-05-09 NOTE — NURSING NOTE
Called to patients room because patient wanted a breathing treatment. I pulled meds and went to patients room as I walked in and around the curtain patient was taking her own ventolin mdi inhaler patient then placed the inhaler back into her purse and when I told her that I was respiratory and was just going to give her that same medication since she called for it she said I know but I can not breath. Patient was standing next to her bed and did appear a little sob. Patient said that's alright I just had a bad night. Patient got back into bed and was less sob. I returned her medications for her aerosol treatment. I told the nurse what happened.

## 2025-05-09 NOTE — PROGRESS NOTES
05/09/25 1158   Discharge Planning   Living Arrangements Family members   Support Systems Children;Family members   Assistance Needed None   Type of Residence Private residence   Number of Stairs to Enter Residence 4   Number of Stairs Within Residence 0   Home or Post Acute Services Community services  (Patient utilizes [employer] for transportation.)   Expected Discharge Disposition Home   Does the patient need discharge transport arranged? No

## 2025-05-09 NOTE — ASSESSMENT & PLAN NOTE
Secondary to rectovaginal prolapse, no UTI on UA  Urinary retention from distended cystocele with hydronephrosis  Urology consult pending

## 2025-05-10 LAB
ANION GAP SERPL CALCULATED.3IONS-SCNC: 13 MMOL/L (ref 10–20)
BUN SERPL-MCNC: 8 MG/DL (ref 6–23)
CALCIUM SERPL-MCNC: 8.5 MG/DL (ref 8.6–10.3)
CHLORIDE SERPL-SCNC: 102 MMOL/L (ref 98–107)
CO2 SERPL-SCNC: 23 MMOL/L (ref 21–32)
CREAT SERPL-MCNC: 0.99 MG/DL (ref 0.5–1.05)
EGFRCR SERPLBLD CKD-EPI 2021: 60 ML/MIN/1.73M*2
ERYTHROCYTE [DISTWIDTH] IN BLOOD BY AUTOMATED COUNT: 18.5 % (ref 11.5–14.5)
GLUCOSE SERPL-MCNC: 87 MG/DL (ref 74–99)
HCT VFR BLD AUTO: 41 % (ref 36–46)
HGB BLD-MCNC: 13.1 G/DL (ref 12–16)
MCH RBC QN AUTO: 29.8 PG (ref 26–34)
MCHC RBC AUTO-ENTMCNC: 32 G/DL (ref 32–36)
MCV RBC AUTO: 93 FL (ref 80–100)
NRBC BLD-RTO: 0 /100 WBCS (ref 0–0)
PLATELET # BLD AUTO: 164 X10*3/UL (ref 150–450)
POTASSIUM SERPL-SCNC: 3.4 MMOL/L (ref 3.5–5.3)
RBC # BLD AUTO: 4.4 X10*6/UL (ref 4–5.2)
SODIUM SERPL-SCNC: 135 MMOL/L (ref 136–145)
WBC # BLD AUTO: 6.3 X10*3/UL (ref 4.4–11.3)

## 2025-05-10 PROCEDURE — 2500000002 HC RX 250 W HCPCS SELF ADMINISTERED DRUGS (ALT 637 FOR MEDICARE OP, ALT 636 FOR OP/ED): Performed by: INTERNAL MEDICINE

## 2025-05-10 PROCEDURE — 2500000001 HC RX 250 WO HCPCS SELF ADMINISTERED DRUGS (ALT 637 FOR MEDICARE OP): Performed by: INTERNAL MEDICINE

## 2025-05-10 PROCEDURE — 85027 COMPLETE CBC AUTOMATED: CPT | Performed by: STUDENT IN AN ORGANIZED HEALTH CARE EDUCATION/TRAINING PROGRAM

## 2025-05-10 PROCEDURE — 99232 SBSQ HOSP IP/OBS MODERATE 35: CPT | Performed by: INTERNAL MEDICINE

## 2025-05-10 PROCEDURE — 2500000002 HC RX 250 W HCPCS SELF ADMINISTERED DRUGS (ALT 637 FOR MEDICARE OP, ALT 636 FOR OP/ED): Performed by: STUDENT IN AN ORGANIZED HEALTH CARE EDUCATION/TRAINING PROGRAM

## 2025-05-10 PROCEDURE — 36415 COLL VENOUS BLD VENIPUNCTURE: CPT | Performed by: STUDENT IN AN ORGANIZED HEALTH CARE EDUCATION/TRAINING PROGRAM

## 2025-05-10 PROCEDURE — 2500000004 HC RX 250 GENERAL PHARMACY W/ HCPCS (ALT 636 FOR OP/ED): Performed by: STUDENT IN AN ORGANIZED HEALTH CARE EDUCATION/TRAINING PROGRAM

## 2025-05-10 PROCEDURE — 2500000001 HC RX 250 WO HCPCS SELF ADMINISTERED DRUGS (ALT 637 FOR MEDICARE OP)

## 2025-05-10 PROCEDURE — 82374 ASSAY BLOOD CARBON DIOXIDE: CPT | Performed by: STUDENT IN AN ORGANIZED HEALTH CARE EDUCATION/TRAINING PROGRAM

## 2025-05-10 PROCEDURE — 94640 AIRWAY INHALATION TREATMENT: CPT

## 2025-05-10 PROCEDURE — 2500000001 HC RX 250 WO HCPCS SELF ADMINISTERED DRUGS (ALT 637 FOR MEDICARE OP): Performed by: STUDENT IN AN ORGANIZED HEALTH CARE EDUCATION/TRAINING PROGRAM

## 2025-05-10 PROCEDURE — 2060000001 HC INTERMEDIATE ICU ROOM DAILY

## 2025-05-10 PROCEDURE — 2500000005 HC RX 250 GENERAL PHARMACY W/O HCPCS: Performed by: STUDENT IN AN ORGANIZED HEALTH CARE EDUCATION/TRAINING PROGRAM

## 2025-05-10 RX ORDER — TRAMADOL HYDROCHLORIDE 50 MG/1
50 TABLET, FILM COATED ORAL EVERY 8 HOURS PRN
Status: DISCONTINUED | OUTPATIENT
Start: 2025-05-10 | End: 2025-05-12 | Stop reason: HOSPADM

## 2025-05-10 RX ORDER — SPIRONOLACTONE 25 MG/1
12.5 TABLET ORAL 2 TIMES DAILY
Status: DISCONTINUED | OUTPATIENT
Start: 2025-05-10 | End: 2025-05-12 | Stop reason: HOSPADM

## 2025-05-10 RX ORDER — POTASSIUM CHLORIDE 20 MEQ/1
40 TABLET, EXTENDED RELEASE ORAL ONCE
Status: DISCONTINUED | OUTPATIENT
Start: 2025-05-10 | End: 2025-05-10

## 2025-05-10 RX ORDER — BISOPROLOL FUMARATE 5 MG/1
10 TABLET, FILM COATED ORAL DAILY
Status: DISCONTINUED | OUTPATIENT
Start: 2025-05-10 | End: 2025-05-11

## 2025-05-10 RX ADMIN — ROPINIROLE 1 MG: 1 TABLET, FILM COATED ORAL at 14:24

## 2025-05-10 RX ADMIN — SPIRONOLACTONE 12.5 MG: 25 TABLET ORAL at 20:33

## 2025-05-10 RX ADMIN — ACETAMINOPHEN 650 MG: 325 TABLET ORAL at 18:10

## 2025-05-10 RX ADMIN — TRAMADOL HYDROCHLORIDE 50 MG: 50 TABLET, COATED ORAL at 22:05

## 2025-05-10 RX ADMIN — DILTIAZEM HYDROCHLORIDE 90 MG: 60 TABLET, FILM COATED ORAL at 22:04

## 2025-05-10 RX ADMIN — DILTIAZEM HYDROCHLORIDE 90 MG: 60 TABLET, FILM COATED ORAL at 14:24

## 2025-05-10 RX ADMIN — ROPINIROLE 1 MG: 1 TABLET, FILM COATED ORAL at 09:02

## 2025-05-10 RX ADMIN — IPRATROPIUM BROMIDE AND ALBUTEROL SULFATE 3 ML: 2.5; .5 SOLUTION RESPIRATORY (INHALATION) at 08:11

## 2025-05-10 RX ADMIN — FERROUS SULFATE TAB 325 MG (65 MG ELEMENTAL FE) 325 MG: 325 (65 FE) TAB at 09:02

## 2025-05-10 RX ADMIN — METOPROLOL SUCCINATE 100 MG: 100 TABLET, EXTENDED RELEASE ORAL at 09:01

## 2025-05-10 RX ADMIN — FUROSEMIDE 40 MG: 40 TABLET ORAL at 09:02

## 2025-05-10 RX ADMIN — IPRATROPIUM BROMIDE AND ALBUTEROL SULFATE 3 ML: 2.5; .5 SOLUTION RESPIRATORY (INHALATION) at 19:31

## 2025-05-10 RX ADMIN — Medication 21 PERCENT: at 19:32

## 2025-05-10 RX ADMIN — POTASSIUM CHLORIDE 20 MEQ: 1500 TABLET, EXTENDED RELEASE ORAL at 09:02

## 2025-05-10 RX ADMIN — APIXABAN 5 MG: 5 TABLET, FILM COATED ORAL at 09:02

## 2025-05-10 RX ADMIN — IPRATROPIUM BROMIDE AND ALBUTEROL SULFATE 3 ML: 2.5; .5 SOLUTION RESPIRATORY (INHALATION) at 13:23

## 2025-05-10 RX ADMIN — ACETYLCYSTEINE 200 MG: 200 SOLUTION ORAL; RESPIRATORY (INHALATION) at 08:11

## 2025-05-10 RX ADMIN — IPRATROPIUM BROMIDE AND ALBUTEROL SULFATE 3 ML: 2.5; .5 SOLUTION RESPIRATORY (INHALATION) at 00:08

## 2025-05-10 RX ADMIN — ROPINIROLE 1 MG: 1 TABLET, FILM COATED ORAL at 20:33

## 2025-05-10 RX ADMIN — Medication 2 L/MIN: at 08:13

## 2025-05-10 RX ADMIN — ACETYLCYSTEINE 600 MG: 200 SOLUTION ORAL; RESPIRATORY (INHALATION) at 19:31

## 2025-05-10 RX ADMIN — ATORVASTATIN CALCIUM 10 MG: 10 TABLET, FILM COATED ORAL at 09:02

## 2025-05-10 RX ADMIN — LISINOPRIL 2.5 MG: 2.5 TABLET ORAL at 09:02

## 2025-05-10 RX ADMIN — APIXABAN 5 MG: 5 TABLET, FILM COATED ORAL at 20:33

## 2025-05-10 SDOH — ECONOMIC STABILITY: INCOME INSECURITY: IN THE PAST 12 MONTHS HAS THE ELECTRIC, GAS, OIL, OR WATER COMPANY THREATENED TO SHUT OFF SERVICES IN YOUR HOME?: NO

## 2025-05-10 SDOH — SOCIAL STABILITY: SOCIAL INSECURITY: ARE YOU MARRIED, WIDOWED, DIVORCED, SEPARATED, NEVER MARRIED, OR LIVING WITH A PARTNER?: DIVORCED

## 2025-05-10 SDOH — SOCIAL STABILITY: SOCIAL INSECURITY: WITHIN THE LAST YEAR, HAVE YOU BEEN HUMILIATED OR EMOTIONALLY ABUSED IN OTHER WAYS BY YOUR PARTNER OR EX-PARTNER?: NO

## 2025-05-10 SDOH — HEALTH STABILITY: MENTAL HEALTH: HOW OFTEN DO YOU HAVE SIX OR MORE DRINKS ON ONE OCCASION?: NEVER

## 2025-05-10 SDOH — HEALTH STABILITY: MENTAL HEALTH: HOW OFTEN DO YOU HAVE A DRINK CONTAINING ALCOHOL?: MONTHLY OR LESS

## 2025-05-10 SDOH — ECONOMIC STABILITY: FOOD INSECURITY: HOW HARD IS IT FOR YOU TO PAY FOR THE VERY BASICS LIKE FOOD, HOUSING, MEDICAL CARE, AND HEATING?: SOMEWHAT HARD

## 2025-05-10 SDOH — SOCIAL STABILITY: SOCIAL NETWORK: HOW OFTEN DO YOU ATTEND MEETINGS OF THE CLUBS OR ORGANIZATIONS YOU BELONG TO?: NEVER

## 2025-05-10 SDOH — SOCIAL STABILITY: SOCIAL INSECURITY: WITHIN THE LAST YEAR, HAVE YOU BEEN AFRAID OF YOUR PARTNER OR EX-PARTNER?: NO

## 2025-05-10 SDOH — HEALTH STABILITY: PHYSICAL HEALTH: ON AVERAGE, HOW MANY MINUTES DO YOU ENGAGE IN EXERCISE AT THIS LEVEL?: 10 MIN

## 2025-05-10 SDOH — ECONOMIC STABILITY: HOUSING INSECURITY: IN THE LAST 12 MONTHS, WAS THERE A TIME WHEN YOU WERE NOT ABLE TO PAY THE MORTGAGE OR RENT ON TIME?: YES

## 2025-05-10 SDOH — ECONOMIC STABILITY: FOOD INSECURITY: WITHIN THE PAST 12 MONTHS, THE FOOD YOU BOUGHT JUST DIDN'T LAST AND YOU DIDN'T HAVE MONEY TO GET MORE.: SOMETIMES TRUE

## 2025-05-10 SDOH — ECONOMIC STABILITY: HOUSING INSECURITY: AT ANY TIME IN THE PAST 12 MONTHS, WERE YOU HOMELESS OR LIVING IN A SHELTER (INCLUDING NOW)?: NO

## 2025-05-10 SDOH — HEALTH STABILITY: PHYSICAL HEALTH: ON AVERAGE, HOW MANY DAYS PER WEEK DO YOU ENGAGE IN MODERATE TO STRENUOUS EXERCISE (LIKE A BRISK WALK)?: 7 DAYS

## 2025-05-10 SDOH — HEALTH STABILITY: MENTAL HEALTH: HOW MANY DRINKS CONTAINING ALCOHOL DO YOU HAVE ON A TYPICAL DAY WHEN YOU ARE DRINKING?: 1 OR 2

## 2025-05-10 SDOH — SOCIAL STABILITY: SOCIAL NETWORK: IN A TYPICAL WEEK, HOW MANY TIMES DO YOU TALK ON THE PHONE WITH FAMILY, FRIENDS, OR NEIGHBORS?: ONCE A WEEK

## 2025-05-10 SDOH — ECONOMIC STABILITY: HOUSING INSECURITY: IN THE PAST 12 MONTHS, HOW MANY TIMES HAVE YOU MOVED WHERE YOU WERE LIVING?: 1

## 2025-05-10 SDOH — ECONOMIC STABILITY: TRANSPORTATION INSECURITY: IN THE PAST 12 MONTHS, HAS LACK OF TRANSPORTATION KEPT YOU FROM MEDICAL APPOINTMENTS OR FROM GETTING MEDICATIONS?: NO

## 2025-05-10 ASSESSMENT — ACTIVITIES OF DAILY LIVING (ADL)
FEEDING YOURSELF: INDEPENDENT
HEARING - LEFT EAR: FUNCTIONAL
BATHING: INDEPENDENT
GROOMING: INDEPENDENT
LACK_OF_TRANSPORTATION: NO
DRESSING YOURSELF: INDEPENDENT
JUDGMENT_ADEQUATE_SAFELY_COMPLETE_DAILY_ACTIVITIES: YES
ADEQUATE_TO_COMPLETE_ADL: YES
WALKS IN HOME: INDEPENDENT
TOILETING: INDEPENDENT
HEARING - RIGHT EAR: FUNCTIONAL
PATIENT'S MEMORY ADEQUATE TO SAFELY COMPLETE DAILY ACTIVITIES?: YES

## 2025-05-10 ASSESSMENT — PAIN SCALES - GENERAL
PAINLEVEL_OUTOF10: 7
PAINLEVEL_OUTOF10: 6
PAINLEVEL_OUTOF10: 6
PAINLEVEL_OUTOF10: 0 - NO PAIN

## 2025-05-10 ASSESSMENT — COGNITIVE AND FUNCTIONAL STATUS - GENERAL
CLIMB 3 TO 5 STEPS WITH RAILING: A LITTLE
DAILY ACTIVITIY SCORE: 24
CLIMB 3 TO 5 STEPS WITH RAILING: A LITTLE
MOBILITY SCORE: 23
CLIMB 3 TO 5 STEPS WITH RAILING: A LITTLE
DAILY ACTIVITIY SCORE: 24

## 2025-05-10 ASSESSMENT — LIFESTYLE VARIABLES
AUDIT-C TOTAL SCORE: 1
SKIP TO QUESTIONS 9-10: 1

## 2025-05-10 ASSESSMENT — PAIN - FUNCTIONAL ASSESSMENT
PAIN_FUNCTIONAL_ASSESSMENT: 0-10

## 2025-05-10 NOTE — NURSING NOTE
Pt soiled with large BM in brief and a smear on draw sheet, pt refuses to allow Nurse or PCA to be a stand by assist,   pt is a fall risk., continuing to monitor pt

## 2025-05-10 NOTE — ASSESSMENT & PLAN NOTE
5/10: 73 female patient with a multiple comorbid condition for history of A-fib RVR now due to underlying hypoxia.  Underlying chronic bronchitis.  Blood pressure is stable at the moment.  Although low normal.  A-fib control is better now since the medication.  Currently heart rate around 76 bpm.  Low potassium with a BMP replaced with K-Dur.  H&H is stable.  Continue current Eliquis for A-fib stroke prevention as well as atorvastatin, add patient to bisoprolol as well as a Cardizem for rate control as well as a blood pressure control.  Low-dose of diuretics.  Start patient's on spironolactone.    Pt. care time is spent includes review of diagnostic tests, labs, radiographs, EKGs, old echoes, cardiac work-up and coordination of care. Assessment, impression and plans are reflected in the note above as well as the orders.

## 2025-05-10 NOTE — PROGRESS NOTES
Subjective Data:  Patient underlying COPD, hypoxia as well as A-fib RVR more likely due to hypoxia and coughing.  Overnight Events:    Continues coughing with mucus expectoration  Objective   Last Recorded Vitals  BP 97/60 (Patient Position: Lying)   Pulse 76   Temp 36.3 °C (97.3 °F) (Temporal)   Resp 17   Wt 85.2 kg (187 lb 13.3 oz) Comment: Weight taken at midnight vitals, as pt wanted to skip vitals for 4am.  SpO2 97%     Intake/Output Summary (Last 24 hours) at 5/10/2025 1206  Last data filed at 5/9/2025 1600  Gross per 24 hour   Intake 300 ml   Output --   Net 300 ml     Physical Exam:  HEENT: Normocephalic/atraumatic pupils equal react light  Neck exam mild JVD, no bruit  Lung exam diffuse wheezing, few crackles at the bases  Cardiac exam is irregular fast rhythm S1-S2, soft systolic murmur heard.   Abdomen soft nontender, nondistended  Extremities no clubbing, cyanosis, 1+ edema  Neuro exam grossly intact.  Image Results  Transthoracic Echo (TTE) Complete              [facility]  [address] [place], [place] [id number]              Phone [phone]    TRANSTHORACIC ECHOCARDIOGRAM REPORT    Patient Name:       CONSTANCE M KOCHER   Reading Physician:    [id number] Israel Olivarez MD  Study Date:         5/9/2025             Ordering Provider:    [id number] BEE LONG  MRN/PID:            [id number]             Fellow:  Accession#:         [id number]         Nurse:  Date of Birth/Age:  1950 / 74 years Sonographer:          Brittni Real RDCS  Gender Assigned at  F                    Additional Staff:  Birth:  Height:             162.56 cm            Admit Date:  Weight:             83.01 kg             Admission Status:     Inpatient -                                                                  Routine  BSA / BMI:          1.88 m2 / 31.41      [facility]                      kg/m2  Blood Pressure: 130 /82 mmHg    Study Type:    TRANSTHORACIC ECHO (TTE) COMPLETE  Diagnosis/ICD: Unspecified atrial fibrillation-I48.91  Indication:    Afib w/RVR  CPT Codes:     Echo Complete w Full Doppler-[id number]    Patient History:  Pertinent History: HTN, A-Fib and CHF. CKD.    Study Detail: The following Echo studies were performed: 2D, M-Mode, Doppler and                color flow.       PHYSICIAN INTERPRETATION:  Left Ventricle: The left ventricular systolic function is normal, with a visually estimated ejection fraction of 60%. There are no regional wall motion abnormalities. The left ventricular cavity size is normal. There is mildly increased posterior left ventricular wall thickness. There is left ventricular concentric remodeling. Left ventricular diastolic filling cannot be determined due to atrial fibrillation/flutter.  Left Atrium: The left atrial size is mildly dilated.  Right Ventricle: The right ventricle is normal in size. There is normal right ventriclar wall thickness. There is normal right ventricular global systolic function.  Right Atrium: The right atrial size is normal.  Aortic Valve: The aortic valve is trileaflet. There is no aortic valve cusp calcification noted. There is no evidence of reduced aortic valve leaflet excursion excursion. There is no evidence of aortic valve regurgitation. The peak instantaneous gradient of the aortic valve is 5 mmHg.  Mitral Valve: The mitral valve is normal in structure. There is normal mitral valve leaflet mobility. There is mild mitral annular calcification. There is moderate mitral valve regurgitation which is centrally directed. The mitral regurgitant orifice area is 2 mm2. The mitral regurgitant volume is 2.78 ml.  Tricuspid Valve: The tricuspid valve is structurally normal. There is normal tricuspid valve leaflet mobility. There is  trace tricuspid regurgitation.  Pulmonic Valve: The pulmonic valve is structurally normal. There is trace pulmonic valve regurgitation.  Pericardium: No pericardial effusion noted. There is a pericardial fat pad present.  Aorta: The aortic root is normal. There is no dilatation of the aortic arch. There is no dilatation of the ascending aorta. There is no dilatation of the aortic root.  Systemic Veins: The inferior vena cava appears mildly dilated.       CONCLUSIONS:   1. The left ventricular systolic function is normal, with a visually estimated ejection fraction of 60%.   2. Left ventricular diastolic filling cannot be determined due to atrial fibrillation/flutter.   3. Moderate mitral valve regurgitation.   4. The inferior vena cava appears mildly dilated.    QUANTITATIVE DATA SUMMARY:     2D MEASUREMENTS:             Normal Ranges:  LAs:             4.00 cm     (2.7-4.0cm)  IVSd:            0.95 cm     (0.6-1.1cm)  LVPWd:           1.06 cm     (0.6-1.1cm)  LVIDd:           4.46 cm     (3.9-5.9cm)  LVIDs:           2.86 cm  LV Mass Index:   80.6 g/m2  LVEDV Index:     49.89 ml/m2  LV % FS          35.9 %       LEFT ATRIUM:                  Normal Ranges:  LA Vol A4C:        89.4 ml    (22+/-6mL/m2)  LA Vol A2C:        90.6 ml  LA Vol BP:         90.3 ml  LA Vol Index A4C:  47.5ml/m2  LA Vol Index A2C:  48.1 ml/m2  LA Vol Index BP:   47.9 ml/m2  LA Area A4C:       26.9 cm2  LA Area A2C:       27.0 cm2  LA Major Axis A4C: 6.9 cm  LA Major Axis A2C: 6.8 cm  LA Volume Index:   45.4 ml/m2  LA Vol A4C:        83.1 ml  LA Vol A2C:        87.4 ml  LA Vol Index BSA:  45.3 ml/m2       M-MODE MEASUREMENTS:         Normal Ranges:  Ao Root:             3.10 cm (2.0-3.7cm)       AORTA MEASUREMENTS:         Normal Ranges:  Ao Sinus, d:        3.02 cm (2.1-3.5cm)       LV SYSTOLIC FUNCTION:                       Normal Ranges:  EF-A4C View:    46 % (>=55%)  EF-A2C View:    44 %  EF-Biplane:     45 %  EF-Visual:      60 %  LV EF  Reported: 60 %       LV DIASTOLIC FUNCTION:           Normal Ranges:  MV Peak E:             1.38 m/s  (0.7-1.2 m/s)  MV e'                  0.094 m/s (>8.0)  MV lateral e'          0.09 m/s  MV medial e'           0.09 m/s  E/e' Ratio:            14.67     (<8.0)       MITRAL VALVE:          Normal Ranges:  MV DT:        155 msec (150-240msec)       MITRAL INSUFFICIENCY:             Normal Ranges:  PISA Radius:          0.2 cm  MR VTI:               142.00 cm  MR Vmax:              494.00 cm/s  MR Alias Dillon:         38.5 cm/s  MR Volume:            2.78 ml  MR Flow Rt:           9.68 ml/s  MR EROA:              2 mm2       AORTIC VALVE:           Normal Ranges:  AoV Vmax:      1.16 m/s (<=1.7m/s)  AoV Peak P.4 mmHg (<20mmHg)  LVOT Max Dillon:  0.94 m/s (<=1.1m/s)  LVOT Diameter: 2.10 cm  (1.8-2.4cm)  AoV Area,Vmax: 2.79 cm2 (2.5-4.5cm2)       RIGHT VENTRICLE:  RV s' 0.09 m/s       TRICUSPID VALVE/RVSP:         Normal Ranges:  IVC Diam:             2.49 cm       [id number] Israel Olivarez MD  Electronically signed on 2025 at 5:24:46 PM       ** Final **  ECG 12 lead  Atrial fibrillation with rapid ventricular response  Rightward axis  Cannot rule out Anterior infarct , age undetermined  Abnormal ECG  No previous ECGs available    Last Labs:  CBC - 5/10/2025:  4:44 AM  6.3 13.1 164    41.0      CMP - 5/10/2025:  4:44 AM  8.5 7.2 17 --- 0.7   _ 4.2 15 79      PTT - No results in last year.  _   _ _     Inpatient Medications:  Scheduled Medications[1]  Assessment & Plan  Atrial fibrillation with RVR (Multi)    Primary hypertension    Chronic bronchitis, unspecified chronic bronchitis type (Multi)    Chronic diastolic CHF (congestive heart failure)    Difficulty urinating    Hypokalemia    Restless leg syndrome  5/10: 73 female patient with a multiple comorbid condition for history of A-fib RVR now due to underlying hypoxia.  Underlying chronic bronchitis.  Blood pressure is stable at the moment.  Although low normal.   A-fib control is better now since the medication.  Currently heart rate around 76 bpm.  Low potassium with a BMP replaced with K-Dur.  H&H is stable.  Continue current Eliquis for A-fib stroke prevention as well as atorvastatin, add patient to bisoprolol as well as a Cardizem for rate control as well as a blood pressure control.  Low-dose of diuretics.  Start patient's on spironolactone.    Pt. care time is spent includes review of diagnostic tests, labs, radiographs, EKGs, old echoes, cardiac work-up and coordination of care. Assessment, impression and plans are reflected in the note above as well as the orders.    Code Status:  Full Code  I spent 40 minutes in the professional and overall care of this patient.  Hugo Rubin MD           [1]   Scheduled medications   Medication Dose Route Frequency    acetylcysteine  3 mL nebulization BID    apixaban  5 mg oral BID    atorvastatin  10 mg oral Daily    bisoprolol  10 mg oral Daily    dilTIAZem  90 mg oral q8h    ferrous sulfate  65 mg of iron oral Daily with breakfast    ipratropium-albuteroL  3 mL nebulization TID    oxygen   inhalation Continuous - Inhalation    perflutren lipid microspheres  2 mL of dilution intravenous Once in imaging    rOPINIRole  1 mg oral TID    spironolactone  12.5 mg oral BID

## 2025-05-10 NOTE — PROGRESS NOTES
Constance M Kocher is a 74 y.o. female on day 1 of admission presenting with Atrial fibrillation with RVR (Multi).      Subjective   No events over night,   Reports cough, with mucoid expectoration,   No c/o fever or chills.     RN reported no new events.        Objective     Last Recorded Vitals  /70 (BP Location: Right arm, Patient Position: Lying)   Pulse (!) 118   Temp 36.7 °C (98.1 °F) (Temporal)   Resp 18   Wt 85.2 kg (187 lb 13.3 oz) Comment: Weight taken at midnight vitals, as pt wanted to skip vitals for 4am.  SpO2 93%   Intake/Output last 3 Shifts:    Intake/Output Summary (Last 24 hours) at 5/10/2025 0919  Last data filed at 5/9/2025 1600  Gross per 24 hour   Intake 900 ml   Output --   Net 900 ml       Admission Weight  Weight: 77.6 kg (171 lb) (05/08/25 1811)    Daily Weight  05/10/25 : 85.2 kg (187 lb 13.3 oz)    Image Results  Transthoracic Echo (TTE) Complete              [facility]  [address] [place], [place] [address]              Phone [phone]    TRANSTHORACIC ECHOCARDIOGRAM REPORT    Patient Name:       CONSTANCE M KOCHER   Reading Physician:    [id number] Israel Olivarez MD  Study Date:         5/9/2025             Ordering Provider:    [id number] BEE LONG  MRN/PID:            [id number]             Fellow:  Accession#:         [id number]         Nurse:  Date of Birth/Age:  1950 / 74 years Sonographer:          Brittni Real RDCS  Gender Assigned at  F                    Additional Staff:  Birth:  Height:             162.56 cm            Admit Date:  Weight:             83.01 kg             Admission Status:     Inpatient -                                                                 Routine  BSA / BMI:          1.88 m2 / 31.41      Department Location:  [facility]  Stepdown                      kg/m2  Blood Pressure: 130 /82 mmHg    Study Type:    TRANSTHORACIC ECHO (TTE) COMPLETE  Diagnosis/ICD: Unspecified atrial fibrillation-I48.91  Indication:    Afib w/RVR  CPT Codes:     Echo Complete w Full Doppler-[id number]    Patient History:  Pertinent History: HTN, A-Fib and CHF. CKD.    Study Detail: The following Echo studies were performed: 2D, M-Mode, Doppler and                color flow.       PHYSICIAN INTERPRETATION:  Left Ventricle: The left ventricular systolic function is normal, with a visually estimated ejection fraction of 60%. There are no regional wall motion abnormalities. The left ventricular cavity size is normal. There is mildly increased posterior left ventricular wall thickness. There is left ventricular concentric remodeling. Left ventricular diastolic filling cannot be determined due to atrial fibrillation/flutter.  Left Atrium: The left atrial size is mildly dilated.  Right Ventricle: The right ventricle is normal in size. There is normal right ventriclar wall thickness. There is normal right ventricular global systolic function.  Right Atrium: The right atrial size is normal.  Aortic Valve: The aortic valve is trileaflet. There is no aortic valve cusp calcification noted. There is no evidence of reduced aortic valve leaflet excursion excursion. There is no evidence of aortic valve regurgitation. The peak instantaneous gradient of the aortic valve is 5 mmHg.  Mitral Valve: The mitral valve is normal in structure. There is normal mitral valve leaflet mobility. There is mild mitral annular calcification. There is moderate mitral valve regurgitation which is centrally directed. The mitral regurgitant orifice area is 2 mm2. The mitral regurgitant volume is 2.78 ml.  Tricuspid Valve: The tricuspid valve is structurally normal. There is normal tricuspid valve leaflet mobility. There is trace tricuspid regurgitation.  Pulmonic Valve: The pulmonic valve is structurally  normal. There is trace pulmonic valve regurgitation.  Pericardium: No pericardial effusion noted. There is a pericardial fat pad present.  Aorta: The aortic root is normal. There is no dilatation of the aortic arch. There is no dilatation of the ascending aorta. There is no dilatation of the aortic root.  Systemic Veins: The inferior vena cava appears mildly dilated.       CONCLUSIONS:   1. The left ventricular systolic function is normal, with a visually estimated ejection fraction of 60%.   2. Left ventricular diastolic filling cannot be determined due to atrial fibrillation/flutter.   3. Moderate mitral valve regurgitation.   4. The inferior vena cava appears mildly dilated.    QUANTITATIVE DATA SUMMARY:     2D MEASUREMENTS:             Normal Ranges:  LAs:             4.00 cm     (2.7-4.0cm)  IVSd:            0.95 cm     (0.6-1.1cm)  LVPWd:           1.06 cm     (0.6-1.1cm)  LVIDd:           4.46 cm     (3.9-5.9cm)  LVIDs:           2.86 cm  LV Mass Index:   80.6 g/m2  LVEDV Index:     49.89 ml/m2  LV % FS          35.9 %       LEFT ATRIUM:                  Normal Ranges:  LA Vol A4C:        89.4 ml    (22+/-6mL/m2)  LA Vol A2C:        90.6 ml  LA Vol BP:         90.3 ml  LA Vol Index A4C:  47.5ml/m2  LA Vol Index A2C:  48.1 ml/m2  LA Vol Index BP:   47.9 ml/m2  LA Area A4C:       26.9 cm2  LA Area A2C:       27.0 cm2  LA Major Axis A4C: 6.9 cm  LA Major Axis A2C: 6.8 cm  LA Volume Index:   45.4 ml/m2  LA Vol A4C:        83.1 ml  LA Vol A2C:        87.4 ml  LA Vol Index BSA:  45.3 ml/m2       M-MODE MEASUREMENTS:         Normal Ranges:  Ao Root:             3.10 cm (2.0-3.7cm)       AORTA MEASUREMENTS:         Normal Ranges:  Ao Sinus, d:        3.02 cm (2.1-3.5cm)       LV SYSTOLIC FUNCTION:                       Normal Ranges:  EF-A4C View:    46 % (>=55%)  EF-A2C View:    44 %  EF-Biplane:     45 %  EF-Visual:      60 %  LV EF Reported: 60 %       LV DIASTOLIC FUNCTION:           Normal Ranges:  MV Peak E:              1.38 m/s  (0.7-1.2 m/s)  MV e'                  0.094 m/s (>8.0)  MV lateral e'          0.09 m/s  MV medial e'           0.09 m/s  E/e' Ratio:            14.67     (<8.0)       MITRAL VALVE:          Normal Ranges:  MV DT:        155 msec (150-240msec)       MITRAL INSUFFICIENCY:             Normal Ranges:  PISA Radius:          0.2 cm  MR VTI:               142.00 cm  MR Vmax:              494.00 cm/s  MR Alias Dillon:         38.5 cm/s  MR Volume:            2.78 ml  MR Flow Rt:           9.68 ml/s  MR EROA:              2 mm2       AORTIC VALVE:           Normal Ranges:  AoV Vmax:      1.16 m/s (<=1.7m/s)  AoV Peak P.4 mmHg (<20mmHg)  LVOT Max Dillon:  0.94 m/s (<=1.1m/s)  LVOT Diameter: 2.10 cm  (1.8-2.4cm)  AoV Area,Vmax: 2.79 cm2 (2.5-4.5cm2)       RIGHT VENTRICLE:  RV s' 0.09 m/s       TRICUSPID VALVE/RVSP:         Normal Ranges:  IVC Diam:             2.49 cm       [id number] Israel Olivarez MD  Electronically signed on 2025 at 5:24:46 PM       ** Final **  ECG 12 lead  Atrial fibrillation with rapid ventricular response  Rightward axis  Cannot rule out Anterior infarct , age undetermined  Abnormal ECG  No previous ECGs available    Vitals:    05/10/25 0813   BP:    Pulse:    Resp:    Temp:    SpO2: 93%       Physical Exam  Constitutional:       Appearance: Normal appearance.   HENT:      Head: Normocephalic.      Nose: Nose normal.      Mouth/Throat:      Mouth: Mucous membranes are moist.   Eyes:      Extraocular Movements: Extraocular movements intact.      Pupils: Pupils are equal, round, and reactive to light.   Cardiovascular:      Rate and Rhythm: Tachycardia present. Rhythm irregular.      Heart sounds: Normal heart sounds. No murmur heard.  Pulmonary:      Effort: No respiratory distress.      Breath sounds: Normal breath sounds. No wheezing.   Abdominal:      General: There is no distension.      Palpations: Abdomen is soft.      Tenderness: There is no abdominal tenderness.    Musculoskeletal:         General: Normal range of motion.      Cervical back: Normal range of motion.   Skin:     General: Skin is warm.   Neurological:      General: No focal deficit present.      Mental Status: She is alert and oriented to person, place, and time.   Psychiatric:         Mood and Affect: Mood normal.         Relevant Results  Scheduled medications  Scheduled Medications[1]  Continuous medications  Continuous Medications[2]  PRN medications  PRN Medications[3]    Results for orders placed or performed during the hospital encounter of 05/08/25 (from the past 24 hours)   CBC   Result Value Ref Range    WBC 6.3 4.4 - 11.3 x10*3/uL    nRBC 0.0 0.0 - 0.0 /100 WBCs    RBC 4.40 4.00 - 5.20 x10*6/uL    Hemoglobin 13.1 12.0 - 16.0 g/dL    Hematocrit 41.0 36.0 - 46.0 %    MCV 93 80 - 100 fL    MCH 29.8 26.0 - 34.0 pg    MCHC 32.0 32.0 - 36.0 g/dL    RDW 18.5 (H) 11.5 - 14.5 %    Platelets 164 150 - 450 x10*3/uL   Basic Metabolic Panel   Result Value Ref Range    Glucose 87 74 - 99 mg/dL    Sodium 135 (L) 136 - 145 mmol/L    Potassium 3.4 (L) 3.5 - 5.3 mmol/L    Chloride 102 98 - 107 mmol/L    Bicarbonate 23 21 - 32 mmol/L    Anion Gap 13 10 - 20 mmol/L    Urea Nitrogen 8 6 - 23 mg/dL    Creatinine 0.99 0.50 - 1.05 mg/dL    eGFR 60 (L) >60 mL/min/1.73m*2    Calcium 8.5 (L) 8.6 - 10.3 mg/dL       Patient is a 74 y.o. female who is presented to [facility] ([place], [place]) ED with c/o difficulty urinating and is admitted for Atrial fibrillation with RVR (Multi). Cardiology is consulted.      Afib with RVR:  History of chronic A-fib,   Continue Eliquis  Cardiology following  Started on Bisoprolol 10 mg  On Diltiazem 90 mg Q 8hrs.   Continue to monitor HR.      Difficulty urinating  Secondary to rectovaginal prolapse,   no UTI on UA  Urinary retention from distended cystocele with hydronephrosis  Urology consulted,   Advised urogynecology follow up for vaginal prolapse, which is causing hydronephrosis,      Chronic bronchitis,  unspecified chronic bronchitis type (Multi)  Was recently started on 2 L of home nasal cannula  She has refused oxygen before due to increasing cough  On mucomyst nebs  Duoneb q 8hrs     Chronic diastolic CHF (congestive heart failure)  Continue to hold bumex, she is euvolemic  On Spironolactone 12.5 mg BID,      Hypokalemia  Continue home potassium  Potassium 3.3,   Levels repleted with additional Kdur 40 meq on 5/10,   Monitor.      Restless leg syndrome  Continue ropinirole  Continue oral iron     Primary hypertension  Continue lisinopril     Prophylaxis: Continue Eliquis    Dispo: improvement in HR.      CODE STATUS: Full code           Georges Swan MD           [1] acetylcysteine, 3 mL, nebulization, BID  apixaban, 5 mg, oral, BID  atorvastatin, 10 mg, oral, Daily  bisoprolol, 10 mg, oral, Daily  dilTIAZem, 90 mg, oral, q8h  ferrous sulfate, 65 mg of iron, oral, Daily with breakfast  ipratropium-albuteroL, 3 mL, nebulization, TID  oxygen, , inhalation, Continuous - Inhalation  perflutren lipid microspheres, 2 mL of dilution, intravenous, Once in imaging  rOPINIRole, 1 mg, oral, TID  spironolactone, 12.5 mg, oral, BID  [2]    [3] PRN medications: acetaminophen, codeine-guaifenesin, ipratropium-albuteroL, ondansetron ODT **OR** ondansetron, traZODone

## 2025-05-10 NOTE — NURSING NOTE
Pt wants to skip 4am vitals , so she can sleep . Pt may be willing to let vitals be taken ...closer to morning hours.  , continuing to monitor pt

## 2025-05-10 NOTE — CARE PLAN
The patient's goals for the shift include sleep    The clinical goals for the shift include control heart rate

## 2025-05-11 VITALS
RESPIRATION RATE: 14 BRPM | HEART RATE: 77 BPM | OXYGEN SATURATION: 95 % | BODY MASS INDEX: 31.65 KG/M2 | DIASTOLIC BLOOD PRESSURE: 69 MMHG | HEIGHT: 64 IN | TEMPERATURE: 96.6 F | WEIGHT: 185.4 LBS | SYSTOLIC BLOOD PRESSURE: 106 MMHG

## 2025-05-11 LAB
ANION GAP SERPL CALCULATED.3IONS-SCNC: 11 MMOL/L (ref 10–20)
BUN SERPL-MCNC: 10 MG/DL (ref 6–23)
CALCIUM SERPL-MCNC: 8.3 MG/DL (ref 8.6–10.3)
CHLORIDE SERPL-SCNC: 99 MMOL/L (ref 98–107)
CO2 SERPL-SCNC: 28 MMOL/L (ref 21–32)
CREAT SERPL-MCNC: 1.19 MG/DL (ref 0.5–1.05)
EGFRCR SERPLBLD CKD-EPI 2021: 48 ML/MIN/1.73M*2
ERYTHROCYTE [DISTWIDTH] IN BLOOD BY AUTOMATED COUNT: 18.6 % (ref 11.5–14.5)
GLUCOSE SERPL-MCNC: 96 MG/DL (ref 74–99)
HCT VFR BLD AUTO: 40.7 % (ref 36–46)
HGB BLD-MCNC: 12.9 G/DL (ref 12–16)
MCH RBC QN AUTO: 29.8 PG (ref 26–34)
MCHC RBC AUTO-ENTMCNC: 31.7 G/DL (ref 32–36)
MCV RBC AUTO: 94 FL (ref 80–100)
NRBC BLD-RTO: 0 /100 WBCS (ref 0–0)
PLATELET # BLD AUTO: 170 X10*3/UL (ref 150–450)
POTASSIUM SERPL-SCNC: 3.3 MMOL/L (ref 3.5–5.3)
RBC # BLD AUTO: 4.33 X10*6/UL (ref 4–5.2)
SODIUM SERPL-SCNC: 135 MMOL/L (ref 136–145)
WBC # BLD AUTO: 5.3 X10*3/UL (ref 4.4–11.3)

## 2025-05-11 PROCEDURE — 51702 INSERT TEMP BLADDER CATH: CPT

## 2025-05-11 PROCEDURE — 36415 COLL VENOUS BLD VENIPUNCTURE: CPT | Performed by: STUDENT IN AN ORGANIZED HEALTH CARE EDUCATION/TRAINING PROGRAM

## 2025-05-11 PROCEDURE — 99232 SBSQ HOSP IP/OBS MODERATE 35: CPT | Performed by: INTERNAL MEDICINE

## 2025-05-11 PROCEDURE — 85027 COMPLETE CBC AUTOMATED: CPT | Performed by: STUDENT IN AN ORGANIZED HEALTH CARE EDUCATION/TRAINING PROGRAM

## 2025-05-11 PROCEDURE — 2500000001 HC RX 250 WO HCPCS SELF ADMINISTERED DRUGS (ALT 637 FOR MEDICARE OP): Performed by: STUDENT IN AN ORGANIZED HEALTH CARE EDUCATION/TRAINING PROGRAM

## 2025-05-11 PROCEDURE — 80048 BASIC METABOLIC PNL TOTAL CA: CPT | Performed by: STUDENT IN AN ORGANIZED HEALTH CARE EDUCATION/TRAINING PROGRAM

## 2025-05-11 PROCEDURE — 2500000001 HC RX 250 WO HCPCS SELF ADMINISTERED DRUGS (ALT 637 FOR MEDICARE OP): Performed by: INTERNAL MEDICINE

## 2025-05-11 PROCEDURE — 2500000001 HC RX 250 WO HCPCS SELF ADMINISTERED DRUGS (ALT 637 FOR MEDICARE OP): Performed by: OBSTETRICS & GYNECOLOGY

## 2025-05-11 PROCEDURE — 2500000002 HC RX 250 W HCPCS SELF ADMINISTERED DRUGS (ALT 637 FOR MEDICARE OP, ALT 636 FOR OP/ED): Performed by: STUDENT IN AN ORGANIZED HEALTH CARE EDUCATION/TRAINING PROGRAM

## 2025-05-11 PROCEDURE — 2500000004 HC RX 250 GENERAL PHARMACY W/ HCPCS (ALT 636 FOR OP/ED): Performed by: STUDENT IN AN ORGANIZED HEALTH CARE EDUCATION/TRAINING PROGRAM

## 2025-05-11 PROCEDURE — 99233 SBSQ HOSP IP/OBS HIGH 50: CPT | Performed by: INTERNAL MEDICINE

## 2025-05-11 PROCEDURE — 87070 CULTURE OTHR SPECIMN AEROBIC: CPT | Mod: TRILAB | Performed by: OBSTETRICS & GYNECOLOGY

## 2025-05-11 PROCEDURE — 2500000005 HC RX 250 GENERAL PHARMACY W/O HCPCS: Performed by: STUDENT IN AN ORGANIZED HEALTH CARE EDUCATION/TRAINING PROGRAM

## 2025-05-11 PROCEDURE — 2060000001 HC INTERMEDIATE ICU ROOM DAILY

## 2025-05-11 PROCEDURE — 99221 1ST HOSP IP/OBS SF/LOW 40: CPT | Performed by: OBSTETRICS & GYNECOLOGY

## 2025-05-11 PROCEDURE — 94640 AIRWAY INHALATION TREATMENT: CPT

## 2025-05-11 PROCEDURE — 2500000002 HC RX 250 W HCPCS SELF ADMINISTERED DRUGS (ALT 637 FOR MEDICARE OP, ALT 636 FOR OP/ED): Performed by: INTERNAL MEDICINE

## 2025-05-11 PROCEDURE — 9420000001 HC RT PATIENT EDUCATION 5 MIN

## 2025-05-11 RX ORDER — OXYCODONE HYDROCHLORIDE 5 MG/1
5 TABLET ORAL EVERY 6 HOURS PRN
Refills: 0 | Status: DISCONTINUED | OUTPATIENT
Start: 2025-05-11 | End: 2025-05-12 | Stop reason: HOSPADM

## 2025-05-11 RX ORDER — POTASSIUM CHLORIDE 1.5 G/1.58G
20 POWDER, FOR SOLUTION ORAL ONCE
Status: COMPLETED | OUTPATIENT
Start: 2025-05-11 | End: 2025-05-11

## 2025-05-11 RX ORDER — BISOPROLOL FUMARATE 5 MG/1
2.5 TABLET, FILM COATED ORAL DAILY
Status: DISCONTINUED | OUTPATIENT
Start: 2025-05-12 | End: 2025-05-12 | Stop reason: HOSPADM

## 2025-05-11 RX ADMIN — DILTIAZEM HYDROCHLORIDE 90 MG: 60 TABLET, FILM COATED ORAL at 13:12

## 2025-05-11 RX ADMIN — ATORVASTATIN CALCIUM 10 MG: 10 TABLET, FILM COATED ORAL at 08:24

## 2025-05-11 RX ADMIN — Medication 1 APPLICATION: at 18:00

## 2025-05-11 RX ADMIN — DILTIAZEM HYDROCHLORIDE 90 MG: 60 TABLET, FILM COATED ORAL at 06:17

## 2025-05-11 RX ADMIN — TRAMADOL HYDROCHLORIDE 50 MG: 50 TABLET, COATED ORAL at 21:28

## 2025-05-11 RX ADMIN — ROPINIROLE 1 MG: 1 TABLET, FILM COATED ORAL at 08:26

## 2025-05-11 RX ADMIN — OXYCODONE HYDROCHLORIDE 5 MG: 5 TABLET ORAL at 18:48

## 2025-05-11 RX ADMIN — FERROUS SULFATE TAB 325 MG (65 MG ELEMENTAL FE) 325 MG: 325 (65 FE) TAB at 08:24

## 2025-05-11 RX ADMIN — DILTIAZEM HYDROCHLORIDE 90 MG: 60 TABLET, FILM COATED ORAL at 21:28

## 2025-05-11 RX ADMIN — OXYCODONE HYDROCHLORIDE 5 MG: 5 TABLET ORAL at 03:33

## 2025-05-11 RX ADMIN — BISOPROLOL FUMARATE 10 MG: 5 TABLET ORAL at 08:24

## 2025-05-11 RX ADMIN — ROPINIROLE 1 MG: 1 TABLET, FILM COATED ORAL at 21:29

## 2025-05-11 RX ADMIN — APIXABAN 5 MG: 5 TABLET, FILM COATED ORAL at 21:26

## 2025-05-11 RX ADMIN — ACETYLCYSTEINE 600 MG: 200 SOLUTION ORAL; RESPIRATORY (INHALATION) at 19:02

## 2025-05-11 RX ADMIN — IPRATROPIUM BROMIDE AND ALBUTEROL SULFATE 3 ML: 2.5; .5 SOLUTION RESPIRATORY (INHALATION) at 19:01

## 2025-05-11 RX ADMIN — ACETYLCYSTEINE 200 MG: 200 SOLUTION ORAL; RESPIRATORY (INHALATION) at 07:57

## 2025-05-11 RX ADMIN — OXYCODONE HYDROCHLORIDE 5 MG: 5 TABLET ORAL at 13:12

## 2025-05-11 RX ADMIN — POTASSIUM CHLORIDE 20 MEQ: 1.5 POWDER, FOR SOLUTION ORAL at 11:32

## 2025-05-11 RX ADMIN — ROPINIROLE 1 MG: 1 TABLET, FILM COATED ORAL at 15:09

## 2025-05-11 RX ADMIN — TRAMADOL HYDROCHLORIDE 50 MG: 50 TABLET, COATED ORAL at 08:29

## 2025-05-11 RX ADMIN — Medication 2 L/MIN: at 19:02

## 2025-05-11 RX ADMIN — APIXABAN 5 MG: 5 TABLET, FILM COATED ORAL at 08:24

## 2025-05-11 RX ADMIN — SPIRONOLACTONE 12.5 MG: 25 TABLET ORAL at 08:24

## 2025-05-11 RX ADMIN — IPRATROPIUM BROMIDE AND ALBUTEROL SULFATE 3 ML: 2.5; .5 SOLUTION RESPIRATORY (INHALATION) at 07:56

## 2025-05-11 RX ADMIN — SPIRONOLACTONE 12.5 MG: 25 TABLET ORAL at 21:26

## 2025-05-11 RX ADMIN — IPRATROPIUM BROMIDE AND ALBUTEROL SULFATE 3 ML: 2.5; .5 SOLUTION RESPIRATORY (INHALATION) at 13:12

## 2025-05-11 RX ADMIN — Medication 1 APPLICATION: at 21:29

## 2025-05-11 RX ADMIN — Medication 2 L/MIN: at 08:00

## 2025-05-11 ASSESSMENT — COGNITIVE AND FUNCTIONAL STATUS - GENERAL
DAILY ACTIVITIY SCORE: 24
CLIMB 3 TO 5 STEPS WITH RAILING: A LITTLE
CLIMB 3 TO 5 STEPS WITH RAILING: A LITTLE
DAILY ACTIVITIY SCORE: 24
MOBILITY SCORE: 23
MOBILITY SCORE: 23

## 2025-05-11 ASSESSMENT — PAIN - FUNCTIONAL ASSESSMENT
PAIN_FUNCTIONAL_ASSESSMENT: 0-10

## 2025-05-11 ASSESSMENT — PAIN DESCRIPTION - LOCATION
LOCATION: BACK
LOCATION: BACK

## 2025-05-11 ASSESSMENT — PAIN DESCRIPTION - DESCRIPTORS
DESCRIPTORS: ACHING
DESCRIPTORS: ACHING

## 2025-05-11 ASSESSMENT — PAIN SCALES - GENERAL
PAINLEVEL_OUTOF10: 9
PAINLEVEL_OUTOF10: 0 - NO PAIN
PAINLEVEL_OUTOF10: 7
PAINLEVEL_OUTOF10: 7
PAINLEVEL_OUTOF10: 0 - NO PAIN
PAINLEVEL_OUTOF10: 7
PAINLEVEL_OUTOF10: 1
PAINLEVEL_OUTOF10: 7

## 2025-05-11 NOTE — CONSULTS
"Obstetrical Consult    Reason for Consult: Chronic Cystocele    Assessment/Plan    1) Uterine procidentia (complete uterine prolapse, (Grade 4)  2) Cystocele  3) Rectocele  4) Bilateral hydronephrosis and hydroureter  5) decubitus ulcer of vaginal mucosa    -The patient deserves a pessary, but may not hold due to her complete prolapse.  Also, the problem of ulceration of the mucosa.  Begin with moisturization- coconut oil.    Will culture the wound..  Will confer with urogynecology team for evaluation and treatment, alternatives to general anesthesia for a long-term repair.  - A Barclay catheter was inserted during exam.  Can be converted to a leg bag until she can be fitted for a pessary which would be helpful to aid in relieving bilateral hydronephrosis/hydroureter's.  Fortunately, there are no pessaries available in the hospital.    Subjective   Constance M Kocher is a 74 y.o. female presenting with chronic vaginal prolapse complicating urological function and anatomy.   She has a known history of pelvic prolapse and cystocele for nearly 30 years.  She came to the ER with difficulty urinating. She was found to be in rapid A-fib.  The patient endorses having been offered repair at one point she was offered surgical repair, but she states she declined because every time she goes under anesthesia she\" dies.\"  States that this has happened to her \"5 times.\"   She voids spontaneously with some urinary incontinence.  Denies hematuria vaginal bleeding.  She does not have a gynecologist.  States she has never heard of a pessary before this visit.  She states that her bowel function is normal but evacuation changes from time to time.  Sometimes it is loose, sometimes its formed and sometimes it shoots out spontaneously.  She denies pain to help herself to evacuate manually.    Labs were reviewed her urinalysis on admission was negative.    Allergy was reviewed:  A CT scan done revealed bilateral hydronephrosis down to the " bladder with evidence of prolapse.        Obstetrical History   OB History   No obstetric history on file.       Past Medical History  Medical History[1]     Past Surgical History   Surgical History[2]    Social History  Social History     Tobacco Use    Smoking status: Former     Current packs/day: 0.00     Average packs/day: 1 pack/day for 55.0 years (55.0 ttl pk-yrs)     Types: Cigarettes     Start date: 1969     Quit date: 2024     Years since quittin.2     Passive exposure: Never    Smokeless tobacco: Never   Substance Use Topics    Alcohol use: Not Currently     Substance and Sexual Activity   Drug Use Never       Allergies  Aller-chlor decongestant, Iodinated contrast media, Procaine hcl, and Propofol     Medications  Prescriptions Prior to Admission[3]    Objective    Last Vitals  Temp Pulse Resp BP MAP O2 Sat   36.7 °C (98.1 °F) 72 19 125/77 90 93 %     Physical Examination  GENERAL:   General Appearance:  well-developed, elevated BMI no functional handicap, well-groomed.  Hygiene:  good.  Ill-appearance:  none.    HEENT: NC/AT head.   Speech:  clear.  Eye contact:  normal.  LUNGS:  Effort: Productive cough; wheezing, easing accessory muscles at times  ABDOMEN: Tenderness: Obese; none.  Distention:  none.   GENITOURINARY - FEMALE: Bladder: Complete prolapse the uterus. External genitalia:  Normal.  Urethra:  Normal.  Vagina: Erosion of the uterovaginal mucosa at 4:00 - 5:00 scant tan discharge on her adult diaper. Cervix/ cuff: Light, otherwise unremarkable uterus:  normal size, non tender complete procidentia.  Adnexa:  normal , non tender.   Rectum: Also prolapsed with the uterus.  RN inserted the Barclay catheter by sterile technique following providers reduction of the uterine prolapse to aid in visualizing the anatomy.  Uterus prolapsed again with the patient's cough.  DERMATOLOGY:  Skin: Aging, dry, chronic changes; tender scar on abdomen.    EXTREMITIES: Normal:  no anomalies.  Edema:   "none.    NEUROLOGICAL: Orientation:  alert and oriented x 3.   PSYCHOLOGY: Affect:  appropriate.  Mood:  pleasant.     Chart reviewed    Lab Review  Lab Results   Component Value Date    WBC 5.3 05/11/2025    HGB 12.9 05/11/2025    HCT 40.7 05/11/2025     05/11/2025     Lab Results   Component Value Date    GLUCOSE 96 05/11/2025     (L) 05/11/2025    K 3.3 (L) 05/11/2025    CL 99 05/11/2025    CO2 28 05/11/2025    ANIONGAP 11 05/11/2025    BUN 10 05/11/2025    CREATININE 1.19 (H) 05/11/2025    EGFR 48 (L) 05/11/2025    CALCIUM 8.3 (L) 05/11/2025    ALBUMIN 4.2 05/08/2025    PROT 7.2 05/08/2025    ALKPHOS 79 05/08/2025    ALT 15 05/08/2025    AST 17 05/08/2025    BILITOT 0.7 05/08/2025     No results found for: \"APTT\", \"PROTIME\", \"INR\"         [1]   Past Medical History:  Diagnosis Date    A-fib (Multi)     Atrial fibrillation with rapid ventricular response (Multi) 03/27/2024    Carotid stenosis     Chronic diastolic CHF (congestive heart failure) 04/24/2024    Dyspnea 03/27/2024    Essential hypertension 10/20/2009    Last Assessment & Plan: Formatting of this note might be different from the original. -Uncontrolled -states she has not done well with meds in the past, but willing to try restarting something given how high her BP is.   -will start her on losartan 50 mg daily and see if she is able to tolerate this. -encouraged to watch salt in her diet and work on weight loss -risk of heart attack and stroke rev    Fatigue 10/19/2023    Last Assessment & Plan: Formatting of this note might be different from the original. -checking blood work including CBC and TSH    GERD (gastroesophageal reflux disease)     Hyperlipidemia 02/12/2014    Last Assessment & Plan: Formatting of this note might be different from the original. -updating lipid panel    Hypokalemia 10/19/2023    Insomnia 03/09/2012    Murmur 10/20/2009    PMR (polymyalgia rheumatica) (Multi)     Polymyalgia rheumatica (Multi)     Primary " hypertension 10/20/2009    Last Assessment & Plan: Formatting of this note might be different from the original. -Uncontrolled -states she has not done well with meds in the past, but willing to try restarting something given how high her BP is.   -will start her on losartan 50 mg daily and see if she is able to tolerate this. -encouraged to watch salt in her diet and work on weight loss -risk of heart attack and stroke rev   [2] No past surgical history on file.  [3]   Medications Prior to Admission   Medication Sig Dispense Refill Last Dose/Taking    acetaminophen (Tylenol) 325 mg tablet Take 2 tablets (650 mg) by mouth every 4 hours if needed for mild pain (1 - 3) or fever (temp greater than 38.0 C). 30 tablet 0     albuterol 2.5 mg /3 mL (0.083 %) nebulizer solution Inhale contents of 1 vial with nebulizer every 4 hours as needed for shortness of breath 75 mL 11     albuterol 90 mcg/actuation inhaler Inhale 1 puff every 6 hours if needed for shortness of breath. 18 g 2     albuterol 90 mcg/actuation inhaler Inhale 2 puffs by mouth twice a day as needed for SOB (Patient taking differently: Inhale 2 puffs once daily as needed for shortness of breath.) 8.5 g 3     [] amoxicillin-clavulanate (Augmentin) 875-125 mg tablet Take 1 tablet (875 mg) by mouth 2 times a day for 7 days. 20 tablet 0     apixaban (Eliquis) 5 mg tablet Take 1 tablet (5 mg) by mouth every 12 hours. 180 tablet 3     atorvastatin (Lipitor) 10 mg tablet Take 1 tablet (10 mg) by mouth once daily.       bumetanide (Bumex) 1 mg tablet Take 1 tablet (1 mg) by mouth once daily. 90 tablet 3     cholecalciferol (Vitamin D3) 50 mcg (2,000 unit) capsule Take 2 capsules (100 mcg) by mouth once daily. 60 capsule 11     cyanocobalamin (Vitamin B-12) 500 mcg tablet Take 1 tablet (500 mcg) by mouth once daily. 30 tablet 11     dilTIAZem CD (Cardizem CD) 120 mg 24 hr capsule Take 1 capsule (120 mg) by mouth 2 times a day. 180 capsule 3     ferrous sulfate  325 mg (65 mg elemental) tablet Take 1 tablet (325 mg) by mouth 3 times daily (morning, midday, late afternoon). Do not crush, chew, or split. 90 tablet 1     lisinopril 2.5 mg tablet Take 1 tablet (2.5 mg) by mouth once daily. 100 tablet 3     oxygen (O2) gas therapy Inhale 2 L/min once daily as needed (COPD, SOB). Indications: COPD       potassium chloride CR (Klor-Con) 10 mEq ER tablet Take 1 tablet (10 mEq) by mouth once daily. Do not crush, chew, or split. 30 tablet 2     rOPINIRole (Requip) 1 mg tablet Take 1 tablet (1 mg) by mouth 3 times a day. 90 tablet 1     rosuvastatin (Crestor) 5 mg tablet Take 1 tablet (5 mg) by mouth once daily. 100 tablet 3     traZODone (Desyrel) 50 mg tablet Take 2 tablets (100 mg) by mouth as needed at bedtime for sleep. 60 tablet 0

## 2025-05-11 NOTE — ASSESSMENT & PLAN NOTE
5/10: 73 female patient with a multiple comorbid condition for history of A-fib RVR now due to underlying hypoxia.  Underlying chronic bronchitis.  Blood pressure is stable at the moment.  Although low normal.  A-fib control is better now since the medication.  Currently heart rate around 76 bpm.  Low potassium with a BMP replaced with K-Dur.  H&H is stable.  Continue current Eliquis for A-fib stroke prevention as well as atorvastatin, add patient to bisoprolol as well as a Cardizem for rate control as well as a blood pressure control.  Low-dose of diuretics.  Start patient's on spironolactone.    Pt. care time is spent includes review of diagnostic tests, labs, radiographs, EKGs, old echoes, cardiac work-up and coordination of care. Assessment, impression and plans are reflected in the note above as well as the orders.  5/11: Patient stable cardiac wise.  Atrial fibrillation is controlled at the moment.  Still have a hypoxia or wheezing diffusely.  Need to optimize underlying pulmonary status.  Blood pressure remained 125/77.  Pulse remained about 72.  Although patient potassium was 3.3 we can replace stent.  Patient currently on Eliquis, Lipitor, bisoprolol as well as diltiazem for better control.

## 2025-05-11 NOTE — PROGRESS NOTES
Subjective Data:  Patient stable cardiac wise.  No active chest pain tightness.  Breathing still difficult.  Wheezing diffusely.  Stable A-fib at the moment.  Overnight Events:    None  Objective   Last Recorded Vitals  /77 (BP Location: Left arm, Patient Position: Lying)   Pulse 72   Temp 36.7 °C (98.1 °F) (Temporal)   Resp 19   Wt 84.1 kg (185 lb 6.4 oz)   SpO2 93%     Intake/Output Summary (Last 24 hours) at 5/11/2025 1147  Last data filed at 5/11/2025 1100  Gross per 24 hour   Intake 120 ml   Output 850 ml   Net -730 ml     Physical Exam:  HEENT: Normocephalic/atraumatic pupils equal react light  Neck exam mild JVD, no bruit  Lung exam clear to auscultation, few crackles at the bases  Cardiac exam is irregular rhythm S1-S2, soft systolic murmur heard.   Abdomen soft nontender, nondistended  Extremities no clubbing, cyanosis, trace edema  Neuro exam grossly intact.  Image Results  Transthoracic Echo (TTE) [facility]  [address] [place], [place] [id number]              Phone [phone]    TRANSTHORACIC ECHOCARDIOGRAM REPORT    Patient Name:       EUSEBIA M KOCHER   Reading Physician:    [id number] Israel Olivarez MD  Study Date:         5/9/2025             Ordering Provider:    [id number] BEE LONG  MRN/PID:            [id number]             Fellow:  Accession#:         [id number]         Nurse:  Date of Birth/Age:  1950 / 74 years Sonographer:          Brittni Real RDCS  Gender Assigned at  F                    Additional Staff:  Birth:  Height:             162.56 cm            Admit Date:  Weight:             83.01 kg             Admission Status:     Inpatient -                                                                 Routine  BSA / BMI:          1.88 m2 /  31.41      Department Location:  [facility]                      kg/m2  Blood Pressure: 130 /82 mmHg    Study Type:    TRANSTHORACIC ECHO (TTE) COMPLETE  Diagnosis/ICD: Unspecified atrial fibrillation-I48.91  Indication:    Afib w/RVR  CPT Codes:     Echo Complete w Full [id number]    Patient History:  Pertinent History: HTN, A-Fib and CHF. CKD.    Study Detail: The following Echo studies were performed: 2D, M-Mode, Doppler and                color flow.       PHYSICIAN INTERPRETATION:  Left Ventricle: The left ventricular systolic function is normal, with a visually estimated ejection fraction of 60%. There are no regional wall motion abnormalities. The left ventricular cavity size is normal. There is mildly increased posterior left ventricular wall thickness. There is left ventricular concentric remodeling. Left ventricular diastolic filling cannot be determined due to atrial fibrillation/flutter.  Left Atrium: The left atrial size is mildly dilated.  Right Ventricle: The right ventricle is normal in size. There is normal right ventriclar wall thickness. There is normal right ventricular global systolic function.  Right Atrium: The right atrial size is normal.  Aortic Valve: The aortic valve is trileaflet. There is no aortic valve cusp calcification noted. There is no evidence of reduced aortic valve leaflet excursion excursion. There is no evidence of aortic valve regurgitation. The peak instantaneous gradient of the aortic valve is 5 mmHg.  Mitral Valve: The mitral valve is normal in structure. There is normal mitral valve leaflet mobility. There is mild mitral annular calcification. There is moderate mitral valve regurgitation which is centrally directed. The mitral regurgitant orifice area is 2 mm2. The mitral regurgitant volume is 2.78 ml.  Tricuspid Valve: The tricuspid valve is structurally normal. There is normal tricuspid valve leaflet mobility. There is trace tricuspid regurgitation.  Pulmonic  Valve: The pulmonic valve is structurally normal. There is trace pulmonic valve regurgitation.  Pericardium: No pericardial effusion noted. There is a pericardial fat pad present.  Aorta: The aortic root is normal. There is no dilatation of the aortic arch. There is no dilatation of the ascending aorta. There is no dilatation of the aortic root.  Systemic Veins: The inferior vena cava appears mildly dilated.       CONCLUSIONS:   1. The left ventricular systolic function is normal, with a visually estimated ejection fraction of 60%.   2. Left ventricular diastolic filling cannot be determined due to atrial fibrillation/flutter.   3. Moderate mitral valve regurgitation.   4. The inferior vena cava appears mildly dilated.    QUANTITATIVE DATA SUMMARY:     2D MEASUREMENTS:             Normal Ranges:  LAs:             4.00 cm     (2.7-4.0cm)  IVSd:            0.95 cm     (0.6-1.1cm)  LVPWd:           1.06 cm     (0.6-1.1cm)  LVIDd:           4.46 cm     (3.9-5.9cm)  LVIDs:           2.86 cm  LV Mass Index:   80.6 g/m2  LVEDV Index:     49.89 ml/m2  LV % FS          35.9 %       LEFT ATRIUM:                  Normal Ranges:  LA Vol A4C:        89.4 ml    (22+/-6mL/m2)  LA Vol A2C:        90.6 ml  LA Vol BP:         90.3 ml  LA Vol Index A4C:  47.5ml/m2  LA Vol Index A2C:  48.1 ml/m2  LA Vol Index BP:   47.9 ml/m2  LA Area A4C:       26.9 cm2  LA Area A2C:       27.0 cm2  LA Major Axis A4C: 6.9 cm  LA Major Axis A2C: 6.8 cm  LA Volume Index:   45.4 ml/m2  LA Vol A4C:        83.1 ml  LA Vol A2C:        87.4 ml  LA Vol Index BSA:  45.3 ml/m2       M-MODE MEASUREMENTS:         Normal Ranges:  Ao Root:             3.10 cm (2.0-3.7cm)       AORTA MEASUREMENTS:         Normal Ranges:  Ao Sinus, d:        3.02 cm (2.1-3.5cm)       LV SYSTOLIC FUNCTION:                       Normal Ranges:  EF-A4C View:    46 % (>=55%)  EF-A2C View:    44 %  EF-Biplane:     45 %  EF-Visual:      60 %  LV EF Reported: 60 %       LV DIASTOLIC  FUNCTION:           Normal Ranges:  MV Peak E:             1.38 m/s  (0.7-1.2 m/s)  MV e'                  0.094 m/s (>8.0)  MV lateral e'          0.09 m/s  MV medial e'           0.09 m/s  E/e' Ratio:            14.67     (<8.0)       MITRAL VALVE:          Normal Ranges:  MV DT:        155 msec (150-240msec)       MITRAL INSUFFICIENCY:             Normal Ranges:  PISA Radius:          0.2 cm  MR VTI:               142.00 cm  MR Vmax:              494.00 cm/s  MR Alias Dillon:         38.5 cm/s  MR Volume:            2.78 ml  MR Flow Rt:           9.68 ml/s  MR EROA:              2 mm2       AORTIC VALVE:           Normal Ranges:  AoV Vmax:      1.16 m/s (<=1.7m/s)  AoV Peak P.4 mmHg (<20mmHg)  LVOT Max Dillon:  0.94 m/s (<=1.1m/s)  LVOT Diameter: 2.10 cm  (1.8-2.4cm)  AoV Area,Vmax: 2.79 cm2 (2.5-4.5cm2)       RIGHT VENTRICLE:  RV s' 0.09 m/s       TRICUSPID VALVE/RVSP:         Normal Ranges:  IVC Diam:             2.49 cm       [id number] Israel Olivarez MD  Electronically signed on 2025 at 5:24:46 PM       ** Final **  ECG 12 lead  Atrial fibrillation with rapid ventricular response  Rightward axis  Cannot rule out Anterior infarct , age undetermined  Abnormal ECG  No previous ECGs available    Last Labs:  CBC - 2025:  4:23 AM  5.3 12.9 170    40.7      CMP - 2025:  4:23 AM  8.3 7.2 17 --- 0.7   _ 4.2 15 79      PTT - No results in last year.  _   _ _     Inpatient Medications:  Scheduled Medications[1]  Assessment & Plan  Atrial fibrillation with RVR (Multi)    Primary hypertension    Chronic bronchitis, unspecified chronic bronchitis type (Multi)    Chronic diastolic CHF (congestive heart failure)    Difficulty urinating    Hypokalemia    Restless leg syndrome  5/10: 73 female patient with a multiple comorbid condition for history of A-fib RVR now due to underlying hypoxia.  Underlying chronic bronchitis.  Blood pressure is stable at the moment.  Although low normal.  A-fib control is better now since  the medication.  Currently heart rate around 76 bpm.  Low potassium with a BMP replaced with K-Dur.  H&H is stable.  Continue current Eliquis for A-fib stroke prevention as well as atorvastatin, add patient to bisoprolol as well as a Cardizem for rate control as well as a blood pressure control.  Low-dose of diuretics.  Start patient's on spironolactone.    Pt. care time is spent includes review of diagnostic tests, labs, radiographs, EKGs, old echoes, cardiac work-up and coordination of care. Assessment, impression and plans are reflected in the note above as well as the orders.  5/11: Patient stable cardiac wise.  Atrial fibrillation is controlled at the moment.  Still have a hypoxia or wheezing diffusely.  Need to optimize underlying pulmonary status.  Blood pressure remained 125/77.  Pulse remained about 72.  Although patient potassium was 3.3 we can replace stent.  Patient currently on Eliquis, Lipitor, bisoprolol as well as diltiazem for better control.    Code Status:  Full Code  I spent 40 minutes in the professional and overall care of this patient.  Hugo Rubin MD           [1]   Scheduled medications   Medication Dose Route Frequency    acetylcysteine  3 mL nebulization BID    apixaban  5 mg oral BID    atorvastatin  10 mg oral Daily    [START ON 5/12/2025] bisoprolol  2.5 mg oral Daily    dilTIAZem  90 mg oral q8h    ferrous sulfate  65 mg of iron oral Daily with breakfast    ipratropium-albuteroL  3 mL nebulization TID    oxygen   inhalation Continuous - Inhalation    perflutren lipid microspheres  2 mL of dilution intravenous Once in imaging    rOPINIRole  1 mg oral TID    spironolactone  12.5 mg oral BID

## 2025-05-11 NOTE — CARE PLAN
The patient's goals for the shift include sleep    The clinical goals for the shift include monitor vitals, keep pt comfortable

## 2025-05-11 NOTE — PROGRESS NOTES
Constance M Kocher is a 74 y.o. female on day 2 of admission presenting with Atrial fibrillation with RVR (Multi).      Subjective   Chart reviewed  Cardiology/urology notes reviewed  Patient's biggest concern is about the wound on her large cystocele and the fact that she will not be able to manage this outpatient  Per chat with urology they are deferring pessary to gynecology service       Objective     Last Recorded Vitals  /71   Pulse 73   Temp 36.2 °C (97.2 °F) (Temporal)   Resp 19   Wt 84.1 kg (185 lb 6.4 oz)   SpO2 93%   Intake/Output last 3 Shifts:    Intake/Output Summary (Last 24 hours) at 5/11/2025 1109  Last data filed at 5/10/2025 1300  Gross per 24 hour   Intake 120 ml   Output --   Net 120 ml       Physical Exam  Alert oriented x 3 cooperative no distress  On nasal cannula oxygen which is baseline  Chest occasional rhonchi good air movement  Heart irregular A-fib on monitor  Abdomen soft nontender   on inspection there is a large prolapsed structure out of her vagina with some obvious wound superficial with minor bleeding  Neurology gross nonfocal  Relevant Results  Reviewed  Assessment/Plan     Assessment & Plan  Atrial fibrillation with RVR (Multi)  History of chronic A-fib,   Continue Eliquis  Continue with diltiazem drip, increased from 10 to 15 mL/h  Status post 1 dose of Lopressor overnight  Start metoprolol succinate 100 mg twice daily  Oral diltiazem on hold  Cardiology following    Difficulty urinating  Secondary to rectovaginal prolapse, no UTI on UA  Urinary retention from distended cystocele with hydronephrosis  Urology consult pending    Chronic bronchitis, unspecified chronic bronchitis type (Multi)  Was recently started on 2 L of home nasal cannula  She has refused oxygen before due to increasing cough    Chronic diastolic CHF (congestive heart failure)  Continue to hold bumex, she is euvolemic  Lasix started 40 mg oral daily    Hypokalemia  Continue home potassium  Replete  as necessary    Restless leg syndrome  Continue ropinirole  Continue oral iron    Primary hypertension  Continue lisinopril    5/11, will consult gynecology to see if they could help with management of large vaginal prolapse; discussed with urologist Dr. Martha Cullen MD

## 2025-05-11 NOTE — CARE PLAN
The patient's goals for the shift include sleep    The clinical goals for the shift include remain hemodynamically stable      Problem: Pain - Adult  Goal: Verbalizes/displays adequate comfort level or baseline comfort level  5/11/2025 1810 by Mickie Kim RN  Outcome: Progressing  5/11/2025 1810 by Mickie Kim RN  Outcome: Progressing  Flowsheets (Taken 5/11/2025 1810)  Verbalizes/displays adequate comfort level or baseline comfort level: Encourage patient to monitor pain and request assistance  5/11/2025 1809 by Mickie Kim RN  Outcome: Progressing  Flowsheets (Taken 5/11/2025 1809)  Verbalizes/displays adequate comfort level or baseline comfort level: Encourage patient to monitor pain and request assistance     Problem: Safety - Adult  Goal: Free from fall injury  5/11/2025 1810 by Mickie Kim RN  Outcome: Progressing  5/11/2025 1810 by Mickie Kim RN  Outcome: Progressing  Flowsheets (Taken 5/11/2025 1810)  Free from fall injury: Instruct family/caregiver on patient safety  5/11/2025 1809 by Mickie Kim RN  Outcome: Progressing  Flowsheets (Taken 5/11/2025 1809)  Free from fall injury: Instruct family/caregiver on patient safety     Problem: Discharge Planning  Goal: Discharge to home or other facility with appropriate resources  5/11/2025 1810 by Mickie Kim RN  Outcome: Progressing  5/11/2025 1810 by Mickie Kim RN  Outcome: Progressing  5/11/2025 1809 by Mickie Kim RN  Outcome: Progressing     Problem: Chronic Conditions and Co-morbidities  Goal: Patient's chronic conditions and co-morbidity symptoms are monitored and maintained or improved  5/11/2025 1810 by Mickie Kim RN  Outcome: Progressing  5/11/2025 1810 by Mickie Kim RN  Outcome: Progressing  5/11/2025 1809 by Mickie Kim RN  Outcome: Progressing     Problem: Nutrition  Goal: Nutrient intake appropriate for maintaining nutritional needs  5/11/2025 1810 by Mickie Kim RN  Outcome: Progressing  5/11/2025 1810 by Mickie Kim RN  Outcome:  Progressing  5/11/2025 1809 by Mickie Kim RN  Outcome: Progressing     Problem: Respiratory  Goal: Clear secretions with interventions this shift  5/11/2025 1810 by Mickie Kim RN  Outcome: Progressing  5/11/2025 1810 by Mickie Kim RN  Outcome: Progressing  5/11/2025 1809 by Mickie Kim RN  Outcome: Progressing  Goal: Wean oxygen to maintain O2 saturation per order/standard this shift  5/11/2025 1810 by Mickie Kim RN  Outcome: Progressing  5/11/2025 1810 by Mickie Kim RN  Outcome: Progressing  5/11/2025 1809 by Mickie Kim RN  Outcome: Progressing     Problem: Fall/Injury  Goal: Not fall by end of shift  5/11/2025 1810 by Mickie Kim RN  Outcome: Progressing  5/11/2025 1810 by Mickie Kim RN  Outcome: Progressing  Goal: Be free from injury by end of the shift  5/11/2025 1810 by Mickie Kim RN  Outcome: Progressing  5/11/2025 1810 by Mickie Kim RN  Outcome: Progressing  Goal: Verbalize understanding of personal risk factors for fall in the hospital  5/11/2025 1810 by Mickie Kim RN  Outcome: Progressing  5/11/2025 1810 by Mickie Kim RN  Outcome: Progressing  Goal: Verbalize understanding of risk factor reduction measures to prevent injury from fall in the home  5/11/2025 1810 by Mickie Kim RN  Outcome: Progressing  5/11/2025 1810 by Mickie Kim RN  Outcome: Progressing  Goal: Use assistive devices by end of the shift  5/11/2025 1810 by Mickie Kim RN  Outcome: Progressing  5/11/2025 1810 by Mickie Kim RN  Outcome: Progressing  Goal: Pace activities to prevent fatigue by end of the shift  5/11/2025 1810 by Mickie Kim RN  Outcome: Progressing  5/11/2025 1810 by Mickie Kim RN  Outcome: Progressing     Problem: Skin  Goal: Decreased wound size/increased tissue granulation at next dressing change  Outcome: Progressing  Flowsheets (Taken 5/11/2025 1810)  Decreased wound size/increased tissue granulation at next dressing change:   Promote sleep for wound healing   Protective dressings over bony  prominences   Utilize specialty bed per algorithm  Goal: Participates in plan/prevention/treatment measures  Outcome: Progressing  Flowsheets (Taken 5/11/2025 1810)  Participates in plan/prevention/treatment measures:   Discuss with provider PT/OT consult   Elevate heels   Increase activity/out of bed for meals  Goal: Prevent/manage excess moisture  Outcome: Progressing  Flowsheets (Taken 5/11/2025 1810)  Prevent/manage excess moisture:   Cleanse incontinence/protect with barrier cream   Moisturize dry skin   Use wicking fabric (obtain order)   Follow provider orders for dressing changes   Monitor for/manage infection if present  Goal: Prevent/minimize sheer/friction injuries  Outcome: Progressing  Flowsheets (Taken 5/11/2025 1810)  Prevent/minimize sheer/friction injuries:   Complete micro-shifts as needed if patient unable. Adjust patient position to relieve pressure points, not a full turn   Increase activity/out of bed for meals   Use pull sheet   HOB 30 degrees or less   Turn/reposition every 2 hours/use positioning/transfer devices   Utilize specialty bed per algorithm  Goal: Promote/optimize nutrition  Outcome: Progressing  Flowsheets (Taken 5/11/2025 1810)  Promote/optimize nutrition:   Assist with feeding   Monitor/record intake including meals   Consume > 50% meals/supplements   Offer water/supplements/favorite foods   Discuss with provider if NPO > 2 days  Goal: Promote skin healing  Outcome: Progressing  Flowsheets (Taken 5/11/2025 1810)  Promote skin healing:   Assess skin/pad under line(s)/device(s)   Protective dressings over bony prominences   Turn/reposition every 2 hours/use positioning/transfer devices   Ensure correct size (line/device) and apply per  instructions   Rotate device position/do not position patient on device

## 2025-05-12 ENCOUNTER — HOME HEALTH ADMISSION (OUTPATIENT)
Dept: HOME HEALTH SERVICES | Facility: HOME HEALTH | Age: 75
End: 2025-05-12
Payer: MEDICARE

## 2025-05-12 ENCOUNTER — TELEPHONE (OUTPATIENT)
Dept: PRIMARY CARE | Facility: CLINIC | Age: 75
End: 2025-05-12

## 2025-05-12 ENCOUNTER — HOSPITAL ENCOUNTER (EMERGENCY)
Facility: HOSPITAL | Age: 75
Discharge: LEFT BEFORE DISCHARGE | End: 2025-05-12
Payer: MEDICARE

## 2025-05-12 ENCOUNTER — DOCUMENTATION (OUTPATIENT)
Dept: HOME HEALTH SERVICES | Facility: HOME HEALTH | Age: 75
End: 2025-05-12

## 2025-05-12 VITALS
HEART RATE: 75 BPM | OXYGEN SATURATION: 94 % | WEIGHT: 147 LBS | TEMPERATURE: 98.4 F | HEIGHT: 64 IN | BODY MASS INDEX: 25.1 KG/M2 | DIASTOLIC BLOOD PRESSURE: 58 MMHG | RESPIRATION RATE: 16 BRPM | SYSTOLIC BLOOD PRESSURE: 108 MMHG

## 2025-05-12 VITALS
WEIGHT: 187.5 LBS | HEART RATE: 67 BPM | RESPIRATION RATE: 19 BRPM | TEMPERATURE: 98.2 F | SYSTOLIC BLOOD PRESSURE: 108 MMHG | DIASTOLIC BLOOD PRESSURE: 77 MMHG | HEIGHT: 64 IN | BODY MASS INDEX: 32.01 KG/M2 | OXYGEN SATURATION: 95 %

## 2025-05-12 DIAGNOSIS — R31.0 GROSS HEMATURIA: Primary | ICD-10-CM

## 2025-05-12 PROBLEM — N81.10 CYSTOCELE WITH RECTOCELE: Chronic | Status: ACTIVE | Noted: 2025-05-12

## 2025-05-12 PROBLEM — N89.8 EROSION OF VAGINA: Chronic | Status: ACTIVE | Noted: 2025-05-12

## 2025-05-12 PROBLEM — N81.3 UTERINE PROCIDENTIA: Chronic | Status: ACTIVE | Noted: 2025-05-12

## 2025-05-12 PROBLEM — N81.6 CYSTOCELE WITH RECTOCELE: Chronic | Status: ACTIVE | Noted: 2025-05-12

## 2025-05-12 PROBLEM — N13.30 BILATERAL HYDRONEPHROSIS: Chronic | Status: ACTIVE | Noted: 2025-05-12

## 2025-05-12 PROBLEM — N28.82 URETERAL DILATATION: Chronic | Status: ACTIVE | Noted: 2025-05-12

## 2025-05-12 LAB
ANION GAP SERPL CALCULATED.3IONS-SCNC: 10 MMOL/L (ref 10–20)
ANION GAP SERPL CALCULATED.3IONS-SCNC: 10 MMOL/L (ref 10–20)
APPEARANCE UR: CLEAR
BACTERIA #/AREA URNS AUTO: ABNORMAL /HPF
BASOPHILS # BLD AUTO: 0.02 X10*3/UL (ref 0–0.1)
BASOPHILS NFR BLD AUTO: 0.2 %
BILIRUB UR STRIP.AUTO-MCNC: NEGATIVE MG/DL
BUN SERPL-MCNC: 9 MG/DL (ref 6–23)
BUN SERPL-MCNC: 9 MG/DL (ref 6–23)
CALCIUM SERPL-MCNC: 8.6 MG/DL (ref 8.6–10.3)
CALCIUM SERPL-MCNC: 9 MG/DL (ref 8.6–10.3)
CHLORIDE SERPL-SCNC: 100 MMOL/L (ref 98–107)
CHLORIDE SERPL-SCNC: 99 MMOL/L (ref 98–107)
CO2 SERPL-SCNC: 27 MMOL/L (ref 21–32)
CO2 SERPL-SCNC: 29 MMOL/L (ref 21–32)
COLOR UR: ABNORMAL
CREAT SERPL-MCNC: 1.06 MG/DL (ref 0.5–1.05)
CREAT SERPL-MCNC: 1.14 MG/DL (ref 0.5–1.05)
EGFRCR SERPLBLD CKD-EPI 2021: 51 ML/MIN/1.73M*2
EGFRCR SERPLBLD CKD-EPI 2021: 55 ML/MIN/1.73M*2
EOSINOPHIL # BLD AUTO: 0.05 X10*3/UL (ref 0–0.4)
EOSINOPHIL NFR BLD AUTO: 0.5 %
ERYTHROCYTE [DISTWIDTH] IN BLOOD BY AUTOMATED COUNT: 18.3 % (ref 11.5–14.5)
ERYTHROCYTE [DISTWIDTH] IN BLOOD BY AUTOMATED COUNT: 18.4 % (ref 11.5–14.5)
GLUCOSE SERPL-MCNC: 102 MG/DL (ref 74–99)
GLUCOSE SERPL-MCNC: 116 MG/DL (ref 74–99)
GLUCOSE UR STRIP.AUTO-MCNC: NORMAL MG/DL
HCT VFR BLD AUTO: 41.2 % (ref 36–46)
HCT VFR BLD AUTO: 43.3 % (ref 36–46)
HGB BLD-MCNC: 12.7 G/DL (ref 12–16)
HGB BLD-MCNC: 13.7 G/DL (ref 12–16)
IMM GRANULOCYTES # BLD AUTO: 0.02 X10*3/UL (ref 0–0.5)
IMM GRANULOCYTES NFR BLD AUTO: 0.2 % (ref 0–0.9)
KETONES UR STRIP.AUTO-MCNC: NEGATIVE MG/DL
LEUKOCYTE ESTERASE UR QL STRIP.AUTO: ABNORMAL
LYMPHOCYTES # BLD AUTO: 1.78 X10*3/UL (ref 0.8–3)
LYMPHOCYTES NFR BLD AUTO: 19.2 %
MCH RBC QN AUTO: 29.4 PG (ref 26–34)
MCH RBC QN AUTO: 29.7 PG (ref 26–34)
MCHC RBC AUTO-ENTMCNC: 30.8 G/DL (ref 32–36)
MCHC RBC AUTO-ENTMCNC: 31.6 G/DL (ref 32–36)
MCV RBC AUTO: 94 FL (ref 80–100)
MCV RBC AUTO: 95 FL (ref 80–100)
MONOCYTES # BLD AUTO: 0.52 X10*3/UL (ref 0.05–0.8)
MONOCYTES NFR BLD AUTO: 5.6 %
MUCOUS THREADS #/AREA URNS AUTO: ABNORMAL /LPF
NEUTROPHILS # BLD AUTO: 6.87 X10*3/UL (ref 1.6–5.5)
NEUTROPHILS NFR BLD AUTO: 74.3 %
NITRITE UR QL STRIP.AUTO: NEGATIVE
NRBC BLD-RTO: 0 /100 WBCS (ref 0–0)
NRBC BLD-RTO: 0 /100 WBCS (ref 0–0)
PH UR STRIP.AUTO: 6.5 [PH]
PLATELET # BLD AUTO: 192 X10*3/UL (ref 150–450)
PLATELET # BLD AUTO: 193 X10*3/UL (ref 150–450)
POTASSIUM SERPL-SCNC: 3.3 MMOL/L (ref 3.5–5.3)
POTASSIUM SERPL-SCNC: 3.6 MMOL/L (ref 3.5–5.3)
PROT UR STRIP.AUTO-MCNC: ABNORMAL MG/DL
RBC # BLD AUTO: 4.32 X10*6/UL (ref 4–5.2)
RBC # BLD AUTO: 4.62 X10*6/UL (ref 4–5.2)
RBC # UR STRIP.AUTO: ABNORMAL MG/DL
RBC #/AREA URNS AUTO: >20 /HPF
SODIUM SERPL-SCNC: 134 MMOL/L (ref 136–145)
SODIUM SERPL-SCNC: 134 MMOL/L (ref 136–145)
SP GR UR STRIP.AUTO: 1
SQUAMOUS #/AREA URNS AUTO: ABNORMAL /HPF
UROBILINOGEN UR STRIP.AUTO-MCNC: NORMAL MG/DL
WBC # BLD AUTO: 6.9 X10*3/UL (ref 4.4–11.3)
WBC # BLD AUTO: 9.3 X10*3/UL (ref 4.4–11.3)
WBC #/AREA URNS AUTO: ABNORMAL /HPF

## 2025-05-12 PROCEDURE — 94640 AIRWAY INHALATION TREATMENT: CPT

## 2025-05-12 PROCEDURE — 2500000004 HC RX 250 GENERAL PHARMACY W/ HCPCS (ALT 636 FOR OP/ED): Performed by: STUDENT IN AN ORGANIZED HEALTH CARE EDUCATION/TRAINING PROGRAM

## 2025-05-12 PROCEDURE — 85027 COMPLETE CBC AUTOMATED: CPT | Performed by: STUDENT IN AN ORGANIZED HEALTH CARE EDUCATION/TRAINING PROGRAM

## 2025-05-12 PROCEDURE — 9420000001 HC RT PATIENT EDUCATION 5 MIN

## 2025-05-12 PROCEDURE — 85025 COMPLETE CBC W/AUTO DIFF WBC: CPT

## 2025-05-12 PROCEDURE — 2500000002 HC RX 250 W HCPCS SELF ADMINISTERED DRUGS (ALT 637 FOR MEDICARE OP, ALT 636 FOR OP/ED): Performed by: INTERNAL MEDICINE

## 2025-05-12 PROCEDURE — 36415 COLL VENOUS BLD VENIPUNCTURE: CPT | Performed by: STUDENT IN AN ORGANIZED HEALTH CARE EDUCATION/TRAINING PROGRAM

## 2025-05-12 PROCEDURE — 2500000005 HC RX 250 GENERAL PHARMACY W/O HCPCS: Performed by: STUDENT IN AN ORGANIZED HEALTH CARE EDUCATION/TRAINING PROGRAM

## 2025-05-12 PROCEDURE — 2500000002 HC RX 250 W HCPCS SELF ADMINISTERED DRUGS (ALT 637 FOR MEDICARE OP, ALT 636 FOR OP/ED): Performed by: STUDENT IN AN ORGANIZED HEALTH CARE EDUCATION/TRAINING PROGRAM

## 2025-05-12 PROCEDURE — 87086 URINE CULTURE/COLONY COUNT: CPT | Mod: TRILAB

## 2025-05-12 PROCEDURE — 2500000001 HC RX 250 WO HCPCS SELF ADMINISTERED DRUGS (ALT 637 FOR MEDICARE OP): Performed by: STUDENT IN AN ORGANIZED HEALTH CARE EDUCATION/TRAINING PROGRAM

## 2025-05-12 PROCEDURE — 99232 SBSQ HOSP IP/OBS MODERATE 35: CPT | Performed by: OBSTETRICS & GYNECOLOGY

## 2025-05-12 PROCEDURE — 2500000001 HC RX 250 WO HCPCS SELF ADMINISTERED DRUGS (ALT 637 FOR MEDICARE OP): Performed by: INTERNAL MEDICINE

## 2025-05-12 PROCEDURE — 80048 BASIC METABOLIC PNL TOTAL CA: CPT

## 2025-05-12 PROCEDURE — 99283 EMERGENCY DEPT VISIT LOW MDM: CPT

## 2025-05-12 PROCEDURE — 36415 COLL VENOUS BLD VENIPUNCTURE: CPT

## 2025-05-12 PROCEDURE — 81001 URINALYSIS AUTO W/SCOPE: CPT

## 2025-05-12 PROCEDURE — 80048 BASIC METABOLIC PNL TOTAL CA: CPT | Performed by: STUDENT IN AN ORGANIZED HEALTH CARE EDUCATION/TRAINING PROGRAM

## 2025-05-12 PROCEDURE — 99239 HOSP IP/OBS DSCHRG MGMT >30: CPT | Performed by: INTERNAL MEDICINE

## 2025-05-12 PROCEDURE — 51702 INSERT TEMP BLADDER CATH: CPT

## 2025-05-12 RX ORDER — CEPHALEXIN 250 MG/1
250 CAPSULE ORAL NIGHTLY
Qty: 14 CAPSULE | Refills: 0 | Status: SHIPPED | OUTPATIENT
Start: 2025-05-12 | End: 2025-05-26

## 2025-05-12 RX ORDER — BISOPROLOL FUMARATE 5 MG/1
2.5 TABLET, FILM COATED ORAL DAILY
Qty: 15 TABLET | Refills: 0 | Status: SHIPPED | OUTPATIENT
Start: 2025-05-13 | End: 2025-06-12

## 2025-05-12 RX ORDER — ALBUTEROL SULFATE 90 UG/1
2 INHALANT RESPIRATORY (INHALATION) DAILY PRN
Start: 2025-05-12

## 2025-05-12 RX ORDER — SPIRONOLACTONE 25 MG/1
12.5 TABLET ORAL 2 TIMES DAILY
Qty: 30 TABLET | Refills: 0 | Status: SHIPPED | OUTPATIENT
Start: 2025-05-12 | End: 2025-06-11

## 2025-05-12 RX ORDER — DILTIAZEM HYDROCHLORIDE 90 MG/1
90 TABLET, FILM COATED ORAL EVERY 8 HOURS
Qty: 90 TABLET | Refills: 0 | Status: SHIPPED | OUTPATIENT
Start: 2025-05-12 | End: 2025-06-11

## 2025-05-12 RX ADMIN — APIXABAN 5 MG: 5 TABLET, FILM COATED ORAL at 08:44

## 2025-05-12 RX ADMIN — DILTIAZEM HYDROCHLORIDE 90 MG: 60 TABLET, FILM COATED ORAL at 06:35

## 2025-05-12 RX ADMIN — ROPINIROLE 1 MG: 1 TABLET, FILM COATED ORAL at 08:44

## 2025-05-12 RX ADMIN — BISOPROLOL FUMARATE 2.5 MG: 5 TABLET ORAL at 08:44

## 2025-05-12 RX ADMIN — ACETYLCYSTEINE 600 MG: 200 SOLUTION ORAL; RESPIRATORY (INHALATION) at 07:36

## 2025-05-12 RX ADMIN — Medication 1 APPLICATION: at 09:31

## 2025-05-12 RX ADMIN — SPIRONOLACTONE 12.5 MG: 25 TABLET ORAL at 08:44

## 2025-05-12 RX ADMIN — FERROUS SULFATE TAB 325 MG (65 MG ELEMENTAL FE) 325 MG: 325 (65 FE) TAB at 08:44

## 2025-05-12 RX ADMIN — OXYCODONE HYDROCHLORIDE 5 MG: 5 TABLET ORAL at 03:36

## 2025-05-12 RX ADMIN — ATORVASTATIN CALCIUM 10 MG: 10 TABLET, FILM COATED ORAL at 08:44

## 2025-05-12 RX ADMIN — IPRATROPIUM BROMIDE AND ALBUTEROL SULFATE 3 ML: 2.5; .5 SOLUTION RESPIRATORY (INHALATION) at 07:36

## 2025-05-12 RX ADMIN — Medication 2 L/MIN: at 07:38

## 2025-05-12 ASSESSMENT — COGNITIVE AND FUNCTIONAL STATUS - GENERAL
MOBILITY SCORE: 23
DAILY ACTIVITIY SCORE: 24
CLIMB 3 TO 5 STEPS WITH RAILING: A LITTLE

## 2025-05-12 ASSESSMENT — PAIN SCALES - GENERAL
PAINLEVEL_OUTOF10: 0 - NO PAIN
PAINLEVEL_OUTOF10: 7
PAINLEVEL_OUTOF10: 7

## 2025-05-12 ASSESSMENT — PAIN DESCRIPTION - PAIN TYPE: TYPE: ACUTE PAIN

## 2025-05-12 ASSESSMENT — PAIN DESCRIPTION - FREQUENCY: FREQUENCY: CONSTANT/CONTINUOUS

## 2025-05-12 ASSESSMENT — COLUMBIA-SUICIDE SEVERITY RATING SCALE - C-SSRS
2. HAVE YOU ACTUALLY HAD ANY THOUGHTS OF KILLING YOURSELF?: NO
6. HAVE YOU EVER DONE ANYTHING, STARTED TO DO ANYTHING, OR PREPARED TO DO ANYTHING TO END YOUR LIFE?: NO
1. IN THE PAST MONTH, HAVE YOU WISHED YOU WERE DEAD OR WISHED YOU COULD GO TO SLEEP AND NOT WAKE UP?: NO

## 2025-05-12 ASSESSMENT — PAIN DESCRIPTION - DESCRIPTORS: DESCRIPTORS: BURNING

## 2025-05-12 ASSESSMENT — PAIN DESCRIPTION - LOCATION: LOCATION: GROIN

## 2025-05-12 ASSESSMENT — PAIN - FUNCTIONAL ASSESSMENT
PAIN_FUNCTIONAL_ASSESSMENT: 0-10
PAIN_FUNCTIONAL_ASSESSMENT: 0-10

## 2025-05-12 NOTE — DISCHARGE SUMMARY
Discharge Diagnosis  Problem List[1]  A-fib RVR  Rectovaginal uterine and bladder prolapse  COPD    Issues Requiring Follow-Up  Follow-up with pelvic  on May 20 most important follow-up  Follow-up lab work later this week  Follow-up with cardiologist Dr. Rubin for further adjustment of your medications  Stop taking Eliquis in case of significant bleeding from prolapsed organs    Discharge Meds     Your medication list        START taking these medications        Instructions Last Dose Given Next Dose Due   bisoprolol 5 mg tablet  Commonly known as: Zebeta  Start taking on: May 13, 2025      Take 0.5 tablets (2.5 mg) by mouth once daily.       dilTIAZem 90 mg immediate release tablet  Commonly known as: Cardizem  Replaces: dilTIAZem  mg 24 hr capsule      Take 1 tablet (90 mg) by mouth every 8 hours.       emollient combination no.61 cream      Apply 1 Application topically 3 times a day.       spironolactone 25 mg tablet  Commonly known as: Aldactone      Take 0.5 tablets (12.5 mg) by mouth 2 times a day.              CONTINUE taking these medications        Instructions Last Dose Given Next Dose Due   acetaminophen 325 mg tablet  Commonly known as: Tylenol      Take 2 tablets (650 mg) by mouth every 4 hours if needed for mild pain (1 - 3) or fever (temp greater than 38.0 C).       albuterol 90 mcg/actuation inhaler      Inhale 1 puff every 6 hours if needed for shortness of breath.       albuterol 2.5 mg /3 mL (0.083 %) nebulizer solution      Inhale contents of 1 vial with nebulizer every 4 hours as needed for shortness of breath       albuterol 90 mcg/actuation inhaler      Inhale 2 puffs once daily as needed for shortness of breath.       apixaban 5 mg tablet  Commonly known as: Eliquis      Take 1 tablet (5 mg) by mouth every 12 hours.       atorvastatin 10 mg tablet  Commonly known as: Lipitor           cholecalciferol 50 mcg (2,000 units) capsule  Commonly known as: Vitamin D3      Take  2 capsules (100 mcg) by mouth once daily.       cyanocobalamin 500 mcg tablet  Commonly known as: Vitamin B-12      Take 1 tablet (500 mcg) by mouth once daily.       ferrous sulfate 325 mg (65 mg elemental) tablet      Take 1 tablet (325 mg) by mouth 3 times daily (morning, midday, late afternoon). Do not crush, chew, or split.       oxygen gas therapy  Commonly known as: O2           rOPINIRole 1 mg tablet  Commonly known as: Requip      Take 1 tablet (1 mg) by mouth 3 times a day.       rosuvastatin 5 mg tablet  Commonly known as: Crestor      Take 1 tablet (5 mg) by mouth once daily.       traZODone 50 mg tablet  Commonly known as: Desyrel      Take 2 tablets (100 mg) by mouth as needed at bedtime for sleep.              STOP taking these medications      amoxicillin-clavulanate 875-125 mg tablet  Commonly known as: Augmentin        bumetanide 1 mg tablet  Commonly known as: Bumex        dilTIAZem  mg 24 hr capsule  Commonly known as: Cardizem CD  Replaced by: dilTIAZem 90 mg immediate release tablet        lisinopril 2.5 mg tablet        potassium chloride CR 10 mEq ER tablet  Commonly known as: Klor-Con                  Where to Get Your Medications        These medications were sent to [id number] [address] [address]      Phone: [phone]   bisoprolol 5 mg tablet  dilTIAZem 90 mg immediate release tablet  emollient combination no.61 cream  spironolactone 25 mg tablet       Information about where to get these medications is not yet available    Ask your nurse or doctor about these medications  albuterol 90 mcg/actuation inhaler         Test Results Pending At Discharge  Pending Labs       Order Current Status    Extra Urine Gray Tube Collected (05/08/25 6878)    Tissue/Wound Culture/Smear In process    Urinalysis with Reflex Culture and Microscopic In process            Hospital Course  Patient  with significant chronic pelvic floor prolapse presents with A-fib RVR.  This is quickly controlled and cardiology adjust medications.  Per my discussion with Dr. Rubin we will discharge today and continue to Eliquis new bisoprolol new form of diltiazem and new spironolactone all started by Dr. Rubin.  He will follow-up with her for further adjustment medications in his office.  The patient has had years long of problems with prolapsed vaginal organs.  Initial CAT scan demonstrated bilateral hydronephrosis she was seen by urology who recommended outpatient follow-up.  I did speak with urologist myself he admitted that he would defer further consult to the gynecology.  Dr. Eisenberg saw the patient in gynecology and was very helpful in addressing local care finding an appointment with a pelvic  for the patient.  She recommended Barclay catheter which was inserted, urinalysis was negative for infection, she also recommended daily small dose Keflex to prevent infection.  She took a culture from the wound of the prolapsed structure which is currently still pending.  Nursing will provide the patient with emollients and dressing supplies and Barclay bags.  I will arrange for outpatient labs and home care nurse visit.  I did talk to her son on the phone and gave him an update.  Of note, the patient complained of poor appetite but did not want to stay for any further workup of that.  There have been no documented episodes of vomiting.  She also declined any pain medication prescriptions stating that she is not in pain currently    Pertinent Physical Exam At Time of Discharge  Alert oriented x 3 cooperative on room air  Vital signs reviewed  Normocephalic/atraumatic EOMI PERRLA  Chest clear  Heart irregular heart rate in the 60s  Abdomen soft nontender  Extremities no edema  Neurologic exam nonfocal    Labs from today reviewed  Outpatient Follow-Up  Per chart    Time spent on discharge arrangement:  45  minutes    Juancarlos Cullen MD         [1]   Patient Active Problem List  Diagnosis    Primary hypertension    A-fib (Multi)    Chronic bronchitis, unspecified chronic bronchitis type (Multi)    Stage 3a chronic kidney disease (Multi)    Acute suppurative otitis media without spontaneous rupture of ear drum    Acute sinusitis    Chronic diastolic CHF (congestive heart failure)    Hypoxia    Arrhythmia    PMR (polymyalgia rheumatica) (Multi)    Atrial fibrillation with RVR (Multi)    Difficulty urinating    Hypokalemia    Restless leg syndrome    Atrial fibrillation with rapid ventricular response (Multi)    Uterine procidentia    Cystocele with rectocele    Bilateral hydronephrosis    Ureteral dilatation    Erosion of vagina

## 2025-05-12 NOTE — DISCHARGE INSTRUCTIONS
- Apply coconut oil cream to wound and uterus with gloves rubbing completely for at least 50-60 seconds.  Apply the pre- moisturized gauze once or twice a day to keep wound covered.    - Maintain Barclay/leg bag as directed.  Call our office if you develop a problem before seen by urogynecology at [phone]    - Take Keflex (cephalexin) 250mg at bedtime daily until seen by urogynecology to help reduce risk of UTI  The above instructions from the gynecologist Dr Eisenberg.    From the hospitalist Dr. Cullen; please stop taking Eliquis in case of significant bleeding from your prolapsed uterus and bladder; I have ordered follow-up lab work for you you can go to any  facility to get it done, this is to check on your kidney function and your blood counts; results should be available through your primary care physician or through  [facility]

## 2025-05-12 NOTE — TELEPHONE ENCOUNTER
"SCOTT Motta - Patient phoned office, reported she just discharged from [facility], she  requested a [facility] visit. She reported she was sent home with a catheter but she does not believe it is the right size. She was instructed on wound care but feels she will have difficulty managing this. She also noted she needs follow up lab work drawn r/t her creatinine level. She reported she did not feel ready to discharge home but \"the doctor said if they kept me they were going to run more tests\". Patient was tearful, reassured. Informed a referral was placed for [facility] and they will contacting her for SOC. [facility] Calls visit scheduled with you 5/13/25 at 3:00 pm.   Per DC summary: Per my discussion with Dr. Rubin we will discharge today and continue to Eliquis new bisoprolol new form of diltiazem and new spironolactone all started by Dr. Rubin. Barclay catheter which was inserted, urinalysis was negative for infection, she also recommended daily small dose Keflex to prevent infection. She took a culture from the wound of the prolapsed structure which is currently still pending. Nursing will provide the patient with emollients and dressing supplies and Barclay bags. Of note, the patient complained of poor appetite but did not want to stay for any further workup of that.  There have been no documented episodes of vomiting.  She also declined any pain medication prescriptions stating that she is not in pain currently.   "

## 2025-05-12 NOTE — ASSESSMENT & PLAN NOTE
-Vaginal mucosal care reviewed with patient and nurse.  Continue coconut oil/cream application 2 to 3 times a day, cover with moisturizing gauze.

## 2025-05-12 NOTE — HH CARE COORDINATION
Home Care received a Referral for Nursing. We have processed the referral for a Start of Care on 5/14-5/15/25 .     If you have any questions or concerns, please feel free to contact us at [phone]. Follow the prompts, enter your five digit zip code, and you will be directed to your care team on [facility] 1.

## 2025-05-12 NOTE — CONSULTS
Wound Care Consult     Visit Date: 5/12/2025      Patient Name: Constance M Kocher         MRN: [id number]           YOB: 1950    Consulted for wound care. A 74 y.o. female patient admitted for   Nausea [R11.0]  Flank pain [R10.9]  Atrial fibrillation with rapid ventricular response (Multi) [I48.91]  Atrial fibrillation with RVR (Multi) [I48.91]   Medical History[1]   Surgical History[2]   Scheduled medications  Scheduled Medications[3]  Continuous medications  Continuous Medications[4]  PRN medications  PRN Medications[5]     Allergies[6]     No results found for the last 90 days.         Pertinent Labs:   Albumin   Date Value Ref Range Status   05/08/2025 4.2 3.4 - 5.0 g/dL Final       Wound Assessment:     Reason for Consult: wound evaluation and recommendations.         Wound History: present on admission    Wound Team Assessment: Reviewed record and discussed case with nursing, Cystocele with rectocele and wound on mucus membrane. Wound was cultured. GARRETT Eisenberg DO Gynecology notes state to coat wound with coconut oil, cover in petroleum gauze, and wap with kerlix. Nursing states discharge intended for today, appointments for urology as outpatient. Nursing has wound care supplies to provide to patient at discharge. No further wound care needs at this time     Recommendations: follow providers orders.      Patient is on a [facility] bed frame with [employer] Mattress. Joe score is 19. Genia Solis RN  bedside nurse updated, continue pressure injury prevention interventions and nursing to continue to follow provider orders. Re consult wound RN PRN.    Johnna Barton BSN RN [facility]  5/12/2025  12:15 PM         [1]   Past Medical History:  Diagnosis Date    A-fib (Multi)     Atrial fibrillation with rapid ventricular response (Multi) 03/27/2024    Carotid stenosis     Chronic diastolic CHF (congestive heart failure) 04/24/2024    Dyspnea 03/27/2024    Essential hypertension 10/20/2009    Last  Assessment & Plan: Formatting of this note might be different from the original. -Uncontrolled -states she has not done well with meds in the past, but willing to try restarting something given how high her BP is.   -will start her on losartan 50 mg daily and see if she is able to tolerate this. -encouraged to watch salt in her diet and work on weight loss -risk of heart attack and stroke rev    Fatigue 10/19/2023    Last Assessment & Plan: Formatting of this note might be different from the original. -checking blood work including CBC and TSH    GERD (gastroesophageal reflux disease)     Hyperlipidemia 02/12/2014    Last Assessment & Plan: Formatting of this note might be different from the original. -updating lipid panel    Hypokalemia 10/19/2023    Insomnia 03/09/2012    Murmur 10/20/2009    PMR (polymyalgia rheumatica) (Multi)     Polymyalgia rheumatica (Multi)     Primary hypertension 10/20/2009    Last Assessment & Plan: Formatting of this note might be different from the original. -Uncontrolled -states she has not done well with meds in the past, but willing to try restarting something given how high her BP is.   -will start her on losartan 50 mg daily and see if she is able to tolerate this. -encouraged to watch salt in her diet and work on weight loss -risk of heart attack and stroke rev   [2] No past surgical history on file.  [3] acetylcysteine, 3 mL, nebulization, BID  apixaban, 5 mg, oral, BID  atorvastatin, 10 mg, oral, Daily  bisoprolol, 2.5 mg, oral, Daily  dilTIAZem, 90 mg, oral, q8h  emollient combination no.61, 1 Application, topical (top), TID  ferrous sulfate, 65 mg of iron, oral, Daily with breakfast  ipratropium-albuteroL, 3 mL, nebulization, TID  oxygen, , inhalation, Continuous - Inhalation  perflutren lipid microspheres, 2 mL of dilution, intravenous, Once in imaging  rOPINIRole, 1 mg, oral, TID  spironolactone, 12.5 mg, oral, BID  [4]    [5] PRN medications: acetaminophen,  codeine-guaifenesin, ipratropium-albuteroL, ondansetron ODT **OR** ondansetron, oxyCODONE, traMADol, traZODone  [6]   Allergies  Allergen Reactions    Aller-Chlor Decongestant Anaphylaxis    Iodinated Contrast Media Anaphylaxis    Procaine Hcl Cardiac arrhythmia/arrest     Per patient    Propofol Anaphylaxis

## 2025-05-12 NOTE — PROGRESS NOTES
"Antepartum Progress Note    Assessment/Plan   Constance M Kocher is a 74 y.o. No obstetric history on file. at Unknown. CONSUELO: Not found..         Assessment & Plan  Uterine procidentia    Cystocele with rectocele    Bilateral hydronephrosis    Ureteral dilatation    -Got her in with Dr. Palumbo, 5/20 at 10am, the first available Urogyn.   -May remove handy before discharge if not desired in for that time to appt.  Otherwise, please consider keeping the leg bag as it is draining completely, less stress on urinary system.  -Recommending Keflex 250mg  at bedtime daily until catheter removal by specialist. (Macrobid contraindicated due to PD/HD,  and CRCL <60 is a not defined for use.    Erosion of vagina  -Vaginal mucosal care reviewed with patient and nurse.  Continue coconut oil/cream application 2 to 3 times a day, cover with moisturizing gauze.  -Culture pending from collection yesterday. Low suspicion.      Subjective   Patient has no new complaints.  Chronic low appetite and nausea. Chronic flank pain. No F/C.  Chronic procidentia, present for 35 yrs per patient.  Patient now reports that she is allergic to ALL \"Cains\" causing cardiac arrest.     Objective   Allergies:   Aller-chlor decongestant, Iodinated contrast media, Procaine hcl, and Propofol    Last Vitals:  Temp Pulse Resp BP MAP Pulse Ox   36.8 °C (98.2 °F) 67 19 108/77 88 95 %     Vitals Min/Max Last 24 Hours:  Temp  Min: 35.9 °C (96.6 °F)  Max: 36.8 °C (98.2 °F)  Pulse  Min: 67  Max: 85  Resp  Min: 14  Max: 27  BP  Min: 100/68  Max: 113/69  MAP (mmHg)  Min: 79  Max: 88    Intake/Output:     Intake/Output Summary (Last 24 hours) at 5/12/2025 1031  Last data filed at 5/12/2025 0853  Gross per 24 hour   Intake --   Output 1325 ml   Net -1325 ml       Physical Exam:  GENERAL:   General Appearance:  well-developed, obese  no functional handicap, well-groomed.  Hygiene:  good.  Ill-appearance:  none.    HEENT: NC/AT head.  Neck is supple.  Speech:  clear.  " Eye contact:  normal.  LUNGS:  Effort: normal. NC O2 in place. Productive cough.  : No bladder tenderness. Uterine procidentia with cystocele and rectocele - reduced x 2 during admission. Vaginal mucosal erosion, shaped like a half moon or peanut, 3-4 inches in length btw 3:00 and 6:00  EXTREMITIES: Normal:  no anomalies.  Edema:  none.    NEUROLOGICAL: Orientation:  alert and oriented x 3.   PSYCHOLOGY: Affect:  appropriate.  Mood:  pleasant. Cooperative.      Chaperone Present: Yes.  Chaperone Name/Title: Nurse  Examination Chaperoned: Entire Physical Exam    30 Minutes spent with the patient with greater than 50% of the time used for counseling and coordination of care.

## 2025-05-12 NOTE — PROGRESS NOTES
05/12/25 1218   Discharge Planning   Home or Post Acute Services In home services  ([facility] referral in review.  Awaiting updates/SOC.)   Type of Home Care Services Home nursing visits   Expected Discharge Disposition Home H   Does the patient need discharge transport arranged? No     1620  Per [facility], SOC processed for 5/14/25 - 5/15/25.

## 2025-05-12 NOTE — TELEPHONE ENCOUNTER
1630 Spoke with pt who is havign large amounts of blood with clots in her handy bag and pt is very concerned. Pt instructed to go to the ER for evaluation. Pt agreeable and will go now.   Ángela Rowell, APRN-CNP

## 2025-05-12 NOTE — CARE PLAN
The patient's goals for the shift include rest    The clinical goals for the shift include maintain hemodynamic stability

## 2025-05-13 ENCOUNTER — OFFICE VISIT (OUTPATIENT)
Dept: OBSTETRICS AND GYNECOLOGY | Facility: CLINIC | Age: 75
End: 2025-05-13
Payer: MEDICARE

## 2025-05-13 ENCOUNTER — PATIENT OUTREACH (OUTPATIENT)
Dept: PRIMARY CARE | Facility: CLINIC | Age: 75
End: 2025-05-13

## 2025-05-13 ENCOUNTER — OFFICE VISIT (OUTPATIENT)
Dept: PRIMARY CARE | Facility: CLINIC | Age: 75
End: 2025-05-13
Payer: MEDICARE

## 2025-05-13 VITALS
DIASTOLIC BLOOD PRESSURE: 60 MMHG | TEMPERATURE: 98.2 F | WEIGHT: 171.9 LBS | SYSTOLIC BLOOD PRESSURE: 130 MMHG | BODY MASS INDEX: 29.51 KG/M2 | HEART RATE: 50 BPM | OXYGEN SATURATION: 95 % | RESPIRATION RATE: 20 BRPM

## 2025-05-13 VITALS — DIASTOLIC BLOOD PRESSURE: 90 MMHG | BODY MASS INDEX: 29.35 KG/M2 | SYSTOLIC BLOOD PRESSURE: 138 MMHG | WEIGHT: 171 LBS

## 2025-05-13 DIAGNOSIS — Z46.89 ENCOUNTER FOR FITTING AND ADJUSTMENT OF PESSARY: ICD-10-CM

## 2025-05-13 DIAGNOSIS — I50.32 CHRONIC DIASTOLIC CHF (CONGESTIVE HEART FAILURE): ICD-10-CM

## 2025-05-13 DIAGNOSIS — I77.9 CAROTID ARTERY DISEASE, UNSPECIFIED LATERALITY, UNSPECIFIED TYPE: ICD-10-CM

## 2025-05-13 DIAGNOSIS — J42 CHRONIC BRONCHITIS, UNSPECIFIED CHRONIC BRONCHITIS TYPE (MULTI): ICD-10-CM

## 2025-05-13 DIAGNOSIS — E87.6 HYPOKALEMIA: Primary | ICD-10-CM

## 2025-05-13 DIAGNOSIS — J41.0 SIMPLE CHRONIC BRONCHITIS (MULTI): ICD-10-CM

## 2025-05-13 DIAGNOSIS — M35.3 PMR (POLYMYALGIA RHEUMATICA) (MULTI): ICD-10-CM

## 2025-05-13 DIAGNOSIS — R31.0 GROSS HEMATURIA: ICD-10-CM

## 2025-05-13 DIAGNOSIS — R33.9 URINARY RETENTION: ICD-10-CM

## 2025-05-13 DIAGNOSIS — N81.4 UTEROVAGINAL PROLAPSE: Primary | ICD-10-CM

## 2025-05-13 DIAGNOSIS — Z86.74 HISTORY OF CARDIAC ARREST: ICD-10-CM

## 2025-05-13 DIAGNOSIS — I48.19 PERSISTENT ATRIAL FIBRILLATION (MULTI): ICD-10-CM

## 2025-05-13 PROCEDURE — 1160F RVW MEDS BY RX/DR IN RCRD: CPT | Performed by: NURSE PRACTITIONER

## 2025-05-13 PROCEDURE — 1111F DSCHRG MED/CURRENT MED MERGE: CPT | Performed by: OBSTETRICS & GYNECOLOGY

## 2025-05-13 PROCEDURE — 1159F MED LIST DOCD IN RCRD: CPT | Performed by: OBSTETRICS & GYNECOLOGY

## 2025-05-13 PROCEDURE — 99496 TRANSJ CARE MGMT HIGH F2F 7D: CPT | Performed by: NURSE PRACTITIONER

## 2025-05-13 PROCEDURE — G2211 COMPLEX E/M VISIT ADD ON: HCPCS | Performed by: OBSTETRICS & GYNECOLOGY

## 2025-05-13 PROCEDURE — 3075F SYST BP GE 130 - 139MM HG: CPT | Performed by: NURSE PRACTITIONER

## 2025-05-13 PROCEDURE — 99215 OFFICE O/P EST HI 40 MIN: CPT | Performed by: OBSTETRICS & GYNECOLOGY

## 2025-05-13 PROCEDURE — 1125F AMNT PAIN NOTED PAIN PRSNT: CPT | Performed by: NURSE PRACTITIONER

## 2025-05-13 PROCEDURE — A4561 PESSARY RUBBER, ANY TYPE: HCPCS | Performed by: OBSTETRICS & GYNECOLOGY

## 2025-05-13 PROCEDURE — 3075F SYST BP GE 130 - 139MM HG: CPT | Performed by: OBSTETRICS & GYNECOLOGY

## 2025-05-13 PROCEDURE — 3078F DIAST BP <80 MM HG: CPT | Performed by: NURSE PRACTITIONER

## 2025-05-13 PROCEDURE — 1125F AMNT PAIN NOTED PAIN PRSNT: CPT | Performed by: OBSTETRICS & GYNECOLOGY

## 2025-05-13 PROCEDURE — 1111F DSCHRG MED/CURRENT MED MERGE: CPT | Performed by: NURSE PRACTITIONER

## 2025-05-13 PROCEDURE — 3080F DIAST BP >= 90 MM HG: CPT | Performed by: OBSTETRICS & GYNECOLOGY

## 2025-05-13 PROCEDURE — 1159F MED LIST DOCD IN RCRD: CPT | Performed by: NURSE PRACTITIONER

## 2025-05-13 RX ORDER — PEN NEEDLE, DIABETIC 31 GX5/16"
1 NEEDLE, DISPOSABLE MISCELLANEOUS
Qty: 6 EACH | Refills: 3 | COMMUNITY
Start: 2025-05-13 | End: 2025-05-13

## 2025-05-13 RX ORDER — FLUTICASONE FUROATE 27.5 UG/1
1 SPRAY, METERED NASAL
Qty: 10 G | Refills: 3 | Status: SHIPPED | OUTPATIENT
Start: 2025-05-13 | End: 2026-05-13

## 2025-05-13 RX ORDER — POTASSIUM CHLORIDE 20 MEQ/1
20 TABLET, EXTENDED RELEASE ORAL DAILY
Qty: 90 TABLET | Refills: 3 | Status: SHIPPED | OUTPATIENT
Start: 2025-05-13 | End: 2026-05-13

## 2025-05-13 RX ORDER — ALBUTEROL SULFATE 0.83 MG/ML
SOLUTION RESPIRATORY (INHALATION)
Qty: 75 ML | Refills: 11 | Status: SHIPPED | OUTPATIENT
Start: 2025-05-13

## 2025-05-13 ASSESSMENT — ENCOUNTER SYMPTOMS
HEMATOLOGIC/LYMPHATIC NEGATIVE: 1
FLANK PAIN: 1
SHORTNESS OF BREATH: 1
PALPITATIONS: 1
NEUROLOGICAL NEGATIVE: 1
WEAKNESS: 1
NECK PAIN: 1
LIGHT-HEADEDNESS: 1
COUGH: 1
CONSTIPATION: 1
RESPIRATORY NEGATIVE: 1
NECK STIFFNESS: 1
EYES NEGATIVE: 1
ARTHRALGIAS: 1
PSYCHIATRIC NEGATIVE: 1
BACK PAIN: 1
APPETITE CHANGE: 1
GASTROINTESTINAL NEGATIVE: 1
ACTIVITY CHANGE: 1
CARDIOVASCULAR NEGATIVE: 1
HEMATURIA: 1
PSYCHIATRIC NEGATIVE: 1
CONSTITUTIONAL NEGATIVE: 1
FATIGUE: 1
DYSURIA: 1
RHINORRHEA: 1
DIFFICULTY URINATING: 1
HEADACHES: 1
ENDOCRINE NEGATIVE: 1
MUSCULOSKELETAL NEGATIVE: 1

## 2025-05-13 ASSESSMENT — PAIN SCALES - GENERAL
PAINLEVEL_OUTOF10: 10-WORST PAIN EVER
PAINLEVEL_OUTOF10: 3

## 2025-05-13 NOTE — PROGRESS NOTES
[facility] Department of Urogynecology   Rosa Ray MD, MPH   [phone]    ASSESSMENT AND PLAN:   74 year old female with a stage 4 complete uterovaginal prolapse, UUI, urinary retention, and gross hematuria. Comorbidities include: Hx of cardiac arrest, CAD, HTN, A-fib (on Eliquis), chronic diastolic HF, stage 3a CKD, and PMR.     Diagnoses:  #1 Complete uterovaginal prolapse, stage 4  #2 Encounter for fitting and adjustment of pessary  #3 Urinary retention  #4 Gross hematuria  #5 Barclay catheter in place  #6 History of cardiac arrest  #7 Carotid artery disease     Plan:  1. Uterovaginal prolapse, urinary retention, pessary fitting  - Upon exam Pop-Q demonstrated Aa: +3, Ba: +12, C: +12, Ap: -1, Bp: +12, gh: 6, pb: 3, and D: -2. The patient has a stage 4 complete uterovaginal prolapse.   - Recommended fitting her with a pessary and defer POP repair surgery due to ongoing cardiac issues. She is amenable to this plan.   - We fitted her with a #6 ring with a support pessary, which was comfortable and remained in place with standing up, bending forward, movement, positional changes, and valsalva.     2. Gross hematuria, Barclay catheter in place, urinary retention   - Plan to reassess her ability to empty her bladder and kidney function with reducing her POP with a pessary.  - Removed her Barclay catheter today to be able to completely reassess if the pessary is working to reduce her POP and improving her ability to empty her bladder.   - We discussed she may leak urine with removing the Barclay today and reducing her prolapse. She understands we can address this in a staged fashion and is amenable to wearing Depends in the meantime.     Follow up in 2 weeks with Dr. Rosa Ray or REGINA Mcgregor for new fitted pessary check and to check PVR after removing her Barclay catheter today.     Scribe Attestation  By signing my name below, Alonzo MADRID Scribe, attest that this documentation has been  prepared under the direction and in the presence of Rosa Ray MD MPH on 05/13/2025 at 10:47 AM.     Problem List Items Addressed This Visit    None  Visit Diagnoses         Uterovaginal prolapse    -  Primary      Urinary retention          Encounter for fitting and adjustment of pessary          Gross hematuria          History of cardiac arrest          Carotid artery disease, unspecified laterality, unspecified type               I spent a total of 60 minutes in face to face and non face to face time.   I Dr. Ray, personally performed the services described in the documentation as scribed in my presence and confirm it is both complete and accurate.  Rosa Ray MD, MPH, FACOG       Rosa Ray MD, MPH, FACOG     New    HISTORY OF PRESENT ILLNESS:   74 year old female presenting as a new patient for evaluation of urinary retention and uterovaginal prolapse.     Record Review:   - 5/8/2025 XR chest IMPRESSION: Cardiomegaly remains but the lungs appear clear with no edema or consolidation.  - 5/8/2025 CT Abdomen/Pelvis IMPRESSION: Since the prior CT of last year there is increase in the degree of rectovaginal prolapse which appears to be the cause of increasing bilateral hydroureter and hydronephrosis.  There is some bowel involvement in the prolapsed but no definite bowel obstruction or inflammatory changes are visible.  - Hospitalized and discharged on 5/12/2025. The patient saw Dr. Latriec Eisenberg in gynecology while in the hospital for urinary retention. She had a catheter in place and took Keflex 250mg until Barclay was removed for UTI ppx. Patient is on Eliquis for A-fib. Past hx of bilateral hydronephrosis - OP urology recommended her to seek urology consultation and deferred to gynecology team (Dr. Eisenberg). Recommended Barclay due to urinary retention.     Prolapse Symptoms:  - The patient has a vaginal bulge that has been present for the past x35 years.   - She is interested in treatment  options including pessary fitting and is not an ideal candidate for POP repair surgery due to her ongoing cardiac issues and reported allergy to anesthesia.   - Patient has a history of complications with anesthesia having '' 5 different times during past surgeries.  - She does not splint her POP.      Urinary Symptoms:   - The patient has a Barclay catheter in place currently and it is painful. The catheter bag often times has blood in it due to gross hematuria in the absence of taking Azo/Pyridium.   - Patient denies any other history of gross hematuria aside from her current recent episode of blood in her catheter bag.   - She stopped taking Eliquis and does have a history of stroke several years ago with several heart blockages currently; she has A-fib, CAD, HTN, and chronic diastolic CHF at present.   - Patient has history of incomplete bladder emptying.   - She wears pull-ups due to manage urinary incontinence with urge (UUI).   - No history of recurrent UTI.     Bowel Symptoms:   - Patient reports intermittent dark melena black stools and other times has normal soft/formed stools but denies seeing any hematochezia.   - Denies constipation, pushing/straining to have a BM, or having a hard stool consistency (I.e. [place] scale #1-2 stools).   - FI occurs rarely when acutely ill but otherwise is able to control her bowels.   - She is not up to date on her colonoscopy.     OBGYN History and Sexual Activity:   - , x2   - Does report having a third or fourth degree OASI with both deliveries.   - She is not currently sexually active.       Past Medical History:     Medical History[1]       Past Surgical History:     Surgical History[2]      Medications:     Prior to Admission medications    Medication Sig Start Date End Date Taking? Authorizing Provider   acetaminophen (Tylenol) 325 mg tablet Take 2 tablets (650 mg) by mouth every 4 hours if needed for mild pain (1 - 3) or fever (temp greater than 38.0  C). 4/1/24   Cecilia Rodriguez APRN-CNP   albuterol 2.5 mg /3 mL (0.083 %) nebulizer solution Inhale contents of 1 vial with nebulizer every 4 hours as needed for shortness of breath 5/13/25   TANI Godinez-CNP   albuterol 90 mcg/actuation inhaler Inhale 1 puff every 6 hours if needed for shortness of breath. 4/16/24 4/22/25  Kathryn Sneed MD   albuterol 90 mcg/actuation inhaler Inhale 2 puffs once daily as needed for shortness of breath. 5/12/25   Juancarlos Cullen MD   apixaban (Eliquis) 5 mg tablet Take 1 tablet (5 mg) by mouth every 12 hours. 4/7/25 4/7/26  Hugo Rubin MD   atorvastatin (Lipitor) 10 mg tablet Take 1 tablet (10 mg) by mouth once daily.    Karina Barr MD   bisoprolol (Zebeta) 5 mg tablet Take 0.5 tablets (2.5 mg) by mouth once daily. 5/13/25 6/12/25  Juancarlos Cullen MD   cephalexin (Keflex) 250 mg capsule Take 1 capsule (250 mg) by mouth once daily at bedtime for 14 days. 5/12/25 5/26/25  Juancarlos Cullen MD   cholecalciferol (Vitamin D3) 50 mcg (2,000 unit) capsule Take 2 capsules (100 mcg) by mouth once daily. 6/11/24   Nahun Douglas DO   cyanocobalamin (Vitamin B-12) 500 mcg tablet Take 1 tablet (500 mcg) by mouth once daily. 11/19/24 11/19/25  TANI Fonseca-CNP   dilTIAZem (Cardizem) 90 mg immediate release tablet Take 1 tablet (90 mg) by mouth every 8 hours. 5/12/25 6/11/25  Juancarlos Cullen MD   emollient combination no.61 cream Apply 1 Application topically 3 times a day. 5/12/25 5/12/26  Juancarlos Cullen MD   ferrous sulfate 325 mg (65 mg elemental) tablet Take 1 tablet (325 mg) by mouth 3 times daily (morning, midday, late afternoon). Do not crush, chew, or split. 4/17/25 4/17/26  TANI Fonseca-CNP   oxygen (O2) gas therapy Inhale 2 L/min once daily as needed (COPD, SOB). Indications: COPD    Historical Provider, MD   rOPINIRole (Requip) 1 mg tablet Take 1 tablet (1 mg) by mouth 3 times a day. 4/17/25  4/17/26  Amara Wood APRN-CNP   rosuvastatin (Crestor) 5 mg tablet Take 1 tablet (5 mg) by mouth once daily. 4/15/25 5/20/26  Amara Wood APRN-CNP   spironolactone (Aldactone) 25 mg tablet Take 0.5 tablets (12.5 mg) by mouth 2 times a day. 5/12/25 6/11/25  Juancarlos Cullen MD   traZODone (Desyrel) 50 mg tablet Take 2 tablets (100 mg) by mouth as needed at bedtime for sleep. 4/17/25 4/17/26  Amara Wood APRN-CNP   albuterol 2.5 mg /3 mL (0.083 %) nebulizer solution Inhale contents of 1 vial with nebulizer every 4 hours as needed for shortness of breath 6/11/24 5/13/25  Nahun Douglas DO   albuterol 2.5 mg /3 mL (0.083 %) nebulizer solution Inhale by nebulizer four times a day as needed 6/11/24 5/8/25     albuterol 90 mcg/actuation inhaler Inhale 2 puffs by mouth twice a day as needed for SOB  Patient taking differently: Inhale 2 puffs once daily as needed for shortness of breath. 6/11/24 5/12/25     amoxicillin-clavulanate (Augmentin) 875-125 mg tablet Take 1 tablet (875 mg) by mouth 2 times a day for 7 days. 4/22/25 5/12/25  TANI Wells-CNP   atorvastatin (Lipitor) 10 mg tablet Take 1 tablet (10 mg) by mouth once daily.  5/8/25  Historical Provider, MD   bumetanide (Bumex) 1 mg tablet Take 1 tablet (1 mg) by mouth once daily. 6/26/24 5/12/25     dilTIAZem CD (Cardizem CD) 120 mg 24 hr capsule Take 1 capsule (120 mg) by mouth 2 times a day. 6/11/24 5/12/25     fluticasone-umeclidin-vilanter (Trelegy Ellipta) 100-62.5-25 mcg blister with device Inhale 1 puff once daily.  Patient not taking: Reported on 4/22/2025 6/25/24 5/8/25  REGINA Fonseca   lisinopril 2.5 mg tablet Take 1 tablet (2.5 mg) by mouth once daily. 6/25/24 5/12/25  REGINA Fonseca   nebulizers misc Use as directed every 14 days  Patient not taking: Reported on 4/8/2025 4/3/25 5/8/25  REGINA Fonseca   ondansetron ODT (Zofran-ODT) 4 mg disintegrating tablet Dissolve 1 tablet by mouth  three times daily as needed  Patient not taking: Reported on 4/8/2025 6/26/24 5/8/25     potassium chloride CR (Klor-Con) 10 mEq ER tablet Take 1 tablet (10 mEq) by mouth once daily. Do not crush, chew, or split. 2/28/25 5/12/25  Amara Wood, APRN-CNP        ROS  Review of Systems   Constitutional: Negative.    HENT: Negative.     Eyes: Negative.    Respiratory: Negative.     Cardiovascular: Negative.    Gastrointestinal: Negative.    Endocrine: Negative.    Genitourinary:  Positive for difficulty urinating.   Musculoskeletal: Negative.    Neurological: Negative.    Psychiatric/Behavioral: Negative.            PHYSICAL EXAM:    /90   Wt 77.6 kg (171 lb)   LMP  (LMP Unknown)   BMI 29.35 kg/m²   No LMP recorded (lmp unknown). Patient is postmenopausal.    Declines chaperone for physical exam.      Well developed, well nourished, in no apparent distress.   Neurologic/Psychiatric:  Awake, Alert and Oriented times 3.  Affect normal.     GENITAL/URINARY:     External Genitalia:  The patient has normal appearing external genitalia, normal skenes and bartholins glands, and a normal hair distribution.  Her vulva is without lesions, erythema or discharge.  It is non-tender with appropriate sensation.     Urethral Meatus:  Size normal, Location normal, Lesions absent, Prolapse absent.    Urethra:  Fullness absent, Masses absent.    Bladder:  Fullness absent, Masses absent, Tenderness absent.    Vagina:  General appearance normal, Discharge absent, Lesions absent.     Cervix: Normal, no discharge.   Uterus:  normal size, mobile, and nontender  Adnexa:  normal, no masses or tenderness over the bilateral adnexa    Anus/Perineum:  Lesions absent and masses absent. No hemorrhoids.     Stress urinary incontinence not demonstrable.       Physical Exam  Genitourinary:      Vulva, bladder and urethral meatus normal.      No lesions in the vagina.      Genitourinary Comments: Thinning on posterior vaginal wall present.        Right Labia: No lesions.     Left Labia: No lesions.     No vaginal discharge or bleeding.      Anterior and apical vaginal prolapse present.     Moderate vaginal atrophy present.       Right Adnexa: not tender and no mass present.     Left Adnexa: not tender and no mass present.     No cervical motion tenderness.      Uterus is not enlarged, fixed or tender.      No uterine mass detected.     No urethral tenderness or mass present.      Levator ani not tender and obturator internus not tender.  POP-Q measurements were:      Aa: +3, Ba: +12, C: +12     gH: 6, pB: 3, TVL:     Ap: -1, Bp: +12, D: -2     Pelvic exam was performed with patient in the lithotomy position.         She does not have myofascial tenderness on exam.   Her highest pain score on exam is 1       Rectal exam: Deferred.       Pessary    Date/Time: 5/13/2025 11:27 AM    Performed by: Rosa Ray MD MPH  Authorized by: Rosa Ray MD MPH    Consent:     Procedural risks discussed: yes      Relevant documents present and verified: yes      Patient agrees, verbalizes understanding, and wants to proceed: yes      Consent given by:  Patient  Indication:     Indication for pessary: uterine prolapse and cystocele      POP-Q:  Aa: +3, Ba: +12, C: +12, Ap: -1, Bp: +12, gh: 6, pb: 3, and D: -2.  Outcomes:     Patient tolerance of procedure:  Tolerated well, no immediate complications    Post-procedure education provided: yes      Reviewed by: provider        Data and DIAGNOSTIC STUDIES REVIEWED   Imaging  XR chest 1 view  Result Date: 5/8/2025  Cardiomegaly remains but the lungs appear clear with no edema or consolidation.. Signed by Arnie Trejo MD    CT abdomen pelvis wo IV contrast  Result Date: 5/8/2025  Since the prior CT of last year there is increase in the degree of rectovaginal prolapse which appears to be the cause of increasing bilateral hydroureter and hydronephrosis.  There is some bowel involvement in the prolapsed but no definite  bowel obstruction or inflammatory changes are visible.. Signed by Arnie Trejo MD      Cardiology, Vascular, and Other Imaging  Transthoracic Echo (TTE) Complete  Result Date: 5/9/2025            [facility] [address], [place] [id number]             Phone [phone] TRANSTHORACIC ECHOCARDIOGRAM REPORT Patient Name:       CONSTANCE M KOCHER   Reading Physician:    [id number] Israel Olivarez MD Study Date:         5/9/2025             Ordering Provider:    [id number] BEE LONG MRN/PID:            [id number]             Fellow: Accession#:         [id number]         Nurse: Date of Birth/Age:  1950 / 74 years Sonographer:          Brittni Real RDCS Gender Assigned at  F                    Additional Staff: Birth: Height:             162.56 cm            Admit Date: Weight:             83.01 kg             Admission Status:     Inpatient -                                                                Routine BSA / BMI:          1.88 m2 / 31.41      Department Location:  [facility]                     kg/m2 Blood Pressure: 130 /82 mmHg Study Type:    TRANSTHORACIC ECHO (TTE) COMPLETE Diagnosis/ICD: Unspecified atrial fibrillation-I48.91 Indication:    Afib w/RVR CPT Codes:     Echo Complete w Full [id number] Patient History: Pertinent History: HTN, A-Fib and CHF. CKD. Study Detail: The following Echo studies were performed: 2D, M-Mode, Doppler and               color flow.  PHYSICIAN INTERPRETATION: Left Ventricle: The left ventricular systolic function is normal, with a visually estimated ejection fraction of 60%. There are no regional wall motion abnormalities. The left ventricular cavity size is normal. There is mildly increased posterior left ventricular wall thickness. There is left  ventricular concentric remodeling. Left ventricular diastolic filling cannot be determined due to atrial fibrillation/flutter. Left Atrium: The left atrial size is mildly dilated. Right Ventricle: The right ventricle is normal in size. There is normal right ventriclar wall thickness. There is normal right ventricular global systolic function. Right Atrium: The right atrial size is normal. Aortic Valve: The aortic valve is trileaflet. There is no aortic valve cusp calcification noted. There is no evidence of reduced aortic valve leaflet excursion excursion. There is no evidence of aortic valve regurgitation. The peak instantaneous gradient of the aortic valve is 5 mmHg. Mitral Valve: The mitral valve is normal in structure. There is normal mitral valve leaflet mobility. There is mild mitral annular calcification. There is moderate mitral valve regurgitation which is centrally directed. The mitral regurgitant orifice area is 2 mm2. The mitral regurgitant volume is 2.78 ml. Tricuspid Valve: The tricuspid valve is structurally normal. There is normal tricuspid valve leaflet mobility. There is trace tricuspid regurgitation. Pulmonic Valve: The pulmonic valve is structurally normal. There is trace pulmonic valve regurgitation. Pericardium: No pericardial effusion noted. There is a pericardial fat pad present. Aorta: The aortic root is normal. There is no dilatation of the aortic arch. There is no dilatation of the ascending aorta. There is no dilatation of the aortic root. Systemic Veins: The inferior vena cava appears mildly dilated.  CONCLUSIONS:  1. The left ventricular systolic function is normal, with a visually estimated ejection fraction of 60%.  2. Left ventricular diastolic filling cannot be determined due to atrial fibrillation/flutter.  3. Moderate mitral valve regurgitation.  4. The inferior vena cava appears mildly dilated. QUANTITATIVE DATA SUMMARY:  2D MEASUREMENTS:             Normal Ranges: LAs:              4.00 cm     (2.7-4.0cm) IVSd:            0.95 cm     (0.6-1.1cm) LVPWd:           1.06 cm     (0.6-1.1cm) LVIDd:           4.46 cm     (3.9-5.9cm) LVIDs:           2.86 cm LV Mass Index:   80.6 g/m2 LVEDV Index:     49.89 ml/m2 LV % FS          35.9 %  LEFT ATRIUM:                  Normal Ranges: LA Vol A4C:        89.4 ml    (22+/-6mL/m2) LA Vol A2C:        90.6 ml LA Vol BP:         90.3 ml LA Vol Index A4C:  47.5ml/m2 LA Vol Index A2C:  48.1 ml/m2 LA Vol Index BP:   47.9 ml/m2 LA Area A4C:       26.9 cm2 LA Area A2C:       27.0 cm2 LA Major Axis A4C: 6.9 cm LA Major Axis A2C: 6.8 cm LA Volume Index:   45.4 ml/m2 LA Vol A4C:        83.1 ml LA Vol A2C:        87.4 ml LA Vol Index BSA:  45.3 ml/m2  M-MODE MEASUREMENTS:         Normal Ranges: Ao Root:             3.10 cm (2.0-3.7cm)  AORTA MEASUREMENTS:         Normal Ranges: Ao Sinus, d:        3.02 cm (2.1-3.5cm)  LV SYSTOLIC FUNCTION:                      Normal Ranges: EF-A4C View:    46 % (>=55%) EF-A2C View:    44 % EF-Biplane:     45 % EF-Visual:      60 % LV EF Reported: 60 %  LV DIASTOLIC FUNCTION:           Normal Ranges: MV Peak E:             1.38 m/s  (0.7-1.2 m/s) MV e'                  0.094 m/s (>8.0) MV lateral e'          0.09 m/s MV medial e'           0.09 m/s E/e' Ratio:            14.67     (<8.0)  MITRAL VALVE:          Normal Ranges: MV DT:        155 msec (150-240msec)  MITRAL INSUFFICIENCY:             Normal Ranges: PISA Radius:          0.2 cm MR VTI:               142.00 cm MR Vmax:              494.00 cm/s MR Alias Dillon:         38.5 cm/s MR Volume:            2.78 ml MR Flow Rt:           9.68 ml/s MR EROA:              2 mm2  AORTIC VALVE:           Normal Ranges: AoV Vmax:      1.16 m/s (<=1.7m/s) AoV Peak P.4 mmHg (<20mmHg) LVOT Max Dillon:  0.94 m/s (<=1.1m/s) LVOT Diameter: 2.10 cm  (1.8-2.4cm) AoV Area,Vmax: 2.79 cm2 (2.5-4.5cm2)  RIGHT VENTRICLE: RV s' 0.09 m/s  TRICUSPID VALVE/RVSP:         Normal Ranges: IVC Diam:              2.49 cm  [id number] Israel Olivarez MD Electronically signed on 5/9/2025 at 5:24:46 PM  ** Final **     ECG 12 lead  Result Date: 5/9/2025  Atrial fibrillation with rapid ventricular response Rightward axis Cannot rule out Anterior infarct , age undetermined Abnormal ECG No previous ECGs available       Lab Results   Component Value Date    URINECULTURE SEE NOTE 04/17/2025      Lab Results   Component Value Date    GLUCOSE 116 (H) 05/12/2025    CALCIUM 9.0 05/12/2025     (L) 05/12/2025    K 3.3 (L) 05/12/2025    CO2 27 05/12/2025     05/12/2025    BUN 9 05/12/2025    CREATININE 1.06 (H) 05/12/2025     Lab Results   Component Value Date    WBC 9.3 05/12/2025    HGB 13.7 05/12/2025    HCT 43.3 05/12/2025    MCV 94 05/12/2025     05/12/2025             [1]   Past Medical History:  Diagnosis Date    A-fib (Multi)     Atrial fibrillation with rapid ventricular response (Multi) 03/27/2024    Carotid stenosis     Chronic diastolic CHF (congestive heart failure) 04/24/2024    Dyspnea 03/27/2024    Essential hypertension 10/20/2009    Last Assessment & Plan: Formatting of this note might be different from the original. -Uncontrolled -states she has not done well with meds in the past, but willing to try restarting something given how high her BP is.   -will start her on losartan 50 mg daily and see if she is able to tolerate this. -encouraged to watch salt in her diet and work on weight loss -risk of heart attack and stroke rev    Fatigue 10/19/2023    Last Assessment & Plan: Formatting of this note might be different from the original. -checking blood work including CBC and TSH    GERD (gastroesophageal reflux disease)     Hyperlipidemia 02/12/2014    Last Assessment & Plan: Formatting of this note might be different from the original. -updating lipid panel    Hypokalemia 10/19/2023    Insomnia 03/09/2012    Murmur 10/20/2009    PMR (polymyalgia rheumatica) (Multi)     Polymyalgia rheumatica (Multi)      Primary hypertension 10/20/2009    Last Assessment & Plan: Formatting of this note might be different from the original. -Uncontrolled -states she has not done well with meds in the past, but willing to try restarting something given how high her BP is.   -will start her on losartan 50 mg daily and see if she is able to tolerate this. -encouraged to watch salt in her diet and work on weight loss -risk of heart attack and stroke rev   [2] No past surgical history on file.

## 2025-05-13 NOTE — PROGRESS NOTES
Subjective   Patient ID: Constance M Kocher is a 74 y.o. female who is being seen for hospital follow up.    HPI Pt seen in single family home accompanied by family. PMHx: polymyalgia rheumatica, Afib. HTN, HLD, CHF, CKD 3a, COPD, Insomnia, prolapsed bladder.  Pt seen sitting on couch with feet dependent. Pt admitted 5/8/25 with atrial fib with RVR and rectovaginal uterine and bladder prolapse, COPD. Pt discharged with handy and was seen by gyn today to have it removed and a pessary placed. Pt had coughing jag when she got home and pessary came out. Pt reports breathing feeling horrible today. Pt with runny nose and moist productive cough white phlegm. Pt not wearing oxygen during visit. Pt to follow with Dr. Rubin for her afib. Pt with occasional CP that she blames on her cough.  Pt denies N/V/D or constipation. Pt with bowel movement every couple days. Pt with left knee pain and having difficulty putting pressure on it. Pt reports energy not great but is pushing self to get things done. Pt with poor appetite and didn't eat while in hospital, pt was able to eat today. Pt sleeping reported as great. Pt admits to FARAH. Pt states oxygen is drying out nose and requesting humidification for oxygen. Pt requesting Rolator and is sending back wheeled walker as she needs one with seat to carry portable oxygen and wants one that she can take breaks and have somewhere to sit as she get FARAH. Pt also needs nebulizer meds sent to [facility], [place] per her insurance coverage. Pt denies fever, chills, or night sweats or headaches. Pt admits to lightheadedness if gets up too fast. Pt with c/o frequent urination with some dysuria. Urine culture from ER pending. Pt went to ER last night due to hematuria and left AMA. Pt with no other concerns or complaints at present.   Home Visit medically necessary due to: pt has chronic condition that makes access to a traditional office visit very difficult, illness or condition that results in activity  limitation or restriction that impacts the ability to leave home such as; unsteady gait/ poor condition.      Review of Systems   Constitutional:  Positive for activity change, appetite change and fatigue.   HENT:  Positive for congestion, postnasal drip and rhinorrhea.    Respiratory:  Positive for cough (Moist prodcutive white) and shortness of breath (FARAH).    Cardiovascular:  Positive for palpitations.   Gastrointestinal:  Positive for constipation.   Endocrine: Positive for heat intolerance.   Genitourinary:  Positive for dysuria, flank pain, hematuria and urgency.   Musculoskeletal:  Positive for arthralgias, back pain, gait problem, neck pain and neck stiffness.   Allergic/Immunologic: Positive for environmental allergies.   Neurological:  Positive for weakness, light-headedness and headaches.   Hematological: Negative.    Psychiatric/Behavioral: Negative.         Objective   /60 (BP Location: Right arm, Patient Position: Sitting, BP Cuff Size: Adult)   Pulse 50   Temp 36.8 °C (98.2 °F) (Temporal)   Resp 20   Wt 78 kg (171 lb 14.4 oz)   LMP  (LMP Unknown)   SpO2 95%   BMI 29.51 kg/m²     Current Medications[1]      Physical Exam  Constitutional:       Appearance: She is normal weight. She is ill-appearing.   HENT:      Head: Normocephalic and atraumatic.      Nose: Congestion and rhinorrhea present.      Mouth/Throat:      Mouth: Mucous membranes are moist.      Pharynx: Oropharynx is clear.   Eyes:      Extraocular Movements: Extraocular movements intact.      Conjunctiva/sclera: Conjunctivae normal.      Pupils: Pupils are equal, round, and reactive to light.   Cardiovascular:      Rate and Rhythm: Bradycardia present. Rhythm irregular.      Pulses: Normal pulses.      Heart sounds: Normal heart sounds.   Pulmonary:      Effort: Pulmonary effort is normal.      Breath sounds: Examination of the right-upper field reveals wheezing and rhonchi. Examination of the left-upper field reveals wheezing  and rhonchi. Examination of the right-middle field reveals rhonchi. Examination of the left-middle field reveals rhonchi. Examination of the right-lower field reveals rhonchi. Examination of the left-lower field reveals rhonchi. Wheezing and rhonchi present.   Abdominal:      General: Bowel sounds are normal.      Palpations: Abdomen is soft.   Musculoskeletal:         General: Tenderness (lower extremities) present. Normal range of motion.      Cervical back: Normal range of motion and neck supple.   Skin:     General: Skin is warm and dry.      Capillary Refill: Capillary refill takes 2 to 3 seconds.      Findings: Bruising present.   Neurological:      Mental Status: She is alert and oriented to person, place, and time.      Motor: Weakness present.      Gait: Gait abnormal.   Psychiatric:         Mood and Affect: Mood normal.         Behavior: Behavior normal.         Thought Content: Thought content normal.         Judgment: Judgment normal.       Problem List[2]    Lab Results   Component Value Date    GLUCOSE 116 (H) 05/12/2025    CALCIUM 9.0 05/12/2025     (L) 05/12/2025    K 3.3 (L) 05/12/2025    CO2 27 05/12/2025     05/12/2025    BUN 9 05/12/2025    CREATININE 1.06 (H) 05/12/2025     Lab Results   Component Value Date    WBC 9.3 05/12/2025    HGB 13.7 05/12/2025    HCT 43.3 05/12/2025    MCV 94 05/12/2025     05/12/2025       Assessment/Plan   Diagnoses and all orders for this visit:  Hypokalemia  Comments:  Start Klor Con 20 meq po daily for hypokalemia.  Orders:  -     potassium chloride CR (Klor-Con M20) 20 mEq ER tablet; Take 1 tablet (20 mEq) by mouth once daily. Do not crush or chew.  Chronic bronchitis, unspecified chronic bronchitis type (Multi)  Comments:  Start Floanse one puff into wach nostril once a day for increased nasal secretions.  Continue oxygen at 2 liters per nasal cannula.   Humidification for oxygen concentrator.   Orders:  -     fluticasone (Flonase Sensimist)  27.5 mcg/actuation nasal spray; Administer 1 spray into each nostril once daily.  Simple chronic bronchitis (Multi)  Comments:  Continue Albuterol nebulaizer every 4 hours.  Continue oxygen at 2 liters per nasal cannula  Humidification for oxygen.  Chronic diastolic CHF (congestive heart failure)  Comments:  Continue Aldactone 12.5 mg po twice a day.   Continue to hold Bumex  Daily weights  Limit sodium intake.  Walker with seat ordered.  PMR (polymyalgia rheumatica) (Multi)  Comments:  Walker with seat ordered.  Persistent atrial fibrillation (Multi)  Comments:  Stable   Continue Cardizem 90 mg po every 8 hours   Continue to hold Eliquis due to hematuria.    Follow up in 6 weeks or prn.  Ángela Rowell, APRN-CNP          [1]   Current Outpatient Medications:     acetaminophen (Tylenol) 325 mg tablet, Take 2 tablets (650 mg) by mouth every 4 hours if needed for mild pain (1 - 3) or fever (temp greater than 38.0 C)., Disp: 30 tablet, Rfl: 0    albuterol 2.5 mg /3 mL (0.083 %) nebulizer solution, Inhale contents of 1 vial with nebulizer every 4 hours as needed for shortness of breath, Disp: 75 mL, Rfl: 11    albuterol 90 mcg/actuation inhaler, Inhale 2 puffs once daily as needed for shortness of breath., Disp: , Rfl:     atorvastatin (Lipitor) 10 mg tablet, Take 1 tablet (10 mg) by mouth once daily., Disp: , Rfl:     bisoprolol (Zebeta) 5 mg tablet, Take 0.5 tablets (2.5 mg) by mouth once daily., Disp: 15 tablet, Rfl: 0    cephalexin (Keflex) 250 mg capsule, Take 1 capsule (250 mg) by mouth once daily at bedtime for 14 days., Disp: 14 capsule, Rfl: 0    cholecalciferol (Vitamin D3) 50 mcg (2,000 unit) capsule, Take 2 capsules (100 mcg) by mouth once daily., Disp: 60 capsule, Rfl: 11    cyanocobalamin (Vitamin B-12) 500 mcg tablet, Take 1 tablet (500 mcg) by mouth once daily., Disp: 30 tablet, Rfl: 11    dilTIAZem (Cardizem) 90 mg immediate release tablet, Take 1 tablet (90 mg) by mouth every 8 hours., Disp: 90 tablet,  Rfl: 0    emollient combination no.61 cream, Apply 1 Application topically 3 times a day., Disp: 57 g, Rfl: 0    ferrous sulfate 325 mg (65 mg elemental) tablet, Take 1 tablet (325 mg) by mouth 3 times daily (morning, midday, late afternoon). Do not crush, chew, or split., Disp: 90 tablet, Rfl: 1    fluticasone (Flonase Sensimist) 27.5 mcg/actuation nasal spray, Administer 1 spray into each nostril once daily., Disp: 10 g, Rfl: 3    nebulizers misc, Use as directed every 14 days, Disp: 6 each, Rfl: 3    oxygen (O2) gas therapy, Inhale 2 L/min once daily as needed (COPD, SOB). Indications: COPD, Disp: , Rfl:     rOPINIRole (Requip) 1 mg tablet, Take 1 tablet (1 mg) by mouth 3 times a day., Disp: 90 tablet, Rfl: 1    rosuvastatin (Crestor) 5 mg tablet, Take 1 tablet (5 mg) by mouth once daily., Disp: 100 tablet, Rfl: 3    spironolactone (Aldactone) 25 mg tablet, Take 0.5 tablets (12.5 mg) by mouth 2 times a day., Disp: 30 tablet, Rfl: 0    traZODone (Desyrel) 50 mg tablet, Take 2 tablets (100 mg) by mouth as needed at bedtime for sleep., Disp: 60 tablet, Rfl: 0    albuterol 90 mcg/actuation inhaler, Inhale 1 puff every 6 hours if needed for shortness of breath., Disp: 18 g, Rfl: 2    apixaban (Eliquis) 5 mg tablet, Take 1 tablet (5 mg) by mouth every 12 hours. (Patient not taking: Reported on 5/13/2025), Disp: 180 tablet, Rfl: 3    potassium chloride CR (Klor-Con M20) 20 mEq ER tablet, Take 1 tablet (20 mEq) by mouth once daily. Do not crush or chew., Disp: 90 tablet, Rfl: 3  [2]   Patient Active Problem List  Diagnosis    Primary hypertension    A-fib (Multi)    Chronic bronchitis, unspecified chronic bronchitis type (Multi)    Stage 3a chronic kidney disease (Multi)    Acute suppurative otitis media without spontaneous rupture of ear drum    Acute sinusitis    Chronic diastolic CHF (congestive heart failure)    Hypoxia    Arrhythmia    PMR (polymyalgia rheumatica) (Multi)    Atrial fibrillation with RVR (Multi)     Difficulty urinating    Hypokalemia    Restless leg syndrome    Atrial fibrillation with rapid ventricular response (Multi)    Uterine procidentia    Cystocele with rectocele    Bilateral hydronephrosis    Ureteral dilatation    Erosion of vagina

## 2025-05-13 NOTE — PROGRESS NOTES
Discharge facility:    Discharge diagnosis: AFIB WITH AVR  Admission date: 5/8/25  Discharge date: 5/12/25    PCP Appointment Date: Ángela Rowell House Calls 5/13 3PM *does not want to see PCP in office only house calls for now  Specialist Appointment Date:   Hospital Encounter and Summary: Linked   ED to Hosp-Admission (Discharged) with Juancarlos Cullen MD; Timothy Gan MD (05/08/2025)     Wrap Up  Is the patient/caregiver familiar with Advance Care Planning?: Yes (5/13/2025 11:15 AM)  Would the patient like more information on Advance Care Planning?: No (5/13/2025 11:15 AM)  Wrap Up Additional Comments: Jazzy ended up in the ED yesterday was discharged with indwelling catheter which had clots in it. Today Jazzy saw gyn specialist Rosa Ray and had pessary placed. Barclay was removed. Jazzy is very happy states no more vaginal prolapse and is urinating just fine. Still needs the albuteral for neb machine Ángela states she will take care of this at visit later (5/13/2025 11:15 AM)  Call End Time: 1122 (5/13/2025 11:15 AM)    Engagement  Call Start Time: 1115 (5/13/2025 11:15 AM)    Medications  Medications reviewed with patient/caregiver?: Yes (5/13/2025 11:15 AM)  Is the patient having any side effects they believe may be caused by any medication additions or changes?: No (5/13/2025 11:15 AM)  Does the patient have all medications ordered at discharge?: Yes (5/13/2025 11:15 AM)  Prescription Comments: n/a (5/13/2025 11:15 AM)  Is the patient taking all medications as directed (includes completed medication regime)?: Yes (5/13/2025 11:15 AM)  Medication Comments: Bisoprolol 5 mg (1/2 tab) every day, Diltiazem 90 mg q 8 hrs, Spironalactone 25 mg (1/2 tab) bid  STOP:  Amox-Clav, Butetanide, Lisinipril 2.5, KCL, Diltiazem 120mg (5/13/2025 11:15 AM)    Appointments  Does the patient have a primary care provider?: Yes (5/13/2025 11:15 AM)  Care Management Interventions: Verified appointment date/time/provider  (Will not be seeing KEMAL Wood.  Ángela Rowell will be seeing pt today at 3 PM) (5/13/2025 11:15 AM)  Care Management Interventions: Advised patient to keep appointment (5/13/2025 11:15 AM)    Self Management  What is the home health agency?: n/a (5/13/2025 11:15 AM)  Has home health visited the patient within 72 hours of discharge?: Not applicable (5/13/2025 11:15 AM)  What Durable Medical Equipment (DME) was ordered?: Pt needs walker with seat and medication for nebulizer machine (5/13/2025 11:15 AM)  Has all Durable Medical Equipment (DME) been delivered?: No (Ángela will take care of orders at the appt this afternoon) (5/13/2025 11:15 AM)    Patient Teaching  Does the patient have access to their discharge instructions?: Yes (5/13/2025 11:15 AM)  Care Management Interventions: Reviewed instructions with patient (5/13/2025 11:15 AM)  What is the patient's perception of their health status since discharge?: Improving (5/13/2025 11:15 AM)  Is the patient/caregiver able to teach back the hierarchy of who to call/visit for symptoms/problems? PCP, Specialist, Home Health nurse, Urgent Care, ED, 911: Yes (5/13/2025 11:15 AM)  Patient/Caregiver Education Comments: Jazzy ended up in the ED yesterday was discharged with indwelling catheter which had clots in it.  Today Jazzy saw gyn specialist Rosa Ray and had pessary placed. Barclay was removed.  Jazzy is very happy states no more vaginal prolapse and is urinating just fine. (5/13/2025 11:15 AM)

## 2025-05-13 NOTE — ED PROVIDER NOTES
HPI   Chief Complaint   Patient presents with    Blood in Urine     Presents to ed with a c/c of blood in catheter bag. State she had it placed today here at [facility]. Was discharged today. States she has filled bag with hematuria twice        Patient is a 74-year-old female presents emergency department for evaluation of blood in catheter bag.  Patient states that she was in the hospital for several days and was just discharged this afternoon.  She states that they placed the catheter either early this morning or late last night as she has rectovaginal prolapse and sees urologist tomorrow.  She states since her discharge she has had 2 episodes of bright red blood in her catheter bag and she became concerned and presents for reevaluation.  She has some pain and fullness in her suprapubic region is ongoing since her hospitalization and not new from her discharge.  She is no longer on any blood thinning medications.  She states she is an appointment tomorrow with her urologist to have a pessary placed and for further evaluation of her prolapse.      History provided by:  Patient   used: No            Patient History   Medical History[1]  Surgical History[2]  Family History[3]  Social History[4]    Physical Exam   ED Triage Vitals   Temperature Heart Rate Respirations BP   05/12/25 1809 05/12/25 1809 05/12/25 1809 05/12/25 1811   36.9 °C (98.4 °F) 75 16 108/58      Pulse Ox Temp Source Heart Rate Source Patient Position   05/12/25 1809 05/12/25 1809 05/12/25 1809 05/12/25 1809   94 % Tympanic Monitor Sitting      BP Location FiO2 (%)     05/12/25 1809 --     Left arm        Physical Exam  Constitutional:       Appearance: Normal appearance.   Cardiovascular:      Rate and Rhythm: Normal rate and regular rhythm.   Pulmonary:      Effort: Pulmonary effort is normal.      Breath sounds: Normal breath sounds.   Abdominal:      General: Abdomen is flat.      Palpations: Abdomen is soft.      Comments: Minimal  suprapubic fullness to palpation.  Catheter bag with bright red blood with urine in catheter tube itself red-tinged.   Musculoskeletal:         General: Normal range of motion.   Skin:     General: Skin is warm and dry.   Neurological:      General: No focal deficit present.      Mental Status: She is alert and oriented to person, place, and time.           ED Course & MDM   Diagnoses as of 05/15/25 0247   Gross hematuria                 No data recorded     [place] Coma Scale Score: 15 (05/12/25 1832 : Apolonia Cee RN)                           Medical Decision Making  Patient is a 74-year-old female presents emergency department for evaluation of hematuria.    Lab work done today included BMP, CBC, urinalysis.  Lab work with renal insufficiency with no drop in hemoglobin with urinalysis showing leukocyte esterase white blood cells red blood cells bacteria.    Scans not warranted at today's visit.    Medications not given at today's visit    I saw this patient independently.  Lab work ultimately shows no drop in hemoglobin with urinalysis showing leukocyte esterase white blood cells bacteria and red blood cells.  Ultimately order was placed for bladder irrigation to see if urine begins running more clear given the catheter was just placed earlier this morning or late last night with patient has history of being on blood thinner use not longeron blood thinning medication.  Prior to bladder irrigation I and receiving received results of her lab work patient left without further care.  She left in the emergency department without further care and notifying nursing staff that she has appoint with urologist tomorrow and does not want to wait for any of her results or further care.  She left with treatment incomplete.    ** Disclaimer:  Parts of this document were written utilizing a voice to text dictation software.  Note may contain minor transcription or typographical errors that were inadvertently transcribed by  the computer software.        Procedure  Procedures       Ashlee Rios PA-C  05/13/25 2588         [1]   Past Medical History:  Diagnosis Date    A-fib (Multi)     Atrial fibrillation with rapid ventricular response (Multi) 03/27/2024    Carotid stenosis     Chronic diastolic CHF (congestive heart failure) 04/24/2024    Dyspnea 03/27/2024    Essential hypertension 10/20/2009    Last Assessment & Plan: Formatting of this note might be different from the original. -Uncontrolled -states she has not done well with meds in the past, but willing to try restarting something given how high her BP is.   -will start her on losartan 50 mg daily and see if she is able to tolerate this. -encouraged to watch salt in her diet and work on weight loss -risk of heart attack and stroke rev    Fatigue 10/19/2023    Last Assessment & Plan: Formatting of this note might be different from the original. -checking blood work including CBC and TSH    GERD (gastroesophageal reflux disease)     Hyperlipidemia 02/12/2014    Last Assessment & Plan: Formatting of this note might be different from the original. -updating lipid panel    Hypokalemia 10/19/2023    Insomnia 03/09/2012    Murmur 10/20/2009    PMR (polymyalgia rheumatica) (Multi)     Polymyalgia rheumatica (Multi)     Primary hypertension 10/20/2009    Last Assessment & Plan: Formatting of this note might be different from the original. -Uncontrolled -states she has not done well with meds in the past, but willing to try restarting something given how high her BP is.   -will start her on losartan 50 mg daily and see if she is able to tolerate this. -encouraged to watch salt in her diet and work on weight loss -risk of heart attack and stroke rev   [2] No past surgical history on file.  [3]   Family History  Problem Relation Name Age of Onset    No Known Problems Mother      No Known Problems Father     [4]   Social History  Tobacco Use    Smoking status: Former     Current packs/day:  0.00     Average packs/day: 1 pack/day for 55.0 years (55.0 ttl pk-yrs)     Types: Cigarettes     Start date: 1969     Quit date: 2024     Years since quittin.2     Passive exposure: Never    Smokeless tobacco: Never   Vaping Use    Vaping status: Former    Substances: Nicotine   Substance Use Topics    Alcohol use: Not Currently    Drug use: Never        Ashlee Rios PA-C  05/15/25 0247

## 2025-05-13 NOTE — PATIENT INSTRUCTIONS
Pessary     A pessary is a round device inserted into the vagina to support fallen or prolapsed uterus, bladder or rectum. ?It may ease your feelings of vaginal pressure or bulging. It can also be used to alleviate urinary incontinence by repositioning the problem area back to its original position. It can be a relatively simple solution to an uncomfortable problem.     There are many different types of pessaries and each is made in many sizes. ?We have worked with you today to find the correct pessary for you. ?It is not uncommon that fitting will take a few tries.      What is it made of?     Pessaries are made of soft, pliable medical grade silicon. This ensures a longer usage life and less chance of an allergic reaction.      How is it inserted?     A pessary is inserted with a small amount of lubricant much like a diaphragm. ?A properly fitted pessary should not be felt once it is place.        How do I take care of it?      You can be taught how to remove your pessary if you wish and are physically able to do so. ?If not, you can come to the clinic every three months and we will remove it for you, clean it and reinsert it.     A pessary can be removed as often as you wish or at least once every two weeks. ?Most women will notice increased vaginal discharge when leaving it in the vagina for a prolonged period of time. ?This is a normal response and unless the discharge becomes irritating or foul smelling, it is not a sign of infection. ?You may also notice the pessary will change color over time and this also is normal.      Tell your physician if you are sexually active and your provider will fit you with the appropriate type if you choose to leave the pessary in the vagina during intercourse. ?     At home, when you remove your pessary, it should be washed with mild soap and water and stored to be kept clean until you reinsert it. ?Keep pessary out overnight and replace in the morning.??The vagina is not  sterile, so soap and water are appropriate for cleaning. ?Alcohol, bleach, peroxide, or other harsh chemicals should not be used as they can irritate the vagina and cause changes in the pessary rubber. ?     ?  Will it fall out?     The pessary may fall out with straining or heavy lifting. ?If this happens, notify your physician. ?It may be that your pessary is too small or you need have a change in size if it has been some time. You can also support the pessary vaginally during a bowel movement to prevent it from coming out. If your pessary falls out, wash it with soap and water before reinserting. ?       Patient follow up:     Report inability to urinate or have bowel movement.   Report any pain in urination or bowel movements.   Report any signs of vaginal bleeding.   Report and discharge with strong odor.   Report any discomfort or pain while pessary is in place

## 2025-05-13 NOTE — PROGRESS NOTES
"Patient: Constance M Kocher  : 1950  PCP: Amara Wood, APRN-CNP  MRN: [id number]  Program: Chronic Care Management (CMS)  Status: Enrolled  Effective Dates: 2025 - present  Responsible Staff: Nimco Rg  Social Drivers to be Addressed: No information to display    Transitional Care Management  Status: Identified  Start Date: 2025  Responsible Staff: Nimco Rg  Social Drivers to be Addressed: Social Connections, Tobacco Use, Financial Resource Strain, Stress, Physical Activity         Constance M Kocher is a 74 y.o. female presenting today for follow-up after being discharged from the hospital {Numbers; 1-14:13794} days ago. The main problem requiring admission was ***. The discharge summary and/or Transitional Care Management documentation was reviewed. Medication reconciliation was performed as indicated via the \"Timothy as Reviewed\" timestamp.     Constance M Kocher was contacted by Transitional Care Management services two days after her discharge. This encounter and supporting documentation was reviewed.    Review of Systems    LMP  (LMP Unknown)     Physical Exam    The complexity of medical decision making for this patient's transitional care is {DESC; MODERATE/HIGH:06005}.    Assessment/Plan   {Assess/PlanSmartLinks:73046}    "

## 2025-05-14 ENCOUNTER — PATIENT OUTREACH (OUTPATIENT)
Dept: PRIMARY CARE | Facility: CLINIC | Age: 75
End: 2025-05-14
Payer: MEDICARE

## 2025-05-14 DIAGNOSIS — I50.32 CHRONIC DIASTOLIC CHF (CONGESTIVE HEART FAILURE): ICD-10-CM

## 2025-05-14 DIAGNOSIS — I10 HTN (HYPERTENSION), BENIGN: ICD-10-CM

## 2025-05-14 LAB
BACTERIA SPEC CULT: NORMAL
BACTERIA UR CULT: NORMAL
GRAM STN SPEC: NORMAL
GRAM STN SPEC: NORMAL

## 2025-05-14 NOTE — PROGRESS NOTES
"Care Management Monthly Outreach  Chart review completed  Confirmation of at least 2 patient identifiers  Change in insurance? No    Has patient been to ER/Urgent Care since last outreach? Yes - 5/8-12 Afib w AVR    Last Office Visit with PCP: 4/17/2025   Next Office Visit with PCP: Visit date not found   APC Collaboration: n/a    Chronic Conditions and Outreach Summary:   Chronic diastolic CHF (congestive heart failure)    HTN (hypertension), benign    Call from Jazzy states burning on urination feels she has a UTI.  Urinalysis was done 5/12 and the culture results are pending.  Told Jazzy we will need to wait for culture results to determine what antibiotic to prescribe.  Jazzy states her fluticasone cost $29 at [facility] why so expensive.  Told Jazzy she would need to call her pharmacy benefits provider to inquire.  Jazzy states she never got the albuteral neb solution from [facility] they did not have it.  Jazzy wants a BP machine her old one broke discussed calling insurance to see if this is a covered benefit.  9:18 Spoke to [facility] they said albuteral solution was picked up yesterday.  When I told Jazzy this she said \"oh yea my nephew did pick that up last evening.\"  Jazzy checked with [employer] they do not cover BP machine but she can use her quarterly spending card to purchase.  She checked with [facility] they never received rx for walker with seat.  Will follow up with Ángela on this.  0932 Spoke with [facility] at  [phone] no results yet from urine culture.  Was told results just put in contamination with gram + chana repeat culture if clinically indicated.  Message routed to Ángela TROY and update to Jazzy.  Jazzy did share that with all the coughing the pessary came out.  She wants to wait until the UTI is treated before going back to gyn to get it replaced.     IDENTIFIED ISSUES/CONCERNS:  FREQUENT ADMITS ATRIAL FIBRILLATION  5/8-14/2025 A fib with AVR  5/22-28 A FIB  3/27-4/1 A FIB  Smokes " Cigarettes  Retrovaginal prolapse with urinary retention / incontinence    [X] Get rx for walker with seat  5/13/25 done by Ángela TROY  [X] Home overnight 02 testing  1/24/25 done message to KEMAL Wood  [X] Face 2 face appt for scooter.  1/24/25 scooter obtained  [X] Referral to House Calls  5/14 active with Ángela TROY  [x] [x] [ ] REVIEW HEART FAILURE SELF MANAGEMENT PLAN  REVIEW HF MEDICATIONS  REVIEW SODIUM RESTRICTION  REVIEW FLUID RESTRICTION  REVIEW MORNING WEIGHTS  REVIEW FOR WHAT WEIGHT INCREASE TO CALL CARDIOLOGIST / PCP  REVIEW OTHER SIGNS/SYMPTOMS OF HF FLARE (SHORTNESS OF BREATH MORE THAN THE USUAL, SWELLING IN LEGS, FEET OR ABDOMEN, LOOSE SOUNDING COUGH THAT WILL NOT GO AWAY, NEEDING TO USE MORE PILLOWS IN ORDER TO BREATHE BETTER)  REVIEW IMPORTANCE OF PROMPT CALL TO CARDIOLOGIST / PCP FOR SIGNS OF FLUID OVERLOAD IN ORDER TO RECEIVE PROMPT TREATMENT AND PREVENT ED/HOSPITALIZATION  ROUTINE CARDIOLOGIST VISITS  DAILY ACTIVITY 30 MINS/DAY 5 DAYS/WK.   PACE ACITIVITES. IF EXPERIENCING SHORTNESS OF BREATH STOP AND RESTow and has cardiac follow up in July.     [X] [  ] [  ] [  ]  REVIEW ATRIAL FIBRILLATION SELF MANAGEMENT PLAN             -     MEDICATIONS (ELLIQUIS, ATENALOL, CARDIZEM)             -     AVOID ALCOHOL AND CAFFEINE             -     SMOKING CESSATION    Medications:   Are there medication changes since last visit? Yes - fluticasone  Refills needed? No    Social Drivers of Health: Addressed in the last 6 months  Care Gaps Addressed? Deferred  Care Plan addressed: Yes    Upcoming Appointments:   Future Appointments       Date / Time Provider Department Dept Phone    5/27/2025 9:30 AM Rosa Ray MD MPH [facility]     6/18/2025 3:40 PM Saran Nesbitt MD [facility] [phone]    6/25/2025 10:00 AM Ángela Rowell, APRN-CNP [facility] [phone]          Blood Pressures Reviewed  BP Readings from Last 3 Encounters:   05/13/25 130/60  "  05/13/25 138/90   05/12/25 108/58     Labs Reviewed:  Lab Results   Component Value Date    CREATININE 1.06 (H) 05/12/2025    GLUCOSE 116 (H) 05/12/2025    ALKPHOS 79 05/08/2025    K 3.3 (L) 05/12/2025    PROT 7.2 05/08/2025     (L) 05/12/2025    CALCIUM 9.0 05/12/2025    AST 17 05/08/2025    ALT 15 05/08/2025    BUN 9 05/12/2025    MG 1.60 05/08/2025     Lab Results   Component Value Date    TRIG 165 (H) 04/08/2025    CHOL 214 (H) 04/08/2025    LDLCALC 122 (H) 04/08/2025    HDL 64 04/08/2025     No results found for: \"HGBA1C\"  Lab Results   Component Value Date    WBC 9.3 05/12/2025    RBC 4.62 05/12/2025    HGB 13.7 05/12/2025     05/12/2025   No other concerns at this time.  Agreeable to continue monthly outreaches.  Encouraged to call if questions or concerns arise.    Nimco Rg   "

## 2025-05-15 DIAGNOSIS — N89.8 EROSION OF VAGINA: Chronic | ICD-10-CM

## 2025-05-15 LAB
Q ONSET: 221 MS
QRS COUNT: 27 BEATS
QRS DURATION: 70 MS
QT INTERVAL: 290 MS
QTC CALCULATION(BAZETT): 477 MS
QTC FREDERICIA: 404 MS
R AXIS: 108 DEGREES
T AXIS: -63 DEGREES
T OFFSET: 366 MS
VENTRICULAR RATE: 163 BPM

## 2025-05-19 ENCOUNTER — TELEPHONE (OUTPATIENT)
Dept: HOME HEALTH SERVICES | Facility: HOME HEALTH | Age: 75
End: 2025-05-19
Payer: MEDICARE

## 2025-05-19 ENCOUNTER — PATIENT OUTREACH (OUTPATIENT)
Dept: PRIMARY CARE | Facility: CLINIC | Age: 75
End: 2025-05-19
Payer: MEDICARE

## 2025-05-19 DIAGNOSIS — I10 HTN (HYPERTENSION), BENIGN: ICD-10-CM

## 2025-05-19 DIAGNOSIS — I50.32 CHRONIC DIASTOLIC CHF (CONGESTIVE HEART FAILURE): ICD-10-CM

## 2025-05-19 NOTE — TELEPHONE ENCOUNTER
Hello,    Your recent home care referral for Constance M Kocher has been made a Non Admit with  Home Care due to Inability to Contact Patient. Staff has called all available numbers and contacts for this patient over a 3-4 day period, left messages, and received no return call. If you have further questions, feel free to reach out to our office at [phone].     Thank you,   [facility]

## 2025-05-20 ENCOUNTER — APPOINTMENT (OUTPATIENT)
Dept: PRIMARY CARE | Facility: CLINIC | Age: 75
End: 2025-05-20
Payer: MEDICARE

## 2025-05-20 ENCOUNTER — APPOINTMENT (OUTPATIENT)
Dept: OBSTETRICS AND GYNECOLOGY | Facility: CLINIC | Age: 75
End: 2025-05-20
Payer: MEDICARE

## 2025-05-20 DIAGNOSIS — I50.32 CHRONIC DIASTOLIC CHF (CONGESTIVE HEART FAILURE): Primary | ICD-10-CM

## 2025-05-21 ENCOUNTER — APPOINTMENT (OUTPATIENT)
Dept: PRIMARY CARE | Facility: CLINIC | Age: 75
End: 2025-05-21
Payer: MEDICARE

## 2025-05-21 DIAGNOSIS — J42 CHRONIC BRONCHITIS, UNSPECIFIED CHRONIC BRONCHITIS TYPE (MULTI): ICD-10-CM

## 2025-05-21 RX ORDER — ALBUTEROL SULFATE 0.83 MG/ML
SOLUTION RESPIRATORY (INHALATION)
Qty: 540 ML | Refills: 11 | Status: CANCELLED | OUTPATIENT
Start: 2025-05-21

## 2025-05-22 ENCOUNTER — PATIENT OUTREACH (OUTPATIENT)
Dept: PRIMARY CARE | Facility: CLINIC | Age: 75
End: 2025-05-22
Payer: MEDICARE

## 2025-05-22 ENCOUNTER — APPOINTMENT (OUTPATIENT)
Dept: PRIMARY CARE | Facility: CLINIC | Age: 75
End: 2025-05-22
Payer: MEDICARE

## 2025-05-22 DIAGNOSIS — I10 HTN (HYPERTENSION), BENIGN: ICD-10-CM

## 2025-05-22 DIAGNOSIS — I50.32 CHRONIC DIASTOLIC CHF (CONGESTIVE HEART FAILURE): ICD-10-CM
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
## 2025-05-27 ENCOUNTER — APPOINTMENT (OUTPATIENT)
Dept: OBSTETRICS AND GYNECOLOGY | Facility: CLINIC | Age: 75
End: 2025-05-27
Payer: MEDICARE

## 2025-05-27 ENCOUNTER — PATIENT OUTREACH (OUTPATIENT)
Dept: PRIMARY CARE | Facility: CLINIC | Age: 75
End: 2025-05-27

## 2025-05-27 DIAGNOSIS — I10 HTN (HYPERTENSION), BENIGN: ICD-10-CM

## 2025-05-27 DIAGNOSIS — I50.32 CHRONIC DIASTOLIC CHF (CONGESTIVE HEART FAILURE): ICD-10-CM

## 2025-05-27 NOTE — PROGRESS NOTES
Call from Jazzy states D3 needs refilled and wonders if she is supposed to be on Magnesium supplement.   Recent Mg 1.60 WNL  Message routed to Ángela Rowell

## 2025-06-02 ENCOUNTER — APPOINTMENT (OUTPATIENT)
Dept: CARDIOLOGY | Facility: CLINIC | Age: 75
End: 2025-06-02
Payer: MEDICARE

## 2025-06-02 DIAGNOSIS — E55.9 VITAMIN D DEFICIENCY: ICD-10-CM

## 2025-06-02 RX ORDER — ACETAMINOPHEN 500 MG
100 TABLET ORAL DAILY
Qty: 60 CAPSULE | Refills: 11 | Status: SHIPPED | OUTPATIENT
Start: 2025-06-02

## 2025-06-03 ENCOUNTER — PATIENT OUTREACH (OUTPATIENT)
Dept: PRIMARY CARE | Facility: CLINIC | Age: 75
End: 2025-06-03
Payer: MEDICARE

## 2025-06-03 DIAGNOSIS — I10 HTN (HYPERTENSION), BENIGN: ICD-10-CM

## 2025-06-03 DIAGNOSIS — I50.32 CHRONIC DIASTOLIC CHF (CONGESTIVE HEART FAILURE): ICD-10-CM
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
## 2025-06-04 ENCOUNTER — APPOINTMENT (OUTPATIENT)
Dept: PHARMACY | Facility: HOSPITAL | Age: 75
End: 2025-06-04
Payer: MEDICARE

## 2025-06-18 ENCOUNTER — APPOINTMENT (OUTPATIENT)
Dept: CARDIOLOGY | Facility: CLINIC | Age: 75
End: 2025-06-18
Payer: MEDICARE

## 2025-06-24 ENCOUNTER — TELEPHONE (OUTPATIENT)
Dept: PRIMARY CARE | Facility: CLINIC | Age: 75
End: 2025-06-24
Payer: MEDICARE

## 2025-06-25 ENCOUNTER — OFFICE VISIT (OUTPATIENT)
Dept: PRIMARY CARE | Facility: CLINIC | Age: 75
End: 2025-06-25
Payer: MEDICARE

## 2025-06-25 VITALS
RESPIRATION RATE: 18 BRPM | WEIGHT: 167 LBS | SYSTOLIC BLOOD PRESSURE: 112 MMHG | TEMPERATURE: 97.9 F | DIASTOLIC BLOOD PRESSURE: 56 MMHG | OXYGEN SATURATION: 97 % | HEART RATE: 55 BPM | BODY MASS INDEX: 28.67 KG/M2

## 2025-06-25 DIAGNOSIS — E87.6 HYPOKALEMIA: Primary | ICD-10-CM

## 2025-06-25 DIAGNOSIS — I48.19 PERSISTENT ATRIAL FIBRILLATION (MULTI): ICD-10-CM

## 2025-06-25 DIAGNOSIS — D50.0 IRON DEFICIENCY ANEMIA DUE TO CHRONIC BLOOD LOSS: ICD-10-CM

## 2025-06-25 DIAGNOSIS — E78.2 MIXED HYPERLIPIDEMIA: ICD-10-CM

## 2025-06-25 DIAGNOSIS — J42 CHRONIC BRONCHITIS, UNSPECIFIED CHRONIC BRONCHITIS TYPE (MULTI): ICD-10-CM

## 2025-06-25 DIAGNOSIS — I10 PRIMARY HYPERTENSION: ICD-10-CM

## 2025-06-25 DIAGNOSIS — G25.81 RESTLESS LEG SYNDROME: ICD-10-CM

## 2025-06-25 PROCEDURE — 99349 HOME/RES VST EST MOD MDM 40: CPT | Performed by: NURSE PRACTITIONER

## 2025-06-25 PROCEDURE — 3074F SYST BP LT 130 MM HG: CPT | Performed by: NURSE PRACTITIONER

## 2025-06-25 PROCEDURE — 1126F AMNT PAIN NOTED NONE PRSNT: CPT | Performed by: NURSE PRACTITIONER

## 2025-06-25 PROCEDURE — 3078F DIAST BP <80 MM HG: CPT | Performed by: NURSE PRACTITIONER

## 2025-06-25 ASSESSMENT — ENCOUNTER SYMPTOMS
CONSTIPATION: 1
WEAKNESS: 1
ACTIVITY CHANGE: 1
FREQUENCY: 1
SHORTNESS OF BREATH: 1
PSYCHIATRIC NEGATIVE: 1
FATIGUE: 1
COUGH: 1
RHINORRHEA: 1
APPETITE CHANGE: 1

## 2025-06-25 ASSESSMENT — PAIN SCALES - GENERAL: PAINLEVEL_OUTOF10: 0-NO PAIN
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
## 2025-06-26 LAB
ANION GAP SERPL CALCULATED.4IONS-SCNC: 13 MMOL/L (CALC) (ref 7–17)
BUN SERPL-MCNC: 15 MG/DL (ref 7–25)
BUN/CREAT SERPL: 13 (CALC) (ref 6–22)
CALCIUM SERPL-MCNC: 9.2 MG/DL (ref 8.6–10.4)
CHLORIDE SERPL-SCNC: 104 MMOL/L (ref 98–110)
CO2 SERPL-SCNC: 27 MMOL/L (ref 20–32)
CREAT SERPL-MCNC: 1.12 MG/DL (ref 0.6–1)
EGFRCR SERPLBLD CKD-EPI 2021: 52 ML/MIN/1.73M2
FERRITIN SERPL-MCNC: 55 NG/ML (ref 16–288)
GLUCOSE SERPL-MCNC: 69 MG/DL (ref 65–99)
IRON SATN MFR SERPL: 70 % (CALC) (ref 16–45)
IRON SERPL-MCNC: 267 MCG/DL (ref 45–160)
POTASSIUM SERPL-SCNC: 4 MMOL/L (ref 3.5–5.3)
SODIUM SERPL-SCNC: 144 MMOL/L (ref 135–146)
TIBC SERPL-MCNC: 384 MCG/DL (CALC) (ref 250–450)

## 2025-07-01 ENCOUNTER — PATIENT OUTREACH (OUTPATIENT)
Dept: PRIMARY CARE | Facility: CLINIC | Age: 75
End: 2025-07-01
Payer: MEDICARE

## 2025-07-01 DIAGNOSIS — I50.32 CHRONIC DIASTOLIC CHF (CONGESTIVE HEART FAILURE): ICD-10-CM

## 2025-07-01 DIAGNOSIS — I48.19 PERSISTENT ATRIAL FIBRILLATION (MULTI): ICD-10-CM

## 2025-07-01 NOTE — PROGRESS NOTES
Care Management Monthly Outreach  Chart review completed    Last Office Visit: 4/17/2025  Next Office Visit: Visit date not found   APC: n/a    Has patient been to ER/Urgent Care since last outreach? No    Outreach attempted: unable to leave message on voicemail..    Nimco Rg

## 2025-07-07 ENCOUNTER — PATIENT OUTREACH (OUTPATIENT)
Dept: PRIMARY CARE | Facility: CLINIC | Age: 75
End: 2025-07-07
Payer: MEDICARE

## 2025-07-07 DIAGNOSIS — I50.32 CHRONIC DIASTOLIC CHF (CONGESTIVE HEART FAILURE): ICD-10-CM

## 2025-07-07 DIAGNOSIS — I48.19 PERSISTENT ATRIAL FIBRILLATION (MULTI): ICD-10-CM

## 2025-07-15 ENCOUNTER — PATIENT OUTREACH (OUTPATIENT)
Dept: PRIMARY CARE | Facility: CLINIC | Age: 75
End: 2025-07-15
Payer: MEDICARE

## 2025-07-15 DIAGNOSIS — I50.32 CHRONIC DIASTOLIC CHF (CONGESTIVE HEART FAILURE): ICD-10-CM

## 2025-07-15 DIAGNOSIS — I48.19 PERSISTENT ATRIAL FIBRILLATION (MULTI): ICD-10-CM

## 2025-07-17 NOTE — PROGRESS NOTES
Subjective   Patient ID: Constance M Kocher is a 73 y.o. female who presents for Chest Pain (Patient here for chest pressure, trouble breathing and cough).  Feels like her kidneys are  hurting and stopped her torsomide 3 days due to kidney painand upper legs with spasm   HPI feels like lungs heavy and cough has not had albuterol machine med getting delivered.   Nausea med refill   Having Afib with elevated heart rate.   Been checking her weight and   Discussed home scooter due home confinment related to activity intolaerance not able to walk distance and can attend family activity and having depression   Seen by pulmonology and elevated oxygenation has   Review of Systems   Constitutional:  Positive for fatigue.   Respiratory:  Positive for cough, shortness of breath and wheezing.    Cardiovascular:  Positive for leg swelling.   Psychiatric/Behavioral:  Positive for sleep disturbance.        Objective   /60 (BP Location: Right arm, Patient Position: Sitting, BP Cuff Size: Adult)   Pulse 72   LMP  (LMP Unknown)   SpO2 95%     Physical Exam  Constitutional:       General: She is not in acute distress.     Appearance: Normal appearance.   HENT:      Mouth/Throat:      Mouth: Mucous membranes are dry.   Cardiovascular:      Rate and Rhythm: Normal rate and regular rhythm.      Heart sounds: No murmur heard.  Pulmonary:      Breath sounds: Rhonchi present. No wheezing.   Musculoskeletal:      Cervical back: Normal range of motion and neck supple.   Skin:     General: Skin is warm.   Neurological:      Mental Status: She is alert.   Psychiatric:         Mood and Affect: Mood normal.         Assessment/Plan   Problem List Items Addressed This Visit             ICD-10-CM    Stage 3a chronic kidney disease (Multi) N18.31    Relevant Medications    lisinopril 2.5 mg tablet    Chronic diastolic CHF (congestive heart failure) (Multi) I50.32    Relevant Medications    torsemide (Demadex) 10 mg tablet    Other Relevant  Orders    B-type natriuretic peptide     Other Visit Diagnoses         Codes    Shortness of breath    -  Primary R06.02    Restless leg syndrome     G25.81    Relevant Medications    rOPINIRole (Requip) 2 mg tablet    Simple chronic bronchitis (Multi)     J41.0    Relevant Medications    fluticasone-umeclidin-vilanter (Trelegy Ellipta) 100-62.5-25 mcg blister with device    methylPREDNISolone (Medrol Dospak) 4 mg tablets    Other Relevant Orders    XR chest 2 views                [FreeTextEntry1] : CPE [de-identified] : Pt reports feeling well today.

## 2025-07-30 ENCOUNTER — PATIENT MESSAGE (OUTPATIENT)
Dept: PRIMARY CARE | Facility: CLINIC | Age: 75
End: 2025-07-30
Payer: MEDICARE

## 2025-07-31 ENCOUNTER — PHARMACY VISIT (OUTPATIENT)
Dept: PHARMACY | Facility: CLINIC | Age: 75
End: 2025-07-31
Payer: MEDICARE

## 2025-07-31 ENCOUNTER — TELEPHONE (OUTPATIENT)
Dept: PRIMARY CARE | Facility: CLINIC | Age: 75
End: 2025-07-31
Payer: MEDICARE

## 2025-07-31 DIAGNOSIS — I10 HTN (HYPERTENSION), BENIGN: ICD-10-CM

## 2025-07-31 DIAGNOSIS — I48.91 ATRIAL FIBRILLATION WITH RAPID VENTRICULAR RESPONSE (MULTI): ICD-10-CM

## 2025-07-31 PROCEDURE — RXMED WILLOW AMBULATORY MEDICATION CHARGE

## 2025-07-31 RX ORDER — NEBULIZER AND COMPRESSOR
EACH MISCELLANEOUS
Qty: 6 EACH | Refills: 3 | OUTPATIENT
Start: 2025-04-23

## 2025-07-31 RX ORDER — BUMETANIDE 1 MG/1
1 TABLET ORAL DAILY
Qty: 90 TABLET | Refills: 3 | Status: SHIPPED | OUTPATIENT
Start: 2025-07-31 | End: 2025-07-31 | Stop reason: SDUPTHER

## 2025-07-31 RX ORDER — BUMETANIDE 1 MG/1
1 TABLET ORAL DAILY
Qty: 90 TABLET | Refills: 3 | Status: SHIPPED | OUTPATIENT
Start: 2025-07-31

## 2025-07-31 RX ORDER — EMOLLIENT BASE
CREAM (GRAM) TOPICAL
Qty: 113 G | Refills: 0 | OUTPATIENT
Start: 2025-05-12

## 2025-07-31 RX ORDER — BUMETANIDE 1 MG/1
1 TABLET ORAL DAILY
COMMUNITY
End: 2025-07-31 | Stop reason: SDUPTHER

## 2025-07-31 RX ORDER — POTASSIUM CHLORIDE 750 MG/1
10 TABLET, FILM COATED, EXTENDED RELEASE ORAL DAILY
Qty: 30 TABLET | Refills: 0 | OUTPATIENT
Start: 2025-02-28

## 2025-07-31 RX ORDER — BUMETANIDE 1 MG/1
1 TABLET ORAL DAILY
Qty: 30 TABLET | Refills: 0 | Status: CANCELLED | OUTPATIENT
Start: 2025-07-31

## 2025-07-31 RX ORDER — ROSUVASTATIN CALCIUM 5 MG/1
5 TABLET, COATED ORAL DAILY
Qty: 310 TABLET | Refills: 0 | OUTPATIENT
Start: 2025-04-23

## 2025-07-31 NOTE — TELEPHONE ENCOUNTER
"Patient calling office, stating she needs a refill of medication on her \"water pills\", she is having increased SOB and \"holding onto water\".  Patient states pharmacy called her to let her know they have been trying to get hold of Ashlee, the NP who originally wrote the Rx. For her waterpills and no one's returned the call.  This nurse calls [facility] speaking with pharmacist, medication needing refill is Bumetanide 1mG, one by mouth daily.   "

## 2025-08-01 ENCOUNTER — PHARMACY VISIT (OUTPATIENT)
Dept: PHARMACY | Facility: CLINIC | Age: 75
End: 2025-08-01
Payer: COMMERCIAL

## 2025-08-05 ENCOUNTER — PHARMACY VISIT (OUTPATIENT)
Dept: PHARMACY | Facility: CLINIC | Age: 75
End: 2025-08-05
Payer: MEDICARE

## 2025-08-05 DIAGNOSIS — G25.81 RESTLESS LEG: ICD-10-CM

## 2025-08-05 PROCEDURE — RXMED WILLOW AMBULATORY MEDICATION CHARGE

## 2025-08-05 RX ORDER — ROPINIROLE 1 MG/1
1 TABLET, FILM COATED ORAL 3 TIMES DAILY
Qty: 270 TABLET | Refills: 3 | Status: SHIPPED | OUTPATIENT
Start: 2025-08-05 | End: 2026-08-05

## 2025-08-25 ENCOUNTER — TELEPHONE (OUTPATIENT)
Dept: PRIMARY CARE | Facility: CLINIC | Age: 75
End: 2025-08-25
Payer: MEDICARE

## 2025-08-25 PROCEDURE — RXMED WILLOW AMBULATORY MEDICATION CHARGE

## 2025-08-26 ENCOUNTER — OFFICE VISIT (OUTPATIENT)
Dept: PRIMARY CARE | Facility: CLINIC | Age: 75
End: 2025-08-26
Payer: MEDICARE

## 2025-08-26 ENCOUNTER — PHARMACY VISIT (OUTPATIENT)
Dept: PHARMACY | Facility: CLINIC | Age: 75
End: 2025-08-26
Payer: MEDICARE

## 2025-08-26 VITALS
BODY MASS INDEX: 27.26 KG/M2 | OXYGEN SATURATION: 95 % | HEART RATE: 72 BPM | DIASTOLIC BLOOD PRESSURE: 70 MMHG | SYSTOLIC BLOOD PRESSURE: 130 MMHG | TEMPERATURE: 96.8 F | WEIGHT: 158.8 LBS | RESPIRATION RATE: 24 BRPM

## 2025-08-26 DIAGNOSIS — I48.19 PERSISTENT ATRIAL FIBRILLATION (MULTI): ICD-10-CM

## 2025-08-26 DIAGNOSIS — E87.6 HYPOKALEMIA: ICD-10-CM

## 2025-08-26 DIAGNOSIS — M35.3 PMR (POLYMYALGIA RHEUMATICA) (MULTI): ICD-10-CM

## 2025-08-26 DIAGNOSIS — G25.81 RESTLESS LEG SYNDROME: ICD-10-CM

## 2025-08-26 DIAGNOSIS — I50.32 CHRONIC DIASTOLIC CHF (CONGESTIVE HEART FAILURE): Primary | ICD-10-CM

## 2025-08-26 DIAGNOSIS — E78.2 MIXED HYPERLIPIDEMIA: ICD-10-CM

## 2025-08-26 DIAGNOSIS — J42 CHRONIC BRONCHITIS, UNSPECIFIED CHRONIC BRONCHITIS TYPE (MULTI): ICD-10-CM

## 2025-08-26 PROCEDURE — 3075F SYST BP GE 130 - 139MM HG: CPT | Performed by: NURSE PRACTITIONER

## 2025-08-26 PROCEDURE — 3078F DIAST BP <80 MM HG: CPT | Performed by: NURSE PRACTITIONER

## 2025-08-26 PROCEDURE — 1126F AMNT PAIN NOTED NONE PRSNT: CPT | Performed by: NURSE PRACTITIONER

## 2025-08-26 PROCEDURE — 99349 HOME/RES VST EST MOD MDM 40: CPT | Performed by: NURSE PRACTITIONER

## 2025-08-26 ASSESSMENT — ENCOUNTER SYMPTOMS
FREQUENCY: 1
PALPITATIONS: 1
SINUS PAIN: 1
WEAKNESS: 1
EYES NEGATIVE: 1
COUGH: 1
PSYCHIATRIC NEGATIVE: 1
APPETITE CHANGE: 1
ACTIVITY CHANGE: 1
CONSTIPATION: 1
LIGHT-HEADEDNESS: 1
FATIGUE: 1
WHEEZING: 1
SHORTNESS OF BREATH: 1

## 2025-08-26 ASSESSMENT — PAIN SCALES - GENERAL: PAINLEVEL_OUTOF10: 0-NO PAIN

## 2025-08-27 ENCOUNTER — TELEPHONE (OUTPATIENT)
Dept: PRIMARY CARE | Facility: CLINIC | Age: 75
End: 2025-08-27
Payer: MEDICARE

## 2025-08-27 DIAGNOSIS — J42 CHRONIC BRONCHITIS, UNSPECIFIED CHRONIC BRONCHITIS TYPE (MULTI): Primary | ICD-10-CM

## 2025-08-27 RX ORDER — AZITHROMYCIN 250 MG/1
TABLET, FILM COATED ORAL
Qty: 6 TABLET | Refills: 0 | Status: SHIPPED | OUTPATIENT
Start: 2025-08-27 | End: 2025-08-27 | Stop reason: ENTERED-IN-ERROR

## 2025-08-27 RX ORDER — AZITHROMYCIN 250 MG/1
TABLET, FILM COATED ORAL
Qty: 6 TABLET | Refills: 0 | Status: SHIPPED | OUTPATIENT
Start: 2025-08-27 | End: 2025-09-01

## 2025-09-02 ENCOUNTER — TELEPHONE (OUTPATIENT)
Dept: PRIMARY CARE | Facility: CLINIC | Age: 75
End: 2025-09-02
Payer: MEDICARE